# Patient Record
Sex: FEMALE | Race: WHITE | NOT HISPANIC OR LATINO | Employment: OTHER | URBAN - METROPOLITAN AREA
[De-identification: names, ages, dates, MRNs, and addresses within clinical notes are randomized per-mention and may not be internally consistent; named-entity substitution may affect disease eponyms.]

---

## 2017-11-22 ENCOUNTER — TELEPHONE (OUTPATIENT)
Dept: NEUROLOGY | Facility: CLINIC | Age: 77
End: 2017-11-22

## 2017-11-22 NOTE — TELEPHONE ENCOUNTER
----- Message from Jodee Allred sent at 11/22/2017 10:26 AM CST -----  Contact: Berenice with [facility] Nurse Navigator with Dr. Zachary Ramsey sent an electronic referral & also a faxed document marked urgent as well to Dr. Louise for patient to be seen as soon as possible.  Please call patient to schedule the appointment.  Call Back#for patient: [phone] or    (Dr. Ramsey's phone number )  Thanks

## 2017-11-22 NOTE — TELEPHONE ENCOUNTER
Returned call and spoke with patient. Patient stated she had a stroke and went to [facility]. Patient scheduled with Renetta Izaguirre. Patient is to bring records for [facility] to visit. Patient verbalized understanding.

## 2017-12-01 ENCOUNTER — TELEPHONE (OUTPATIENT)
Dept: NEUROLOGY | Facility: CLINIC | Age: 77
End: 2017-12-01

## 2017-12-01 NOTE — TELEPHONE ENCOUNTER
Spoke with the patient's husbamd, she was trying to get an appointment in [place].  He has a hard time getting his wife to places.  He was instructed to get her medical records from [facility] along with the disc of any imaging.  Can you see this patient.  He has an appointment scheduled.

## 2017-12-18 ENCOUNTER — TELEPHONE (OUTPATIENT)
Dept: NEUROLOGY | Facility: CLINIC | Age: 77
End: 2017-12-18

## 2018-05-22 ENCOUNTER — OFFICE VISIT (OUTPATIENT)
Dept: PULMONOLOGY | Facility: CLINIC | Age: 78
End: 2018-05-22
Payer: MEDICARE

## 2018-05-22 VITALS
OXYGEN SATURATION: 98 % | SYSTOLIC BLOOD PRESSURE: 121 MMHG | HEART RATE: 91 BPM | BODY MASS INDEX: 21.8 KG/M2 | HEIGHT: 67 IN | WEIGHT: 138.88 LBS | DIASTOLIC BLOOD PRESSURE: 72 MMHG

## 2018-05-22 DIAGNOSIS — J44.9 COPD MIXED TYPE: Primary | ICD-10-CM

## 2018-05-22 DIAGNOSIS — R09.89 CHRONIC SINUS COMPLAINTS: ICD-10-CM

## 2018-05-22 PROCEDURE — 3078F DIAST BP <80 MM HG: CPT | Mod: CPTII,S$GLB,, | Performed by: INTERNAL MEDICINE

## 2018-05-22 PROCEDURE — 99999 PR PBB SHADOW E&M-EST. PATIENT-LVL III: CPT | Mod: PBBFAC,,, | Performed by: INTERNAL MEDICINE

## 2018-05-22 PROCEDURE — 99204 OFFICE O/P NEW MOD 45 MIN: CPT | Mod: S$GLB,,, | Performed by: INTERNAL MEDICINE

## 2018-05-22 PROCEDURE — 3074F SYST BP LT 130 MM HG: CPT | Mod: CPTII,S$GLB,, | Performed by: INTERNAL MEDICINE

## 2018-05-22 RX ORDER — FLUOXETINE HYDROCHLORIDE 20 MG/1
CAPSULE ORAL
Refills: 0 | COMMUNITY
Start: 2018-04-02 | End: 2018-05-22 | Stop reason: SDUPTHER

## 2018-05-22 RX ORDER — FLUTICASONE PROPIONATE 50 MCG
2 SPRAY, SUSPENSION (ML) NASAL DAILY
Qty: 1 BOTTLE | Refills: 11 | Status: SHIPPED | OUTPATIENT
Start: 2018-05-22 | End: 2019-06-04 | Stop reason: SDUPTHER

## 2018-05-22 RX ORDER — FAMOTIDINE 20 MG/1
20 TABLET, FILM COATED ORAL NIGHTLY
Refills: 1 | COMMUNITY
Start: 2018-04-02

## 2018-05-22 RX ORDER — AZITHROMYCIN 500 MG/1
TABLET, FILM COATED ORAL
Qty: 3 TABLET | Refills: 3 | Status: ON HOLD | OUTPATIENT
Start: 2018-05-22 | End: 2019-10-18 | Stop reason: ALTCHOICE

## 2018-05-22 RX ORDER — PREDNISONE 20 MG/1
TABLET ORAL
Qty: 12 TABLET | Refills: 0 | Status: SHIPPED | OUTPATIENT
Start: 2018-05-22 | End: 2018-09-19 | Stop reason: SDUPTHER

## 2018-05-22 RX ORDER — CALCITRIOL 0.25 UG/1
0.25 CAPSULE ORAL DAILY
Refills: 3 | COMMUNITY
Start: 2018-03-22 | End: 2021-02-25

## 2018-05-22 RX ORDER — LEVOTHYROXINE SODIUM 100 UG/1
100 TABLET ORAL
COMMUNITY
End: 2024-03-09 | Stop reason: DRUGHIGH

## 2018-05-22 RX ORDER — IPRATROPIUM BROMIDE AND ALBUTEROL SULFATE 2.5; .5 MG/3ML; MG/3ML
3 SOLUTION RESPIRATORY (INHALATION) EVERY 6 HOURS PRN
Qty: 120 VIAL | Refills: 11 | Status: SHIPPED | OUTPATIENT
Start: 2018-05-22 | End: 2019-05-22

## 2018-05-22 RX ORDER — GABAPENTIN 100 MG/1
CAPSULE ORAL
Refills: 0 | COMMUNITY
Start: 2018-04-02 | End: 2018-05-22 | Stop reason: SDUPTHER

## 2018-05-22 RX ORDER — GABAPENTIN 300 MG/1
CAPSULE ORAL
COMMUNITY
End: 2020-02-17

## 2018-05-22 NOTE — LETTER
May 22, 2018      Zachary Ramsey MD  [address]  [address]  [facility]  [place] [address]           [facility] [facility]  [address]  [place] [address]  Phone: [phone]  Fax: [phone]          Patient: Althea Gaona   MR Number: [id number]   YOB: 1940   Date of Visit: 5/22/2018       Dear Dr. Zachary Ramsey:    Thank you for referring Althea Gaona to me for evaluation. Attached you will find relevant portions of my assessment and plan of care.    If you have questions, please do not hesitate to call me. I look forward to following Althea Gaona along with you.    Sincerely,    Matt Harvey MD    Enclosure  CC:  No Recipients    If you would like to receive this communication electronically, please contact [email or web address] or [phone] to request more information on [email or web address] Link access.    For providers and/or their staff who would like to refer a patient to [facility], please contact us through our one-stop-shop provider referral line, [facility], at [phone].    If you feel you have received this communication in error or would no longer like to receive these types of communications, please e-mail [email or web address]

## 2018-05-22 NOTE — PROGRESS NOTES
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  Fearful of dying.  Chr nausea and dizzy.  Was in hosp recently - hospitalized.  Had bipolar - usually ok but stressed with renal dz/illness.  Uses advair/combivent. Uses nebulizer.  Uses steroids occ.  trelegy helped well - better than advair.    No mucous, occ chest pains/rib pains.    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    Was seeing Dr Mckoy at [place], not recently , poor sleep on prozac 20/d - med list may be incomplete.    The chief compliant  problem is varies with instablilty at time   PFSH:  Past Medical History:   Diagnosis Date    Alcoholism     no drink since     Anxiety     Bipolar disorder     Chronic kidney disease     COPD (chronic obstructive pulmonary disease)     GERD (gastroesophageal reflux disease)     Hypertension     Pancreatitis     Thyroid disease          Past Surgical History:   Procedure Laterality Date    APPENDECTOMY      CATARACT EXTRACTION Right     EYE SURGERY      HYSTERECTOMY      THYROIDECTOMY      THYROIDECTOMY       Social History   Substance Use Topics    Smoking status: Former Smoker     Packs/day: 1.00     Types: Cigarettes     Quit date: 6/10/2015    Smokeless tobacco: Not on file    Alcohol use No     Family History   Problem Relation Age of Onset    No Known Problems Mother     Diabetes Father     Heart disease Father         blood clot in lung went to heart    Cancer Sister         breast    Stroke Paternal Aunt     Cancer Sister         breast    Cancer Cousin         colon    Diabetes Cousin     Cancer Cousin         colon    COPD Sister          of COPD     Review of patient's allergies indicates:   Allergen Reactions    Metoprolol Other (See  "Comments)     Grand-daughter/care giver reported Pt has over reaction to this drug, Bp bottoms out along with heart rate    Abilify [aripiprazole]      dizziness    Codeine      Other reaction(s): Unknown       Performance Status:The patient's activity level is mobility with asit devices.      Review of Systems:  a review of eleven systems covering constitutional, Eye, HEENT, Psych, Respiratory, Cardiac, GI, , Musculoskeletal, Endocrine, Dermatologic was negative except for pertinent findings as listed ABOVE and below:  Loss wt recently with renal failure, sinus stuffy, nerves getting by , still makes urine, chr back pains and back and rib pains.      Exam:Comprehensive exam done. /72 (BP Location: Right arm, Patient Position: Sitting)   Pulse 91   Ht 5' 7" (1.702 m)   Wt 63 kg (138 lb 14.2 oz)   SpO2 98%   BMI 21.75 kg/m²   Exam included Vitals as listed, and patient's appearance and affect and alertness and mood, oral exam for yeast and hygiene and pharynx lesions and Mallapatti (M) score, neck with inspection for jvd and masses and thyroid abnormalities and lymph nodes (supraclavicular and infraclavicular nodes and axillary also examined and noted if abn), chest exam included symmetry and effort and fremitus and percussion and auscultation, cardiac exam included rhythm and gallops and murmur and rubs and jvd and edema, abdominal exam for mass and hepatosplenomegaly and tenderness and hernias and bowel sounds, Musculoskeletal exam with muscle tone and posture and mobility/gait and  strength, and skin for rashes and cyanosis and pallor and turgor, extremity for clubbing.  Findings were normal except for pertinent findings listed below:   M2, chest is symmetric, no distress, normal percussion, normal fremitus and good normal breath sounds  Anxious.     Radiographs (ct chest and cxr) reviewed: view by direct vision  cxr nov 2016 good    Labs reviewed no recent         PFT was done and results to be " reviewed       Plan:  Clinical impression is resonably certain and repeated evaluation prn +/- follow up will be needed as below.     Althea was seen today for copd and shortness of breath.    Diagnoses and all orders for this visit:    COPD mixed type  -     PULSE OXIMETRY OVERNIGHT  -     fluticasone-umeclidin-vilanter (TRELEGY ELLIPTA) 100-62.5-25 mcg DsDv; Inhale 1 puff into the lungs once daily.  -     azithromycin (ZITHROMAX) 500 MG tablet; One daily for yellow mucous, repeat if needed  -     predniSONE (DELTASONE) 20 MG tablet; One daily for 3 days and repeat for flare of lung symptoms as intructed  -     albuterol-ipratropium (DUO-NEB) 2.5 mg-0.5 mg/3 mL nebulizer solution; Take 3 mLs by nebulization every 6 (six) hours as needed.    Chronic sinus complaints  -     fluticasone (FLONASE) 50 mcg/actuation nasal spray; 2 sprays (100 mcg total) by Each Nare route once daily.        Follow-up in about 4 months (around 9/22/2018).    Discussed with patient above for education the following:      Patient Instructions   Need to review old breathing test.    Check 02 level with sleep and arrange oxygen if needed    Use trelegy once daily.    Use combivent or nebulizer as needed.    If bad cough/wheezes - use prednisone daily for 3 days, repeat as needed.    If yellow mucous - take azithromycin.    flonase should help sinus drainage.

## 2018-05-22 NOTE — PATIENT INSTRUCTIONS
Need to review old breathing test.    Check 02 level with sleep and arrange oxygen if needed    Use trelegy once daily.    Use combivent or nebulizer as needed.    If bad cough/wheezes - use prednisone daily for 3 days, repeat as needed.    If yellow mucous - take azithromycin.    flonase should help sinus drainage.

## 2018-09-19 ENCOUNTER — OFFICE VISIT (OUTPATIENT)
Dept: PULMONOLOGY | Facility: CLINIC | Age: 78
End: 2018-09-19
Payer: MEDICARE

## 2018-09-19 VITALS
OXYGEN SATURATION: 96 % | SYSTOLIC BLOOD PRESSURE: 108 MMHG | HEART RATE: 86 BPM | BODY MASS INDEX: 21.8 KG/M2 | HEIGHT: 67 IN | DIASTOLIC BLOOD PRESSURE: 70 MMHG | WEIGHT: 138.88 LBS

## 2018-09-19 DIAGNOSIS — J44.9 COPD MIXED TYPE: ICD-10-CM

## 2018-09-19 DIAGNOSIS — J01.10 ACUTE NON-RECURRENT FRONTAL SINUSITIS: ICD-10-CM

## 2018-09-19 DIAGNOSIS — R06.02 SOB (SHORTNESS OF BREATH): Primary | ICD-10-CM

## 2018-09-19 PROCEDURE — 99213 OFFICE O/P EST LOW 20 MIN: CPT | Mod: PBBFAC,PO | Performed by: NURSE PRACTITIONER

## 2018-09-19 PROCEDURE — 1101F PT FALLS ASSESS-DOCD LE1/YR: CPT | Mod: CPTII,,, | Performed by: NURSE PRACTITIONER

## 2018-09-19 PROCEDURE — 3078F DIAST BP <80 MM HG: CPT | Mod: CPTII,,, | Performed by: NURSE PRACTITIONER

## 2018-09-19 PROCEDURE — 99214 OFFICE O/P EST MOD 30 MIN: CPT | Mod: S$PBB,,, | Performed by: NURSE PRACTITIONER

## 2018-09-19 PROCEDURE — 96372 THER/PROPH/DIAG INJ SC/IM: CPT | Mod: PBBFAC,PO

## 2018-09-19 PROCEDURE — 3074F SYST BP LT 130 MM HG: CPT | Mod: CPTII,,, | Performed by: NURSE PRACTITIONER

## 2018-09-19 PROCEDURE — 99999 PR PBB SHADOW E&M-EST. PATIENT-LVL III: CPT | Mod: PBBFAC,,, | Performed by: NURSE PRACTITIONER

## 2018-09-19 RX ORDER — BETAMETHASONE SODIUM PHOSPHATE AND BETAMETHASONE ACETATE 3; 3 MG/ML; MG/ML
6 INJECTION, SUSPENSION INTRA-ARTICULAR; INTRALESIONAL; INTRAMUSCULAR; SOFT TISSUE ONCE
Status: COMPLETED | OUTPATIENT
Start: 2018-09-19 | End: 2018-09-19

## 2018-09-19 RX ORDER — AZITHROMYCIN 500 MG/1
500 TABLET, FILM COATED ORAL DAILY
Qty: 5 TABLET | Refills: 0 | Status: SHIPPED | OUTPATIENT
Start: 2018-09-19 | End: 2018-09-24

## 2018-09-19 RX ORDER — PREDNISONE 20 MG/1
TABLET ORAL
Qty: 12 TABLET | Refills: 0 | Status: SHIPPED | OUTPATIENT
Start: 2018-09-19 | End: 2019-01-18 | Stop reason: SDUPTHER

## 2018-09-19 RX ORDER — BUSPIRONE HYDROCHLORIDE 10 MG/1
10 TABLET ORAL 3 TIMES DAILY
COMMUNITY
End: 2019-11-25 | Stop reason: SDUPTHER

## 2018-09-19 RX ADMIN — BETAMETHASONE ACETATE AND BETAMETHASONE SODIUM PHOSPHATE 6 MG: 3; 3 INJECTION, SUSPENSION INTRA-ARTICULAR; INTRALESIONAL; INTRAMUSCULAR; SOFT TISSUE at 02:09

## 2018-09-19 NOTE — PROGRESS NOTES
"9/19/2018    Althea Gaona  Office Note  Established Patient of Dr. Harvey. Patient is new to me.    Chief Complaint   Patient presents with    Shortness of Breath     "cant breath at night, weights on chest"    Cough    Dizziness       HPI: States dizziness through out day constant,  Associated with nausea takes zofran, SOB constant through day and night, states mild cough not productive, facial pain, sore throat,ear pain constant    Previous HPI Dr. 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  Fearful of dying.  Chr nausea and dizzy.  Was in hosp recently - hospitalized.  Had bipolar - usually ok but stressed with renal dz/illness.  Uses advair/combivent. Uses nebulizer.  Uses steroids occ.  trelegy helped well - better than advair.     No mucous, occ chest pains/rib pains.     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     Was seeing Dr Mckoy at [place], not recently , poor sleep on prozac 20/d - med list may be incomplete.    The chief compliant  problem is new to me   PFSH:  Past Medical History:   Diagnosis Date    Alcoholism     no drink since 1984    Anxiety     Bipolar disorder     Chronic kidney disease     COPD (chronic obstructive pulmonary disease)     GERD (gastroesophageal reflux disease)     Hypertension     Pancreatitis     Thyroid disease          Past Surgical History:   Procedure Laterality Date    APPENDECTOMY      CATARACT EXTRACTION Right     EYE SURGERY      HYSTERECTOMY      THYROIDECTOMY      THYROIDECTOMY       Social History     Tobacco Use    Smoking status: Former Smoker     Packs/day: 1.00     Types: Cigarettes     Last attempt to quit: 6/10/2015     Years since quitting: 3.2   Substance Use Topics    Alcohol use: No    Drug use: Not on file     Family History   Problem Relation Age of Onset    No Known Problems Mother     Diabetes Father     Heart disease Father         blood clot in " lung went to heart    Cancer Sister         breast    Stroke Paternal Aunt     Cancer Sister         breast    Cancer Cousin         colon    Diabetes Cousin     Cancer Cousin         colon    COPD Sister          of COPD     Review of patient's allergies indicates:   Allergen Reactions    Metoprolol Other (See Comments)     Grand-daughter/care giver reported Pt has over reaction to this drug, Bp bottoms out along with heart rate    Abilify [aripiprazole]      dizziness    Codeine      Other reaction(s): Unknown     I have reviewed past medical, family, and social history. I have reviewed previous nurse notes.    Performance Status:The patient's activity level is housebound activities.      Review of Systems   Constitutional: Negative for activity change, appetite change, diaphoresis, fatigue, fever and unexpected weight change. Positive for chills  HENT: Negative for dental problem, postnasal drip, rhinorrhea,sinus pain, sneezing, trouble swallowing and voice change.  Positive for sinus pressure, sore throat  Respiratory: Negative for apnea, cough, chest tightness, shortness of breath, wheezing and stridor.    Cardiovascular: Negative for palpitations and leg swelling. Positive for chest pain,  Gastrointestinal: Negative for abdominal distention, abdominal pain, constipation and nausea.   Musculoskeletal:  Positive for gait problems, uses cane, back pain, neck pain  Skin: Negative for color change and pallor.   Allergic/Immunologic: Negative for environmental allergies and food allergies.   Neurological: Negative for speech difficulty, weakness, , numbness Positive for dizziness, light-headedness, headaches  Hematological: Negative for adenopathy. Does not bruise/bleed easily.   Psychiatric/Behavioral: Negative for dysphoric mood and sleep disturbance. The patient is nervous/anxious.           Exam:Comprehensive exam done. /70 (BP Location: Right arm, Patient Position: Sitting)   Pulse 86   Ht  "5' 7" (1.702 m)   Wt 63 kg (138 lb 14.2 oz)   SpO2 96% Comment: on room air  BMI 21.75 kg/m²   Exam included Vitals as listed  Constitutional: He is oriented to person, place, and time. He appears well-developed. No distress.   Nose: Nose normal.   Mouth/Throat: Uvula is midline, oropharynx is clear and moist and mucous membranes are normal. No dental caries. No oropharyngeal exudate, posterior oropharyngeal edema, posterior oropharyngeal erythema or tonsillar abscesses.  Mallapatti (M) score II  Eyes: Pupils are equal, round, and reactive to light.   Neck: No JVD present. No thyromegaly present.   Cardiovascular: Normal rate, regular rhythm and normal heart sounds. Exam reveals no gallop and no friction rub.   No murmur heard.  Pulmonary/Chest: Effort normal and breath sounds normal. No accessory muscle usage or stridor. No apnea and no tachypnea. No respiratory distress. He has no decreased breath sounds. He has no wheezes. He has no rhonchi. He has no rales. He exhibits no tenderness.   Abdominal: Soft. He exhibits no mass. There is no tenderness. No hepatosplenomegaly, hernias and normoactive bowel sounds  Musculoskeletal: Normal range of motion. He exhibits no edema.   Lymphadenopathy:     Has small left submandibular adenopathy, smooth, tender, movable    He has no axillary adenopathy.   Neurological: He is alert and oriented to person, place, and time. He is not disoriented.   Skin: Skin is warm and dry. Capillary refill takes less 2 sec. No cyanosis or erythema. No pallor. Nails show no clubbing.   Psychiatric: He has a normal mood and affect. His behavior is normal. Judgment and thought content normal.       Radiographs (ct chest and cxr) reviewed: was not done recently      Labs reviewed   Lab Results   Component Value Date    WBC 9.90 11/18/2016    RBC 3.51 (L) 11/18/2016    HGB 11.1 (L) 11/18/2016    HCT 33.4 (L) 11/18/2016    MCV 95 11/18/2016    MCH 31.5 (H) 11/18/2016    MCHC 33.0 11/18/2016    RDW " 15.0 (H) 11/18/2016     11/18/2016    MPV 9.6 11/18/2016    GRAN 7.5 11/18/2016    GRAN 75.9 (H) 11/18/2016    LYMPH 1.2 11/18/2016    LYMPH 12.3 (L) 11/18/2016    MONO 1.1 (H) 11/18/2016    MONO 11.4 11/18/2016    EOS 0.0 11/18/2016    BASO 0.00 11/18/2016    EOSINOPHIL 0.1 11/18/2016    BASOPHIL 0.3 11/18/2016       PFT was not available      Plan:  Clinical impression is apparently straight forward and impression with management as below.    Althea was seen today for shortness of breath, cough and dizziness.    Diagnoses and all orders for this visit:    SOB (shortness of breath)    Acute non-recurrent frontal sinusitis    COPD mixed type    Spent over half of visit educating patient on effect of SOB and sinusitis on Headache and dizziness.    Follow-up in about 2 months (around 11/19/2018), or if symptoms worsen or fail to improve.    Discussed with patient above for education the following:      Patient Instructions   Sinusitis infection  Celestone shot today  Azithromycin 500 mg once a day for five days  Prednisone 20 mg once a day for three days, repeat if needed  Flonase 2 sprays in each nose once a day  Dizziness and headache should resolve with sinusitis resolves      COPD   Continue current medications Trelegy

## 2018-09-19 NOTE — PATIENT INSTRUCTIONS
Sinusitis infection  Celestone shot today  Azithromycin 500 mg once a day for five days  Prednisone 20 mg once a day for three days, repeat if needed  Flonase 2 sprays in each nose once a day  Dizziness and headache should resolve with sinusitis resolves      COPD   Continue current medications Trelegy

## 2018-09-21 ENCOUNTER — OFFICE VISIT (OUTPATIENT)
Dept: PULMONOLOGY | Facility: CLINIC | Age: 78
End: 2018-09-21
Payer: MEDICARE

## 2018-09-21 VITALS
BODY MASS INDEX: 21.97 KG/M2 | WEIGHT: 140 LBS | DIASTOLIC BLOOD PRESSURE: 67 MMHG | SYSTOLIC BLOOD PRESSURE: 110 MMHG | HEIGHT: 67 IN | OXYGEN SATURATION: 97 % | HEART RATE: 81 BPM

## 2018-09-21 DIAGNOSIS — R06.02 SOB (SHORTNESS OF BREATH): Primary | ICD-10-CM

## 2018-09-21 DIAGNOSIS — R42 DIZZINESS OF UNKNOWN CAUSE: ICD-10-CM

## 2018-09-21 DIAGNOSIS — J32.9 CHRONIC SINUSITIS, UNSPECIFIED LOCATION: ICD-10-CM

## 2018-09-21 PROCEDURE — 3078F DIAST BP <80 MM HG: CPT | Mod: CPTII,,, | Performed by: NURSE PRACTITIONER

## 2018-09-21 PROCEDURE — 99213 OFFICE O/P EST LOW 20 MIN: CPT | Mod: PBBFAC,PO | Performed by: NURSE PRACTITIONER

## 2018-09-21 PROCEDURE — 99999 PR PBB SHADOW E&M-EST. PATIENT-LVL III: CPT | Mod: PBBFAC,,, | Performed by: NURSE PRACTITIONER

## 2018-09-21 PROCEDURE — 1101F PT FALLS ASSESS-DOCD LE1/YR: CPT | Mod: CPTII,,, | Performed by: NURSE PRACTITIONER

## 2018-09-21 PROCEDURE — 3074F SYST BP LT 130 MM HG: CPT | Mod: CPTII,,, | Performed by: NURSE PRACTITIONER

## 2018-09-21 PROCEDURE — 99213 OFFICE O/P EST LOW 20 MIN: CPT | Mod: S$PBB,,, | Performed by: NURSE PRACTITIONER

## 2018-09-21 RX ORDER — CIPROFLOXACIN 250 MG/1
250 TABLET, FILM COATED ORAL 2 TIMES DAILY
COMMUNITY
End: 2019-10-15

## 2018-09-21 NOTE — PROGRESS NOTES
9/21/2018    Althea Gaona  Office Note    Chief Complaint   Patient presents with    Shortness of Breath    Cough    Chest Pain    Dizziness       HPI:pt arrives today alone, states she is feeling better with her breathing, no cough, finished her antibiotic and three days of prednisone 20mg . Sinus pressure has decreased.     States dizziness has not resolved but gotten worse. States she uses a cane to walk and fells like she is going to fall sometimes. Dizziness worse during day, says she can not turn over in bed. Worsens with standing. States nausea but no vomiting, takes zofran every morning.      9/19/18   States dizziness through out day constant,  Associated with nausea takes zofran, SOB constant through day and night, states mild cough not productive, facial pain, sore throat,ear pain constant     Previous HPI Dr. 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  Fearful of dying.  Chr nausea and dizzy.  Was in hosp recently - hospitalized.  Had bipolar - usually ok but stressed with renal dz/illness.  Uses advair/combivent. Uses nebulizer.  Uses steroids occ.  trelegy helped well - better than advair.     No mucous, occ chest pains/rib pains.     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     Was seeing Dr Mckoy at [place], not recently ,    The chief compliant  problem is worsening  PFSH:  Past Medical History:   Diagnosis Date    Alcoholism     no drink since 1984    Anxiety     Bipolar disorder     Chronic kidney disease     COPD (chronic obstructive pulmonary disease)     GERD (gastroesophageal reflux disease)     Hypertension     Pancreatitis     Thyroid disease          Past Surgical History:   Procedure Laterality Date    APPENDECTOMY      CATARACT EXTRACTION Right     EYE SURGERY      HYSTERECTOMY      THYROIDECTOMY      THYROIDECTOMY       Social History     Tobacco Use    Smoking status: Former Smoker      Packs/day: 1.00     Types: Cigarettes     Last attempt to quit: 6/10/2015     Years since quitting: 3.2   Substance Use Topics    Alcohol use: No    Drug use: Not on file     Family History   Problem Relation Age of Onset    No Known Problems Mother     Diabetes Father     Heart disease Father         blood clot in lung went to heart    Cancer Sister         breast    Stroke Paternal Aunt     Cancer Sister         breast    Cancer Cousin         colon    Diabetes Cousin     Cancer Cousin         colon    COPD Sister          of COPD     Review of patient's allergies indicates:   Allergen Reactions    Metoprolol Other (See Comments)     Grand-daughter/care giver reported Pt has over reaction to this drug, Bp bottoms out along with heart rate    Abilify [aripiprazole]      dizziness    Codeine      Other reaction(s): Unknown     I have reviewed past medical, family, and social history. I have reviewed previous nurse notes.    Performance Status:The patient's activity level is housebound activities.        Review of Systems   Constitutional: Negative for activity change, appetite change, chills, diaphoresis, fatigue, fever and unexpected weight change.   HENT: Negative for dental problem, , trouble swallowing and voice change.  Positive for postnasal drip, rhinorrhea, sinus pressure, sinus pain, sneezing, sore throat  Respiratory: Negative for apnea, cough, chest tightness, shortness of breath, wheezing and stridor.    Cardiovascular: Negative for chest pain, palpitations and leg swelling.   Gastrointestinal: Negative for abdominal distention, abdominal pain, constipation and nausea.   Musculoskeletal: Negative for myalgias and neck pain. Positive for unstable Gait  Skin: Negative for color change and pallor.   Allergic/Immunologic: Negative for environmental allergies and food allergies.   Neurological: Negative for  speech difficulty, weakness, numbness and headaches. Positive for dizziness,  "light-headedness  Hematological: Negative for adenopathy. Does not bruise/bleed easily.   Psychiatric/Behavioral: Negative for dysphoric mood and sleep disturbance. The patient is not nervous/anxious.           Exam:Comprehensive exam done. /67 (BP Location: Right arm, Patient Position: Sitting)   Pulse 81   Ht 5' 7" (1.702 m)   Wt 63.5 kg (139 lb 15.9 oz)   SpO2 97% Comment: on room air  BMI 21.93 kg/m²   Exam included Vitals as listed  Constitutional: He is oriented to person, place, and time. He appears well-developed. No distress.   Nose: Nose normal.   Mouth/Throat: Uvula is midline, oropharynx is clear and moist and mucous membranes are normal. No dental caries. No oropharyngeal exudate, posterior oropharyngeal edema, posterior oropharyngeal erythema or tonsillar abscesses.  Mallapatti (M) score II  Eyes: Pupils are equal, round, and reactive to light.   Neck: No JVD present. No thyromegaly present.   Cardiovascular: Normal rate, regular rhythm and normal heart sounds. Exam reveals no gallop and no friction rub.   No murmur heard.  Pulmonary/Chest: Effort normal and breath sounds normal. No accessory muscle usage or stridor. No apnea and no tachypnea. No respiratory distress. He has no decreased breath sounds. He has no wheezes. He has no rhonchi. He has no rales. He exhibits no tenderness.   Abdominal: Soft. He exhibits no mass. There is no tenderness. No hepatosplenomegaly, hernias and normoactive bowel sounds  Musculoskeletal: Normal range of motion. He exhibits no edema.   Lymphadenopathy:     no cervical adenopathy except Left submandibular adenopathy    no axillary adenopathy.   Neurological: He is alert and oriented to person, place, and time.  not disoriented.   Skin: Skin is warm and dry. Capillary refill takes less 2 sec. No cyanosis or erythema. No pallor. Nails show no clubbing.   Psychiatric: normal mood and affect. behavior is normal. Judgment and thought content normal.     CT brain " reviewed by direct vision from 8/27/15. Unclear the patency of sinus cavity.    Labs reviewed    Lab Results   Component Value Date    WBC 9.90 11/18/2016    RBC 3.51 (L) 11/18/2016    HGB 11.1 (L) 11/18/2016    HCT 33.4 (L) 11/18/2016    MCV 95 11/18/2016    MCH 31.5 (H) 11/18/2016    MCHC 33.0 11/18/2016    RDW 15.0 (H) 11/18/2016     11/18/2016    MPV 9.6 11/18/2016    GRAN 7.5 11/18/2016    GRAN 75.9 (H) 11/18/2016    LYMPH 1.2 11/18/2016    LYMPH 12.3 (L) 11/18/2016    MONO 1.1 (H) 11/18/2016    MONO 11.4 11/18/2016    EOS 0.0 11/18/2016    BASO 0.00 11/18/2016    EOSINOPHIL 0.1 11/18/2016    BASOPHIL 0.3 11/18/2016         Plan:  Clinical impression is resonably certain and repeated evaluation prn +/- follow up will be needed as below.    Althea was seen today for shortness of breath, cough, chest pain and dizziness.    Diagnoses and all orders for this visit:    SOB (shortness of breath)   -Continue current medications    Dizziness of unknown cause  -     CT Sinuses without Contrast; Future  - Possible Neurology referral, pt wants provider in [place] will wait till sinusitis resolved    Chronic sinusitis, unspecified location  -     CT Sinuses without Contrast; Future  - Possible ENT referral, will wait till CT sinus completed        Follow-up if symptoms worsen or fail to improve.    Discussed with patient above for education the following:      Patient Instructions   Dizziness:  CT sinuses    Possible referral to ENT and Neurology, local providers only due to difficulty with transportation    Continue current medications  Use Trelegy daily  When SOB use Nebulizer medications Combivent  When SOB persists after using Nebulizer medications  Then Use prednisone 20 mg once a day for three days Only when SOB not resolved with inhalers, Prednisone has side effects

## 2018-09-21 NOTE — PATIENT INSTRUCTIONS
Dizziness:  CT sinuses    Possible referral to ENT and Neurology, local providers only due to difficulty with transportation    Continue current medications  Use Trelegy daily  When SOB use Nebulizer medications Combivent  When SOB persists after using Nebulizer medications  Then Use prednisone 20 mg once a day for three days Only when SOB not resolved with inhalers, Prednisone has side effects

## 2018-09-24 ENCOUNTER — HOSPITAL ENCOUNTER (OUTPATIENT)
Dept: RADIOLOGY | Facility: HOSPITAL | Age: 78
Discharge: HOME | End: 2018-09-24
Attending: NURSE PRACTITIONER
Payer: MEDICARE

## 2018-09-24 ENCOUNTER — HOSPITAL ENCOUNTER (OUTPATIENT)
Dept: RESPIRATORY THERAPY | Facility: HOSPITAL | Age: 78
Discharge: HOME | End: 2018-09-24
Attending: NURSE PRACTITIONER
Payer: MEDICARE

## 2018-09-24 DIAGNOSIS — J01.10 ACUTE NON-RECURRENT FRONTAL SINUSITIS: ICD-10-CM

## 2018-09-24 DIAGNOSIS — J44.9 COPD MIXED TYPE: ICD-10-CM

## 2018-09-24 DIAGNOSIS — J32.9 CHRONIC SINUSITIS, UNSPECIFIED LOCATION: ICD-10-CM

## 2018-09-24 DIAGNOSIS — R06.02 SOB (SHORTNESS OF BREATH): ICD-10-CM

## 2018-09-24 DIAGNOSIS — R42 DIZZINESS OF UNKNOWN CAUSE: ICD-10-CM

## 2018-09-24 LAB — BR6MWT: NORMAL

## 2018-09-24 PROCEDURE — 70486 CT MAXILLOFACIAL W/O DYE: CPT | Mod: TC

## 2018-09-24 PROCEDURE — 70486 CT MAXILLOFACIAL W/O DYE: CPT | Mod: 26,,, | Performed by: RADIOLOGY

## 2018-09-24 PROCEDURE — 94618 PULMONARY STRESS TESTING: CPT

## 2018-09-25 ENCOUNTER — TELEPHONE (OUTPATIENT)
Dept: PULMONOLOGY | Facility: CLINIC | Age: 78
End: 2018-09-25

## 2018-09-25 DIAGNOSIS — J44.9 CHRONIC OBSTRUCTIVE PULMONARY DISEASE, UNSPECIFIED COPD TYPE: ICD-10-CM

## 2018-09-25 DIAGNOSIS — R06.02 SOB (SHORTNESS OF BREATH): Primary | ICD-10-CM

## 2018-09-25 NOTE — TELEPHONE ENCOUNTER
Contacted pt with the following results per NP. No further action needed.     ----- Message from Edith Narayanan NP sent at 9/25/2018  9:00 AM CDT -----  Contact patient to Let her know her CT sinus came back good. Sinus infection has cleared.

## 2018-09-25 NOTE — TELEPHONE ENCOUNTER
Faxed LMN to [facility] to set up POC. No further action needed. ----- Message from Edith Narayanan NP sent at 9/25/2018  8:58 AM CDT -----  Arrange oxygen 2 L per min. Concentrator POC

## 2018-10-01 ENCOUNTER — TELEPHONE (OUTPATIENT)
Dept: PULMONOLOGY | Facility: CLINIC | Age: 78
End: 2018-10-01

## 2018-10-01 NOTE — TELEPHONE ENCOUNTER
Contacted pt and notified that orders were sent to [facility] and to call [facility] for an update. Pt verbalized understanding. No further action needed.     ----- Message from Kasie Royal sent at 10/1/2018 12:35 PM CDT -----  Please call [phone]  Asking for update / has not heard from the oxygen company

## 2018-11-19 ENCOUNTER — OFFICE VISIT (OUTPATIENT)
Dept: PULMONOLOGY | Facility: CLINIC | Age: 78
End: 2018-11-19
Payer: MEDICARE

## 2018-11-19 VITALS
WEIGHT: 149.94 LBS | OXYGEN SATURATION: 94 % | HEART RATE: 90 BPM | DIASTOLIC BLOOD PRESSURE: 58 MMHG | BODY MASS INDEX: 23.53 KG/M2 | SYSTOLIC BLOOD PRESSURE: 108 MMHG | HEIGHT: 67 IN

## 2018-11-19 DIAGNOSIS — R06.02 SOB (SHORTNESS OF BREATH): Primary | ICD-10-CM

## 2018-11-19 DIAGNOSIS — H04.122 DRY EYE OF LEFT SIDE: ICD-10-CM

## 2018-11-19 DIAGNOSIS — J44.9 CHRONIC OBSTRUCTIVE PULMONARY DISEASE, UNSPECIFIED COPD TYPE: ICD-10-CM

## 2018-11-19 PROCEDURE — 99999 PR PBB SHADOW E&M-EST. PATIENT-LVL IV: CPT | Mod: PBBFAC,,, | Performed by: NURSE PRACTITIONER

## 2018-11-19 PROCEDURE — 1101F PT FALLS ASSESS-DOCD LE1/YR: CPT | Mod: CPTII,S$GLB,, | Performed by: INTERNAL MEDICINE

## 2018-11-19 PROCEDURE — 99213 OFFICE O/P EST LOW 20 MIN: CPT | Mod: S$GLB,,, | Performed by: INTERNAL MEDICINE

## 2018-11-19 PROCEDURE — 3074F SYST BP LT 130 MM HG: CPT | Mod: CPTII,S$GLB,, | Performed by: INTERNAL MEDICINE

## 2018-11-19 PROCEDURE — 3078F DIAST BP <80 MM HG: CPT | Mod: CPTII,S$GLB,, | Performed by: INTERNAL MEDICINE

## 2018-11-19 RX ORDER — POLYVINYL ALCOHOL 14 MG/ML
1 SOLUTION/ DROPS OPHTHALMIC
Qty: 15 ML | Refills: 0 | Status: SHIPPED | OUTPATIENT
Start: 2018-11-19 | End: 2021-02-25

## 2018-11-19 NOTE — PROGRESS NOTES
11/19/2018    Althea Gaona  Patient Seen Years Ago And Here For Evaluation    Chief Complaint   Patient presents with    Follow-up     2 month     Nov 19, 2018- left pink eye with hurting burning itches.  Has chr sinusitis.  Son  With pt - relates 10 cats,  And 4 dogs.  Breathing good.      Saw NP Oracio in  Sept - got couple steroid shots- HPI:pt arrives today alone, states she is feeling better with her breathing, no cough, finished her antibiotic and three days of prednisone 20mg . Sinus pressure has decreased.      States dizziness has not resolved but gotten worse. States she uses a cane to walk and fells like she is going to fall sometimes. Dizziness worse during day, says she can not turn over in bed. Worsens with standing. States nausea but no vomiting, takes zofran every morning.       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  Fearful of dying.  Chr nausea and dizzy.  Was in hosp recently - hospitalized.  Had bipolar - usually ok but stressed with renal dz/illness.  Uses advair/combivent. Uses nebulizer.  Uses steroids occ.  trelegy helped well - better than advair.    No mucous, occ chest pains/rib pains.    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    Was seeing Dr Mckoy at [place], not recently , poor sleep on prozac 20/d - med list may be incomplete.    The chief compliant  problem is varies with instablilty at time   PFSH:  Past Medical History:   Diagnosis Date    Alcoholism     no drink since 1984    Anxiety     Bipolar disorder     Chronic kidney disease     COPD (chronic obstructive pulmonary disease)     GERD (gastroesophageal reflux disease)     Hypertension     Pancreatitis     Thyroid disease   "        Past Surgical History:   Procedure Laterality Date    APPENDECTOMY      CATARACT EXTRACTION Right     EYE SURGERY      HYSTERECTOMY      THYROIDECTOMY      THYROIDECTOMY       Social History     Tobacco Use    Smoking status: Former Smoker     Packs/day: 1.00     Types: Cigarettes     Last attempt to quit: 6/10/2015     Years since quitting: 3.4   Substance Use Topics    Alcohol use: No    Drug use: Not on file     Family History   Problem Relation Age of Onset    No Known Problems Mother     Diabetes Father     Heart disease Father         blood clot in lung went to heart    Cancer Sister         breast    Stroke Paternal Aunt     Cancer Sister         breast    Cancer Cousin         colon    Diabetes Cousin     Cancer Cousin         colon    COPD Sister          of COPD     Review of patient's allergies indicates:   Allergen Reactions    Metoprolol Other (See Comments)     Grand-daughter/care giver reported Pt has over reaction to this drug, Bp bottoms out along with heart rate    Abilify [aripiprazole]      dizziness    Codeine      Other reaction(s): Unknown       Performance Status:The patient's activity level is mobility with asit devices.      Review of Systems:  a review of eleven systems covering constitutional, Eye, HEENT, Psych, Respiratory, Cardiac, GI, , Musculoskeletal, Endocrine, Dermatologic was negative except for pertinent findings as listed ABOVE and below:  Loss wt recently with renal failure, sinus stuffy, nerves getting by , still makes urine, chr back pains and back and rib pains.      Exam:Comprehensive exam done. BP (!) 108/58 (BP Location: Right arm, Patient Position: Sitting)   Pulse 90   Ht 5' 7" (1.702 m)   Wt 68 kg (149 lb 14.6 oz)   SpO2 (!) 94% Comment: on room air  BMI 23.48 kg/m²   Exam included Vitals as listed, and patient's appearance and affect and alertness and mood, oral exam for yeast and hygiene and pharynx lesions and Mallapatti (M) " score, neck with inspection for jvd and masses and thyroid abnormalities and lymph nodes (supraclavicular and infraclavicular nodes and axillary also examined and noted if abn), chest exam included symmetry and effort and fremitus and percussion and auscultation, cardiac exam included rhythm and gallops and murmur and rubs and jvd and edema, abdominal exam for mass and hepatosplenomegaly and tenderness and hernias and bowel sounds, Musculoskeletal exam with muscle tone and posture and mobility/gait and  strength, and skin for rashes and cyanosis and pallor and turgor, extremity for clubbing.  Findings were normal except for pertinent findings listed below:   M2, chest is symmetric, no distress, normal percussion, normal fremitus and good normal breath sounds  Anxious.     Radiographs (ct chest and cxr) reviewed: view by direct vision  cxr nov 2016 good,   cxr aug 2018 nad.  Labs reviewed no recent         PFT was done and results to be reviewed       Plan:  Clinical impression is resonably certain and repeated evaluation prn +/- follow up will be needed as below.     Althea was seen today for follow-up.    Diagnoses and all orders for this visit:    SOB (shortness of breath)    Dry eye of left side  -     polyvinyl alcohol, artificial tears, (LIQUIFILM TEARS) 1.4 % ophthalmic solution; Place 1 drop into the left eye as needed.    Chronic obstructive pulmonary disease, unspecified COPD type        Follow-up in about 2 months (around 1/19/2019), or if symptoms worsen or fail to improve.    Discussed with patient above for education the following:      Patient Instructions   Take prednisone once get home, should be same as shot.       Chest xray was in august

## 2018-12-03 DIAGNOSIS — R92.8 ABNORMAL FINDINGS ON DIAGNOSTIC IMAGING OF BREAST: Primary | ICD-10-CM

## 2019-01-17 NOTE — PROGRESS NOTES
1/18/2019    Althea Gaona  Office Note    Chief Complaint   Patient presents with    Follow-up     2 month, would like a steroid shot    COPD     1/18/2019- Complaint of right side hip and leg pain onset 3 weeks ago,relieved with heating pad.  Breathing- SOB with exertion, chest tightness,   Sinus pain and pressure- onset recurrent, non drainage, no cough  Trouble swallowing, chocking when eating and drinking,   No prednisone use since last appointment.  Nausea- states daily, through out day, zofran not helpful,     Nov 19, 2018- left pink eye with hurting burning itches.  Has chr sinusitis.  Son  With pt - relates 10 cats,  And 4 dogs.  Breathing good.      Saw NP Oracio in  Sept - got couple steroid shots- HPI:pt arrives today alone, states she is feeling better with her breathing, no cough, finished her antibiotic and three days of prednisone 20mg . Sinus pressure has decreased.      States dizziness has not resolved but gotten worse. States she uses a cane to walk and fells like she is going to fall sometimes. Dizziness worse during day, says she can not turn over in bed. Worsens with standing. States nausea but no vomiting, takes zofran every morning.       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  Fearful of dying.  Chr nausea and dizzy.  Was in hosp recently - hospitalized.  Had bipolar - usually ok but stressed with renal dz/illness.  Uses advair/combivent. Uses nebulizer.  Uses steroids occ.  trelegy helped well - better than advair.    No mucous, occ chest pains/rib pains.    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    Was seeing Dr Mckoy at [place], not recently , poor sleep on prozac 20/d - med list may be  incomplete.    The chief compliant  problem is varies with instablilty at time   PFSH:  Past Medical History:   Diagnosis Date    Alcoholism     no drink since     Anxiety     Bipolar disorder     Chronic kidney disease     COPD (chronic obstructive pulmonary disease)     GERD (gastroesophageal reflux disease)     Hypertension     Pancreatitis     Thyroid disease          Past Surgical History:   Procedure Laterality Date    APPENDECTOMY      CATARACT EXTRACTION Right     EYE SURGERY      HYSTERECTOMY      THYROIDECTOMY      THYROIDECTOMY       Social History     Tobacco Use    Smoking status: Current Some Day Smoker     Packs/day: 1.00     Types: Cigarettes   Substance Use Topics    Alcohol use: No    Drug use: Not on file     Family History   Problem Relation Age of Onset    No Known Problems Mother     Diabetes Father     Heart disease Father         blood clot in lung went to heart    Cancer Sister         breast    Stroke Paternal Aunt     Cancer Sister         breast    Cancer Cousin         colon    Diabetes Cousin     Cancer Cousin         colon    COPD Sister          of COPD     Review of patient's allergies indicates:   Allergen Reactions    Metoprolol Other (See Comments)     Grand-daughter/care giver reported Pt has over reaction to this drug, Bp bottoms out along with heart rate    Abilify [aripiprazole]      dizziness    Codeine      Other reaction(s): Unknown       Performance Status:The patient's activity level is mobility with asit devices.      Review of Systems:  a review of eleven systems covering constitutional, Eye, HEENT, Psych, Respiratory, Cardiac, GI, , Musculoskeletal, Endocrine, Dermatologic was negative except for pertinent findings as listed ABOVE and below:  Loss wt recently with renal failure, sinus stuffy, nerves getting by , still makes urine, chr back pains and back and rib pains.  Trouble swallowing.    Exam:Comprehensive exam done. BP  "127/76 (BP Location: Right arm, Patient Position: Sitting)   Pulse 85   Ht 5' 7" (1.702 m)   Wt 67.2 kg (148 lb 2.4 oz)   SpO2 97% Comment: on room air  BMI 23.20 kg/m²   Exam included Vitals as listed, and patient's appearance and affect and alertness and mood, oral exam for yeast and hygiene and pharynx lesions and Mallapatti (M) score, neck with inspection for jvd and masses and thyroid abnormalities and lymph nodes (supraclavicular and infraclavicular nodes and axillary also examined and noted if abn), chest exam included symmetry and effort and fremitus and percussion and auscultation, cardiac exam included rhythm and gallops and murmur and rubs and jvd and edema, abdominal exam for mass and hepatosplenomegaly and tenderness and hernias and bowel sounds, Musculoskeletal exam with muscle tone and posture and mobility/gait and  strength, and skin for rashes and cyanosis and pallor and turgor, extremity for clubbing.  Findings were normal except for pertinent findings listed below:   M2, chest is symmetric, no distress, normal percussion, normal fremitus and good normal breath sounds, slight bilateral wheeze.  Anxious.     Radiographs (ct chest and cxr) reviewed: view by direct vision  cxr nov 2016 good,   cxr aug 2018 nad.      Labs reviewed no recent  Since 2016, Blood work followed by dialysis        PFT was not done  6 min walk study was accomplished non 04/20/2018.  Patient's room air saturation was 93% at rest.  After walking 2 min O2 sat was 88% with a heart rate of 114. Patient required 2 L of nasal oxygen to keep sat in the mid upper 90s.  Patient was able to   walk 108 m. heart rate craito from 87 up to 114 during activity.       Plan:  Clinical impression is resonably certain and repeated evaluation prn +/- follow up will be needed as below.     Althea was seen today for follow-up and copd.    Diagnoses and all orders for this visit:    Chronic obstructive pulmonary disease, unspecified COPD " type  -     betamethasone acetate-betamethasone sodium phosphate injection 6 mg  -     predniSONE (DELTASONE) 20 MG tablet; One daily for 3 days and repeat for flare of lung symptoms as intructed    COPD mixed type  -     predniSONE (DELTASONE) 20 MG tablet; One daily for 3 days and repeat for flare of lung symptoms as intructed    SOB (shortness of breath)  -     predniSONE (DELTASONE) 20 MG tablet; One daily for 3 days and repeat for flare of lung symptoms as intructed    Acute non-recurrent frontal sinusitis  -     predniSONE (DELTASONE) 20 MG tablet; One daily for 3 days and repeat for flare of lung symptoms as intructed    Nausea in adult    Right hip pain        Follow-up in about 2 months (around 3/18/2019), or if symptoms worsen or fail to improve.    Discussed with patient above for education the following:    Total face-to-face time with the patient was 40 minutes and greater than 50% was spent in counseling and coordination of care. The above assessment and plan have been discussed at length. Labs and procedure reviewed. Problem List updated.    Patient Instructions   Mention nausea and right hip pain to nephrologist at dialysis, you are currently on zofran. Continue using.   Not able to prescribe pain medication for hip pain    COPD: Continue current treatment    Contact Dr. Hand Gastroenterologist about swallowing difficulty    Celestone injection in clinic for SOB, sinus pressure

## 2019-01-18 ENCOUNTER — OFFICE VISIT (OUTPATIENT)
Dept: PULMONOLOGY | Facility: CLINIC | Age: 79
End: 2019-01-18
Payer: MEDICARE

## 2019-01-18 VITALS
DIASTOLIC BLOOD PRESSURE: 76 MMHG | SYSTOLIC BLOOD PRESSURE: 127 MMHG | HEIGHT: 67 IN | HEART RATE: 85 BPM | BODY MASS INDEX: 23.25 KG/M2 | OXYGEN SATURATION: 97 % | WEIGHT: 148.13 LBS

## 2019-01-18 DIAGNOSIS — J44.9 COPD MIXED TYPE: ICD-10-CM

## 2019-01-18 DIAGNOSIS — J01.10 ACUTE NON-RECURRENT FRONTAL SINUSITIS: ICD-10-CM

## 2019-01-18 DIAGNOSIS — R06.02 SOB (SHORTNESS OF BREATH): ICD-10-CM

## 2019-01-18 DIAGNOSIS — R11.0 NAUSEA IN ADULT: ICD-10-CM

## 2019-01-18 DIAGNOSIS — J44.9 CHRONIC OBSTRUCTIVE PULMONARY DISEASE, UNSPECIFIED COPD TYPE: Primary | ICD-10-CM

## 2019-01-18 DIAGNOSIS — M25.551 RIGHT HIP PAIN: ICD-10-CM

## 2019-01-18 PROCEDURE — 96372 PR INJECTION,THERAP/PROPH/DIAG2ST, IM OR SUBCUT: ICD-10-PCS | Mod: S$GLB,,, | Performed by: NURSE PRACTITIONER

## 2019-01-18 PROCEDURE — 3074F PR MOST RECENT SYSTOLIC BLOOD PRESSURE < 130 MM HG: ICD-10-PCS | Mod: CPTII,S$GLB,, | Performed by: NURSE PRACTITIONER

## 2019-01-18 PROCEDURE — 3074F SYST BP LT 130 MM HG: CPT | Mod: CPTII,S$GLB,, | Performed by: NURSE PRACTITIONER

## 2019-01-18 PROCEDURE — 96372 THER/PROPH/DIAG INJ SC/IM: CPT | Mod: S$GLB,,, | Performed by: NURSE PRACTITIONER

## 2019-01-18 PROCEDURE — 99215 PR OFFICE/OUTPT VISIT, EST, LEVL V, 40-54 MIN: ICD-10-PCS | Mod: 25,S$GLB,, | Performed by: NURSE PRACTITIONER

## 2019-01-18 PROCEDURE — 99215 OFFICE O/P EST HI 40 MIN: CPT | Mod: 25,S$GLB,, | Performed by: NURSE PRACTITIONER

## 2019-01-18 PROCEDURE — 1101F PT FALLS ASSESS-DOCD LE1/YR: CPT | Mod: CPTII,S$GLB,, | Performed by: NURSE PRACTITIONER

## 2019-01-18 PROCEDURE — 1101F PR PT FALLS ASSESS DOC 0-1 FALLS W/OUT INJ PAST YR: ICD-10-PCS | Mod: CPTII,S$GLB,, | Performed by: NURSE PRACTITIONER

## 2019-01-18 PROCEDURE — 99999 PR PBB SHADOW E&M-EST. PATIENT-LVL III: ICD-10-PCS | Mod: PBBFAC,,, | Performed by: NURSE PRACTITIONER

## 2019-01-18 PROCEDURE — 99999 PR PBB SHADOW E&M-EST. PATIENT-LVL III: CPT | Mod: PBBFAC,,, | Performed by: NURSE PRACTITIONER

## 2019-01-18 PROCEDURE — 3078F PR MOST RECENT DIASTOLIC BLOOD PRESSURE < 80 MM HG: ICD-10-PCS | Mod: CPTII,S$GLB,, | Performed by: NURSE PRACTITIONER

## 2019-01-18 PROCEDURE — 3078F DIAST BP <80 MM HG: CPT | Mod: CPTII,S$GLB,, | Performed by: NURSE PRACTITIONER

## 2019-01-18 RX ORDER — PREDNISONE 20 MG/1
TABLET ORAL
Qty: 12 TABLET | Refills: 0 | Status: SHIPPED | OUTPATIENT
Start: 2019-01-18 | End: 2019-03-18 | Stop reason: SDUPTHER

## 2019-01-18 RX ORDER — BETAMETHASONE SODIUM PHOSPHATE AND BETAMETHASONE ACETATE 3; 3 MG/ML; MG/ML
6 INJECTION, SUSPENSION INTRA-ARTICULAR; INTRALESIONAL; INTRAMUSCULAR; SOFT TISSUE
Status: COMPLETED | OUTPATIENT
Start: 2019-01-18 | End: 2019-01-18

## 2019-01-18 RX ADMIN — BETAMETHASONE SODIUM PHOSPHATE AND BETAMETHASONE ACETATE 6 MG: 3; 3 INJECTION, SUSPENSION INTRA-ARTICULAR; INTRALESIONAL; INTRAMUSCULAR; SOFT TISSUE at 12:01

## 2019-01-18 NOTE — PATIENT INSTRUCTIONS
Mention nausea and right hip pain to nephrologist at dialysis, you are currently on zofran. Continue using.   Not able to prescribe pain medication for hip pain    COPD: Continue current treatment    Contact Dr. Hand Gastroenterologist about swallowing difficulty    Celestone injection in clinic for SOB, sinus pressure

## 2019-03-18 ENCOUNTER — OFFICE VISIT (OUTPATIENT)
Dept: PULMONOLOGY | Facility: CLINIC | Age: 79
End: 2019-03-18
Payer: MEDICARE

## 2019-03-18 VITALS
BODY MASS INDEX: 24.43 KG/M2 | WEIGHT: 155.63 LBS | HEIGHT: 67 IN | DIASTOLIC BLOOD PRESSURE: 66 MMHG | SYSTOLIC BLOOD PRESSURE: 110 MMHG | OXYGEN SATURATION: 95 % | HEART RATE: 79 BPM

## 2019-03-18 DIAGNOSIS — J44.1 CHRONIC OBSTRUCTIVE PULMONARY DISEASE WITH ACUTE EXACERBATION: Primary | ICD-10-CM

## 2019-03-18 DIAGNOSIS — R06.02 SOB (SHORTNESS OF BREATH): ICD-10-CM

## 2019-03-18 DIAGNOSIS — J44.9 COPD MIXED TYPE: ICD-10-CM

## 2019-03-18 PROCEDURE — 3078F DIAST BP <80 MM HG: CPT | Mod: CPTII,S$GLB,, | Performed by: NURSE PRACTITIONER

## 2019-03-18 PROCEDURE — 1101F PT FALLS ASSESS-DOCD LE1/YR: CPT | Mod: CPTII,S$GLB,, | Performed by: NURSE PRACTITIONER

## 2019-03-18 PROCEDURE — 96372 THER/PROPH/DIAG INJ SC/IM: CPT | Mod: S$GLB,,, | Performed by: NURSE PRACTITIONER

## 2019-03-18 PROCEDURE — 1101F PR PT FALLS ASSESS DOC 0-1 FALLS W/OUT INJ PAST YR: ICD-10-PCS | Mod: CPTII,S$GLB,, | Performed by: NURSE PRACTITIONER

## 2019-03-18 PROCEDURE — 96372 PR INJECTION,THERAP/PROPH/DIAG2ST, IM OR SUBCUT: ICD-10-PCS | Mod: S$GLB,,, | Performed by: NURSE PRACTITIONER

## 2019-03-18 PROCEDURE — 99999 PR PBB SHADOW E&M-EST. PATIENT-LVL III: ICD-10-PCS | Mod: PBBFAC,,, | Performed by: NURSE PRACTITIONER

## 2019-03-18 PROCEDURE — 99213 PR OFFICE/OUTPT VISIT, EST, LEVL III, 20-29 MIN: ICD-10-PCS | Mod: 25,S$GLB,, | Performed by: NURSE PRACTITIONER

## 2019-03-18 PROCEDURE — 99213 OFFICE O/P EST LOW 20 MIN: CPT | Mod: 25,S$GLB,, | Performed by: NURSE PRACTITIONER

## 2019-03-18 PROCEDURE — 99999 PR PBB SHADOW E&M-EST. PATIENT-LVL III: CPT | Mod: PBBFAC,,, | Performed by: NURSE PRACTITIONER

## 2019-03-18 PROCEDURE — 3074F SYST BP LT 130 MM HG: CPT | Mod: CPTII,S$GLB,, | Performed by: NURSE PRACTITIONER

## 2019-03-18 PROCEDURE — 3078F PR MOST RECENT DIASTOLIC BLOOD PRESSURE < 80 MM HG: ICD-10-PCS | Mod: CPTII,S$GLB,, | Performed by: NURSE PRACTITIONER

## 2019-03-18 PROCEDURE — 3074F PR MOST RECENT SYSTOLIC BLOOD PRESSURE < 130 MM HG: ICD-10-PCS | Mod: CPTII,S$GLB,, | Performed by: NURSE PRACTITIONER

## 2019-03-18 RX ORDER — BETAMETHASONE SODIUM PHOSPHATE AND BETAMETHASONE ACETATE 3; 3 MG/ML; MG/ML
6 INJECTION, SUSPENSION INTRA-ARTICULAR; INTRALESIONAL; INTRAMUSCULAR; SOFT TISSUE
Status: COMPLETED | OUTPATIENT
Start: 2019-03-18 | End: 2019-03-18

## 2019-03-18 RX ORDER — PREDNISONE 20 MG/1
TABLET ORAL
Qty: 12 TABLET | Refills: 0 | Status: SHIPPED | OUTPATIENT
Start: 2019-03-18 | End: 2019-05-07 | Stop reason: SDUPTHER

## 2019-03-18 RX ADMIN — BETAMETHASONE SODIUM PHOSPHATE AND BETAMETHASONE ACETATE 6 MG: 3; 3 INJECTION, SUSPENSION INTRA-ARTICULAR; INTRALESIONAL; INTRAMUSCULAR; SOFT TISSUE at 02:03

## 2019-03-18 NOTE — PROGRESS NOTES
3/18/2019    Althea Gaona  Office Note    Chief Complaint   Patient presents with    Follow-up     2 months, would like a steroid shot    Wheezing    Shortness of Breath     HPI:  3/18/2019- Breathing worsened onset 2 weeks, chest tightness, no refill for oral prednisone. Requesting steroid injection, Wearing oxygen at night. Appt with Gastroenterologist next week for difficulty swallowing.   Using nebulizer 1-2x daily.    1/18/2019- Complaint of right side hip and leg pain onset 3 weeks ago,relieved with heating pad.  Breathing- SOB with exertion, chest tightness,   Sinus pain and pressure- onset recurrent, non drainage, no cough  Trouble swallowing, chocking when eating and drinking,   No prednisone use since last appointment.  Nausea- states daily, through out day, zofran not helpful,     Nov 19, 2018- left pink eye with hurting burning itches.  Has chr sinusitis.  Son  With pt - relates 10 cats,  And 4 dogs.  Breathing good.      Saw NP Oracio in  Sept - got couple steroid shots- HPI:pt arrives today alone, states she is feeling better with her breathing, no cough, finished her antibiotic and three days of prednisone 20mg . Sinus pressure has decreased.      States dizziness has not resolved but gotten worse. States she uses a cane to walk and fells like she is going to fall sometimes. Dizziness worse during day, says she can not turn over in bed. Worsens with standing. States nausea but no vomiting, takes zofran every morning.       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  Fearful of dying.  Chr nausea and dizzy.  Was in hosp recently - hospitalized.  Had bipolar - usually ok but stressed with renal dz/illness.   Uses advair/combivent. Uses nebulizer.  Uses steroids occ.  trelegy helped well - better than advair.    No mucous, occ chest pains/rib pains.    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    Was seeing Dr Mckoy at [place], not recently , poor sleep on prozac 20/d - med list may be incomplete.    The chief compliant  problem is varies with instablilty at time   PFSH:  Past Medical History:   Diagnosis Date    Alcoholism     no drink since     Anxiety     Bipolar disorder     Chronic kidney disease     COPD (chronic obstructive pulmonary disease)     GERD (gastroesophageal reflux disease)     Hypertension     Pancreatitis     Thyroid disease          Past Surgical History:   Procedure Laterality Date    APPENDECTOMY      CATARACT EXTRACTION Right     EYE SURGERY      HYSTERECTOMY      THYROIDECTOMY      THYROIDECTOMY       Social History     Tobacco Use    Smoking status: Current Some Day Smoker     Packs/day: 1.00     Types: Cigarettes   Substance Use Topics    Alcohol use: No    Drug use: Not on file     Family History   Problem Relation Age of Onset    No Known Problems Mother     Diabetes Father     Heart disease Father         blood clot in lung went to heart    Cancer Sister         breast    Stroke Paternal Aunt     Cancer Sister         breast    Cancer Cousin         colon    Diabetes Cousin     Cancer Cousin         colon    COPD Sister          of COPD     Review of patient's allergies indicates:   Allergen Reactions    Metoprolol Other (See Comments)     Grand-daughter/care giver reported Pt has over reaction to this drug, Bp bottoms out along with heart rate    Abilify [aripiprazole]      dizziness    Codeine      Other reaction(s): Unknown       Performance Status:The patient's activity level is mobility with asit devices.      Review of Systems:  a review of eleven systems covering constitutional, Eye, HEENT, Psych, Respiratory, Cardiac, GI, , Musculoskeletal,  "Endocrine, Dermatologic was negative except for pertinent findings as listed ABOVE and below: recent weight gain 10 lbs. sinus stuffy, nerves getting by , still makes urine, chr back pains and back and rib pains.  Trouble swallowing.    Exam:Comprehensive exam done. /66 (BP Location: Right arm, Patient Position: Sitting)   Pulse 79   Ht 5' 7" (1.702 m)   Wt 70.6 kg (155 lb 10.3 oz)   SpO2 95% Comment: on room air  BMI 24.38 kg/m²   Exam included Vitals as listed, and patient's appearance and affect and alertness and mood, oral exam for yeast and hygiene and pharynx lesions and Mallapatti (M) score, neck with inspection for jvd and masses and thyroid abnormalities and lymph nodes (supraclavicular and infraclavicular nodes and axillary also examined and noted if abn), chest exam included symmetry and effort and fremitus and percussion and auscultation, cardiac exam included rhythm and gallops and murmur and rubs and jvd and edema, abdominal exam for mass and hepatosplenomegaly and tenderness and hernias and bowel sounds, Musculoskeletal exam with muscle tone and posture and mobility/gait and  strength, and skin for rashes and cyanosis and pallor and turgor, extremity for clubbing.  Findings were normal except for pertinent findings listed below:   M2, chest is symmetric, no distress, normal percussion, normal fremitus and good normal breath sounds, slight bilateral wheeze.  Anxious.     Radiographs (ct chest and cxr) reviewed: view by direct vision  cxr nov 2016 good,   cxr aug 2018 nad.      Labs reviewed no recent  Since 2016, Blood work followed by dialysis        PFT was not done  6 min walk study was accomplished non 04/20/2018.  Patient's room air saturation was 93% at rest.  After walking 2 min O2 sat was 88% with a heart rate of 114. Patient required 2 L of nasal oxygen to keep sat in the mid upper 90s.  Patient was able to   walk 108 m. heart rate carito from 87 up to 114 during activity. "       Plan:  Clinical impression is resonably certain and repeated evaluation prn +/- follow up will be needed as below.     Althea was seen today for follow-up, wheezing and shortness of breath.    Diagnoses and all orders for this visit:    Chronic obstructive pulmonary disease with acute exacerbation  -     predniSONE (DELTASONE) 20 MG tablet; One daily for 3 days and repeat for flare of lung symptoms as intructed  -     betamethasone acetate-betamethasone sodium phosphate injection 6 mg    COPD mixed type  -     predniSONE (DELTASONE) 20 MG tablet; One daily for 3 days and repeat for flare of lung symptoms as intructed    SOB (shortness of breath)  -     predniSONE (DELTASONE) 20 MG tablet; One daily for 3 days and repeat for flare of lung symptoms as intructed  -     betamethasone acetate-betamethasone sodium phosphate injection 6 mg        Follow-up in about 3 months (around 6/18/2019).    Discussed with patient above for education the following:        Patient Instructions   COPD exacerbation  Celestone injection today in clinic    Action plan    Azithromycin 500 mg 1 pill for three days for yellow or green mucous    Prednisone 20 mg pills, Take one pill a day for three days, repeat for shortness of breath or wheeze    Combivent inhaler 2 puffs every 6 hours, for cough or shortness of breath

## 2019-03-18 NOTE — PATIENT INSTRUCTIONS
COPD exacerbation  Celestone injection today in clinic    Action plan    Azithromycin 500 mg 1 pill for three days for yellow or green mucous    Prednisone 20 mg pills, Take one pill a day for three days, repeat for shortness of breath or wheeze    Combivent inhaler 2 puffs every 6 hours, for cough or shortness of breath

## 2019-03-25 DIAGNOSIS — R13.10 DYSPHAGIA: Primary | ICD-10-CM

## 2019-04-22 ENCOUNTER — HOSPITAL ENCOUNTER (OUTPATIENT)
Dept: RADIOLOGY | Facility: HOSPITAL | Age: 79
Discharge: HOME | End: 2019-04-22
Attending: INTERNAL MEDICINE
Payer: MEDICARE

## 2019-04-22 DIAGNOSIS — R13.14 PHARYNGOESOPHAGEAL DYSPHAGIA: ICD-10-CM

## 2019-04-22 PROBLEM — J01.10 ACUTE NON-RECURRENT FRONTAL SINUSITIS: Status: RESOLVED | Noted: 2019-01-18 | Resolved: 2019-04-22

## 2019-04-22 PROCEDURE — 92611 MOTION FLUOROSCOPY/SWALLOW: CPT

## 2019-04-22 PROCEDURE — G8998 SWALLOW D/C STATUS: HCPCS | Mod: CI

## 2019-04-22 PROCEDURE — G8997 SWALLOW GOAL STATUS: HCPCS | Mod: CI

## 2019-04-22 PROCEDURE — 74230 X-RAY XM SWLNG FUNCJ C+: CPT | Mod: TC

## 2019-04-22 PROCEDURE — G8996 SWALLOW CURRENT STATUS: HCPCS | Mod: CI

## 2019-04-22 PROCEDURE — 74230 FL MODIFIED BARIUM SWALLOW SPEECH STUDY: ICD-10-PCS | Mod: 26,,, | Performed by: RADIOLOGY

## 2019-04-22 PROCEDURE — 74230 X-RAY XM SWLNG FUNCJ C+: CPT | Mod: 26,,, | Performed by: RADIOLOGY

## 2019-04-22 NOTE — PROGRESS NOTES
"Modified Barium Swallowing Study    Past Medical History:   Diagnosis Date    Alcoholism     no drink since 1984    Anxiety     Bipolar disorder     Chronic kidney disease     COPD (chronic obstructive pulmonary disease)     GERD (gastroesophageal reflux disease)     Hypertension     Pancreatitis     Thyroid disease      Past Surgical History:   Procedure Laterality Date    APPENDECTOMY      CATARACT EXTRACTION Right     EYE SURGERY      HYSTERECTOMY      THYROIDECTOMY      THYROIDECTOMY       Pt was seen due to c/o globus sensation, "choking", & hoarse voice.  Pt has hx long term psych meds (oral tardive dyskinesia noted), COPD, GERD (on Pepcid daily), & CVA (per pt, affected legs only).  Denied Parkinson's, or recent pneumonia, or neck/throat surgery.  Reported xerostomia, O2 use at night, & upcoming EGD with Dr. Hand in May.  Stated she coughs on saliva, liquids, & has pill dysphagia.     Today, pt was found to have WNL oral stages, and WFL pharyngeal stage (very mild residue cleared with subsequent swallow). A barium pill was presented in applesauce & passed from lips to stomach without problem.  An esophageal sweep revealed nothing which affected the pharyngeal swallow.  Due to technical difficulties the study was not recorded.   Recommend continue regular textures & liquids, take pills in puree followed by a swallow of liquid.  Remain up for at least 1 hour after eating.  Follow up with Dr. Hand.  No tx.  G Codes Swallowing Current CI     Goal CI     D/C CI     04/22/19 1000   Speech Time Calculation   Speech Start Time 1000   Speech Stop Time 1021   Speech Total (min) 21 min   General Information   SLP Treatment Date 04/22/19   Current Respiratory Status room air   General Precautions fall   Current Diet   Current Diet Textures Regular   Current Liquid Consistencies Thin   Subjective   Patient states I get choked.  I wake up coughing at night.  I get heartburn sometimes.  My voice is hoarse.  " Dr Hand has me scheduled for that test May 3   Oral Musculature Evaluation   Oral Musculature WFL  (s/s oral tardive dyskinesia )   Dentition upper and lower dentures;uses dentures to eat   Secretion Management adequate   Mucosal Quality adequate  (asked for & constantly sipped water during interview)   Mandibular Strength and Mobility WNL   Oral Labial Strength and Mobility WNL   Lingual Strength and Mobility WNL   Velar Elevation WNL   Buccal Strength and Mobility WNL   Voice Prior to PO Intake clear, slightly hoarse        MBS Eval: Thin Liquid Trial   Mode of Presentation, Thin Liquid spoon;fed by clinician;cup;straw;self fed   Volume of Thin Liquid Presented tsp, cup sip, straw sip, refused serial straw swallows (I get choked)   Oral Prep/Phase, Thin Liquid WNL   Pharyngeal Phase, Thin Liquid cervical protrusion (comment)  (CP Bar, very mild BOT & ph wall residue)   Rosenbeck's 8-Point Penetration-Aspiration Scale, Thin Liquids (1) Material does not enter airway.   Esophageal Phase, Thin aerophagia  (CP bar did not affect passage of any bolus)   Additional Comments WFL oropharyngeal stages        MBS Eval: Pureed Trial   Mode of Presentation, Puree spoon   Volume of Puree Presented tsp   Oral Prep/Phase, Puree WNL   Pharyngeal Phase, Puree cervical protrusion (comment)  (very mild residue)   Rosenbeck's 8-Point Penetration-Aspiration Scale, Pureed (1) Material does not enter airway.   Esophageal Phase, Pureed   (WFL)   Diagnostic Statement WFL oropharyngeal stages   Additional Comments barium pill in puree passed lips to stomach without problem        MBS Eval: Soft Solid Trial   Mode of Presentation, Semisolid spoon   Volume of Semisolid Food Presented tsp peaches in thin barium   Oral Prep/Phase, Semisolid WNL   Pharyngeal Phase, Semisolid cervical protrusion (comment)  (very mild residue)   Rosenbeck's 8-Point Penetration-Aspiration Scale, Semisolid (1) Material does not enter airway.   Diagnostic Statement  WFL oropharyngeal stages        MBS Eval: Solid Food Texture Trial   Mode of Presentation, Solid spoon   Volume of Solid Food Presented 1/2 cookie with avbrium pudding   Oral Prep/Phase, Solid WNL   Pharyngeal Phase, Solid cervical protrusion (comment)  (very mild residue)   Rosenbeck's 8-Point Penetration-Aspiration Scale, Solid (1) Material does not enter airway.   Diagnostic Statement WFl oropharyngeal stages   Recommendations   Solid Diet Level Regular   Liquid Diet Level Thin   Additional Recommendations take pills in puree   SLP Follow-up   SLP Follow-up? No   Treatment/Billable Minutes   Motion Fluoro Swallow, Cine/Vid 21   Total Time 21

## 2019-05-07 DIAGNOSIS — R06.02 SOB (SHORTNESS OF BREATH): ICD-10-CM

## 2019-05-07 DIAGNOSIS — J44.9 COPD MIXED TYPE: ICD-10-CM

## 2019-05-07 DIAGNOSIS — J44.1 CHRONIC OBSTRUCTIVE PULMONARY DISEASE WITH ACUTE EXACERBATION: ICD-10-CM

## 2019-05-07 RX ORDER — PREDNISONE 20 MG/1
TABLET ORAL
Qty: 12 TABLET | Refills: 0 | Status: ON HOLD | OUTPATIENT
Start: 2019-05-07 | End: 2019-11-22 | Stop reason: CLARIF

## 2019-05-14 ENCOUNTER — TELEPHONE (OUTPATIENT)
Dept: PULMONOLOGY | Facility: CLINIC | Age: 79
End: 2019-05-14

## 2019-05-14 NOTE — TELEPHONE ENCOUNTER
Pt called stated she is anxious and having a hard time breathing. Pt stated she is in dialysis right now. Advised to notify dialysis staff of current situation and to have staff call 911 to have her brought to ER. Pt stated she wanted to go to [facility]. Pt spoke to the nurse O2 levels at 97%. Stayed on line until I heard the nurse speaking with pt in regards to going to hospital. Pt speaking in full sentences with no pausing in between.

## 2019-06-04 DIAGNOSIS — R09.89 CHRONIC SINUS COMPLAINTS: ICD-10-CM

## 2019-06-04 RX ORDER — FLUTICASONE PROPIONATE 50 MCG
2 SPRAY, SUSPENSION (ML) NASAL DAILY
Qty: 1 BOTTLE | Refills: 11 | Status: SHIPPED | OUTPATIENT
Start: 2019-06-04 | End: 2020-02-17

## 2019-06-12 ENCOUNTER — OUTSIDE PLACE OF SERVICE (OUTPATIENT)
Dept: ORTHOPEDICS | Facility: CLINIC | Age: 79
End: 2019-06-12
Payer: MEDICARE

## 2019-06-12 PROCEDURE — 99223 1ST HOSP IP/OBS HIGH 75: CPT | Mod: 57,,, | Performed by: ORTHOPAEDIC SURGERY

## 2019-06-12 PROCEDURE — 99223 PR INITIAL HOSPITAL CARE,LEVL III: ICD-10-PCS | Mod: 57,,, | Performed by: ORTHOPAEDIC SURGERY

## 2019-06-13 PROCEDURE — 25607 PR OPEN RX DISTAL RADIUS FX, EXTRA-ARTICULAR: ICD-10-PCS | Mod: LT,,, | Performed by: ORTHOPAEDIC SURGERY

## 2019-06-13 PROCEDURE — 25607 OPTX DST RD XARTC FX/EPI SEP: CPT | Mod: LT,,, | Performed by: ORTHOPAEDIC SURGERY

## 2019-06-27 ENCOUNTER — HOSPITAL ENCOUNTER (OUTPATIENT)
Dept: RADIOLOGY | Facility: HOSPITAL | Age: 79
Discharge: HOME | End: 2019-06-27
Attending: ORTHOPAEDIC SURGERY
Payer: MEDICARE

## 2019-06-27 ENCOUNTER — OFFICE VISIT (OUTPATIENT)
Dept: ORTHOPEDICS | Facility: CLINIC | Age: 79
End: 2019-06-27
Payer: MEDICARE

## 2019-06-27 VITALS — BODY MASS INDEX: 24.43 KG/M2 | HEIGHT: 67 IN | WEIGHT: 155.63 LBS

## 2019-06-27 DIAGNOSIS — M25.532 LEFT WRIST PAIN: ICD-10-CM

## 2019-06-27 DIAGNOSIS — S52.502D: Primary | ICD-10-CM

## 2019-06-27 DIAGNOSIS — S52.602D: Primary | ICD-10-CM

## 2019-06-27 DIAGNOSIS — M25.532 LEFT WRIST PAIN: Primary | ICD-10-CM

## 2019-06-27 PROCEDURE — 99024 PR POST-OP FOLLOW-UP VISIT: ICD-10-PCS | Mod: S$GLB,,, | Performed by: ORTHOPAEDIC SURGERY

## 2019-06-27 PROCEDURE — 99024 POSTOP FOLLOW-UP VISIT: CPT | Mod: S$GLB,,, | Performed by: ORTHOPAEDIC SURGERY

## 2019-06-27 PROCEDURE — 99999 PR PBB SHADOW E&M-EST. PATIENT-LVL II: ICD-10-PCS | Mod: PBBFAC,,, | Performed by: ORTHOPAEDIC SURGERY

## 2019-06-27 PROCEDURE — 73110 XR WRIST COMPLETE 3 VIEWS LEFT: ICD-10-PCS | Mod: 26,LT,, | Performed by: RADIOLOGY

## 2019-06-27 PROCEDURE — 99999 PR PBB SHADOW E&M-EST. PATIENT-LVL II: CPT | Mod: PBBFAC,,, | Performed by: ORTHOPAEDIC SURGERY

## 2019-06-27 PROCEDURE — 73110 X-RAY EXAM OF WRIST: CPT | Mod: 26,LT,, | Performed by: RADIOLOGY

## 2019-06-27 PROCEDURE — 73110 X-RAY EXAM OF WRIST: CPT | Mod: TC,PN,LT

## 2019-06-27 RX ORDER — HYDROCODONE BITARTRATE AND ACETAMINOPHEN 7.5; 325 MG/1; MG/1
1 TABLET ORAL EVERY 6 HOURS PRN
Qty: 28 TABLET | Refills: 0 | Status: SHIPPED | OUTPATIENT
Start: 2019-06-27 | End: 2019-07-15 | Stop reason: SDUPTHER

## 2019-06-27 RX ORDER — ONDANSETRON 4 MG/1
4 TABLET, ORALLY DISINTEGRATING ORAL
Status: DISCONTINUED | OUTPATIENT
Start: 2019-06-27 | End: 2019-08-30 | Stop reason: HOSPADM

## 2019-06-27 NOTE — PROGRESS NOTES
CC:  This is a 79-year-old female who is about 3 weeks postop from an ORIF of a left distal radius fracture with Dr. Mendez, who is currently on vacation.  She walked in the clinic today asking to be seen and evaluated for her left wrist.    LUE:  Surgical incision is clean, dry, intact.  No signs of infection.  Grossly intact motor and sensory function in the left upper extremity with +2 radial and ulnar pulses.    X-rays obtained 06/27/2019 were reviewed by me personally.  They show a left distal radius fracture with plate and screw fixation. It is in excellent alignment and hardware appears well fixed with no evidence of loosening.    Diagnosis:  79-year-old female 3 weeks postop ORIF left distal radius, stable    Plan:  Sutures removed and Steri-Strips applied.  We applied a splint to the distal radius.  We told her to work on moving for her fingers so she does not become stiff.  She can follow up with her surgeon in 2 weeks.

## 2019-07-12 DIAGNOSIS — M25.532 LEFT WRIST PAIN: Primary | ICD-10-CM

## 2019-07-15 ENCOUNTER — HOSPITAL ENCOUNTER (OUTPATIENT)
Dept: RADIOLOGY | Facility: HOSPITAL | Age: 79
Discharge: HOME | End: 2019-07-15
Attending: ORTHOPAEDIC SURGERY
Payer: MEDICARE

## 2019-07-15 ENCOUNTER — OFFICE VISIT (OUTPATIENT)
Dept: ORTHOPEDICS | Facility: CLINIC | Age: 79
End: 2019-07-15
Payer: MEDICARE

## 2019-07-15 VITALS
WEIGHT: 155 LBS | DIASTOLIC BLOOD PRESSURE: 79 MMHG | HEART RATE: 79 BPM | SYSTOLIC BLOOD PRESSURE: 151 MMHG | BODY MASS INDEX: 24.33 KG/M2 | HEIGHT: 67 IN

## 2019-07-15 DIAGNOSIS — M25.532 LEFT WRIST PAIN: ICD-10-CM

## 2019-07-15 DIAGNOSIS — S52.602D: ICD-10-CM

## 2019-07-15 DIAGNOSIS — S52.502D: ICD-10-CM

## 2019-07-15 DIAGNOSIS — S52.552D OTHER CLOSED EXTRA-ARTICULAR FRACTURE OF DISTAL END OF LEFT RADIUS WITH ROUTINE HEALING, SUBSEQUENT ENCOUNTER: Primary | ICD-10-CM

## 2019-07-15 PROCEDURE — 99024 PR POST-OP FOLLOW-UP VISIT: ICD-10-PCS | Mod: S$GLB,,, | Performed by: ORTHOPAEDIC SURGERY

## 2019-07-15 PROCEDURE — 73110 X-RAY EXAM OF WRIST: CPT | Mod: TC,PN,LT

## 2019-07-15 PROCEDURE — 99999 PR PBB SHADOW E&M-EST. PATIENT-LVL III: CPT | Mod: PBBFAC,,, | Performed by: ORTHOPAEDIC SURGERY

## 2019-07-15 PROCEDURE — 99999 PR PBB SHADOW E&M-EST. PATIENT-LVL III: ICD-10-PCS | Mod: PBBFAC,,, | Performed by: ORTHOPAEDIC SURGERY

## 2019-07-15 PROCEDURE — 99024 POSTOP FOLLOW-UP VISIT: CPT | Mod: S$GLB,,, | Performed by: ORTHOPAEDIC SURGERY

## 2019-07-15 PROCEDURE — 73110 XR WRIST COMPLETE 3 VIEWS LEFT: ICD-10-PCS | Mod: 26,LT,, | Performed by: RADIOLOGY

## 2019-07-15 PROCEDURE — 73110 X-RAY EXAM OF WRIST: CPT | Mod: 26,LT,, | Performed by: RADIOLOGY

## 2019-07-15 RX ORDER — HYDROCODONE BITARTRATE AND ACETAMINOPHEN 7.5; 325 MG/1; MG/1
1 TABLET ORAL EVERY 6 HOURS PRN
Qty: 28 TABLET | Refills: 0 | Status: SHIPPED | OUTPATIENT
Start: 2019-07-15 | End: 2019-08-05

## 2019-07-15 NOTE — PROGRESS NOTES
Past Medical History:   Diagnosis Date    Alcoholism     no drink since 1984    Anxiety     Bipolar disorder     Chronic kidney disease     COPD (chronic obstructive pulmonary disease)     GERD (gastroesophageal reflux disease)     Hypertension     Pancreatitis     Thyroid disease        Past Surgical History:   Procedure Laterality Date    APPENDECTOMY      CATARACT EXTRACTION Right     EYE SURGERY      HYSTERECTOMY      THYROIDECTOMY      THYROIDECTOMY         Current Outpatient Medications   Medication Sig    albuterol-ipratropium  mcg (COMBIVENT)  mcg/actuation inhaler Inhale 2 puffs into the lungs every 6 (six) hours as needed for Wheezing.    atorvastatin (LIPITOR) 40 MG tablet Take 40 mg by mouth once daily.    azithromycin (ZITHROMAX) 500 MG tablet One daily for yellow mucous, repeat if needed    busPIRone (BUSPAR) 10 MG tablet Take 10 mg by mouth 3 (three) times daily.    calcitRIOL (ROCALTROL) 0.25 MCG Cap     ciprofloxacin HCl (CIPRO) 250 MG tablet Take 250 mg by mouth 2 (two) times daily.    famotidine (PEPCID) 20 MG tablet TK 1 T PO QHS    fluoxetine (PROZAC) 20 MG tablet Take 20 mg by mouth once daily.    fluticasone propionate (FLONASE) 50 mcg/actuation nasal spray 2 sprays (100 mcg total) by Each Nare route once daily.    fluticasone-umeclidin-vilanter (TRELEGY ELLIPTA) 100-62.5-25 mcg DsDv Inhale 1 puff into the lungs once daily.    gabapentin (NEURONTIN) 100 MG capsule gabapentin 100 mg capsule   Take 3 capsules every day by oral route.    HYDROcodone-acetaminophen (NORCO) 7.5-325 mg per tablet Take 1 tablet by mouth every 6 (six) hours as needed for Pain.    levothyroxine 100 mcg Cap levothyroxine 100 mcg capsule   Take 1 capsule every day by oral route.    ondansetron (ZOFRAN) 8 MG tablet Take 1 tablet (8 mg total) by mouth every 12 (twelve) hours as needed for Nausea.    polyvinyl alcohol, artificial tears, (LIQUIFILM TEARS) 1.4 % ophthalmic solution  Place 1 drop into the left eye as needed.    predniSONE (DELTASONE) 20 MG tablet One daily for 3 days and repeat for flare of lung symptoms as intructed     Current Facility-Administered Medications   Medication    ondansetron disintegrating tablet 4 mg       Review of patient's allergies indicates:   Allergen Reactions    Metoprolol Other (See Comments)     Grand-daughter/care giver reported Pt has over reaction to this drug, Bp bottoms out along with heart rate    Abilify [aripiprazole]      dizziness    Codeine      Other reaction(s): Unknown       Family History   Problem Relation Age of Onset    No Known Problems Mother     Diabetes Father     Heart disease Father         blood clot in lung went to heart    Cancer Sister         breast    Stroke Paternal Aunt     Cancer Sister         breast    Cancer Cousin         colon    Diabetes Cousin     Cancer Cousin         colon    COPD Sister          of COPD       Social History     Socioeconomic History    Marital status:      Spouse name: Not on file    Number of children: Not on file    Years of education: Not on file    Highest education level: Not on file   Occupational History    Not on file   Social Needs    Financial resource strain: Not on file    Food insecurity:     Worry: Not on file     Inability: Not on file    Transportation needs:     Medical: Not on file     Non-medical: Not on file   Tobacco Use    Smoking status: Current Some Day Smoker     Packs/day: 1.00     Types: Cigarettes   Substance and Sexual Activity    Alcohol use: No    Drug use: Not on file    Sexual activity: Not on file   Lifestyle    Physical activity:     Days per week: Not on file     Minutes per session: Not on file    Stress: Not on file   Relationships    Social connections:     Talks on phone: Not on file     Gets together: Not on file     Attends Zoroastrianism service: Not on file     Active member of club or organization: Not on file      Attends meetings of clubs or organizations: Not on file     Relationship status: Not on file   Other Topics Concern    Not on file   Social History Narrative    Not on file       Chief Complaint:   Chief Complaint   Patient presents with    Left Wrist - Pain       Date of surgery:  June 14th 2019    History of present illness:  79-year-old female who is about a month out from open reduction internal fixation of left distal radius fracture. Patient is complaining of a lot of pain.  She is in a removable wrist brace already and is already doing home PT and OT.  Pain particularly at night.  Fairly stiff in the fingers.      Review of Systems:    Musculoskeletal:  See HPI        Physical Examination:    Vital Signs:    Vitals:    07/15/19 1504   BP: (!) 151/79   Pulse: 79       Body mass index is 24.28 kg/m².    This a well-developed, well nourished patient in no acute distress.  They are alert and oriented and cooperative to examination.  Pt.  Comes in in a wheelchair.    Examination of the left wrist shows well-healing surgical incision. Has some fullness and swelling.  Very stiff particularly in the fingers and decreased range of motion in the wrist.    X-rays:  X-rays of the left wrist are ordered and reviewed which show plate and screws in good position.  Distal ulnar shaft fracture has good abundant callus noted.     Assessment::  Healing left distal radius fracture    Plan:  I reviewed the findings with her today.  Refilled her pain medicine.  Placed her in a different wrist brace.  She can continue with gentle exercise and range of motion.  Follow-up in a month with another x-ray of the left wrist.    This note was created using [employer] voice recognition software that occasionally misinterpreted phrases or words.

## 2019-07-24 DIAGNOSIS — J44.9 COPD MIXED TYPE: ICD-10-CM

## 2019-08-01 ENCOUNTER — TELEPHONE (OUTPATIENT)
Dept: ORTHOPEDICS | Facility: CLINIC | Age: 79
End: 2019-08-01

## 2019-08-02 DIAGNOSIS — S52.552D OTHER CLOSED EXTRA-ARTICULAR FRACTURE OF DISTAL END OF LEFT RADIUS WITH ROUTINE HEALING, SUBSEQUENT ENCOUNTER: Primary | ICD-10-CM

## 2019-08-05 ENCOUNTER — HOSPITAL ENCOUNTER (OUTPATIENT)
Dept: RADIOLOGY | Facility: HOSPITAL | Age: 79
Discharge: HOME | End: 2019-08-05
Attending: ORTHOPAEDIC SURGERY
Payer: MEDICARE

## 2019-08-05 ENCOUNTER — OFFICE VISIT (OUTPATIENT)
Dept: ORTHOPEDICS | Facility: CLINIC | Age: 79
End: 2019-08-05
Payer: MEDICARE

## 2019-08-05 VITALS
DIASTOLIC BLOOD PRESSURE: 63 MMHG | HEART RATE: 68 BPM | SYSTOLIC BLOOD PRESSURE: 139 MMHG | HEIGHT: 67 IN | BODY MASS INDEX: 24.33 KG/M2 | WEIGHT: 155 LBS

## 2019-08-05 DIAGNOSIS — S52.552D OTHER CLOSED EXTRA-ARTICULAR FRACTURE OF DISTAL END OF LEFT RADIUS WITH ROUTINE HEALING, SUBSEQUENT ENCOUNTER: Primary | ICD-10-CM

## 2019-08-05 DIAGNOSIS — S52.552D OTHER CLOSED EXTRA-ARTICULAR FRACTURE OF DISTAL END OF LEFT RADIUS WITH ROUTINE HEALING, SUBSEQUENT ENCOUNTER: ICD-10-CM

## 2019-08-05 PROCEDURE — 99024 POSTOP FOLLOW-UP VISIT: CPT | Mod: S$GLB,,, | Performed by: ORTHOPAEDIC SURGERY

## 2019-08-05 PROCEDURE — 99999 PR PBB SHADOW E&M-EST. PATIENT-LVL III: CPT | Mod: PBBFAC,,, | Performed by: ORTHOPAEDIC SURGERY

## 2019-08-05 PROCEDURE — 73110 XR WRIST COMPLETE 3 VIEWS LEFT: ICD-10-PCS | Mod: 26,LT,, | Performed by: RADIOLOGY

## 2019-08-05 PROCEDURE — 99024 PR POST-OP FOLLOW-UP VISIT: ICD-10-PCS | Mod: S$GLB,,, | Performed by: ORTHOPAEDIC SURGERY

## 2019-08-05 PROCEDURE — 73110 X-RAY EXAM OF WRIST: CPT | Mod: TC,PN,LT

## 2019-08-05 PROCEDURE — 99999 PR PBB SHADOW E&M-EST. PATIENT-LVL III: ICD-10-PCS | Mod: PBBFAC,,, | Performed by: ORTHOPAEDIC SURGERY

## 2019-08-05 PROCEDURE — 73110 X-RAY EXAM OF WRIST: CPT | Mod: 26,LT,, | Performed by: RADIOLOGY

## 2019-08-05 RX ORDER — HYDROCODONE BITARTRATE AND ACETAMINOPHEN 5; 325 MG/1; MG/1
1 TABLET ORAL NIGHTLY PRN
Qty: 15 TABLET | Refills: 0 | Status: SHIPPED | OUTPATIENT
Start: 2019-08-05 | End: 2019-11-25 | Stop reason: SDUPTHER

## 2019-08-05 NOTE — PROGRESS NOTES
Past Medical History:   Diagnosis Date    Alcoholism     no drink since 1984    Anxiety     Bipolar disorder     Chronic kidney disease     COPD (chronic obstructive pulmonary disease)     GERD (gastroesophageal reflux disease)     Hypertension     Pancreatitis     Thyroid disease        Past Surgical History:   Procedure Laterality Date    APPENDECTOMY      CATARACT EXTRACTION Right     EYE SURGERY      HYSTERECTOMY      THYROIDECTOMY      THYROIDECTOMY         Current Outpatient Medications   Medication Sig    albuterol-ipratropium  mcg (COMBIVENT)  mcg/actuation inhaler Inhale 2 puffs into the lungs every 6 (six) hours as needed for Wheezing.    atorvastatin (LIPITOR) 40 MG tablet Take 40 mg by mouth once daily.    azithromycin (ZITHROMAX) 500 MG tablet One daily for yellow mucous, repeat if needed    busPIRone (BUSPAR) 10 MG tablet Take 10 mg by mouth 3 (three) times daily.    calcitRIOL (ROCALTROL) 0.25 MCG Cap     ciprofloxacin HCl (CIPRO) 250 MG tablet Take 250 mg by mouth 2 (two) times daily.    famotidine (PEPCID) 20 MG tablet TK 1 T PO QHS    fluoxetine (PROZAC) 20 MG tablet Take 20 mg by mouth once daily.    fluticasone propionate (FLONASE) 50 mcg/actuation nasal spray 2 sprays (100 mcg total) by Each Nare route once daily.    fluticasone-umeclidin-vilanter (TRELEGY ELLIPTA) 100-62.5-25 mcg DsDv Inhale 1 puff into the lungs once daily.    gabapentin (NEURONTIN) 100 MG capsule gabapentin 100 mg capsule   Take 3 capsules every day by oral route.    HYDROcodone-acetaminophen (NORCO) 7.5-325 mg per tablet Take 1 tablet by mouth every 6 (six) hours as needed for Pain.    levothyroxine 100 mcg Cap levothyroxine 100 mcg capsule   Take 1 capsule every day by oral route.    ondansetron (ZOFRAN) 8 MG tablet Take 1 tablet (8 mg total) by mouth every 12 (twelve) hours as needed for Nausea.    polyvinyl alcohol, artificial tears, (LIQUIFILM TEARS) 1.4 % ophthalmic solution  Place 1 drop into the left eye as needed.    predniSONE (DELTASONE) 20 MG tablet One daily for 3 days and repeat for flare of lung symptoms as intructed     Current Facility-Administered Medications   Medication    ondansetron disintegrating tablet 4 mg       Review of patient's allergies indicates:   Allergen Reactions    Metoprolol Other (See Comments)     Grand-daughter/care giver reported Pt has over reaction to this drug, Bp bottoms out along with heart rate    Abilify [aripiprazole]      dizziness    Codeine      Other reaction(s): Unknown       Family History   Problem Relation Age of Onset    No Known Problems Mother     Diabetes Father     Heart disease Father         blood clot in lung went to heart    Cancer Sister         breast    Stroke Paternal Aunt     Cancer Sister         breast    Cancer Cousin         colon    Diabetes Cousin     Cancer Cousin         colon    COPD Sister          of COPD       Social History     Socioeconomic History    Marital status:      Spouse name: Not on file    Number of children: Not on file    Years of education: Not on file    Highest education level: Not on file   Occupational History    Not on file   Social Needs    Financial resource strain: Not on file    Food insecurity:     Worry: Not on file     Inability: Not on file    Transportation needs:     Medical: Not on file     Non-medical: Not on file   Tobacco Use    Smoking status: Current Some Day Smoker     Packs/day: 1.00     Types: Cigarettes   Substance and Sexual Activity    Alcohol use: No    Drug use: Not on file    Sexual activity: Not on file   Lifestyle    Physical activity:     Days per week: Not on file     Minutes per session: Not on file    Stress: Not on file   Relationships    Social connections:     Talks on phone: Not on file     Gets together: Not on file     Attends Jain service: Not on file     Active member of club or organization: Not on file      Attends meetings of clubs or organizations: Not on file     Relationship status: Not on file   Other Topics Concern    Not on file   Social History Narrative    Not on file       Chief Complaint:   Chief Complaint   Patient presents with    Left Wrist - Pain    Left Forearm - Pain       Date of surgery:  June 14th 2019    History of present illness:  79-year-old female who is about a month out from open reduction internal fixation of left distal radius fracture.  Patient had a fall and came in early.  Pain is an 8/10.  A little more swelling particularly over the ulnar aspect of her wrist.  Wound is healing well.      Review of Systems:    Musculoskeletal:  See HPI        Physical Examination:    Vital Signs:    There were no vitals filed for this visit.    Body mass index is 24.28 kg/m².    This a well-developed, well nourished patient in no acute distress.  They are alert and oriented and cooperative to examination.  Pt.  Walks using a walker.    Examination of the left wrist shows healed surgical incision. Has some fullness and swelling. I am able to make a full fist with her fingers.  A little stiffness in the wrist itself.    X-rays:  X-rays of the left wrist are ordered and reviewed which show plate and screws in good position.  Distal ulnar shaft fracture has good abundant callus noted.     Assessment::  Healing left distal radius fracture    Plan:  I reviewed the findings with her today.  Refilled her pain medicine.  Placed her in a different wrist brace.  She can continue with gentle exercise and range of motion.  Follow-up in a month with another x-ray of the left wrist.    This note was created using [employer] voice recognition software that occasionally misinterpreted phrases or words.

## 2019-08-09 ENCOUNTER — TELEPHONE (OUTPATIENT)
Dept: ORTHOPEDICS | Facility: CLINIC | Age: 79
End: 2019-08-09

## 2019-08-09 NOTE — TELEPHONE ENCOUNTER
----- Message from Bird Myles sent at 8/9/2019  2:34 PM CDT -----  Contact: Crys SINGH with   Patient Quinn to be discharged next week and OT needs to give report, please call Crys

## 2019-08-29 ENCOUNTER — HOSPITAL ENCOUNTER (INPATIENT)
Facility: HOSPITAL | Age: 79
LOS: 4 days | Discharge: NURSING, REHAB OR LONG-TERM CARE | DRG: 551 | End: 2019-09-09
Attending: EMERGENCY MEDICINE | Admitting: INTERNAL MEDICINE
Payer: MEDICARE

## 2019-08-29 DIAGNOSIS — N18.6 END STAGE RENAL DISEASE: ICD-10-CM

## 2019-08-29 DIAGNOSIS — R53.81 PHYSICAL DECONDITIONING: Chronic | ICD-10-CM

## 2019-08-29 DIAGNOSIS — E87.5 HYPERKALEMIA: ICD-10-CM

## 2019-08-29 DIAGNOSIS — N18.6 ESRD (END STAGE RENAL DISEASE): Chronic | ICD-10-CM

## 2019-08-29 DIAGNOSIS — R53.81 PHYSICAL DECONDITIONING: Primary | ICD-10-CM

## 2019-08-29 PROBLEM — G93.40 ENCEPHALOPATHY: Status: ACTIVE | Noted: 2019-08-29

## 2019-08-29 PROBLEM — J44.9 CHRONIC OBSTRUCTIVE PULMONARY DISEASE: Chronic | Status: ACTIVE | Noted: 2018-11-19

## 2019-08-29 LAB
ALBUMIN SERPL BCP-MCNC: 3.8 G/DL (ref 3.5–5.2)
ALP SERPL-CCNC: 108 U/L (ref 55–135)
ALT SERPL W/O P-5'-P-CCNC: 15 U/L (ref 10–44)
ANION GAP SERPL CALC-SCNC: 5 MMOL/L (ref 8–16)
AST SERPL-CCNC: 22 U/L (ref 10–40)
BASOPHILS # BLD AUTO: 0.03 K/UL (ref 0–0.2)
BASOPHILS NFR BLD: 0.4 % (ref 0–1.9)
BILIRUB SERPL-MCNC: 0.7 MG/DL (ref 0.1–1)
BNP SERPL-MCNC: 209 PG/ML (ref 0–99)
BUN SERPL-MCNC: 27 MG/DL (ref 8–23)
CALCIUM SERPL-MCNC: 9 MG/DL (ref 8.7–10.5)
CHLORIDE SERPL-SCNC: 109 MMOL/L (ref 95–110)
CO2 SERPL-SCNC: 26 MMOL/L (ref 23–29)
CREAT SERPL-MCNC: 4 MG/DL (ref 0.5–1.4)
DIFFERENTIAL METHOD: ABNORMAL
EOSINOPHIL # BLD AUTO: 0.1 K/UL (ref 0–0.5)
EOSINOPHIL NFR BLD: 0.7 % (ref 0–8)
ERYTHROCYTE [DISTWIDTH] IN BLOOD BY AUTOMATED COUNT: 13.9 % (ref 11.5–14.5)
EST. GFR  (AFRICAN AMERICAN): 11.7 ML/MIN/1.73 M^2
EST. GFR  (NON AFRICAN AMERICAN): 10.2 ML/MIN/1.73 M^2
GLUCOSE SERPL-MCNC: 79 MG/DL (ref 70–110)
HCT VFR BLD AUTO: 38.7 % (ref 37–48.5)
HGB BLD-MCNC: 11.4 G/DL (ref 12–16)
IMM GRANULOCYTES # BLD AUTO: 0.02 K/UL (ref 0–0.04)
IMM GRANULOCYTES NFR BLD AUTO: 0.3 % (ref 0–0.5)
LYMPHOCYTES # BLD AUTO: 0.9 K/UL (ref 1–4.8)
LYMPHOCYTES NFR BLD: 12.7 % (ref 18–48)
MCH RBC QN AUTO: 33.5 PG (ref 27–31)
MCHC RBC AUTO-ENTMCNC: 29.5 G/DL (ref 32–36)
MCV RBC AUTO: 114 FL (ref 82–98)
MONOCYTES # BLD AUTO: 0.5 K/UL (ref 0.3–1)
MONOCYTES NFR BLD: 6.2 % (ref 4–15)
NEUTROPHILS # BLD AUTO: 5.8 K/UL (ref 1.8–7.7)
NEUTROPHILS NFR BLD: 79.7 % (ref 38–73)
NRBC BLD-RTO: 0 /100 WBC
PLATELET # BLD AUTO: 346 K/UL (ref 150–350)
PMV BLD AUTO: 9.9 FL (ref 9.2–12.9)
POTASSIUM SERPL-SCNC: 5.8 MMOL/L (ref 3.5–5.1)
PROT SERPL-MCNC: 7.3 G/DL (ref 6–8.4)
RBC # BLD AUTO: 3.4 M/UL (ref 4–5.4)
SODIUM SERPL-SCNC: 140 MMOL/L (ref 136–145)
TROPONIN I SERPL DL<=0.01 NG/ML-MCNC: <0.03 NG/ML (ref 0.02–0.04)
WBC # BLD AUTO: 7.3 K/UL (ref 3.9–12.7)

## 2019-08-29 PROCEDURE — G0378 HOSPITAL OBSERVATION PER HR: HCPCS

## 2019-08-29 PROCEDURE — 85025 COMPLETE CBC W/AUTO DIFF WBC: CPT

## 2019-08-29 PROCEDURE — 99291 CRITICAL CARE FIRST HOUR: CPT

## 2019-08-29 PROCEDURE — 90935 HEMODIALYSIS ONE EVALUATION: CPT

## 2019-08-29 PROCEDURE — 96372 THER/PROPH/DIAG INJ SC/IM: CPT | Mod: 59

## 2019-08-29 PROCEDURE — 84484 ASSAY OF TROPONIN QUANT: CPT

## 2019-08-29 PROCEDURE — 93005 ELECTROCARDIOGRAM TRACING: CPT

## 2019-08-29 PROCEDURE — 63600175 PHARM REV CODE 636 W HCPCS: Performed by: INTERNAL MEDICINE

## 2019-08-29 PROCEDURE — 80053 COMPREHEN METABOLIC PANEL: CPT

## 2019-08-29 PROCEDURE — 83880 ASSAY OF NATRIURETIC PEPTIDE: CPT

## 2019-08-29 PROCEDURE — 25000003 PHARM REV CODE 250: Performed by: INTERNAL MEDICINE

## 2019-08-29 RX ORDER — MUPIROCIN 20 MG/G
OINTMENT TOPICAL 2 TIMES DAILY
Status: DISPENSED | OUTPATIENT
Start: 2019-08-29 | End: 2019-09-03

## 2019-08-29 RX ORDER — POTASSIUM CHLORIDE 20 MEQ/15ML
60 SOLUTION ORAL
Status: DISCONTINUED | OUTPATIENT
Start: 2019-08-29 | End: 2019-09-09 | Stop reason: HOSPADM

## 2019-08-29 RX ORDER — ONDANSETRON 4 MG/1
4 TABLET, ORALLY DISINTEGRATING ORAL EVERY 6 HOURS PRN
Status: DISCONTINUED | OUTPATIENT
Start: 2019-08-29 | End: 2019-09-09 | Stop reason: HOSPADM

## 2019-08-29 RX ORDER — LANOLIN ALCOHOL/MO/W.PET/CERES
800 CREAM (GRAM) TOPICAL
Status: DISCONTINUED | OUTPATIENT
Start: 2019-08-29 | End: 2019-09-09 | Stop reason: HOSPADM

## 2019-08-29 RX ORDER — SODIUM CHLORIDE 0.9 % (FLUSH) 0.9 %
10 SYRINGE (ML) INJECTION
Status: DISCONTINUED | OUTPATIENT
Start: 2019-08-29 | End: 2019-09-09 | Stop reason: HOSPADM

## 2019-08-29 RX ORDER — CALCITRIOL 0.25 UG/1
0.25 CAPSULE ORAL DAILY
Status: DISCONTINUED | OUTPATIENT
Start: 2019-08-30 | End: 2019-09-09 | Stop reason: HOSPADM

## 2019-08-29 RX ORDER — SODIUM,POTASSIUM PHOSPHATES 280-250MG
2 POWDER IN PACKET (EA) ORAL
Status: DISCONTINUED | OUTPATIENT
Start: 2019-08-29 | End: 2019-09-09 | Stop reason: HOSPADM

## 2019-08-29 RX ORDER — MIDODRINE HYDROCHLORIDE 2.5 MG/1
2.5 TABLET ORAL 2 TIMES DAILY WITH MEALS
Status: DISCONTINUED | OUTPATIENT
Start: 2019-08-29 | End: 2019-08-31

## 2019-08-29 RX ORDER — ALBUTEROL SULFATE 0.83 MG/ML
10 SOLUTION RESPIRATORY (INHALATION)
Status: DISCONTINUED | OUTPATIENT
Start: 2019-08-29 | End: 2019-08-29

## 2019-08-29 RX ORDER — ROSUVASTATIN CALCIUM 20 MG/1
20 TABLET, COATED ORAL DAILY
COMMUNITY
End: 2019-12-18 | Stop reason: SDUPTHER

## 2019-08-29 RX ORDER — FAMOTIDINE 20 MG/1
20 TABLET, FILM COATED ORAL DAILY
Status: DISCONTINUED | OUTPATIENT
Start: 2019-08-30 | End: 2019-09-09 | Stop reason: HOSPADM

## 2019-08-29 RX ORDER — GLUCAGON 1 MG
1 KIT INJECTION
Status: DISCONTINUED | OUTPATIENT
Start: 2019-08-29 | End: 2019-09-09 | Stop reason: HOSPADM

## 2019-08-29 RX ORDER — CALCIUM CHLORIDE INJECTION 100 MG/ML
0.5 INJECTION, SOLUTION INTRAVENOUS
Status: DISCONTINUED | OUTPATIENT
Start: 2019-08-29 | End: 2019-08-29

## 2019-08-29 RX ORDER — ARIPIPRAZOLE 2 MG/1
5 TABLET ORAL NIGHTLY
COMMUNITY
End: 2020-02-17 | Stop reason: SDUPTHER

## 2019-08-29 RX ORDER — ROSUVASTATIN CALCIUM 20 MG/1
20 TABLET, COATED ORAL DAILY
Status: DISCONTINUED | OUTPATIENT
Start: 2019-08-30 | End: 2019-09-09 | Stop reason: HOSPADM

## 2019-08-29 RX ORDER — ONDANSETRON 4 MG/1
8 TABLET, ORALLY DISINTEGRATING ORAL EVERY 8 HOURS PRN
Status: DISCONTINUED | OUTPATIENT
Start: 2019-08-29 | End: 2019-09-09 | Stop reason: HOSPADM

## 2019-08-29 RX ORDER — ONDANSETRON 4 MG/1
4 TABLET, ORALLY DISINTEGRATING ORAL EVERY 6 HOURS PRN
Status: ON HOLD | COMMUNITY
End: 2019-08-30 | Stop reason: HOSPADM

## 2019-08-29 RX ORDER — FLUOXETINE HYDROCHLORIDE 20 MG/1
20 CAPSULE ORAL DAILY
Status: DISCONTINUED | OUTPATIENT
Start: 2019-08-30 | End: 2019-09-09 | Stop reason: HOSPADM

## 2019-08-29 RX ORDER — POTASSIUM CHLORIDE 20 MEQ/15ML
40 SOLUTION ORAL
Status: DISCONTINUED | OUTPATIENT
Start: 2019-08-29 | End: 2019-09-09 | Stop reason: HOSPADM

## 2019-08-29 RX ORDER — ACETAMINOPHEN 325 MG/1
650 TABLET ORAL EVERY 4 HOURS PRN
Status: DISCONTINUED | OUTPATIENT
Start: 2019-08-29 | End: 2019-09-09 | Stop reason: HOSPADM

## 2019-08-29 RX ORDER — LEVOTHYROXINE SODIUM 100 UG/1
100 TABLET ORAL
Status: DISCONTINUED | OUTPATIENT
Start: 2019-08-30 | End: 2019-09-09 | Stop reason: HOSPADM

## 2019-08-29 RX ORDER — IBUPROFEN 200 MG
24 TABLET ORAL
Status: DISCONTINUED | OUTPATIENT
Start: 2019-08-29 | End: 2019-09-09 | Stop reason: HOSPADM

## 2019-08-29 RX ORDER — ONDANSETRON 2 MG/ML
4 INJECTION INTRAMUSCULAR; INTRAVENOUS EVERY 6 HOURS PRN
Status: DISCONTINUED | OUTPATIENT
Start: 2019-08-29 | End: 2019-09-09 | Stop reason: HOSPADM

## 2019-08-29 RX ORDER — INDOMETHACIN 25 MG/1
50 CAPSULE ORAL
Status: DISCONTINUED | OUTPATIENT
Start: 2019-08-29 | End: 2019-08-29

## 2019-08-29 RX ORDER — ARIPIPRAZOLE 5 MG/1
5 TABLET ORAL DAILY
Status: DISCONTINUED | OUTPATIENT
Start: 2019-08-30 | End: 2019-09-09 | Stop reason: HOSPADM

## 2019-08-29 RX ORDER — FLUTICASONE PROPIONATE 50 MCG
2 SPRAY, SUSPENSION (ML) NASAL DAILY
Status: DISCONTINUED | OUTPATIENT
Start: 2019-08-30 | End: 2019-09-09 | Stop reason: HOSPADM

## 2019-08-29 RX ORDER — MIDODRINE HYDROCHLORIDE 5 MG/1
2.5 TABLET ORAL 2 TIMES DAILY WITH MEALS
Status: ON HOLD | COMMUNITY
End: 2019-09-09 | Stop reason: HOSPADM

## 2019-08-29 RX ORDER — SODIUM CHLORIDE 9 MG/ML
INJECTION, SOLUTION INTRAVENOUS ONCE
Status: DISCONTINUED | OUTPATIENT
Start: 2019-08-29 | End: 2019-09-09

## 2019-08-29 RX ORDER — IBUPROFEN 200 MG
16 TABLET ORAL
Status: DISCONTINUED | OUTPATIENT
Start: 2019-08-29 | End: 2019-09-09 | Stop reason: HOSPADM

## 2019-08-29 RX ORDER — ACETAMINOPHEN 325 MG/1
650 TABLET ORAL EVERY 8 HOURS PRN
Status: DISCONTINUED | OUTPATIENT
Start: 2019-08-29 | End: 2019-09-09 | Stop reason: HOSPADM

## 2019-08-29 RX ORDER — HYDROCODONE BITARTRATE AND ACETAMINOPHEN 7.5; 325 MG/1; MG/1
1 TABLET ORAL EVERY 6 HOURS PRN
Status: ON HOLD | COMMUNITY
End: 2019-08-30 | Stop reason: HOSPADM

## 2019-08-29 RX ORDER — GABAPENTIN 300 MG/1
300 CAPSULE ORAL DAILY
Status: DISCONTINUED | OUTPATIENT
Start: 2019-08-30 | End: 2019-09-09 | Stop reason: HOSPADM

## 2019-08-29 RX ORDER — HEPARIN SODIUM 5000 [USP'U]/ML
5000 INJECTION, SOLUTION INTRAVENOUS; SUBCUTANEOUS EVERY 8 HOURS
Status: DISCONTINUED | OUTPATIENT
Start: 2019-08-29 | End: 2019-09-09 | Stop reason: HOSPADM

## 2019-08-29 RX ADMIN — HEPARIN SODIUM 5000 UNITS: 5000 INJECTION INTRAVENOUS; SUBCUTANEOUS at 11:08

## 2019-08-29 RX ADMIN — MIDODRINE HYDROCHLORIDE 2.5 MG: 2.5 TABLET ORAL at 11:08

## 2019-08-29 NOTE — ED NOTES
Pt received from the dialysis unit, staff concerned about her confusion . Access to L upper arm , no swelling or bruising .

## 2019-08-29 NOTE — CONSULTS
Consult Note  Nephrology    Consult Requested By: Otf Mclaughlin MD    Reason for Consult: ESRD, dependent on dialysis    SUBJECTIVE:     History of Present Illness:  78 y/o female patient known to our practice, has out patient hemodialysis 3x week on TTS schedule.  She missed her treatment on Tuesday.  When she came for dialysis today, out patient unit staff noted several changes.  She usually walks in of her own accord and is oriented at baseline.  Today, family was unable to get her out of the car d/t profound weakness, and she was not oriented, talking out of her head.  She was sent to ER via EMS.  Renal is consulted for dialysis orders.    8/29  Pt seen and evaluated in ER.  Mental status a bit better.  States she missed dialysis on Tuesday d/t being very ill with fever and felt terrible.  Noted, she is unsure what day it is right now, thinks it's Saturday.  It was reported to unit staff that pt is taking lyrica, which is on her allergy list, recently prescribed.    Assessment/plan:  1.  ESRD -- dialysis today, will reassess for needs tomorrow.  Seen on dialysis  2.  Anemia of chronic dz--awaiting labs.  Will give epo w/HD or transfuse as indicated  3.  SHPT--continue calcitriol  4.  Hypotension--continue midodrine bid    Past Medical History:   Diagnosis Date    Alcoholism     no drink since 1984    Anxiety     Bipolar disorder     Chronic kidney disease     COPD (chronic obstructive pulmonary disease)     GERD (gastroesophageal reflux disease)     Hypertension     Pancreatitis     Thyroid disease      Past Surgical History:   Procedure Laterality Date    APPENDECTOMY      CATARACT EXTRACTION Right     EYE SURGERY      HYSTERECTOMY      THYROIDECTOMY      THYROIDECTOMY       Family History   Problem Relation Age of Onset    No Known Problems Mother     Diabetes Father     Heart disease Father         blood clot in lung went to heart    Cancer Sister         breast    Stroke Paternal Aunt   "   Cancer Sister         breast    Cancer Cousin         colon    Diabetes Cousin     Cancer Cousin         colon    COPD Sister          of COPD     Social History     Tobacco Use    Smoking status: Current Some Day Smoker     Packs/day: 1.00     Types: Cigarettes   Substance Use Topics    Alcohol use: No    Drug use: Not on file       Review of patient's allergies indicates:   Allergen Reactions    Metoprolol Other (See Comments)     Grand-daughter/care giver reported Pt has over reaction to this drug, Bp bottoms out along with heart rate    Abilify [aripiprazole]      dizziness    Codeine      Other reaction(s): Unknown        Review of Systems:  General ROS: positive for fever, weakness  Psychological ROS: negative for - behavioral disorder or depression  ENT ROS: c/o headache  Hematological and Lymphatic ROS: negative for - bleeding problems or bruising  Endocrine ROS: negative for - temperature intolerance or unexpected weight changes  Respiratory ROS: no cough, shortness of breath, or wheezing  Cardiovascular ROS: + chest pain, no SOB  Gastrointestinal ROS: no abdominal pain, no n/v/c/d  Genito-Urinary ROS: no dysuria or trouble voiding  Musculoskeletal ROS: positive for weakness  Neurological ROS: "dizzy", "HA"  Dermatological ROS: no c/o skin lesion    OBJECTIVE:     Vital Signs Range (Last 24H):  Temp:  [98.1 °F (36.7 °C)]   Pulse:  [70]   Resp:  [20]   BP: (113)/(62)   SpO2:  [95 %]     Physical Exam:  General- NAD noted  HEENT- WNL  Neck- supple  CV- Regular rate and rhythm  Resp- Lungs CTA Bilaterally, No increased WOB  GI- Non tender/non-distended, BS normoactive x4 quads  Extrem- No cyanosis, clubbing, edema.  Derm- skin w/d  Neuro-  Mostly confused, not at baseline     Body mass index is 24.12 kg/m².    Laboratory:  CBC: No results for input(s): WBC, RBC, HGB, HCT, PLT, MCV, MCH, MCHC in the last 168 hours.  CMP: No results for input(s): GLU, CALCIUM, ALBUMIN, PROT, NA, K, CO2, CL, " BUN, CREATININE, ALKPHOS, ALT, AST, BILITOT in the last 168 hours.    Diagnostic Results:  labs pending      ASSESSMENT/PLAN:     There are no hospital problems to display for this patient.        Thank you for allowing us to participate in the care of your patient. We will follow the patient and provide recommendations as needed.      Time spent seeing patient( greater than 1/2 spent in direct contact) :

## 2019-08-29 NOTE — ED PROVIDER NOTES
Encounter Date: 8/29/2019       History     Chief Complaint   Patient presents with    Weakness     Family states pt has been feeling weak and confused since being placed on Abilify     79-year-old female with history of end-stage renal disease dialyzed on Tuesday Thursday Saturday followed by Dr. Finch, COPD, hypertension, bipolar disorder, gastroesophageal reflux, hypothyroid, patient presents to the emergency department with complaint of generalized weakness over the last 2-3 days.  Patient states that she has missed dialysis both on Saturday and Tuesday.  The reason that she states she denied goes because she felt generalized weakness. The patient also states that she has been taking Lyrica for her bipolar and depression which is a new medication however has not been renally dosed.  Patient denies chest pain, shortness of breath, no abdominal pain, no nausea, vomiting, fever.  Patient does admit to generalized weakness and dizziness.  However denies headache, no blurred vision,        Review of patient's allergies indicates:   Allergen Reactions    Metoprolol Other (See Comments)     Grand-daughter/care giver reported Pt has over reaction to this drug, Bp bottoms out along with heart rate    Abilify [aripiprazole]      dizziness    Codeine      Other reaction(s): Unknown     Past Medical History:   Diagnosis Date    Alcoholism     no drink since 1984    Anxiety     Bipolar disorder     Chronic kidney disease     COPD (chronic obstructive pulmonary disease)     GERD (gastroesophageal reflux disease)     Hypertension     Pancreatitis     Thyroid disease      Past Surgical History:   Procedure Laterality Date    APPENDECTOMY      CATARACT EXTRACTION Right     EYE SURGERY      HYSTERECTOMY      THYROIDECTOMY      THYROIDECTOMY       Family History   Problem Relation Age of Onset    No Known Problems Mother     Diabetes Father     Heart disease Father         blood clot in lung went to heart     Cancer Sister         breast    Stroke Paternal Aunt     Cancer Sister         breast    Cancer Cousin         colon    Diabetes Cousin     Cancer Cousin         colon    COPD Sister          of COPD     Social History     Tobacco Use    Smoking status: Current Some Day Smoker     Packs/day: 1.00     Types: Cigarettes   Substance Use Topics    Alcohol use: No    Drug use: Not on file     Review of Systems   Constitutional: Positive for fatigue. Negative for chills and fever.   HENT: Negative for congestion, postnasal drip, rhinorrhea, sinus pain and trouble swallowing.    Eyes: Negative for photophobia and visual disturbance.   Respiratory: Negative for chest tightness, shortness of breath and wheezing.    Cardiovascular: Negative for chest pain, palpitations and leg swelling.   Gastrointestinal: Negative for abdominal pain, blood in stool, nausea and vomiting.   Endocrine: Negative for polydipsia, polyphagia and polyuria.   Genitourinary: Negative for dysuria, flank pain, urgency, vaginal bleeding and vaginal discharge.   Musculoskeletal: Negative for back pain and gait problem.   Skin: Negative for rash.   Neurological: Positive for dizziness and light-headedness. Negative for tremors, seizures, weakness and numbness.        Generalized weakness   Hematological: Does not bruise/bleed easily.   Psychiatric/Behavioral: Negative for confusion.   All other systems reviewed and are negative.      Physical Exam     Initial Vitals [19 1207]   BP Pulse Resp Temp SpO2   113/62 70 20 98.1 °F (36.7 °C) 95 %      MAP       --         Physical Exam    Nursing note and vitals reviewed.  Constitutional: She appears well-developed and well-nourished.   HENT:   Head: Normocephalic and atraumatic.   Nose: Nose normal.   Mouth/Throat: Oropharynx is clear and moist.   Eyes: Conjunctivae and EOM are normal. Pupils are equal, round, and reactive to light.   Neck: Normal range of motion. Neck supple. No thyromegaly  present. No tracheal deviation present.   Cardiovascular: Normal rate, regular rhythm, normal heart sounds and intact distal pulses. Exam reveals no gallop and no friction rub.    No murmur heard.  Pulmonary/Chest: Breath sounds normal. No stridor. No respiratory distress.   Abdominal: Soft. Bowel sounds are normal. She exhibits no mass. There is no rebound and no guarding.   Musculoskeletal: Normal range of motion. She exhibits no edema.   Lymphadenopathy:     She has no cervical adenopathy.   Neurological: She is alert and oriented to person, place, and time. She has normal strength and normal reflexes. No cranial nerve deficit. GCS score is 15. GCS eye subscore is 4. GCS verbal subscore is 5. GCS motor subscore is 6.   Skin: Skin is warm and dry. Capillary refill takes less than 2 seconds.   Psychiatric: She has a normal mood and affect.         ED Course   Procedures  Labs Reviewed   CBC W/ AUTO DIFFERENTIAL   COMPREHENSIVE METABOLIC PANEL   TROPONIN I   B-TYPE NATRIURETIC PEPTIDE   URINALYSIS, REFLEX TO URINE CULTURE     EKG Readings: (Independently Interpreted)   Rhythm: Normal Sinus Rhythm. Ectopy: No Ectopy.   Normal sinus rhythm, 82, left anterior fascicular block.  T-wave inversions in leads 3, flattening in AVF, inversion in V1, V2, V3, flattening in V4, V5, V6       Imaging Results          X-Ray Chest AP Portable (Final result)  Result time 08/29/19 13:58:06    Final result by Dave Baird MD (08/29/19 13:58:06)                 Impression:      Stable cardiomegaly.  Mild right basilar atelectasis.      Electronically signed by: Dave Baird MD  Date:    08/29/2019  Time:    13:58             Narrative:    EXAMINATION:  XR CHEST AP PORTABLE    CLINICAL HISTORY:  Chest Pain;    COMPARISON:  06/12/2019    FINDINGS:  Cardiac silhouette size is enlarged and stable from prior.  Linear opacities at the right lung base compatible with atelectasis.  No large pleural effusion.  No pneumothorax.  No acute  osseous abnormality.                                 Medical Decision Making:   Initial Assessment:   79-year-old female with history of end-stage renal disease, COPD, hypertension, bipolar disorder patient with complaint of generalized weakness and failure to be dialyzed over the last week.  Differential Diagnosis:   Electrolyte abnormality, metabolic encephalopathy,  Clinical Tests:   Lab Tests: Ordered and Reviewed  Radiological Study: Ordered and Reviewed  Medical Tests: Ordered and Reviewed              Attending Attestation:         Attending Critical Care:   Critical Care Times:   Direct Patient Care (initial evaluation, reassessments, and time considering the case)................................................................40 minutes.   Additional History from reviewing old medical records or taking additional history from the family, EMS, PCP, etc.......................10 minutes.   Ordering, Reviewing, and Interpreting Diagnostic Studies...............................................................................................................10 minutes.   Documentation..................................................................................................................................................................................10 minutes.   Consultation with other Physicians. .................................................................................................................................................10 minutes.   Consultation with the patient's family directly relating to the patient's condition, care, and DNR status (when patient unable)......10 minutes.   ==============================================================  · Total Critical Care Time - exclusive of procedural time: 90 minutes.  ==============================================================  Critical care was necessary to treat or prevent imminent or life-threatening deterioration of the following  conditions: metabolic crisis.   Critical care was time spent personally by me on the following activities: obtaining history from patient or relative, examination of patient, review of x-rays / CT sent with the patient, review of old charts, ordering lab, x-rays, and/or EKG, development of treatment plan with patient or relative and discussion with consultants.   Critical Care Condition: potentially life-threatening                  Clinical Impression:       ICD-10-CM ICD-9-CM   1. Hyperkalemia E87.5 276.7   2. End stage renal disease N18.6 585.6                                Otf Mclaughlin MD  08/29/19 2052       Otf Mclaughlin MD  08/29/19 2056

## 2019-08-30 PROBLEM — G89.29 CHRONIC PAIN: Chronic | Status: ACTIVE | Noted: 2019-08-30

## 2019-08-30 PROBLEM — I95.9 HYPOTENSION: Chronic | Status: ACTIVE | Noted: 2019-08-30

## 2019-08-30 PROBLEM — R53.81 PHYSICAL DECONDITIONING: Status: ACTIVE | Noted: 2019-08-30

## 2019-08-30 PROBLEM — J96.10 CHRONIC RESPIRATORY FAILURE: Chronic | Status: ACTIVE | Noted: 2019-08-30

## 2019-08-30 PROBLEM — E03.9 HYPOTHYROIDISM: Chronic | Status: ACTIVE | Noted: 2019-08-30

## 2019-08-30 PROBLEM — S62.102A FRACTURE OF LEFT WRIST: Chronic | Status: ACTIVE | Noted: 2019-08-30

## 2019-08-30 PROBLEM — G93.40 ENCEPHALOPATHY: Status: RESOLVED | Noted: 2019-08-29 | Resolved: 2019-08-30

## 2019-08-30 PROBLEM — F31.9 BIPOLAR DISORDER: Chronic | Status: ACTIVE | Noted: 2019-08-30

## 2019-08-30 PROBLEM — R53.81 PHYSICAL DECONDITIONING: Chronic | Status: ACTIVE | Noted: 2019-08-30

## 2019-08-30 PROBLEM — G89.29 WRIST PAIN, CHRONIC, LEFT: Chronic | Status: ACTIVE | Noted: 2019-08-30

## 2019-08-30 PROBLEM — M25.532 WRIST PAIN, CHRONIC, LEFT: Chronic | Status: ACTIVE | Noted: 2019-08-30

## 2019-08-30 LAB
ANION GAP SERPL CALC-SCNC: 15 MMOL/L (ref 8–16)
BASOPHILS # BLD AUTO: 0.04 K/UL (ref 0–0.2)
BASOPHILS NFR BLD: 0.5 % (ref 0–1.9)
BUN SERPL-MCNC: 13 MG/DL (ref 8–23)
CALCIUM SERPL-MCNC: 9.6 MG/DL (ref 8.7–10.5)
CHLORIDE SERPL-SCNC: 98 MMOL/L (ref 95–110)
CO2 SERPL-SCNC: 24 MMOL/L (ref 23–29)
CREAT SERPL-MCNC: 2.7 MG/DL (ref 0.5–1.4)
DIFFERENTIAL METHOD: ABNORMAL
EOSINOPHIL # BLD AUTO: 0.1 K/UL (ref 0–0.5)
EOSINOPHIL NFR BLD: 1.1 % (ref 0–8)
ERYTHROCYTE [DISTWIDTH] IN BLOOD BY AUTOMATED COUNT: 13.8 % (ref 11.5–14.5)
EST. GFR  (AFRICAN AMERICAN): 18.9 ML/MIN/1.73 M^2
EST. GFR  (NON AFRICAN AMERICAN): 16.4 ML/MIN/1.73 M^2
GLUCOSE SERPL-MCNC: 103 MG/DL (ref 70–110)
GLUCOSE SERPL-MCNC: 129 MG/DL (ref 70–110)
GLUCOSE SERPL-MCNC: 148 MG/DL (ref 70–110)
GLUCOSE SERPL-MCNC: 83 MG/DL (ref 70–110)
GLUCOSE SERPL-MCNC: 84 MG/DL (ref 70–110)
HCT VFR BLD AUTO: 40.2 % (ref 37–48.5)
HGB BLD-MCNC: 12.5 G/DL (ref 12–16)
IMM GRANULOCYTES # BLD AUTO: 0.03 K/UL (ref 0–0.04)
IMM GRANULOCYTES NFR BLD AUTO: 0.4 % (ref 0–0.5)
LYMPHOCYTES # BLD AUTO: 1.1 K/UL (ref 1–4.8)
LYMPHOCYTES NFR BLD: 14.5 % (ref 18–48)
MAGNESIUM SERPL-MCNC: 2.2 MG/DL (ref 1.6–2.6)
MCH RBC QN AUTO: 33.8 PG (ref 27–31)
MCHC RBC AUTO-ENTMCNC: 31.1 G/DL (ref 32–36)
MCV RBC AUTO: 109 FL (ref 82–98)
MONOCYTES # BLD AUTO: 0.6 K/UL (ref 0.3–1)
MONOCYTES NFR BLD: 8.8 % (ref 4–15)
NEUTROPHILS # BLD AUTO: 5.5 K/UL (ref 1.8–7.7)
NEUTROPHILS NFR BLD: 74.7 % (ref 38–73)
NRBC BLD-RTO: 0 /100 WBC
PHOSPHATE SERPL-MCNC: 3.8 MG/DL (ref 2.7–4.5)
PLATELET # BLD AUTO: 326 K/UL (ref 150–350)
PMV BLD AUTO: 9.8 FL (ref 9.2–12.9)
POTASSIUM SERPL-SCNC: 4.6 MMOL/L (ref 3.5–5.1)
PROCALCITONIN SERPL IA-MCNC: 0.37 NG/ML (ref 0–0.5)
RBC # BLD AUTO: 3.7 M/UL (ref 4–5.4)
SODIUM SERPL-SCNC: 137 MMOL/L (ref 136–145)
WBC # BLD AUTO: 7.3 K/UL (ref 3.9–12.7)

## 2019-08-30 PROCEDURE — 84145 PROCALCITONIN (PCT): CPT

## 2019-08-30 PROCEDURE — 97162 PT EVAL MOD COMPLEX 30 MIN: CPT

## 2019-08-30 PROCEDURE — 25000242 PHARM REV CODE 250 ALT 637 W/ HCPCS: Performed by: INTERNAL MEDICINE

## 2019-08-30 PROCEDURE — 25000003 PHARM REV CODE 250: Performed by: INTERNAL MEDICINE

## 2019-08-30 PROCEDURE — 63600175 PHARM REV CODE 636 W HCPCS: Performed by: INTERNAL MEDICINE

## 2019-08-30 PROCEDURE — 97530 THERAPEUTIC ACTIVITIES: CPT

## 2019-08-30 PROCEDURE — 30200315 PPD INTRADERMAL TEST REV CODE 302: Performed by: INTERNAL MEDICINE

## 2019-08-30 PROCEDURE — 80048 BASIC METABOLIC PNL TOTAL CA: CPT

## 2019-08-30 PROCEDURE — 96372 THER/PROPH/DIAG INJ SC/IM: CPT | Mod: 59

## 2019-08-30 PROCEDURE — 97535 SELF CARE MNGMENT TRAINING: CPT

## 2019-08-30 PROCEDURE — 25000003 PHARM REV CODE 250: Performed by: HOSPITALIST

## 2019-08-30 PROCEDURE — 83735 ASSAY OF MAGNESIUM: CPT

## 2019-08-30 PROCEDURE — 86580 TB INTRADERMAL TEST: CPT | Performed by: INTERNAL MEDICINE

## 2019-08-30 PROCEDURE — G0378 HOSPITAL OBSERVATION PER HR: HCPCS

## 2019-08-30 PROCEDURE — 84100 ASSAY OF PHOSPHORUS: CPT

## 2019-08-30 PROCEDURE — 97165 OT EVAL LOW COMPLEX 30 MIN: CPT

## 2019-08-30 PROCEDURE — 36415 COLL VENOUS BLD VENIPUNCTURE: CPT

## 2019-08-30 PROCEDURE — 85025 COMPLETE CBC W/AUTO DIFF WBC: CPT

## 2019-08-30 RX ORDER — POLYETHYLENE GLYCOL 3350 17 G/17G
17 POWDER, FOR SOLUTION ORAL DAILY PRN
Status: DISCONTINUED | OUTPATIENT
Start: 2019-08-30 | End: 2019-09-09 | Stop reason: HOSPADM

## 2019-08-30 RX ORDER — HYDROCODONE BITARTRATE AND ACETAMINOPHEN 5; 325 MG/1; MG/1
1 TABLET ORAL ONCE
Status: COMPLETED | OUTPATIENT
Start: 2019-08-30 | End: 2019-08-30

## 2019-08-30 RX ORDER — HYDROCODONE BITARTRATE AND ACETAMINOPHEN 5; 325 MG/1; MG/1
1 TABLET ORAL EVERY 6 HOURS PRN
Status: DISCONTINUED | OUTPATIENT
Start: 2019-08-30 | End: 2019-09-07

## 2019-08-30 RX ADMIN — FLUOXETINE 20 MG: 20 CAPSULE ORAL at 10:08

## 2019-08-30 RX ADMIN — LEVOTHYROXINE SODIUM 100 MCG: 100 TABLET ORAL at 05:08

## 2019-08-30 RX ADMIN — HYDROCODONE BITARTRATE AND ACETAMINOPHEN 1 TABLET: 5; 325 TABLET ORAL at 07:08

## 2019-08-30 RX ADMIN — HEPARIN SODIUM 5000 UNITS: 5000 INJECTION INTRAVENOUS; SUBCUTANEOUS at 10:08

## 2019-08-30 RX ADMIN — GABAPENTIN 300 MG: 300 CAPSULE ORAL at 10:08

## 2019-08-30 RX ADMIN — MUPIROCIN: 20 OINTMENT TOPICAL at 09:08

## 2019-08-30 RX ADMIN — ONDANSETRON 4 MG: 4 TABLET, ORALLY DISINTEGRATING ORAL at 05:08

## 2019-08-30 RX ADMIN — HEPARIN SODIUM 5000 UNITS: 5000 INJECTION INTRAVENOUS; SUBCUTANEOUS at 04:08

## 2019-08-30 RX ADMIN — FLUTICASONE PROPIONATE 100 MCG: 50 SPRAY, METERED NASAL at 10:08

## 2019-08-30 RX ADMIN — CALCITRIOL CAPSULES 0.25 MCG 0.25 MCG: 0.25 CAPSULE ORAL at 10:08

## 2019-08-30 RX ADMIN — FAMOTIDINE 20 MG: 20 TABLET ORAL at 10:08

## 2019-08-30 RX ADMIN — TUBERCULIN PURIFIED PROTEIN DERIVATIVE 5 UNITS: 5 INJECTION, SOLUTION INTRADERMAL at 01:08

## 2019-08-30 RX ADMIN — ACETAMINOPHEN 650 MG: 325 TABLET ORAL at 05:08

## 2019-08-30 RX ADMIN — MIDODRINE HYDROCHLORIDE 2.5 MG: 2.5 TABLET ORAL at 10:08

## 2019-08-30 RX ADMIN — ONDANSETRON 4 MG: 4 TABLET, ORALLY DISINTEGRATING ORAL at 01:08

## 2019-08-30 RX ADMIN — MIDODRINE HYDROCHLORIDE 2.5 MG: 2.5 TABLET ORAL at 05:08

## 2019-08-30 RX ADMIN — HEPARIN SODIUM 5000 UNITS: 5000 INJECTION INTRAVENOUS; SUBCUTANEOUS at 05:08

## 2019-08-30 RX ADMIN — ROSUVASTATIN CALCIUM 20 MG: 20 TABLET, FILM COATED ORAL at 08:08

## 2019-08-30 RX ADMIN — MUPIROCIN: 20 OINTMENT TOPICAL at 10:08

## 2019-08-30 RX ADMIN — ARIPIPRAZOLE 5 MG: 5 TABLET ORAL at 10:08

## 2019-08-30 RX ADMIN — HYDROCODONE BITARTRATE AND ACETAMINOPHEN 1 TABLET: 5; 325 TABLET ORAL at 01:08

## 2019-08-30 NOTE — PLAN OF CARE
08/30/19 1713   Post-Acute Status   Post-Acute Authorization Placement   Post-Acute Placement Status Referrals Sent   Spoke with patient about Freedom of Choice Form, explained to patient and family that they have the right to choose any agency, and a list of agencies was provided to patient and family to review, they verbalized an understanding, pt signed form, and form scanned into CM notes. [facility]. [facility] accepted the patient.

## 2019-08-30 NOTE — PT/OT/SLP EVAL
"Occupational Therapy   Evaluation    Name: Althea Gaona  MRN: [id number]  Admitting Diagnosis:  Physical deconditioning      Recommendations:     Discharge Recommendations: nursing facility, skilled  Discharge Equipment Recommendations:  (tbd)  Barriers to discharge:  None    Assessment:     Althea Gaona is a 79 y.o. female with a medical diagnosis of Physical deconditioning.  Patient agreeable to OT session. Performance deficits affecting function: weakness, impaired endurance, impaired self care skills, impaired balance, impaired functional mobilty, decreased safety awareness. Pt reports feeling weak today during session.    Rehab Prognosis: Fair; patient would benefit from acute skilled OT services to address these deficits and reach maximum level of function.       Plan:     Patient to be seen 5 x/week to address the above listed problems via self-care/home management, therapeutic activities, therapeutic exercises  · Plan of Care Expires: 09/30/19  · Plan of Care Reviewed with: patient    Subjective     Chief Complaint: "It is so hot in this room"  Patient/Family Comments/goals: to return home    Occupational Profile:  Living Environment: Pt lives with her  and mother-in-law in a 1 story house with 3 steps to enter and a railing present. Pt has a tub/shower combo with a shower chair and a standard height toilet.   Previous level of function: Pt independent in dressing. Mother-in-law does the cooking, driving, and goes to the grocery store. Pt has help from  with bathing. Pt is currently receiving  OT/PT.   Equipment Used at Home:  wheelchair, cane, straight, shower chair, walker, rolling  Assistance upon Discharge: yes, patient lives with her  and mother-in-law.     Pain/Comfort:  · Pain Rating 1: 8/10  · Location - Side 1: Left  · Location 1: wrist  · Pain Addressed 1: Distraction, Other (see comments)(nursing aware)    Patients cultural, spiritual, Nondenominational conflicts given the " current situation: no    Objective:     Communicated with: nursing prior to session.  Patient found HOB elevated with peripheral IV, oxygen upon OT entry to room.    General Precautions: Standard, fall   Orthopedic Precautions:N/A   Braces: UE brace     Occupational Performance:    Bed Mobility:    · Patient completed Rolling/Turning to Left with  stand by assistance  · Patient completed Scooting/Bridging with stand by assistance  · Patient completed Supine to Sit with contact guard assistance    Activities of Daily Living:  · Lower Body Dressing: contact guard assistance to don socks EOB    Cognitive/Visual Perceptual:  Cognitive/Psychosocial Skills:     -       Oriented to: Person, Place, Time and Situation   -       Follows Commands/attention:Follows one-step commands  -       Safety awareness/insight to disability: impaired   -       Mood/Affect/Coping skills/emotional control: Appropriate to situation, Cooperative and Pleasant    Physical Exam:  Balance:    -       sitting balance fair  Postural examination/scapula alignment:    -       Rounded shoulders  -       Forward head  Upper Extremity Range of Motion:     -       Right Upper Extremity: Deficits: ~95 degrees shoulder flexion, WNL distally  -       Left Upper Extremity: Deficits: ~95 degrees shoulder flexion; limited ROM in wrist due to recent surgery  Upper Extremity Strength:    -       Right Upper Extremity: 3-/5 shoulder flexion, 3/5 distally   -       Left Upper Extremity: 3-/5 shoulder flexion, 3/5 distally    Strength:    -       Right Upper Extremity: Deficits  -       Left Upper Extremity: Deficits     AMPAC 6 Click ADL:  AMPAC Total Score: 19    Treatment & Education:  Pt educated on role of OT, POC, safety techniques, bed mobility techniques, and benefits of SNF following d/c.   Education:    Patient left seated EOB with all lines intact and PT  present.    GOALS:   Multidisciplinary Problems     Occupational Therapy Goals        Problem:  Occupational Therapy Goal    Goal Priority Disciplines Outcome Interventions   Occupational Therapy Goal     OT, PT/OT     Description:  Goals to be met by: discharge     Patient will increase functional independence with ADLs by performing:    LE Dressing with Supervision.  Grooming while standing at sink with Supervision.  Toileting from toilet with Supervision for hygiene and clothing management.   Toilet transfer to toilet with Supervision.                       History:     Past Medical History:   Diagnosis Date    Alcoholism     no drink since 1984    Anxiety     Asthma     Bipolar disorder     Chronic kidney disease     COPD (chronic obstructive pulmonary disease)     GERD (gastroesophageal reflux disease)     Hypertension     Pancreatitis     Thyroid disease        Past Surgical History:   Procedure Laterality Date    APPENDECTOMY      CATARACT EXTRACTION Right     EYE SURGERY      HYSTERECTOMY      THYROIDECTOMY      THYROIDECTOMY         Time Tracking:     OT Date of Treatment: 08/30/19  OT Start Time: 1135  OT Stop Time: 1150  OT Total Time (min): 15 min    Billable Minutes:Evaluation 5  Self Care/Home Management 10    Audrey Hunt OT  8/30/2019

## 2019-08-30 NOTE — ASSESSMENT & PLAN NOTE
History of chronic neck and back pain. Likely secondary to MSK/degenerative joint disease. Avoid Lyrica.

## 2019-08-30 NOTE — ASSESSMENT & PLAN NOTE
Chronic medical condition.  Continuing home medication.  Monitoring.  No current signs of exacerbation.

## 2019-08-30 NOTE — CONSULTS
Consult Note  Nephrology    Consult Requested By: Gabi Mckinney MD    Reason for Consult: ESRD, dependent on dialysis    SUBJECTIVE:     History of Present Illness:  80 y/o female patient known to our practice, has out patient hemodialysis 3x week on TTS schedule.  She missed her treatment on Tuesday.  When she came for dialysis today, out patient unit staff noted several changes.  She usually walks in of her own accord and is oriented at baseline.  Today, family was unable to get her out of the car d/t profound weakness, and she was not oriented, talking out of her head.  She was sent to ER via EMS.  Renal is consulted for dialysis orders.    8/29  Pt seen and evaluated in ER.  Mental status a bit better.  States she missed dialysis on Tuesday d/t being very ill with fever and felt terrible.  Noted, she is unsure what day it is right now, thinks it's Saturday.  It was reported to unit staff that pt is taking lyrica, which is on her allergy list, recently prescribed.  8/30  sleeping    Assessment/plan:  1.  ESRD -- t,th,sat hd  2.  Anemia of chronic dz--erythropoiesis stimulating agent with renal replacement therapy    3.  SHPT--continue calcitriol  4.  Hypotension--continue midodrine bid    Past Medical History:   Diagnosis Date    Alcoholism     no drink since 1984    Anxiety     Asthma     Bipolar disorder     Chronic kidney disease     COPD (chronic obstructive pulmonary disease)     GERD (gastroesophageal reflux disease)     Hypertension     Pancreatitis     Thyroid disease      Past Surgical History:   Procedure Laterality Date    APPENDECTOMY      CATARACT EXTRACTION Right     EYE SURGERY      HYSTERECTOMY      THYROIDECTOMY      THYROIDECTOMY       Family History   Problem Relation Age of Onset    No Known Problems Mother     Diabetes Father     Heart disease Father         blood clot in lung went to heart    Cancer Sister         breast    Stroke Paternal Aunt     Cancer Sister   "       breast    Cancer Cousin         colon    Diabetes Cousin     Cancer Cousin         colon    COPD Sister          of COPD     Social History     Tobacco Use    Smoking status: Current Some Day Smoker     Packs/day: 1.00     Types: Cigarettes    Smokeless tobacco: Never Used   Substance Use Topics    Alcohol use: No    Drug use: Never       Review of patient's allergies indicates:   Allergen Reactions    Metoprolol Other (See Comments)     Grand-daughter/care giver reported Pt has over reaction to this drug, Bp bottoms out along with heart rate    Abilify [aripiprazole]      dizziness    Codeine      Other reaction(s): Unknown        Review of Systems:  General ROS: positive for fever, weakness  Psychological ROS: negative for - behavioral disorder or depression  ENT ROS: c/o headache  Hematological and Lymphatic ROS: negative for - bleeding problems or bruising  Endocrine ROS: negative for - temperature intolerance or unexpected weight changes  Respiratory ROS: no cough, shortness of breath, or wheezing  Cardiovascular ROS: + chest pain, no SOB  Gastrointestinal ROS: no abdominal pain, no n/v/c/d  Genito-Urinary ROS: no dysuria or trouble voiding  Musculoskeletal ROS: positive for weakness  Neurological ROS: "dizzy", "HA"  Dermatological ROS: no c/o skin lesion    OBJECTIVE:     Vital Signs Range (Last 24H):  Temp:  [96.4 °F (35.8 °C)-98.8 °F (37.1 °C)]   Pulse:  [64-98]   Resp:  [16-26]   BP: ()/(49-79)   SpO2:  [98 %-99 %]     Physical Exam:  General- NAD noted  HEENT- WNL  Neck- supple  CV- Regular rate and rhythm  Resp- Lungs CTA Bilaterally, No increased WOB  GI- Non tender/non-distended, BS normoactive x4 quads  Extrem- No cyanosis, clubbing, edema.  Derm- skin w/d  Neuro-  Mostly confused, not at baseline     Body mass index is 22.96 kg/m².    Laboratory:  CBC:   Recent Labs   Lab 19  0509   WBC 7.30   RBC 3.70*   HGB 12.5   HCT 40.2      *   MCH 33.8*   MCHC " 31.1*     CMP:   Recent Labs   Lab 08/29/19  1417 08/30/19  0509   GLU 79 83   CALCIUM 9.0 9.6   ALBUMIN 3.8  --    PROT 7.3  --     137   K 5.8* 4.6   CO2 26 24    98   BUN 27* 13   CREATININE 4.0* 2.7*   ALKPHOS 108  --    ALT 15  --    AST 22  --    BILITOT 0.7  --        Diagnostic Results:  labs pending      ASSESSMENT/PLAN:     Active Hospital Problems    Diagnosis  POA    *Physical deconditioning with generalized weakness and history of falls [R53.81]  Yes    Hypotension [I95.9]  Yes     Chronic    Chronic pain [G89.29]  Yes     Chronic    Chronic respiratory failure [J96.10]  Yes     Chronic    Fracture of left wrist [S62.102A]  Yes     Chronic    Bipolar disorder [F31.9]  Yes     Chronic    Hypothyroidism [E03.9]  Yes     Chronic    ESRD (end stage renal disease) [N18.6]  Yes     Chronic    Chronic obstructive pulmonary disease [J44.9]  Yes     Chronic    Hyperlipidemia [E78.5]  Yes     Chronic    GERD (gastroesophageal reflux disease) [K21.9]  Yes     Chronic      Resolved Hospital Problems    Diagnosis Date Resolved POA    Encephalopathy [G93.40] 08/30/2019 Yes    Hyperkalemia [E87.5] 08/30/2019 Yes         Thank you for allowing us to participate in the care of your patient. We will follow the patient and provide recommendations as needed.      Time spent seeing patient( greater than 1/2 spent in direct contact) :

## 2019-08-30 NOTE — DISCHARGE SUMMARY
[facility]  Discharge Summary      Patient Name: Althea Gaona  MRN: [id number]  Admission Date: 8/29/2019  Hospital Length of Stay: 0 days  Discharge Date and Time:  08/30/2019 9:06 AM  Attending Physician: Gabi Mckinney MD   Discharging Provider: Gabi Mckinney MD  Primary Care Provider: Primary Doctor No      HPI:   Mrs. Gaona is a 79 years old female sent from hemodialysis due to encephalopathy and weakness.  As per HPI patient was assessed after urgent dialysis and was reportedly back to her baseline. As per collateral she is ESRD on HD TTS however missed HD session on Tuesday due to generalized weakness. On Thursday when she presented to HD session she was noted to be unlike herself, unable to ambulate, weak and confused and therefore referred to the ED. Her admissions labs were significant for hyperkalemia, otherwise at baseline, no signs of infection, non focal neurological examination. Denied any fevers, shortness of breath, abdominal pain, loose stools.      * No surgery found *      Hospital Course:   In the ED nephrology was consulted and patient underwent urgent hemodialysis session.  Following assessment after dialysis patient mental status back to baseline, alert and oriented, answering all questions appropriately.  There was a concern she was recently started on lyrica however unable to confirm and this is listed as medication allergy. She did complain of headache and chronic neck and back pain for which she is on chronic opioid therapy at home. She also has injury to the left wrist and following orthopedics for same.  She continued to endorse generalized weakness with debility, states has been getting PT/OT sessions at home for the last few weeks, reports noted improvement but still concerned.  Discussed at this time if feels unsafe to return home would recommend skilled nursing placement however patient adamantly refusing same, stating she will not go to  any facility.  Discussed generalized weakness will need time to improve with ongoing therapy sessions.  Medically stable for discharge. Discharge plan including medications, follow-up as well as return precautions explained to the patient at bedside. Resume home health on discharge.    Discharge examination  Chronically ill-appearing female lying in bed, comfortable, calm, cooperative  On chronic home O2 via nasal cannula, good air entry bilaterally  Regular heart rhythm  Alert and oriented x3, answering questions appropriately, moving all 4 extremities, no focal neurological deficit but generalized weakness     Consults:   Consults (From admission, onward)        Status Ordering Provider     Inpatient consult to Nephrology  Once     Provider:  Eran Solis MD    Acknowledged QUINTON SMITH     IP consult to case management  Once     Provider:  (Not yet assigned)    Acknowledged QUINTON SMITH          No new Assessment & Plan notes have been filed under this hospital service since the last note was generated.  Service: Hospital Medicine    Final Active Diagnoses:    Diagnosis Date Noted POA    Hypotension [I95.9] 08/30/2019 Yes     Chronic    Physical deconditioning [R53.81] 08/30/2019 Yes     Chronic    Chronic pain [G89.29] 08/30/2019 Yes     Chronic    Chronic respiratory failure [J96.10] 08/30/2019 Yes     Chronic    Wrist pain, chronic, left [M25.532, G89.29] 08/30/2019 Yes     Chronic    ESRD (end stage renal disease) [N18.6] 08/29/2019 Yes     Chronic    Chronic obstructive pulmonary disease [J44.9] 11/19/2018 Yes     Chronic    Hyperlipidemia [E78.5] 08/03/2015 Yes     Chronic    GERD (gastroesophageal reflux disease) [K21.9] 08/03/2015 Yes     Chronic      Problems Resolved During this Admission:    Diagnosis Date Noted Date Resolved POA    PRINCIPAL PROBLEM:  Encephalopathy [G93.40] 08/29/2019 08/30/2019 Yes    Hyperkalemia [E87.5] 08/03/2015 08/30/2019 Yes       Discharged Condition:  fair    Disposition: Home-Health Care [facility] [place]    Follow Up:  Follow-up Information     Eran Solis MD. Schedule an appointment as soon as possible for a visit in 2 weeks.    Specialty:  Nephrology  Contact information:  [address]  [facility]  [place] [id number]  [phone]             Primary Doctor No. Schedule an appointment as soon as possible for a visit in 1 week.               Patient Instructions:      Referral to Home health   Referral Priority: Routine Referral Type: Home Health Care   Referral Reason: Specialty Services Required   Requested Specialty: Home Health Services   Number of Visits Requested: 1     Diet renal     Activity as tolerated       Significant Diagnostic Studies: Labs:   CMP   Recent Labs   Lab 08/29/19  1417 08/30/19  0509    137   K 5.8* 4.6    98   CO2 26 24   GLU 79 83   BUN 27* 13   CREATININE 4.0* 2.7*   CALCIUM 9.0 9.6   PROT 7.3  --    ALBUMIN 3.8  --    BILITOT 0.7  --    ALKPHOS 108  --    AST 22  --    ALT 15  --    ANIONGAP 5* 15   ESTGFRAFRICA 11.7* 18.9*   EGFRNONAA 10.2* 16.4*    and CBC   Recent Labs   Lab 08/29/19  1417 08/30/19  0509   WBC 7.30 7.30   HGB 11.4* 12.5   HCT 38.7 40.2    326     X-ray Wrist Complete Left    Result Date: 8/5/2019  EXAMINATION: XR WRIST COMPLETE 3 VIEWS LEFT CLINICAL HISTORY: Other extraarticular fracture of lower end of left radius, subsequent encounter for closed fracture with routine healing TECHNIQUE: PA, lateral, and oblique views of the left wrist were performed. COMPARISON: 07/15/2019, 06/27/2019, 06/11/2019 FINDINGS: Postoperative changes of ORIF of a distal left radius fracture with volar plate and screw device again demonstrated.  Alignment and positioning appears unchanged.  Comminuted fracture of the distal ulnar diametaphysis demonstrates interval progression of healing.  No new acute fracture or dislocation.     Interval progression of healing of the comminuted distal radial and ulnar  fractures status post distal radial ORIF.  No change in alignment. Electronically signed by: Tico Calvert Date:    08/05/2019 Time:    16:24    X-ray Chest Ap Portable    Result Date: 8/29/2019  EXAMINATION: XR CHEST AP PORTABLE CLINICAL HISTORY: Chest Pain; COMPARISON: 06/12/2019 FINDINGS: Cardiac silhouette size is enlarged and stable from prior.  Linear opacities at the right lung base compatible with atelectasis.  No large pleural effusion.  No pneumothorax.  No acute osseous abnormality.     Stable cardiomegaly.  Mild right basilar atelectasis. Electronically signed by: Dave Baird MD Date:    08/29/2019 Time:    13:58    Pending Diagnostic Studies:     None         Medications:  Reconciled Home Medications:      Medication List      CHANGE how you take these medications    HYDROcodone-acetaminophen 5-325 mg per tablet  Commonly known as:  NORCO  Take 1 tablet by mouth nightly as needed for Pain.  What changed:  Another medication with the same name was removed. Continue taking this medication, and follow the directions you see here.        CONTINUE taking these medications    ARIPiprazole 2 MG Tab  Commonly known as:  ABILIFY  Take 5 mg by mouth once daily.     atorvastatin 40 MG tablet  Commonly known as:  LIPITOR  Take 40 mg by mouth once daily.     azithromycin 500 MG tablet  Commonly known as:  ZITHROMAX  One daily for yellow mucous, repeat if needed     busPIRone 10 MG tablet  Commonly known as:  BUSPAR  Take 10 mg by mouth 3 (three) times daily.     calcitRIOL 0.25 MCG Cap  Commonly known as:  ROCALTROL  0.25 mcg once daily.     ciprofloxacin HCl 250 MG tablet  Commonly known as:  CIPRO  Take 250 mg by mouth 2 (two) times daily.     COMBIVENT  mcg/actuation inhaler  Generic drug:  albuterol-ipratropium  mcg  Inhale 2 puffs into the lungs every 6 (six) hours as needed for Wheezing.     famotidine 20 MG tablet  Commonly known as:  PEPCID  TK 1 T PO QHS     FLUoxetine 20 MG tablet  Take 20 mg  by mouth once daily.     fluticasone propionate 50 mcg/actuation nasal spray  Commonly known as:  FLONASE  2 sprays (100 mcg total) by Each Nare route once daily.     fluticasone-umeclidin-vilanter 100-62.5-25 mcg Dsdv  Commonly known as:  TRELEGY ELLIPTA  Inhale 1 puff into the lungs once daily.     gabapentin 300 MG capsule  Commonly known as:  NEURONTIN  hs     levothyroxine 100 MCG tablet  Commonly known as:  SYNTHROID  Take 1 tab every day by oral route.     midodrine 5 MG Tab  Commonly known as:  PROAMATINE  Take 2.5 mg by mouth 2 (two) times daily with meals. Hold is bp is greater than 140     ondansetron 8 MG tablet  Commonly known as:  ZOFRAN  Take 1 tablet (8 mg total) by mouth every 12 (twelve) hours as needed for Nausea.     polyvinyl alcohol (artificial tears) 1.4 % ophthalmic solution  Commonly known as:  LIQUIFILM TEARS  Place 1 drop into the left eye as needed.     predniSONE 20 MG tablet  Commonly known as:  DELTASONE  One daily for 3 days and repeat for flare of lung symptoms as intructed     rosuvastatin 20 MG tablet  Commonly known as:  CRESTOR  Take 20 mg by mouth once daily.        STOP taking these medications    ondansetron 4 MG Tbdl  Commonly known as:  ZOFRAN-ODT            Indwelling Lines/Drains at time of discharge:   Lines/Drains/Airways     Drain                 Hemodialysis AV Fistula Left upper arm -- days                Time spent on the discharge of patient: 28 minutes  Patient was seen and examined on the date of discharge and determined to be suitable for discharge.         Gabi Mckinney MD  Department of Hospital Medicine  [facility]

## 2019-08-30 NOTE — NURSING
"Pt arrived in the unit without receiving report from ED nurse due to patient getting transferred to to dialysis 2 hours before night shift.    Pt was received in the room by charge nurse Rosalia Lane RN & task nurse Andrew Tejeda RN at 2100.    Admitting doctor notified of pt being in the unit. MD Arthur states that she will come see the patient.    Presently pt appears with NAD. Admits to feeling tired. VSS.    Addendum: Noted pt has L FA splint. States, "I broke my wrist when I fell." Unable to recall when it happened.  "

## 2019-08-30 NOTE — ASSESSMENT & PLAN NOTE
Known ESRD on HD TTS.  Missed 2 sessions due to generalized weakness.  Status post urgent dialysis 8/29.  Nephrology on board, no need for repeat session today.  - HD as per Nephrology, resuming TTS  -renally dose all medications and avoid nephrotoxin drugs  -appreciate Nephrology input

## 2019-08-30 NOTE — ASSESSMENT & PLAN NOTE
History of fall with sustained left distal radial and ulna fracture status post or if.  Left wrist currently in a splint.  Due to follow-up orthopedics,  09/05.  -resume home Norco 5 q.6h p.r.n. with bowel regimen  -keep orthopedic follow-up

## 2019-08-30 NOTE — SUBJECTIVE & OBJECTIVE
Past Medical History:   Diagnosis Date    Alcoholism     no drink since 1984    Anxiety     Asthma     Bipolar disorder     Chronic kidney disease     COPD (chronic obstructive pulmonary disease)     GERD (gastroesophageal reflux disease)     Hypertension     Pancreatitis     Thyroid disease        Past Surgical History:   Procedure Laterality Date    APPENDECTOMY      CATARACT EXTRACTION Right     EYE SURGERY      HYSTERECTOMY      THYROIDECTOMY      THYROIDECTOMY         Review of patient's allergies indicates:   Allergen Reactions    Metoprolol Other (See Comments)     Grand-daughter/care giver reported Pt has over reaction to this drug, Bp bottoms out along with heart rate    Abilify [aripiprazole]      dizziness    Codeine      Other reaction(s): Unknown       No current facility-administered medications on file prior to encounter.      Current Outpatient Medications on File Prior to Encounter   Medication Sig    ARIPiprazole (ABILIFY) 2 MG Tab Take 5 mg by mouth once daily.    atorvastatin (LIPITOR) 40 MG tablet Take 40 mg by mouth once daily.    busPIRone (BUSPAR) 10 MG tablet Take 10 mg by mouth 3 (three) times daily.    calcitRIOL (ROCALTROL) 0.25 MCG Cap 0.25 mcg once daily.     famotidine (PEPCID) 20 MG tablet TK 1 T PO QHS    fluoxetine (PROZAC) 20 MG tablet Take 20 mg by mouth once daily.    fluticasone propionate (FLONASE) 50 mcg/actuation nasal spray 2 sprays (100 mcg total) by Each Nare route once daily.    fluticasone-umeclidin-vilanter (TRELEGY ELLIPTA) 100-62.5-25 mcg DsDv Inhale 1 puff into the lungs once daily.    gabapentin (NEURONTIN) 300 MG capsule hs    HYDROcodone-acetaminophen (NORCO) 7.5-325 mg per tablet Take 1 tablet by mouth every 6 (six) hours as needed for Pain.    levothyroxine (SYNTHROID) 100 MCG tablet Take 1 tab every day by oral route.    midodrine (PROAMATINE) 5 MG Tab Take 2.5 mg by mouth 2 (two) times daily with meals. Hold is bp is greater than  140    ondansetron (ZOFRAN-ODT) 4 MG TbDL Take 4 mg by mouth every 6 (six) hours as needed.    rosuvastatin (CRESTOR) 20 MG tablet Take 20 mg by mouth once daily.    albuterol-ipratropium  mcg (COMBIVENT)  mcg/actuation inhaler Inhale 2 puffs into the lungs every 6 (six) hours as needed for Wheezing.    azithromycin (ZITHROMAX) 500 MG tablet One daily for yellow mucous, repeat if needed    ciprofloxacin HCl (CIPRO) 250 MG tablet Take 250 mg by mouth 2 (two) times daily.    HYDROcodone-acetaminophen (NORCO) 5-325 mg per tablet Take 1 tablet by mouth nightly as needed for Pain.    ondansetron (ZOFRAN) 8 MG tablet Take 1 tablet (8 mg total) by mouth every 12 (twelve) hours as needed for Nausea.    polyvinyl alcohol, artificial tears, (LIQUIFILM TEARS) 1.4 % ophthalmic solution Place 1 drop into the left eye as needed.    predniSONE (DELTASONE) 20 MG tablet One daily for 3 days and repeat for flare of lung symptoms as intructed     Family History     Problem Relation (Age of Onset)    COPD Sister    Cancer Sister, Sister, Cousin, Cousin    Diabetes Father, Cousin    Heart disease Father    No Known Problems Mother    Stroke Paternal Aunt        Tobacco Use    Smoking status: Current Some Day Smoker     Packs/day: 1.00     Types: Cigarettes    Smokeless tobacco: Never Used   Substance and Sexual Activity    Alcohol use: No    Drug use: Never    Sexual activity: Not Currently     Review of Systems   Constitutional: Negative.    HENT: Negative.    Eyes: Negative.    Respiratory: Negative.    Cardiovascular: Negative.    Gastrointestinal: Negative.    Endocrine: Negative.    Genitourinary: Negative.    Musculoskeletal: Negative.    Skin: Negative.    Allergic/Immunologic: Negative.    Neurological: Positive for weakness.   Hematological: Negative.    Psychiatric/Behavioral: Positive for confusion.     Objective:     Vital Signs (Most Recent):  Temp: 98.4 °F (36.9 °C) (08/29/19 2152)  Pulse: 77  (08/29/19 2152)  Resp: 16 (08/29/19 2152)  BP: 117/78 (08/29/19 2152)  SpO2: 99 % (08/29/19 1600) Vital Signs (24h Range):  Temp:  [96.4 °F (35.8 °C)-98.4 °F (36.9 °C)] 98.4 °F (36.9 °C)  Pulse:  [64-98] 77  Resp:  [16-33] 16  SpO2:  [95 %-100 %] 99 %  BP: ()/(49-79) 117/78     Weight: 67.5 kg (148 lb 13 oz)  Body mass index is 22.96 kg/m².    Physical Exam   Constitutional: She is oriented to person, place, and time. She appears well-developed. No distress.   HENT:   Head: Normocephalic and atraumatic.   Mouth/Throat: Oropharynx is clear and moist.   Eyes: Pupils are equal, round, and reactive to light. EOM are normal.   Neck: Normal range of motion.   Cardiovascular: Regular rhythm.   No murmur heard.  Pulmonary/Chest: Effort normal and breath sounds normal. She has no wheezes.   Abdominal: Soft. She exhibits no distension. There is no tenderness. There is no rebound and no guarding.   Musculoskeletal: Normal range of motion.   Neurological: She is alert and oriented to person, place, and time. No cranial nerve deficit or sensory deficit.   Face symmetric.  Speech non slurred.  She answered all of my questions appropraitely.   Skin: No rash noted.   Psychiatric: She has a normal mood and affect.   Nursing note and vitals reviewed.        CRANIAL NERVES     CN III, IV, VI   Pupils are equal, round, and reactive to light.  Extraocular motions are normal.        Significant Labs:   CBC:   Recent Labs   Lab 08/29/19  1417   WBC 7.30   HGB 11.4*   HCT 38.7        CMP:   Recent Labs   Lab 08/29/19  1417      K 5.8*      CO2 26   GLU 79   BUN 27*   CREATININE 4.0*   CALCIUM 9.0   PROT 7.3   ALBUMIN 3.8   BILITOT 0.7   ALKPHOS 108   AST 22   ALT 15   ANIONGAP 5*   EGFRNONAA 10.2*       Significant Imaging: CXR: I have reviewed all pertinent results/findings within the past 24 hours and my personal findings are:  No consolidation

## 2019-08-30 NOTE — SUBJECTIVE & OBJECTIVE
Interval History:  Patient is alert and oriented x3, answering all questions appropriately.  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  Reports diffuse throbbing headache as well as bilateral upper shoulder and neck pain, chronic as per patient.  She does endorse generalized weakness with history of prior falls, has been receiving PT/OT at home, has noted some improvement.  She states her  is not able to manage her at home and requesting that she stay in the hospital until weakness is better.  Discussed with patient at this time she is medically stable for discharge and can consider skilled nursing placement for ongoing therapy sessions, initially she was reluctant but later agreeable.  Discussed with nephrologist, no need for additional hemodialysis session today, will resume HD TTS.      Review of Systems   Constitutional: Positive for fatigue. Negative for chills and fever.   HENT: Negative for congestion and trouble swallowing.    Eyes: Negative for photophobia.   Respiratory: Negative for cough, shortness of breath and wheezing.    Cardiovascular: Negative for chest pain, palpitations and leg swelling.   Gastrointestinal: Negative for abdominal pain, constipation, nausea and vomiting.   Genitourinary: Negative for dysuria.   Musculoskeletal: Positive for back pain and neck pain.        Left wrist injury in brace   Neurological: Positive for weakness (generalized).   Psychiatric/Behavioral: Negative for agitation and suicidal ideas.        Low mood     Objective:     Vital Signs (Most Recent):  Temp: 98.8 °F (37.1 °C) (08/30/19 0730)  Pulse: 72 (08/30/19 0730)  Resp: 18 (08/30/19 0730)  BP: 102/61 (08/30/19 0730)  SpO2: 99 % (08/29/19 1600) Vital Signs (24h Range):  Temp:  [96.4 °F (35.8 °C)-98.8 °F (37.1 °C)] 98.8 °F (37.1 °C)  Pulse:  [64-98] 72  Resp:  [16-33] 18  SpO2:  [95 %-100 %] 99 %  BP: ()/(49-79) 102/61     Weight: 67.5 kg (148 lb 13 oz)  Body mass index is 22.96  kg/m².    Intake/Output Summary (Last 24 hours) at 8/30/2019 1050  Last data filed at 8/29/2019 1950  Gross per 24 hour   Intake 500 ml   Output 2500 ml   Net -2000 ml      Physical Exam   Constitutional: She is oriented to person, place, and time. No distress.   Elderly female, frail, appears comfortable, calm, cooperative   HENT:   Head: Normocephalic and atraumatic.   Eyes: Pupils are equal, round, and reactive to light. EOM are normal. Right eye exhibits no discharge. Left eye exhibits no discharge.   Neck: Normal range of motion. Neck supple.   Cardiovascular: Normal rate and regular rhythm.   Pulmonary/Chest: Effort normal and breath sounds normal. She has no wheezes.   on supplemental O2   Abdominal: Soft. Bowel sounds are normal. She exhibits no distension. There is no rebound and no guarding.   Musculoskeletal:   L wrist in splint   Neurological: She is alert and oriented to person, place, and time.   Moving all four extremities   Skin: She is not diaphoretic.   Psychiatric:   Flat affect   Nursing note and vitals reviewed.      Significant Labs:   BMP:   Recent Labs   Lab 08/30/19  0509   GLU 83      K 4.6   CL 98   CO2 24   BUN 13   CREATININE 2.7*   CALCIUM 9.6   MG 2.2     CBC:   Recent Labs   Lab 08/29/19  1417 08/30/19  0509   WBC 7.30 7.30   HGB 11.4* 12.5   HCT 38.7 40.2    326     Magnesium:   Recent Labs   Lab 08/30/19  0509   MG 2.2     TSH: No results for input(s): TSH in the last 4320 hours.    Significant Imaging: CXR: I have reviewed all pertinent results/findings within the past 24 hours and my personal findings are:  no focal infiltrates  EKG: I have reviewed all pertinent results/findings within the past 24 hours and my personal findings are: sinus   X-ray Wrist Complete Left    Result Date: 8/5/2019  EXAMINATION: XR WRIST COMPLETE 3 VIEWS LEFT CLINICAL HISTORY: Other extraarticular fracture of lower end of left radius, subsequent encounter for closed fracture with routine  healing TECHNIQUE: PA, lateral, and oblique views of the left wrist were performed. COMPARISON: 07/15/2019, 06/27/2019, 06/11/2019 FINDINGS: Postoperative changes of ORIF of a distal left radius fracture with volar plate and screw device again demonstrated.  Alignment and positioning appears unchanged.  Comminuted fracture of the distal ulnar diametaphysis demonstrates interval progression of healing.  No new acute fracture or dislocation.     Interval progression of healing of the comminuted distal radial and ulnar fractures status post distal radial ORIF.  No change in alignment. Electronically signed by: Tico Calvert Date:    08/05/2019 Time:    16:24    X-ray Chest Ap Portable    Result Date: 8/29/2019  EXAMINATION: XR CHEST AP PORTABLE CLINICAL HISTORY: Chest Pain; COMPARISON: 06/12/2019 FINDINGS: Cardiac silhouette size is enlarged and stable from prior.  Linear opacities at the right lung base compatible with atelectasis.  No large pleural effusion.  No pneumothorax.  No acute osseous abnormality.     Stable cardiomegaly.  Mild right basilar atelectasis. Electronically signed by: Dave Baird MD Date:    08/29/2019 Time:    13:58  ECG Results          EKG 12-lead (In process)  Result time 08/29/19 16:50:02    In process by Interface, Lab In [facility] (08/29/19 16:50:02)                 Narrative:    Test Reason : R07.9,    Vent. Rate : 065 BPM     Atrial Rate : 065 BPM     P-R Int : 176 ms          QRS Dur : 086 ms      QT Int : 420 ms       P-R-T Axes : 049 -30 050 degrees     QTc Int : 436 ms    Normal sinus rhythm  Left axis deviation  Anterior infarct (cited on or before 18-NOV-2016)  Abnormal ECG  When compared with ECG of 18-NOV-2016 14:20,  Questionable change in initial forces of Septal leads    Referred By: [email or web address]   SELF           Confirmed By:                   In process by Interface, Lab In [facility] (08/29/19 16:22:33)                 Narrative:    Test Reason : R07.9,    Vent. Rate : 065 BPM      Atrial Rate : 065 BPM     P-R Int : 176 ms          QRS Dur : 086 ms      QT Int : 420 ms       P-R-T Axes : 049 -30 050 degrees     QTc Int : 436 ms    Normal sinus rhythm  Left axis deviation  Anterior infarct (cited on or before 18-NOV-2016)  Abnormal ECG  When compared with ECG of 18-NOV-2016 14:20,  Questionable change in initial forces of Septal leads    Referred By: [email or web address]   SELF           Confirmed By:                   In process by Interface, Lab In [facility] (08/29/19 14:57:16)                 Narrative:    Test Reason : R07.9,    Vent. Rate : 065 BPM     Atrial Rate : 065 BPM     P-R Int : 176 ms          QRS Dur : 086 ms      QT Int : 420 ms       P-R-T Axes : 049 -30 050 degrees     QTc Int : 436 ms    Normal sinus rhythm  Left axis deviation  Anterior infarct (cited on or before 18-NOV-2016)  Abnormal ECG  When compared with ECG of 18-NOV-2016 14:20,  Questionable change in initial forces of Septal leads    Referred By: [email or web address]   SELF           Confirmed By:                   In process by Interface, Lab In [facility] (08/29/19 14:36:27)                 Narrative:    Test Reason : R07.9,    Vent. Rate : 065 BPM     Atrial Rate : 065 BPM     P-R Int : 176 ms          QRS Dur : 086 ms      QT Int : 420 ms       P-R-T Axes : 049 -30 050 degrees     QTc Int : 436 ms    Normal sinus rhythm  Left axis deviation  Anterior infarct (cited on or before 18-NOV-2016)  Abnormal ECG  When compared with ECG of 18-NOV-2016 14:20,  Questionable change in initial forces of Septal leads    Referred By: [email or web address]   SELF           Confirmed By:

## 2019-08-30 NOTE — NURSING
Pt c/o pain to shoulder and back, uncontrolled by previously given Tylenol 650mg. Pt explained she either takes Tramadol or Norco. Verified that Norco is listed under pt medication    MD Soto notified and received order to administere Norco 5-325 PO x 1 dose.

## 2019-08-30 NOTE — HPI
Mrs. Gaona is a 79 years old female sent from hemodialysis due to encephalopathy and weakness.  As per HPI patient was assessed after urgent dialysis and was reportedly back to her baseline. As per collateral she is ESRD on HD TTS however missed HD session on Tuesday due to generalized weakness. On Thursday when she presented to HD session she was noted to be unlike herself, unable to ambulate, weak and confused and therefore referred to the ED. Her admissions labs were significant for hyperkalemia, otherwise at baseline, no signs of infection, non focal neurological examination. Denied any fevers, shortness of breath, abdominal pain, loose stools.

## 2019-08-30 NOTE — ASSESSMENT & PLAN NOTE
HD done emergently given missed HD and hyperkalemia. Renal following and will decide further HD needs based on AM labs.

## 2019-08-30 NOTE — H&P
[facility]  History & Physical    Patient Name: Althea Gaona  MRN: [id number]  Admission Date: 8/29/2019  Attending Physician: Arnie Arthur MD  Primary Care Provider: Primary Doctor No         Patient information was obtained from patient and ER records.     Subjective:     Principal Problem:<principal problem not specified>    Chief Complaint:   Chief Complaint   Patient presents with    Weakness     Family states pt has been feeling weak and confused since being placed on Abilify        HPI: Patient sent from HD with weakness and encephalopathy.  I did not see her until after HD and she was feeling better at that time. History originally obtained from ED MD.  Patient had been feeling poorly for the last few days and missed the last two sessions of HD.  When she did arrive for HD today, she was weak and confused.  Patient denies fevers at home. Denies shortness of breath.  Denies abdominal pain and loose stool. Denies urinary symptoms.     Past Medical History:   Diagnosis Date    Alcoholism     no drink since 1984    Anxiety     Asthma     Bipolar disorder     Chronic kidney disease     COPD (chronic obstructive pulmonary disease)     GERD (gastroesophageal reflux disease)     Hypertension     Pancreatitis     Thyroid disease        Past Surgical History:   Procedure Laterality Date    APPENDECTOMY      CATARACT EXTRACTION Right     EYE SURGERY      HYSTERECTOMY      THYROIDECTOMY      THYROIDECTOMY         Review of patient's allergies indicates:   Allergen Reactions    Metoprolol Other (See Comments)     Grand-daughter/care giver reported Pt has over reaction to this drug, Bp bottoms out along with heart rate    Abilify [aripiprazole]      dizziness    Codeine      Other reaction(s): Unknown       No current facility-administered medications on file prior to encounter.      Current Outpatient Medications on File Prior to Encounter   Medication Sig     ARIPiprazole (ABILIFY) 2 MG Tab Take 5 mg by mouth once daily.    atorvastatin (LIPITOR) 40 MG tablet Take 40 mg by mouth once daily.    busPIRone (BUSPAR) 10 MG tablet Take 10 mg by mouth 3 (three) times daily.    calcitRIOL (ROCALTROL) 0.25 MCG Cap 0.25 mcg once daily.     famotidine (PEPCID) 20 MG tablet TK 1 T PO QHS    fluoxetine (PROZAC) 20 MG tablet Take 20 mg by mouth once daily.    fluticasone propionate (FLONASE) 50 mcg/actuation nasal spray 2 sprays (100 mcg total) by Each Nare route once daily.    fluticasone-umeclidin-vilanter (TRELEGY ELLIPTA) 100-62.5-25 mcg DsDv Inhale 1 puff into the lungs once daily.    gabapentin (NEURONTIN) 300 MG capsule hs    HYDROcodone-acetaminophen (NORCO) 7.5-325 mg per tablet Take 1 tablet by mouth every 6 (six) hours as needed for Pain.    levothyroxine (SYNTHROID) 100 MCG tablet Take 1 tab every day by oral route.    midodrine (PROAMATINE) 5 MG Tab Take 2.5 mg by mouth 2 (two) times daily with meals. Hold is bp is greater than 140    ondansetron (ZOFRAN-ODT) 4 MG TbDL Take 4 mg by mouth every 6 (six) hours as needed.    rosuvastatin (CRESTOR) 20 MG tablet Take 20 mg by mouth once daily.    albuterol-ipratropium  mcg (COMBIVENT)  mcg/actuation inhaler Inhale 2 puffs into the lungs every 6 (six) hours as needed for Wheezing.    azithromycin (ZITHROMAX) 500 MG tablet One daily for yellow mucous, repeat if needed    ciprofloxacin HCl (CIPRO) 250 MG tablet Take 250 mg by mouth 2 (two) times daily.    HYDROcodone-acetaminophen (NORCO) 5-325 mg per tablet Take 1 tablet by mouth nightly as needed for Pain.    ondansetron (ZOFRAN) 8 MG tablet Take 1 tablet (8 mg total) by mouth every 12 (twelve) hours as needed for Nausea.    polyvinyl alcohol, artificial tears, (LIQUIFILM TEARS) 1.4 % ophthalmic solution Place 1 drop into the left eye as needed.    predniSONE (DELTASONE) 20 MG tablet One daily for 3 days and repeat for flare of lung symptoms as  intructed     Family History     Problem Relation (Age of Onset)    COPD Sister    Cancer Sister, Sister, Cousin, Cousin    Diabetes Father, Cousin    Heart disease Father    No Known Problems Mother    Stroke Paternal Aunt        Tobacco Use    Smoking status: Current Some Day Smoker     Packs/day: 1.00     Types: Cigarettes    Smokeless tobacco: Never Used   Substance and Sexual Activity    Alcohol use: No    Drug use: Never    Sexual activity: Not Currently     Review of Systems   Constitutional: Negative.    HENT: Negative.    Eyes: Negative.    Respiratory: Negative.    Cardiovascular: Negative.    Gastrointestinal: Negative.    Endocrine: Negative.    Genitourinary: Negative.    Musculoskeletal: Negative.    Skin: Negative.    Allergic/Immunologic: Negative.    Neurological: Positive for weakness.   Hematological: Negative.    Psychiatric/Behavioral: Positive for confusion.     Objective:     Vital Signs (Most Recent):  Temp: 98.4 °F (36.9 °C) (08/29/19 2152)  Pulse: 77 (08/29/19 2152)  Resp: 16 (08/29/19 2152)  BP: 117/78 (08/29/19 2152)  SpO2: 99 % (08/29/19 1600) Vital Signs (24h Range):  Temp:  [96.4 °F (35.8 °C)-98.4 °F (36.9 °C)] 98.4 °F (36.9 °C)  Pulse:  [64-98] 77  Resp:  [16-33] 16  SpO2:  [95 %-100 %] 99 %  BP: ()/(49-79) 117/78     Weight: 67.5 kg (148 lb 13 oz)  Body mass index is 22.96 kg/m².    Physical Exam   Constitutional: She is oriented to person, place, and time. She appears well-developed. No distress.   HENT:   Head: Normocephalic and atraumatic.   Mouth/Throat: Oropharynx is clear and moist.   Eyes: Pupils are equal, round, and reactive to light. EOM are normal.   Neck: Normal range of motion.   Cardiovascular: Regular rhythm.   No murmur heard.  Pulmonary/Chest: Effort normal and breath sounds normal. She has no wheezes.   Abdominal: Soft. She exhibits no distension. There is no tenderness. There is no rebound and no guarding.   Musculoskeletal: Normal range of motion.    Neurological: She is alert and oriented to person, place, and time. No cranial nerve deficit or sensory deficit.   Face symmetric.  Speech non slurred.  She answered all of my questions appropraitely.   Skin: No rash noted.   Psychiatric: She has a normal mood and affect.   Nursing note and vitals reviewed.        CRANIAL NERVES     CN III, IV, VI   Pupils are equal, round, and reactive to light.  Extraocular motions are normal.        Significant Labs:   CBC:   Recent Labs   Lab 08/29/19  1417   WBC 7.30   HGB 11.4*   HCT 38.7        CMP:   Recent Labs   Lab 08/29/19  1417      K 5.8*      CO2 26   GLU 79   BUN 27*   CREATININE 4.0*   CALCIUM 9.0   PROT 7.3   ALBUMIN 3.8   BILITOT 0.7   ALKPHOS 108   AST 22   ALT 15   ANIONGAP 5*   EGFRNONAA 10.2*       Significant Imaging: CXR: I have reviewed all pertinent results/findings within the past 24 hours and my personal findings are:  No consolidation    Assessment/Plan:     Encephalopathy  Patient initially presented with confusion at HD.  She reports feeling improved after HD.  Does not appear confused to me at this time.  Neuro exam with no focal deficits.  Answering questions appropriately. Due to missed HD?  No fever. No Leukocytosis. Exam not revealing of infectious source.  Had hyperkalemia but no other significant electrolyte abnormality.  Will monitor overnight and re evaluate tomorrow.If showing further signs of encephalopahty, will need to pursue broader workup.      ESRD (end stage renal disease)  HD done emergently given missed HD and hyperkalemia. Renal following and will decide further HD needs based on AM labs.      Chronic obstructive pulmonary disease    Chronic medical condition.  Continuing home medication.  Monitoring.  No current signs of exacerbation.     Hyperkalemia  Emergent HD done.  Repeating labs.      GERD (gastroesophageal reflux disease)  Chronic medical condition.  Continuing home medication.   Monitoring.        Hypertension, essential  Chronic medical condition.  Continuing home medication.  Monitoring.        Hyperlipidemia  Chronic medical condition.  Continuing home medication.  Monitoring.          VTE Risk Mitigation (From admission, onward)        Ordered     heparin (porcine) injection 5,000 Units  Every 8 hours      08/29/19 2050     IP VTE HIGH RISK PATIENT  Once      08/29/19 2050             Arnie Arthur MD  Department of Hospital Medicine   [facility]

## 2019-08-30 NOTE — ASSESSMENT & PLAN NOTE
Patient initially presented with confusion at HD.  She reports feeling improved after HD.  Does not appear confused to me at this time.  Neuro exam with no focal deficits.  Answering questions appropriately. Due to missed HD?  No fever. No Leukocytosis. Exam not revealing of infectious source.  Had hyperkalemia but no other significant electrolyte abnormality.  Will monitor overnight and re evaluate tomorrow.If showing further signs of encephalopahty, will need to pursue broader workup.

## 2019-08-30 NOTE — PROGRESS NOTES
HD: tx complete, pt stable. Lines re-infused, needles pulled. Hemostasis achieved. Pt tolerated tx well.     Net uf 2000 ml

## 2019-08-30 NOTE — HOSPITAL COURSE
In the ED nephrology was consulted and patient underwent urgent hemodialysis session.  Following assessment after dialysis patient mental status back to baseline, alert and oriented, answering all questions appropriately.  There was a concern she was recently started on lyrica however unable to confirm and this is listed as medication allergy. She did complain of headache and chronic neck and back pain for which she is on chronic opioid therapy at home. She also has injury to the left wrist and following orthopedics for same.  She continued to endorse generalized weakness with debility, states has been getting PT/OT sessions at home for the last few weeks, reports noted improvement but still concerned.  Initially she was reluctant to consider skilled nursing placement however after discussion with family, patient more agreeable.  Blood pressure better controlled on increase dose of midodrine.  Nephrology following for regular home dialysis schedule TTS.  Medically stable for discharge to skilled nursing facility, awaiting placement.  9/4/19, pt fell in her room with c/o back pain. Xray lumbar shows Mild compression fracture at l2. Ortho Dr Mcarthur consulted. Recommended TLSO brace and F/U with pain mx out pt for eval for kyphoplasty  Pt evaluated by PT/OT, information faxed over to [facility] as well as [facility] [place] on 09/08/2019.  Awaiting to hear back  DO NOT PRESCRIBE LYRICA ON DISCHARGE

## 2019-08-30 NOTE — PLAN OF CARE
Problem: Fall Injury Risk  Goal: Absence of Fall and Fall-Related Injury    Intervention: Promote Injury-Free Environment     08/30/19 0456   Optimize [place] and Functional Mobility   Environmental Safety Modification assistive device/personal items within reach   Optimize Balance and Safe Activity   Safety Promotion/Fall Prevention bed alarm set;side rails raised x 2;nonskid shoes/socks when out of bed   Pt educated to call if needing to get to the bathroom. Bed alarm set. Call light placed next to patient hand and instructed on usage. Demonstrated back understanding.

## 2019-08-30 NOTE — PT/OT/SLP EVAL
Physical Therapy Evaluation    Patient Name:  Althea Gaona   MRN:  [id number]    Recommendations:     Discharge Recommendations:  nursing facility, skilled   Discharge Equipment Recommendations: none   Barriers to discharge: None    Assessment:     Althea Gaona is a 79 y.o. female admitted with a medical diagnosis of Physical deconditioning.  She presents with the following impairments/functional limitations:  weakness, gait instability, impaired endurance, impaired balance, decreased safety awareness, impaired self care skills, impaired functional mobilty. OT present upon entry to the room. Pt agreeable to transfer to chair for lunch. She is very anxious about D/C and feels she is too weak to leave the hospital. She was educated on the benefit of SNF upon D/C to allow increased strength before returning home.    Rehab Prognosis: Fair; patient would benefit from acute skilled PT services to address these deficits and reach maximum level of function.    Recent Surgery: * No surgery found *      Plan:     During this hospitalization, patient to be seen 6 x/week to address the identified rehab impairments via gait training, therapeutic activities, therapeutic exercises and progress toward the following goals:    · Plan of Care Expires:  09/30/19    Subjective     Chief Complaint: weakness  Patient/Family Comments/goals: home  Pain/Comfort:  · Pain Rating 1: 0/10    Patients cultural, spiritual, Judaism conflicts given the current situation:      Living Environment:  Pt lives at home with her . She has 3 stairs to enter the home. She notes difficulty with ascent/descent in the past. Educated that SNF placement can allow improved stair navigation  Prior to admission, patients level of function was modified independent. She receives  PT/OT 2x/week.  Equipment used at home: walker, rolling, wheelchair, cane, straight.  DME owned (not currently used): none.  Upon discharge, patient will have assistance  from .    Objective:     Communicated with RN Heather prior to session.  Patient found EOB with peripheral IV, oxygen  And OT present upon PT entry to room.    General Precautions: Standard, fall   Orthopedic Precautions:    Braces: UE brace     Exams:  · RLE Strength: WFL  · LLE Strength: WFL    Functional Mobility:  · Transfers:     · Sit to Stand:  minimum assistance with hand-held assist  · Bed to Chair: minimum assistance with  hand-held assist  using  Step Transfer  · Gait: 3' to chair with [place]  · Balance: Fair      Therapeutic Activities and Exercises:   Pt educated on benefits of SNF upon D/C to increase strength to allow safe ambulation and decrease fall risk before return home     AM-PAC 6 CLICK MOBILITY  Total Score:13     Patient left up in chair for lunch with call button in reach and RN notified.    GOALS:   Multidisciplinary Problems     Physical Therapy Goals        Problem: Physical Therapy Goal    Goal Priority Disciplines Outcome Goal Variances Interventions   Physical Therapy Goal     PT, PT/OT      Description:  Goals to be met by: D/C     Patient will increase functional independence with mobility by performin. Supine to sit with Stand-by Assistance  2. Sit to stand transfer with Stand-by Assistance  3. Gait  x 25 feet with Supervision using Rolling Walker or cane.                       History:     Past Medical History:   Diagnosis Date    Alcoholism     no drink since     Anxiety     Asthma     Bipolar disorder     Chronic kidney disease     COPD (chronic obstructive pulmonary disease)     GERD (gastroesophageal reflux disease)     Hypertension     Pancreatitis     Thyroid disease        Past Surgical History:   Procedure Laterality Date    APPENDECTOMY      CATARACT EXTRACTION Right     EYE SURGERY      HYSTERECTOMY      THYROIDECTOMY      THYROIDECTOMY         Time Tracking:     PT Received On: 19  PT Start Time: 1150     PT Stop Time: 1209  PT Total  Time (min): 19 min     Billable Minutes: Evaluation 9 and Therapeutic Activity 10      Fabiana Brown, PT  08/30/2019

## 2019-08-30 NOTE — PROGRESS NOTES
[facility]  Progress Note    Patient Name: Althea Gaona  MRN: [id number]  Patient Class: OP- Observation   Admission Date: 8/29/2019  Length of Stay: 0 days  Attending Physician: Gabi Mckinney MD  Primary Care Provider: Primary Doctor No        Subjective:     Principal Problem:Physical deconditioning        HPI:  Mrs. Gaona is a 79 years old female sent from hemodialysis due to encephalopathy and weakness.  As per HPI patient was assessed after urgent dialysis and was reportedly back to her baseline. As per collateral she is ESRD on HD TTS however missed HD session on Tuesday due to generalized weakness. On Thursday when she presented to HD session she was noted to be unlike herself, unable to ambulate, weak and confused and therefore referred to the ED. Her admissions labs were significant for hyperkalemia, otherwise at baseline, no signs of infection, non focal neurological examination. Denied any fevers, shortness of breath, abdominal pain, loose stools.      Overview/Hospital Course:  In the ED nephrology was consulted and patient underwent urgent hemodialysis session.  Following assessment after dialysis patient mental status back to baseline, alert and oriented, answering all questions appropriately.  There was a concern she was recently started on lyrica however unable to confirm and this is listed as medication allergy. She did complain of headache and chronic neck and back pain for which she is on chronic opioid therapy at home. She also has injury to the left wrist and following orthopedics for same.  She continued to endorse generalized weakness with debility, states has been getting PT/OT sessions at home for the last few weeks, reports noted improvement but still concerned.  Initially she was reluctant to consider skilled nursing placement however after discussion with family, patient more agreeable.  Medically stable for discharge to skilled nursing facility,  awaiting placement.      Interval History:  Patient is alert and oriented x3, answering all questions appropriately.  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  Reports diffuse throbbing headache as well as bilateral upper shoulder and neck pain, chronic as per patient.  She does endorse generalized weakness with history of prior falls, has been receiving PT/OT at home, has noted some improvement.  She states her  is not able to manage her at home and requesting that she stay in the hospital until weakness is better.  Discussed with patient at this time she is medically stable for discharge and can consider skilled nursing placement for ongoing therapy sessions, initially she was reluctant but later agreeable.  Discussed with nephrologist, no need for additional hemodialysis session today, will resume HD TTS.      Review of Systems   Constitutional: Positive for fatigue. Negative for chills and fever.   HENT: Negative for congestion and trouble swallowing.    Eyes: Negative for photophobia.   Respiratory: Negative for cough, shortness of breath and wheezing.    Cardiovascular: Negative for chest pain, palpitations and leg swelling.   Gastrointestinal: Negative for abdominal pain, constipation, nausea and vomiting.   Genitourinary: Negative for dysuria.   Musculoskeletal: Positive for back pain and neck pain.        Left wrist injury in brace   Neurological: Positive for weakness (generalized).   Psychiatric/Behavioral: Negative for agitation and suicidal ideas.        Low mood     Objective:     Vital Signs (Most Recent):  Temp: 98.8 °F (37.1 °C) (08/30/19 0730)  Pulse: 72 (08/30/19 0730)  Resp: 18 (08/30/19 0730)  BP: 102/61 (08/30/19 0730)  SpO2: 99 % (08/29/19 1600) Vital Signs (24h Range):  Temp:  [96.4 °F (35.8 °C)-98.8 °F (37.1 °C)] 98.8 °F (37.1 °C)  Pulse:  [64-98] 72  Resp:  [16-33] 18  SpO2:  [95 %-100 %] 99 %  BP: ()/(49-79) 102/61     Weight: 67.5 kg (148 lb 13  oz)  Body mass index is 22.96 kg/m².    Intake/Output Summary (Last 24 hours) at 8/30/2019 1050  Last data filed at 8/29/2019 1950  Gross per 24 hour   Intake 500 ml   Output 2500 ml   Net -2000 ml      Physical Exam   Constitutional: She is oriented to person, place, and time. No distress.   Elderly female, frail, appears comfortable, calm, cooperative   HENT:   Head: Normocephalic and atraumatic.   Eyes: Pupils are equal, round, and reactive to light. EOM are normal. Right eye exhibits no discharge. Left eye exhibits no discharge.   Neck: Normal range of motion. Neck supple.   Cardiovascular: Normal rate and regular rhythm.   Pulmonary/Chest: Effort normal and breath sounds normal. She has no wheezes.   on supplemental O2   Abdominal: Soft. Bowel sounds are normal. She exhibits no distension. There is no rebound and no guarding.   Musculoskeletal:   L wrist in splint   Neurological: She is alert and oriented to person, place, and time.   Moving all four extremities   Skin: She is not diaphoretic.   Psychiatric:   Flat affect   Nursing note and vitals reviewed.      Significant Labs:   BMP:   Recent Labs   Lab 08/30/19  0509   GLU 83      K 4.6   CL 98   CO2 24   BUN 13   CREATININE 2.7*   CALCIUM 9.6   MG 2.2     CBC:   Recent Labs   Lab 08/29/19  1417 08/30/19  0509   WBC 7.30 7.30   HGB 11.4* 12.5   HCT 38.7 40.2    326     Magnesium:   Recent Labs   Lab 08/30/19  0509   MG 2.2     TSH: No results for input(s): TSH in the last 4320 hours.    Significant Imaging: CXR: I have reviewed all pertinent results/findings within the past 24 hours and my personal findings are:  no focal infiltrates  EKG: I have reviewed all pertinent results/findings within the past 24 hours and my personal findings are: sinus   X-ray Wrist Complete Left    Result Date: 8/5/2019  EXAMINATION: XR WRIST COMPLETE 3 VIEWS LEFT CLINICAL HISTORY: Other extraarticular fracture of lower end of left radius, subsequent encounter for  closed fracture with routine healing TECHNIQUE: PA, lateral, and oblique views of the left wrist were performed. COMPARISON: 07/15/2019, 06/27/2019, 06/11/2019 FINDINGS: Postoperative changes of ORIF of a distal left radius fracture with volar plate and screw device again demonstrated.  Alignment and positioning appears unchanged.  Comminuted fracture of the distal ulnar diametaphysis demonstrates interval progression of healing.  No new acute fracture or dislocation.     Interval progression of healing of the comminuted distal radial and ulnar fractures status post distal radial ORIF.  No change in alignment. Electronically signed by: Tico Calvert Date:    08/05/2019 Time:    16:24    X-ray Chest Ap Portable    Result Date: 8/29/2019  EXAMINATION: XR CHEST AP PORTABLE CLINICAL HISTORY: Chest Pain; COMPARISON: 06/12/2019 FINDINGS: Cardiac silhouette size is enlarged and stable from prior.  Linear opacities at the right lung base compatible with atelectasis.  No large pleural effusion.  No pneumothorax.  No acute osseous abnormality.     Stable cardiomegaly.  Mild right basilar atelectasis. Electronically signed by: Dave Baird MD Date:    08/29/2019 Time:    13:58  ECG Results          EKG 12-lead (In process)  Result time 08/29/19 16:50:02    In process by Interface, Lab In [facility] (08/29/19 16:50:02)                 Narrative:    Test Reason : R07.9,    Vent. Rate : 065 BPM     Atrial Rate : 065 BPM     P-R Int : 176 ms          QRS Dur : 086 ms      QT Int : 420 ms       P-R-T Axes : 049 -30 050 degrees     QTc Int : 436 ms    Normal sinus rhythm  Left axis deviation  Anterior infarct (cited on or before 18-NOV-2016)  Abnormal ECG  When compared with ECG of 18-NOV-2016 14:20,  Questionable change in initial forces of Septal leads    Referred By: [email or web address]   SELF           Confirmed By:                   In process by Interface, Lab In [facility] (08/29/19 16:22:33)                 Narrative:    Test Reason :  R07.9,    Vent. Rate : 065 BPM     Atrial Rate : 065 BPM     P-R Int : 176 ms          QRS Dur : 086 ms      QT Int : 420 ms       P-R-T Axes : 049 -30 050 degrees     QTc Int : 436 ms    Normal sinus rhythm  Left axis deviation  Anterior infarct (cited on or before 18-NOV-2016)  Abnormal ECG  When compared with ECG of 18-NOV-2016 14:20,  Questionable change in initial forces of Septal leads    Referred By: [email or web address]   SELF           Confirmed By:                   In process by Interface, Lab In [facility] (08/29/19 14:57:16)                 Narrative:    Test Reason : R07.9,    Vent. Rate : 065 BPM     Atrial Rate : 065 BPM     P-R Int : 176 ms          QRS Dur : 086 ms      QT Int : 420 ms       P-R-T Axes : 049 -30 050 degrees     QTc Int : 436 ms    Normal sinus rhythm  Left axis deviation  Anterior infarct (cited on or before 18-NOV-2016)  Abnormal ECG  When compared with ECG of 18-NOV-2016 14:20,  Questionable change in initial forces of Septal leads    Referred By: [email or web address]   SELF           Confirmed By:                   In process by Interface, Lab In [facility] (08/29/19 14:36:27)                 Narrative:    Test Reason : R07.9,    Vent. Rate : 065 BPM     Atrial Rate : 065 BPM     P-R Int : 176 ms          QRS Dur : 086 ms      QT Int : 420 ms       P-R-T Axes : 049 -30 050 degrees     QTc Int : 436 ms    Normal sinus rhythm  Left axis deviation  Anterior infarct (cited on or before 18-NOV-2016)  Abnormal ECG  When compared with ECG of 18-NOV-2016 14:20,  Questionable change in initial forces of Septal leads    Referred By: [email or web address]   SELF           Confirmed By:                               Assessment/Plan:      * Physical deconditioning with generalized weakness and history of falls  Generalized weakness and physical deconditioning.  As per collateral 2 falls recently.  Has sustained left distal radial and ulna fractures status post surgery.  Was receiving home PT/OT.  As per family not able to manage.   Patient now agreeable to skilled nursing placement.  -PT/OT consult  -case management consult for skilled nursing placement  -medical ready for discharge awaiting placement  -place PPD      ESRD (end stage renal disease)  Known ESRD on HD TTS.  Missed 2 sessions due to generalized weakness.  Status post urgent dialysis 8/29.  Nephrology on board, no need for repeat session today.  - HD as per Nephrology, resuming TTS  -renally dose all medications and avoid nephrotoxin drugs  -appreciate Nephrology input      Chronic pain  History of chronic neck and back pain. Likely secondary to MSK/degenerative joint disease. Avoid Lyrica.      Fracture of left wrist  History of fall with sustained left distal radial and ulna fracture status post or if.  Left wrist currently in a splint.  Due to follow-up orthopedics,  09/05.  -resume home [place] 5 q.6h p.r.n. with bowel regimen  -keep orthopedic follow-up      Chronic respiratory failure  On chronic home nocturnal oxygen.      Hypotension  Uses p.r.n. midodrine.      Chronic obstructive pulmonary disease  Chronic medical condition.  Continuing home medication.  Monitoring.  No current signs of exacerbation.     GERD (gastroesophageal reflux disease)  Chronic medical condition.  Continuing home medication.  Monitoring.        Hyperlipidemia  Chronic medical condition.  Continuing home medication.  Monitoring.          VTE Risk Mitigation (From admission, onward)        Ordered     heparin (porcine) injection 5,000 Units  Every 8 hours      08/29/19 2050     IP VTE HIGH RISK PATIENT  Once      08/29/19 2050                Gabi Mckinney MD  Department of Hospital Medicine   [facility]

## 2019-08-30 NOTE — ASSESSMENT & PLAN NOTE
Generalized weakness and physical deconditioning.  As per collateral 2 falls recently.  Has sustained left distal radial and ulna fractures status post surgery.  Was receiving home PT/OT.  As per family not able to manage.  Patient now agreeable to skilled nursing placement.  -PT/OT consult  -case management consult for skilled nursing placement  -medical ready for discharge awaiting placement  -place PPD

## 2019-08-30 NOTE — PLAN OF CARE
08/30/19 1027   STONER Message   Medicare Outpatient and Observation Notification regarding financial responsibility Given to patient/caregiver;Explained to patient/caregiver;Signed/date by patient/caregiver   Date STONER was signed 08/30/19   Time STONER was signed 1020

## 2019-08-31 PROBLEM — I95.9 HYPOTENSION: Status: ACTIVE | Noted: 2019-08-30

## 2019-08-31 PROBLEM — E83.39 HYPERPHOSPHATEMIA: Status: ACTIVE | Noted: 2019-08-31

## 2019-08-31 LAB
ANION GAP SERPL CALC-SCNC: 13 MMOL/L (ref 8–16)
BUN SERPL-MCNC: 38 MG/DL (ref 8–23)
CALCIUM SERPL-MCNC: 9 MG/DL (ref 8.7–10.5)
CHLORIDE SERPL-SCNC: 96 MMOL/L (ref 95–110)
CO2 SERPL-SCNC: 24 MMOL/L (ref 23–29)
CREAT SERPL-MCNC: 5.1 MG/DL (ref 0.5–1.4)
ERYTHROCYTE [DISTWIDTH] IN BLOOD BY AUTOMATED COUNT: 13.6 % (ref 11.5–14.5)
EST. GFR  (AFRICAN AMERICAN): 8.8 ML/MIN/1.73 M^2
EST. GFR  (NON AFRICAN AMERICAN): 7.6 ML/MIN/1.73 M^2
GLUCOSE SERPL-MCNC: 101 MG/DL (ref 70–110)
GLUCOSE SERPL-MCNC: 104 MG/DL (ref 70–110)
GLUCOSE SERPL-MCNC: 122 MG/DL (ref 70–110)
GLUCOSE SERPL-MCNC: 136 MG/DL (ref 70–110)
HCT VFR BLD AUTO: 38.9 % (ref 37–48.5)
HGB BLD-MCNC: 12.3 G/DL (ref 12–16)
MAGNESIUM SERPL-MCNC: 2.5 MG/DL (ref 1.6–2.6)
MCH RBC QN AUTO: 33.8 PG (ref 27–31)
MCHC RBC AUTO-ENTMCNC: 31.6 G/DL (ref 32–36)
MCV RBC AUTO: 107 FL (ref 82–98)
PHOSPHATE SERPL-MCNC: 6.4 MG/DL (ref 2.7–4.5)
PLATELET # BLD AUTO: 288 K/UL (ref 150–350)
PMV BLD AUTO: 9.7 FL (ref 9.2–12.9)
POTASSIUM SERPL-SCNC: 4.6 MMOL/L (ref 3.5–5.1)
RBC # BLD AUTO: 3.64 M/UL (ref 4–5.4)
SODIUM SERPL-SCNC: 133 MMOL/L (ref 136–145)
WBC # BLD AUTO: 6.59 K/UL (ref 3.9–12.7)

## 2019-08-31 PROCEDURE — 94761 N-INVAS EAR/PLS OXIMETRY MLT: CPT

## 2019-08-31 PROCEDURE — 97116 GAIT TRAINING THERAPY: CPT

## 2019-08-31 PROCEDURE — 97530 THERAPEUTIC ACTIVITIES: CPT

## 2019-08-31 PROCEDURE — 84100 ASSAY OF PHOSPHORUS: CPT

## 2019-08-31 PROCEDURE — 90935 HEMODIALYSIS ONE EVALUATION: CPT

## 2019-08-31 PROCEDURE — 96372 THER/PROPH/DIAG INJ SC/IM: CPT | Mod: 59

## 2019-08-31 PROCEDURE — 80048 BASIC METABOLIC PNL TOTAL CA: CPT

## 2019-08-31 PROCEDURE — 36415 COLL VENOUS BLD VENIPUNCTURE: CPT

## 2019-08-31 PROCEDURE — 63600175 PHARM REV CODE 636 W HCPCS: Performed by: INTERNAL MEDICINE

## 2019-08-31 PROCEDURE — G0378 HOSPITAL OBSERVATION PER HR: HCPCS

## 2019-08-31 PROCEDURE — 96374 THER/PROPH/DIAG INJ IV PUSH: CPT

## 2019-08-31 PROCEDURE — 25000003 PHARM REV CODE 250: Performed by: INTERNAL MEDICINE

## 2019-08-31 PROCEDURE — 27000221 HC OXYGEN, UP TO 24 HOURS

## 2019-08-31 PROCEDURE — 85027 COMPLETE CBC AUTOMATED: CPT

## 2019-08-31 PROCEDURE — 83735 ASSAY OF MAGNESIUM: CPT

## 2019-08-31 RX ORDER — MIDODRINE HYDROCHLORIDE 2.5 MG/1
5 TABLET ORAL 2 TIMES DAILY WITH MEALS
Status: DISCONTINUED | OUTPATIENT
Start: 2019-08-31 | End: 2019-09-09 | Stop reason: HOSPADM

## 2019-08-31 RX ORDER — BUSPIRONE HYDROCHLORIDE 5 MG/1
10 TABLET ORAL 3 TIMES DAILY
Status: DISCONTINUED | OUTPATIENT
Start: 2019-08-31 | End: 2019-08-31

## 2019-08-31 RX ADMIN — ONDANSETRON 8 MG: 4 TABLET, ORALLY DISINTEGRATING ORAL at 03:08

## 2019-08-31 RX ADMIN — ROSUVASTATIN CALCIUM 20 MG: 20 TABLET, FILM COATED ORAL at 09:08

## 2019-08-31 RX ADMIN — ACETAMINOPHEN 650 MG: 325 TABLET ORAL at 09:08

## 2019-08-31 RX ADMIN — MUPIROCIN: 20 OINTMENT TOPICAL at 11:08

## 2019-08-31 RX ADMIN — EPOETIN ALFA-EPBX 10000 UNITS: 10000 INJECTION, SOLUTION INTRAVENOUS; SUBCUTANEOUS at 02:08

## 2019-08-31 RX ADMIN — HEPARIN SODIUM 5000 UNITS: 5000 INJECTION INTRAVENOUS; SUBCUTANEOUS at 09:08

## 2019-08-31 RX ADMIN — FLUTICASONE PROPIONATE 100 MCG: 50 SPRAY, METERED NASAL at 09:08

## 2019-08-31 RX ADMIN — LEVOTHYROXINE SODIUM 100 MCG: 100 TABLET ORAL at 05:08

## 2019-08-31 RX ADMIN — FAMOTIDINE 20 MG: 20 TABLET ORAL at 09:08

## 2019-08-31 RX ADMIN — HEPARIN SODIUM 5000 UNITS: 5000 INJECTION INTRAVENOUS; SUBCUTANEOUS at 03:08

## 2019-08-31 RX ADMIN — MUPIROCIN: 20 OINTMENT TOPICAL at 09:08

## 2019-08-31 RX ADMIN — HEPARIN SODIUM 5000 UNITS: 5000 INJECTION INTRAVENOUS; SUBCUTANEOUS at 05:08

## 2019-08-31 RX ADMIN — MIDODRINE HYDROCHLORIDE 5 MG: 2.5 TABLET ORAL at 05:08

## 2019-08-31 RX ADMIN — HYDROCODONE BITARTRATE AND ACETAMINOPHEN 1 TABLET: 5; 325 TABLET ORAL at 03:08

## 2019-08-31 RX ADMIN — MIDODRINE HYDROCHLORIDE 2.5 MG: 2.5 TABLET ORAL at 09:08

## 2019-08-31 RX ADMIN — FLUOXETINE 20 MG: 20 CAPSULE ORAL at 09:08

## 2019-08-31 RX ADMIN — GABAPENTIN 300 MG: 300 CAPSULE ORAL at 09:08

## 2019-08-31 RX ADMIN — HYDROCODONE BITARTRATE AND ACETAMINOPHEN 1 TABLET: 5; 325 TABLET ORAL at 09:08

## 2019-08-31 RX ADMIN — ONDANSETRON 4 MG: 2 INJECTION INTRAMUSCULAR; INTRAVENOUS at 09:08

## 2019-08-31 RX ADMIN — CALCITRIOL CAPSULES 0.25 MCG 0.25 MCG: 0.25 CAPSULE ORAL at 09:08

## 2019-08-31 RX ADMIN — MIDODRINE HYDROCHLORIDE 5 MG: 2.5 TABLET ORAL at 11:08

## 2019-08-31 NOTE — ASSESSMENT & PLAN NOTE
History of fall with sustained left distal radial and ulna fracture status post or if.  Left wrist currently in a splint.  Due to follow-up orthopedics,  09/05.  -home [place] 5 q.6h p.r.n. with bowel regimen  -keep orthopedic follow-up

## 2019-08-31 NOTE — PT/OT/SLP PROGRESS
Physical Therapy Treatment    Patient Name:  Althea Gaona   MRN:  [id number]    Recommendations:     Discharge Recommendations:  nursing facility, skilled   Discharge Equipment Recommendations: none   Barriers to discharge: None    Assessment:     Althea Gaona is a 79 y.o. female admitted with a medical diagnosis of Physical deconditioning.  She presents with the following impairments/functional limitations:  weakness, impaired endurance, impaired self care skills, impaired balance, decreased safety awareness, impaired functional mobilty. Patient not used to using RW and lacked safety awareness during gt training. She required VC's and TC's for safety and did not have rote learning today. She will require further training.    Rehab Prognosis: Good and Fair; patient would benefit from acute skilled PT services to address these deficits and reach maximum level of function.    Recent Surgery: * No surgery found *      Plan:     During this hospitalization, patient to be seen 6 x/week to address the identified rehab impairments via gait training, therapeutic activities, therapeutic exercises and progress toward the following goals:    · Plan of Care Expires:  09/30/19    Subjective     Chief Complaint: is confused and in a fog and a little dizzy  Patient/Family Comments/goals: To get stronger and not be confused or dizzy  Pain/Comfort:  · Pain Rating 1: 0/10  · Pain Addressed 1: Pre-medicate for activity  · Pain Rating Post-Intervention 1: 0/10      Objective:     Communicated with GRUPO Blackman prior to session.  Patient found HOB elevated with peripheral IV, oxygen upon PT entry to room.     General Precautions: Standard, fall   Orthopedic Precautions:    Braces: (wrist brace Left)     Functional Mobility:  · Bed Mobility:     · Rolling Left:  stand by assistance  · Scooting: minimum assistance  · Supine to Sit: minimum assistance  · Transfers:     · Sit to Stand:  contact guard assistance with rolling  walker  · Bed to Chair: contact guard assistance with  rolling walker  using  Stand Pivot  · Gait: 200' with CGA, VC's and TC's to stay in middle of RW and for turns as pt could not turn walker or make turns during gt without TC's with VC's.      AM-PAC 6 CLICK MOBILITY          Therapeutic Activities and Exercises:  Sitting exercises for lower extremity strengthening to increase standing tolerance and decrease fall risk: Ankle pumps, LAQ, Heel slides, Marching, Partial sit to stand x 10.  Patient sat on EOB for spinal orientation and to increase proprioception in sitting.    Patient left up in chair with all lines intact, call button in reach and RN Hiral notified..    GOALS:   Multidisciplinary Problems     Physical Therapy Goals        Problem: Physical Therapy Goal    Goal Priority Disciplines Outcome Goal Variances Interventions   Physical Therapy Goal     PT, PT/OT Ongoing (interventions implemented as appropriate)     Description:  Goals to be met by: D/C     Patient will increase functional independence with mobility by performin. Supine to sit with Stand-by Assistance  2. Sit to stand transfer with Stand-by Assistance  3. Gait  x 50 feet with Supervision using Rolling Walker or cane.   4. Ascend and descend 3 stairs to allow safe return home                        Time Tracking:     PT Received On: 19  PT Start Time: 1124     PT Stop Time: 1147  PT Total Time (min): 23 min     Billable Minutes: Gait Training 15 and Therapeutic Activity 8    Treatment Type: Treatment  PT/PTA: PTA     PTA Visit Number: 1     Delaney Baird, PTA  2019

## 2019-08-31 NOTE — PROGRESS NOTES
[facility]  Progress Note    Patient Name: Althea Gaona  MRN: [id number]  Patient Class: OP- Observation   Admission Date: 8/29/2019  Length of Stay: 0 days  Attending Physician: Gabi Mckinney MD  Primary Care Provider: Primary Doctor No        Subjective:     Principal Problem:Physical deconditioning        HPI:  Mrs. Gaona is a 79 years old female sent from hemodialysis due to encephalopathy and weakness.  As per HPI patient was assessed after urgent dialysis and was reportedly back to her baseline. As per collateral she is ESRD on HD TTS however missed HD session on Tuesday due to generalized weakness. On Thursday when she presented to HD session she was noted to be unlike herself, unable to ambulate, weak and confused and therefore referred to the ED. Her admissions labs were significant for hyperkalemia, otherwise at baseline, no signs of infection, non focal neurological examination. Denied any fevers, shortness of breath, abdominal pain, loose stools.      Overview/Hospital Course:  In the ED nephrology was consulted and patient underwent urgent hemodialysis session.  Following assessment after dialysis patient mental status back to baseline, alert and oriented, answering all questions appropriately.  There was a concern she was recently started on lyrica however unable to confirm and this is listed as medication allergy. She did complain of headache and chronic neck and back pain for which she is on chronic opioid therapy at home. She also has injury to the left wrist and following orthopedics for same.  She continued to endorse generalized weakness with debility, states has been getting PT/OT sessions at home for the last few weeks, reports noted improvement but still concerned.  Initially she was reluctant to consider skilled nursing placement however after discussion with family, patient more agreeable.  Medically stable for discharge to skilled nursing facility,  awaiting placement.      Interval History:  Reports improvement in bilateral shoulder pain.  States she worked with physical therapy today.  Borderline hypotension, on chronic midodrine therapy.  Labs with elevated phosphorus.  Plans for hemodialysis, regular schedule, today.  Awaiting skilled nursing placement.  Ate about 75% of lunch.    Review of Systems   Constitutional: Negative for chills and fever.   Respiratory: Negative for shortness of breath.    Cardiovascular: Negative for chest pain, palpitations and leg swelling.   Gastrointestinal: Negative for abdominal pain, constipation, diarrhea, nausea and vomiting.   Musculoskeletal:        Improved neck pain   Skin: Negative for wound.   Neurological: Positive for weakness (Generalized).   Psychiatric/Behavioral: Negative for hallucinations.     Objective:     Vital Signs (Most Recent):  Temp: 97.5 °F (36.4 °C) (08/31/19 0423)  Pulse: 61 (08/31/19 0756)  Resp: 18 (08/31/19 0756)  BP: (!) 92/55 (08/31/19 0423)  SpO2: (!) 93 % (08/31/19 0756) Vital Signs (24h Range):  Temp:  [97.5 °F (36.4 °C)-98.5 °F (36.9 °C)] 97.5 °F (36.4 °C)  Pulse:  [55-74] 61  Resp:  [18-20] 18  SpO2:  [90 %-95 %] 93 %  BP: (91-98)/(53-64) 92/55     Weight: 67.5 kg (148 lb 13 oz)  Body mass index is 22.96 kg/m².  No intake or output data in the 24 hours ending 08/31/19 1242   Physical Exam   Constitutional: She is oriented to person, place, and time. No distress.   Elderly female, comfortable, calm, cooperative    HENT:   Head: Normocephalic and atraumatic.   Eyes: Pupils are equal, round, and reactive to light. EOM are normal. Right eye exhibits no discharge. Left eye exhibits no discharge.   Neck: Normal range of motion. Neck supple.   Cardiovascular: Normal rate and regular rhythm.   Pulmonary/Chest: Effort normal and breath sounds normal. She has no wheezes.   on supplemental O2   Abdominal: Soft. Bowel sounds are normal. She exhibits no distension. There is no rebound and no guarding.    Musculoskeletal:   L wrist in splint   Neurological: She is alert and oriented to person, place, and time.   Moving all four extremities   Skin: She is not diaphoretic.   Left upper extremity AVF   Psychiatric: She has a normal mood and affect.   Nursing note and vitals reviewed.      Significant Labs:   BMP:   Recent Labs   Lab 08/31/19  0521      *   K 4.6   CL 96   CO2 24   BUN 38*   CREATININE 5.1*   CALCIUM 9.0   MG 2.5     CBC:   Recent Labs   Lab 08/29/19  1417 08/30/19  0509 08/31/19  0521   WBC 7.30 7.30 6.59   HGB 11.4* 12.5 12.3   HCT 38.7 40.2 38.9    326 288     Magnesium:   Recent Labs   Lab 08/30/19  0509 08/31/19  0521   MG 2.2 2.5       Significant Imaging: I have reviewed and interpreted all pertinent imaging results/findings within the past 24 hours.      Assessment/Plan:      * Physical deconditioning with generalized weakness and history of falls  Generalized weakness and physical deconditioning.  As per collateral 2 falls recently.  Has sustained left distal radial and ulna fractures status post surgery.  Was receiving home PT/OT.  As per family not able to manage.  Patient now agreeable to skilled nursing placement.  -PT/OT following  -case management consulted for skilled nursing placement  -medical ready for discharge awaiting SNF placement        Hyperphosphatemia  In ESRD patient.   - dialysis and low phosphorus diet  - trend labs      Borderline hypotension  Borderline hypertension, acute on chronic.  On home midodrine.  -increase midodrine to 5 mg b.i.d.  -continue to monitor blood pressure clinically    Chronic pain  History of chronic neck and back pain. Likely secondary to MSK/degenerative joint disease. Avoid Lyrica.      ESRD (end stage renal disease)  Known ESRD on HD TTS.  Missed 2 sessions due to generalized weakness.  Status post urgent dialysis 8/29.    - HD as per Nephrology, resuming TTS  - renally dose all medications and avoid nephrotoxin drugs  -  appreciate Nephrology input      Fracture of left wrist  History of fall with sustained left distal radial and ulna fracture status post or if.  Left wrist currently in a splint.  Due to follow-up orthopedics,  09/05.  -home [place] 5 q.6h p.r.n. with bowel regimen  -keep orthopedic follow-up      Chronic respiratory failure  On chronic home nocturnal oxygen.      Chronic obstructive pulmonary disease  Chronic medical condition.  Continuing home medication.  Monitoring.  No current signs of exacerbation.     GERD (gastroesophageal reflux disease)  Chronic medical condition.  Continuing home medication.  Monitoring.        Hyperlipidemia  Chronic medical condition.  Continuing home medication.  Monitoring.          VTE Risk Mitigation (From admission, onward)        Ordered     heparin (porcine) injection 5,000 Units  Every 8 hours      08/29/19 2050     IP VTE HIGH RISK PATIENT  Once      08/29/19 2050                Gabi Mckinney MD  Department of Hospital Medicine   [facility]

## 2019-08-31 NOTE — PROGRESS NOTES
Progress Note  Nephrology    Consult Requested By: Gabi Mckinney MD    Reason for Consult: ESRD, dependent on dialysis    SUBJECTIVE:     History of Present Illness:  80 y/o female patient known to our practice, has out patient hemodialysis 3x week on TTS schedule.  She missed her treatment on Tuesday.  When she came for dialysis today, out patient unit staff noted several changes.  She usually walks in of her own accord and is oriented at baseline.  Today, family was unable to get her out of the car d/t profound weakness, and she was not oriented, talking out of her head.  She was sent to ER via EMS.  Renal is consulted for dialysis orders.    8/29  Pt seen and evaluated in ER.  Mental status a bit better.  States she missed dialysis on Tuesday d/t being very ill with fever and felt terrible.  Noted, she is unsure what day it is right now, thinks it's Saturday.  It was reported to unit staff that pt is taking lyrica, which is on her allergy list, recently prescribed.  8/30  Sleeping  8/31  Sleeping.  K+ 4.6, Na+ 133.  Hypotensive.  HD due today.    Assessment/plan:  1.  ESRD -- t,th,sat hd  2.  Anemia of chronic dz--erythropoiesis stimulating agent with renal replacement therapy    3.  SHPT--continue calcitriol  4.  Hypotension--continue midodrine bid    Past Medical History:   Diagnosis Date    Alcoholism     no drink since 1984    Anxiety     Asthma     Bipolar disorder     Chronic kidney disease     COPD (chronic obstructive pulmonary disease)     GERD (gastroesophageal reflux disease)     Hypertension     Pancreatitis     Thyroid disease      Past Surgical History:   Procedure Laterality Date    APPENDECTOMY      CATARACT EXTRACTION Right     EYE SURGERY      HYSTERECTOMY      THYROIDECTOMY      THYROIDECTOMY       Family History   Problem Relation Age of Onset    No Known Problems Mother     Diabetes Father     Heart disease Father         blood clot in lung went to heart    Cancer  "Sister         breast    Stroke Paternal Aunt     Cancer Sister         breast    Cancer Cousin         colon    Diabetes Cousin     Cancer Cousin         colon    COPD Sister          of COPD     Social History     Tobacco Use    Smoking status: Current Some Day Smoker     Packs/day: 1.00     Types: Cigarettes    Smokeless tobacco: Never Used   Substance Use Topics    Alcohol use: No    Drug use: Never       Review of patient's allergies indicates:   Allergen Reactions    Metoprolol Other (See Comments)     Grand-daughter/care giver reported Pt has over reaction to this drug, Bp bottoms out along with heart rate    Abilify [aripiprazole]      dizziness    Codeine      Other reaction(s): Unknown        Review of Systems:  General ROS: positive for fever, weakness  Psychological ROS: negative for - behavioral disorder or depression  ENT ROS: c/o headache  Hematological and Lymphatic ROS: negative for - bleeding problems or bruising  Endocrine ROS: negative for - temperature intolerance or unexpected weight changes  Respiratory ROS: no cough, shortness of breath, or wheezing  Cardiovascular ROS: + chest pain, no SOB  Gastrointestinal ROS: no abdominal pain, no n/v/c/d  Genito-Urinary ROS: no dysuria or trouble voiding  Musculoskeletal ROS: positive for weakness  Neurological ROS: "dizzy", "HA"  Dermatological ROS: no c/o skin lesion    OBJECTIVE:     Vital Signs Range (Last 24H):  Temp:  [97.5 °F (36.4 °C)-98.5 °F (36.9 °C)]   Pulse:  [55-74]   Resp:  [18-20]   BP: (91-98)/(53-66)   SpO2:  [90 %-98 %]     Physical Exam:  General- NAD noted  HEENT- WNL  Neck- supple  CV- Regular rate and rhythm  Resp- Lungs CTA Bilaterally, No increased WOB  GI- Non tender/non-distended, BS normoactive x4 quads  Extrem- No cyanosis, clubbing, edema.  Derm- skin w/d  Neuro-  Mostly confused, not at baseline     Body mass index is 22.96 kg/m².    Laboratory:  CBC:   Recent Labs   Lab 19  0521   WBC 6.59   RBC 3.64* "   HGB 12.3   HCT 38.9      *   MCH 33.8*   MCHC 31.6*     CMP:   Recent Labs   Lab 08/29/19  1417  08/31/19  0521   GLU 79   < > 101   CALCIUM 9.0   < > 9.0   ALBUMIN 3.8  --   --    PROT 7.3  --   --       < > 133*   K 5.8*   < > 4.6   CO2 26   < > 24      < > 96   BUN 27*   < > 38*   CREATININE 4.0*   < > 5.1*   ALKPHOS 108  --   --    ALT 15  --   --    AST 22  --   --    BILITOT 0.7  --   --     < > = values in this interval not displayed.       Diagnostic Results:  labs pending      ASSESSMENT/PLAN:     Active Hospital Problems    Diagnosis  POA    *Physical deconditioning with generalized weakness and history of falls [R53.81]  Yes    Hypotension [I95.9]  Yes     Chronic    Chronic pain [G89.29]  Yes     Chronic    Chronic respiratory failure [J96.10]  Yes     Chronic    Fracture of left wrist [S62.102A]  Yes     Chronic    Bipolar disorder [F31.9]  Yes     Chronic    Hypothyroidism [E03.9]  Yes     Chronic    ESRD (end stage renal disease) [N18.6]  Yes     Chronic    Chronic obstructive pulmonary disease [J44.9]  Yes     Chronic    Hyperlipidemia [E78.5]  Yes     Chronic    GERD (gastroesophageal reflux disease) [K21.9]  Yes     Chronic      Resolved Hospital Problems    Diagnosis Date Resolved POA    Encephalopathy [G93.40] 08/30/2019 Yes    Hyperkalemia [E87.5] 08/30/2019 Yes         Thank you for allowing us to participate in the care of your patient. We will follow the patient and provide recommendations as needed.      Time spent seeing patient( greater than 1/2 spent in direct contact) :

## 2019-08-31 NOTE — ASSESSMENT & PLAN NOTE
Generalized weakness and physical deconditioning.  As per collateral 2 falls recently.  Has sustained left distal radial and ulna fractures status post surgery.  Was receiving home PT/OT.  As per family not able to manage.  Patient now agreeable to skilled nursing placement.  -PT/OT following  -case management consulted for skilled nursing placement  -medical ready for discharge awaiting SNF placement

## 2019-08-31 NOTE — ASSESSMENT & PLAN NOTE
Known ESRD on HD TTS.  Missed 2 sessions due to generalized weakness.  Status post urgent dialysis 8/29.    - HD as per Nephrology, resuming TTS  - renally dose all medications and avoid nephrotoxin drugs  - appreciate Nephrology input

## 2019-08-31 NOTE — SUBJECTIVE & OBJECTIVE
Interval History:  Reports improvement in bilateral shoulder pain.  States she worked with physical therapy today.  Borderline hypotension, on chronic midodrine therapy.  Labs with elevated phosphorus.  Plans for hemodialysis, regular schedule, today.  Awaiting skilled nursing placement.  Ate about 75% of lunch.    Review of Systems   Constitutional: Negative for chills and fever.   Respiratory: Negative for shortness of breath.    Cardiovascular: Negative for chest pain, palpitations and leg swelling.   Gastrointestinal: Negative for abdominal pain, constipation, diarrhea, nausea and vomiting.   Musculoskeletal:        Improved neck pain   Skin: Negative for wound.   Neurological: Positive for weakness (Generalized).   Psychiatric/Behavioral: Negative for hallucinations.     Objective:     Vital Signs (Most Recent):  Temp: 97.5 °F (36.4 °C) (08/31/19 0423)  Pulse: 61 (08/31/19 0756)  Resp: 18 (08/31/19 0756)  BP: (!) 92/55 (08/31/19 0423)  SpO2: (!) 93 % (08/31/19 0756) Vital Signs (24h Range):  Temp:  [97.5 °F (36.4 °C)-98.5 °F (36.9 °C)] 97.5 °F (36.4 °C)  Pulse:  [55-74] 61  Resp:  [18-20] 18  SpO2:  [90 %-95 %] 93 %  BP: (91-98)/(53-64) 92/55     Weight: 67.5 kg (148 lb 13 oz)  Body mass index is 22.96 kg/m².  No intake or output data in the 24 hours ending 08/31/19 1242   Physical Exam   Constitutional: She is oriented to person, place, and time. No distress.   Elderly female, comfortable, calm, cooperative    HENT:   Head: Normocephalic and atraumatic.   Eyes: Pupils are equal, round, and reactive to light. EOM are normal. Right eye exhibits no discharge. Left eye exhibits no discharge.   Neck: Normal range of motion. Neck supple.   Cardiovascular: Normal rate and regular rhythm.   Pulmonary/Chest: Effort normal and breath sounds normal. She has no wheezes.   on supplemental O2   Abdominal: Soft. Bowel sounds are normal. She exhibits no distension. There is no rebound and no guarding.   Musculoskeletal:   L  wrist in splint   Neurological: She is alert and oriented to person, place, and time.   Moving all four extremities   Skin: She is not diaphoretic.   Left upper extremity AVF   Psychiatric: She has a normal mood and affect.   Nursing note and vitals reviewed.      Significant Labs:   BMP:   Recent Labs   Lab 08/31/19  0521      *   K 4.6   CL 96   CO2 24   BUN 38*   CREATININE 5.1*   CALCIUM 9.0   MG 2.5     CBC:   Recent Labs   Lab 08/29/19  1417 08/30/19  0509 08/31/19  0521   WBC 7.30 7.30 6.59   HGB 11.4* 12.5 12.3   HCT 38.7 40.2 38.9    326 288     Magnesium:   Recent Labs   Lab 08/30/19  0509 08/31/19  0521   MG 2.2 2.5       Significant Imaging: I have reviewed and interpreted all pertinent imaging results/findings within the past 24 hours.

## 2019-08-31 NOTE — ASSESSMENT & PLAN NOTE
Borderline hypertension, acute on chronic.  On home midodrine.  -increase midodrine to 5 mg b.i.d.  -continue to monitor blood pressure clinically

## 2019-09-01 PROBLEM — R10.13 DYSPEPSIA: Chronic | Status: ACTIVE | Noted: 2019-09-01

## 2019-09-01 LAB
GLUCOSE SERPL-MCNC: 109 MG/DL (ref 70–110)
GLUCOSE SERPL-MCNC: 116 MG/DL (ref 70–110)
GLUCOSE SERPL-MCNC: 120 MG/DL (ref 70–110)
GLUCOSE SERPL-MCNC: 124 MG/DL (ref 70–110)

## 2019-09-01 PROCEDURE — 99900035 HC TECH TIME PER 15 MIN (STAT)

## 2019-09-01 PROCEDURE — 96372 THER/PROPH/DIAG INJ SC/IM: CPT | Mod: 59

## 2019-09-01 PROCEDURE — 94761 N-INVAS EAR/PLS OXIMETRY MLT: CPT

## 2019-09-01 PROCEDURE — 63600175 PHARM REV CODE 636 W HCPCS: Performed by: INTERNAL MEDICINE

## 2019-09-01 PROCEDURE — 25000003 PHARM REV CODE 250: Performed by: INTERNAL MEDICINE

## 2019-09-01 PROCEDURE — 96376 TX/PRO/DX INJ SAME DRUG ADON: CPT

## 2019-09-01 PROCEDURE — 27000221 HC OXYGEN, UP TO 24 HOURS

## 2019-09-01 PROCEDURE — 82962 GLUCOSE BLOOD TEST: CPT

## 2019-09-01 PROCEDURE — G0378 HOSPITAL OBSERVATION PER HR: HCPCS

## 2019-09-01 RX ORDER — BUSPIRONE HYDROCHLORIDE 5 MG/1
10 TABLET ORAL 3 TIMES DAILY
Status: DISCONTINUED | OUTPATIENT
Start: 2019-09-01 | End: 2019-09-09 | Stop reason: HOSPADM

## 2019-09-01 RX ORDER — POLYETHYLENE GLYCOL 3350 17 G/17G
17 POWDER, FOR SOLUTION ORAL DAILY
Status: DISCONTINUED | OUTPATIENT
Start: 2019-09-01 | End: 2019-09-09 | Stop reason: HOSPADM

## 2019-09-01 RX ADMIN — MUPIROCIN: 20 OINTMENT TOPICAL at 09:09

## 2019-09-01 RX ADMIN — HEPARIN SODIUM 5000 UNITS: 5000 INJECTION INTRAVENOUS; SUBCUTANEOUS at 03:09

## 2019-09-01 RX ADMIN — FLUOXETINE 20 MG: 20 CAPSULE ORAL at 10:09

## 2019-09-01 RX ADMIN — MIDODRINE HYDROCHLORIDE 5 MG: 2.5 TABLET ORAL at 07:09

## 2019-09-01 RX ADMIN — HEPARIN SODIUM 5000 UNITS: 5000 INJECTION INTRAVENOUS; SUBCUTANEOUS at 06:09

## 2019-09-01 RX ADMIN — HYDROCODONE BITARTRATE AND ACETAMINOPHEN 1 TABLET: 5; 325 TABLET ORAL at 02:09

## 2019-09-01 RX ADMIN — ROSUVASTATIN CALCIUM 20 MG: 20 TABLET, FILM COATED ORAL at 09:09

## 2019-09-01 RX ADMIN — POLYETHYLENE GLYCOL 3350 17 G: 17 POWDER, FOR SOLUTION ORAL at 03:09

## 2019-09-01 RX ADMIN — FAMOTIDINE 20 MG: 20 TABLET ORAL at 10:09

## 2019-09-01 RX ADMIN — HYDROCODONE BITARTRATE AND ACETAMINOPHEN 1 TABLET: 5; 325 TABLET ORAL at 07:09

## 2019-09-01 RX ADMIN — FLUTICASONE PROPIONATE 100 MCG: 50 SPRAY, METERED NASAL at 10:09

## 2019-09-01 RX ADMIN — MIDODRINE HYDROCHLORIDE 5 MG: 2.5 TABLET ORAL at 05:09

## 2019-09-01 RX ADMIN — HYDROCODONE BITARTRATE AND ACETAMINOPHEN 1 TABLET: 5; 325 TABLET ORAL at 01:09

## 2019-09-01 RX ADMIN — HEPARIN SODIUM 5000 UNITS: 5000 INJECTION INTRAVENOUS; SUBCUTANEOUS at 09:09

## 2019-09-01 RX ADMIN — GABAPENTIN 300 MG: 300 CAPSULE ORAL at 10:09

## 2019-09-01 RX ADMIN — ONDANSETRON 8 MG: 4 TABLET, ORALLY DISINTEGRATING ORAL at 07:09

## 2019-09-01 RX ADMIN — POLYETHYLENE GLYCOL 3350 17 G: 17 POWDER, FOR SOLUTION ORAL at 10:09

## 2019-09-01 RX ADMIN — BUSPIRONE HYDROCHLORIDE 10 MG: 5 TABLET ORAL at 09:09

## 2019-09-01 RX ADMIN — ONDANSETRON 4 MG: 2 INJECTION INTRAMUSCULAR; INTRAVENOUS at 07:09

## 2019-09-01 RX ADMIN — LEVOTHYROXINE SODIUM 100 MCG: 100 TABLET ORAL at 06:09

## 2019-09-01 RX ADMIN — ONDANSETRON 4 MG: 2 INJECTION INTRAMUSCULAR; INTRAVENOUS at 11:09

## 2019-09-01 RX ADMIN — ALUMINUM HYDROXIDE, MAGNESIUM HYDROXIDE, AND SIMETHICONE 50 ML: 200; 200; 20 SUSPENSION ORAL at 07:09

## 2019-09-01 RX ADMIN — CALCITRIOL CAPSULES 0.25 MCG 0.25 MCG: 0.25 CAPSULE ORAL at 10:09

## 2019-09-01 NOTE — SUBJECTIVE & OBJECTIVE
Interval History:  Patient reports she does not feel well today, reports has indigestion, states this is lifelong and she takes Pepto-Bismol and Zofran at home.  Only small bowel movement yesterday and states she still feels constipated, took some Dulcolax this morning and is hopeful for a bowel movement soon.  Had dialysis yesterday with 2 L removed.  Blood pressure is improved on increased dose of midodrine.    Review of Systems   Constitutional: Negative for chills and fever.   Respiratory: Negative for shortness of breath.    Cardiovascular: Negative for chest pain, palpitations and leg swelling.   Gastrointestinal: Positive for constipation. Negative for abdominal pain, diarrhea, nausea and vomiting.        Dyspepsia   Skin: Negative for wound.   Neurological: Positive for weakness (Generalized).   Psychiatric/Behavioral: Negative for hallucinations.     Objective:     Vital Signs (Most Recent):  Temp: 99 °F (37.2 °C) (09/01/19 0730)  Pulse: 68 (09/01/19 0814)  Resp: 18 (09/01/19 0814)  BP: 105/69 (09/01/19 0730)  SpO2: (!) 94 % (09/01/19 0814) Vital Signs (24h Range):  Temp:  [97 °F (36.1 °C)-99.9 °F (37.7 °C)] 99 °F (37.2 °C)  Pulse:  [60-82] 68  Resp:  [16-19] 18  SpO2:  [90 %-97 %] 94 %  BP: ()/(43-71) 105/69     Weight: 67.5 kg (148 lb 13 oz)  Body mass index is 22.96 kg/m².    Intake/Output Summary (Last 24 hours) at 9/1/2019 1126  Last data filed at 8/31/2019 2100  Gross per 24 hour   Intake 920 ml   Output 2074 ml   Net -1154 ml      Physical Exam   Constitutional: She is oriented to person, place, and time. No distress.   Elderly female, comfortable, calm, cooperative    HENT:   Head: Normocephalic and atraumatic.   Eyes: Pupils are equal, round, and reactive to light. EOM are normal. Right eye exhibits no discharge. Left eye exhibits no discharge.   Neck: Normal range of motion. Neck supple.   Cardiovascular: Normal rate and regular rhythm.   Pulmonary/Chest: Effort normal and breath sounds  normal. She has no wheezes.   on supplemental O2   Abdominal: Soft. Bowel sounds are normal. She exhibits no distension. There is no rebound and no guarding.   Musculoskeletal:   L wrist in splint   Neurological: She is alert and oriented to person, place, and time.   Moving all four extremities   Skin: She is not diaphoretic.   Left upper extremity AVF   Psychiatric: She has a normal mood and affect.   Nursing note and vitals reviewed.      Significant Labs: All pertinent labs within the past 24 hours have been reviewed.    Significant Imaging: I have reviewed and interpreted all pertinent imaging results/findings within the past 24 hours.

## 2019-09-01 NOTE — CARE UPDATE
09/01/19 0814   Patient Assessment/Suction   Level of Consciousness (AVPU) alert   Respiratory Effort Normal;Unlabored   All Lung Fields Breath Sounds clear;diminished   PRE-TX-O2   O2 Device (Oxygen Therapy) nasal cannula   $ Is the patient on Low Flow Oxygen? Yes   Flow (L/min) 2   SpO2 (!) 94 %   $ Pulse Oximetry - Multiple Charge Pulse Oximetry - Multiple   Pulse 68   Resp 18   Aerosol Therapy   $ Aerosol Therapy Charges PRN treatment not required

## 2019-09-01 NOTE — ASSESSMENT & PLAN NOTE
Borderline hypertension, acute on chronic.  Improved on increased dose of midodrine.  -continue increased midodrine to 5 mg b.i.d.  -continue to monitor blood pressure clinically

## 2019-09-01 NOTE — PROGRESS NOTES
Progress Note  Nephrology    Consult Requested By: Gabi Mckinney MD    Reason for Consult: ESRD, dependent on dialysis    SUBJECTIVE:     History of Present Illness:  78 y/o female patient known to our practice, has out patient hemodialysis 3x week on TTS schedule.  She missed her treatment on Tuesday.  When she came for dialysis today, out patient unit staff noted several changes.  She usually walks in of her own accord and is oriented at baseline.  Today, family was unable to get her out of the car d/t profound weakness, and she was not oriented, talking out of her head.  She was sent to ER via EMS.  Renal is consulted for dialysis orders.    8/29  Pt seen and evaluated in ER.  Mental status a bit better.  States she missed dialysis on Tuesday d/t being very ill with fever and felt terrible.  Noted, she is unsure what day it is right now, thinks it's Saturday.  It was reported to unit staff that pt is taking lyrica, which is on her allergy list, recently prescribed.  8/30  Sleeping  8/31  Sleeping.  K+ 4.6, Na+ 133.  Hypotensive.  HD due today.  9/1  No new labs today, BMP in am.  Pt reports feeling better, still weak.  Plans to go to SNF at discharge.  HD yesterday, 2L UF.  SBP low 100's today.    Assessment/plan:  1.  ESRD -- t,th,sat hd  2.  Anemia of chronic dz--erythropoiesis stimulating agent with renal replacement therapy    3.  SHPT--continue calcitriol  4.  Hypotension--continue midodrine bid    Past Medical History:   Diagnosis Date    Alcoholism     no drink since 1984    Anxiety     Asthma     Bipolar disorder     Chronic kidney disease     COPD (chronic obstructive pulmonary disease)     GERD (gastroesophageal reflux disease)     Hypertension     Pancreatitis     Thyroid disease      Past Surgical History:   Procedure Laterality Date    APPENDECTOMY      CATARACT EXTRACTION Right     EYE SURGERY      HYSTERECTOMY      THYROIDECTOMY      THYROIDECTOMY       Family History   Problem  "Relation Age of Onset    No Known Problems Mother     Diabetes Father     Heart disease Father         blood clot in lung went to heart    Cancer Sister         breast    Stroke Paternal Aunt     Cancer Sister         breast    Cancer Cousin         colon    Diabetes Cousin     Cancer Cousin         colon    COPD Sister          of COPD     Social History     Tobacco Use    Smoking status: Current Some Day Smoker     Packs/day: 1.00     Types: Cigarettes    Smokeless tobacco: Never Used   Substance Use Topics    Alcohol use: No    Drug use: Never       Review of patient's allergies indicates:   Allergen Reactions    Metoprolol Other (See Comments)     Grand-daughter/care giver reported Pt has over reaction to this drug, Bp bottoms out along with heart rate    Abilify [aripiprazole]      dizziness    Codeine      Other reaction(s): Unknown        Review of Systems:  General ROS: positive for fever, weakness  Psychological ROS: negative for - behavioral disorder or depression  ENT ROS: c/o headache  Hematological and Lymphatic ROS: negative for - bleeding problems or bruising  Endocrine ROS: negative for - temperature intolerance or unexpected weight changes  Respiratory ROS: no cough, shortness of breath, or wheezing  Cardiovascular ROS: + chest pain, no SOB  Gastrointestinal ROS: no abdominal pain, no n/v/c/d  Genito-Urinary ROS: no dysuria or trouble voiding  Musculoskeletal ROS: positive for weakness  Neurological ROS: "dizzy", "HA"  Dermatological ROS: no c/o skin lesion    OBJECTIVE:     Vital Signs Range (Last 24H):  Temp:  [97 °F (36.1 °C)-99.9 °F (37.7 °C)]   Pulse:  [60-82]   Resp:  [16-19]   BP: ()/(43-71)   SpO2:  [90 %-97 %]     Physical Exam:  General- NAD noted  HEENT- WNL  Neck- supple  CV- Regular rate and rhythm  Resp- Lungs CTA Bilaterally, No increased WOB  GI- Non tender/non-distended, BS normoactive x4 quads  Extrem- No cyanosis, clubbing, edema.  Derm- skin w/d  Neuro- "  Mostly confused, not at baseline     Body mass index is 22.96 kg/m².    Laboratory:  CBC:   Recent Labs   Lab 08/31/19  0521   WBC 6.59   RBC 3.64*   HGB 12.3   HCT 38.9      *   MCH 33.8*   MCHC 31.6*     CMP:   Recent Labs   Lab 08/29/19  1417  08/31/19  0521   GLU 79   < > 101   CALCIUM 9.0   < > 9.0   ALBUMIN 3.8  --   --    PROT 7.3  --   --       < > 133*   K 5.8*   < > 4.6   CO2 26   < > 24      < > 96   BUN 27*   < > 38*   CREATININE 4.0*   < > 5.1*   ALKPHOS 108  --   --    ALT 15  --   --    AST 22  --   --    BILITOT 0.7  --   --     < > = values in this interval not displayed.       Diagnostic Results:  labs pending      ASSESSMENT/PLAN:     Active Hospital Problems    Diagnosis  POA    *Physical deconditioning with generalized weakness and history of falls [R53.81]  Yes    Hyperphosphatemia [E83.39]  Yes    Borderline hypotension [I95.9]  Yes    Chronic pain [G89.29]  Yes     Chronic    Chronic respiratory failure [J96.10]  Yes     Chronic    Fracture of left wrist [S62.102A]  Yes     Chronic    Bipolar disorder [F31.9]  Yes     Chronic    Hypothyroidism [E03.9]  Yes     Chronic    ESRD (end stage renal disease) [N18.6]  Yes     Chronic    Chronic obstructive pulmonary disease [J44.9]  Yes     Chronic    Hyperlipidemia [E78.5]  Yes     Chronic    GERD (gastroesophageal reflux disease) [K21.9]  Yes     Chronic      Resolved Hospital Problems    Diagnosis Date Resolved POA    Encephalopathy [G93.40] 08/30/2019 Yes    Hyperkalemia [E87.5] 08/30/2019 Yes         Thank you for allowing us to participate in the care of your patient. We will follow the patient and provide recommendations as needed.      Time spent seeing patient( greater than 1/2 spent in direct contact) :

## 2019-09-01 NOTE — PROGRESS NOTES
[facility]  Progress Note    Patient Name: Althea Gaona  MRN: [id number]  Patient Class: OP- Observation   Admission Date: 8/29/2019  Length of Stay: 0 days  Attending Physician: Gabi Mckinney MD  Primary Care Provider: Primary Doctor No        Subjective:     Principal Problem:Physical deconditioning        HPI:  Mrs. Gaona is a 79 years old female sent from hemodialysis due to encephalopathy and weakness.  As per HPI patient was assessed after urgent dialysis and was reportedly back to her baseline. As per collateral she is ESRD on HD TTS however missed HD session on Tuesday due to generalized weakness. On Thursday when she presented to HD session she was noted to be unlike herself, unable to ambulate, weak and confused and therefore referred to the ED. Her admissions labs were significant for hyperkalemia, otherwise at baseline, no signs of infection, non focal neurological examination. Denied any fevers, shortness of breath, abdominal pain, loose stools.      Overview/Hospital Course:  In the ED nephrology was consulted and patient underwent urgent hemodialysis session.  Following assessment after dialysis patient mental status back to baseline, alert and oriented, answering all questions appropriately.  There was a concern she was recently started on lyrica however unable to confirm and this is listed as medication allergy. She did complain of headache and chronic neck and back pain for which she is on chronic opioid therapy at home. She also has injury to the left wrist and following orthopedics for same.  She continued to endorse generalized weakness with debility, states has been getting PT/OT sessions at home for the last few weeks, reports noted improvement but still concerned.  Initially she was reluctant to consider skilled nursing placement however after discussion with family, patient more agreeable.  Blood pressure better controlled on increase dose of  midodrine.  Nephrology following for regular home dialysis schedule TTS.  Medically stable for discharge to skilled nursing facility, awaiting placement.      Interval History:  Patient reports she does not feel well today, reports has indigestion, states this is lifelong and she takes Pepto-Bismol and Zofran at home.  Only small bowel movement yesterday and states she still feels constipated, took some Dulcolax this morning and is hopeful for a bowel movement soon.  Had dialysis yesterday with 2 L removed.  Blood pressure is improved on increased dose of midodrine.    Review of Systems   Constitutional: Negative for chills and fever.   Respiratory: Negative for shortness of breath.    Cardiovascular: Negative for chest pain, palpitations and leg swelling.   Gastrointestinal: Positive for constipation. Negative for abdominal pain, diarrhea, nausea and vomiting.        Dyspepsia   Skin: Negative for wound.   Neurological: Positive for weakness (Generalized).   Psychiatric/Behavioral: Negative for hallucinations.     Objective:     Vital Signs (Most Recent):  Temp: 99 °F (37.2 °C) (09/01/19 0730)  Pulse: 68 (09/01/19 0814)  Resp: 18 (09/01/19 0814)  BP: 105/69 (09/01/19 0730)  SpO2: (!) 94 % (09/01/19 0814) Vital Signs (24h Range):  Temp:  [97 °F (36.1 °C)-99.9 °F (37.7 °C)] 99 °F (37.2 °C)  Pulse:  [60-82] 68  Resp:  [16-19] 18  SpO2:  [90 %-97 %] 94 %  BP: ()/(43-71) 105/69     Weight: 67.5 kg (148 lb 13 oz)  Body mass index is 22.96 kg/m².    Intake/Output Summary (Last 24 hours) at 9/1/2019 1126  Last data filed at 8/31/2019 2100  Gross per 24 hour   Intake 920 ml   Output 2074 ml   Net -1154 ml      Physical Exam   Constitutional: She is oriented to person, place, and time. No distress.   Elderly female, comfortable, calm, cooperative    HENT:   Head: Normocephalic and atraumatic.   Eyes: Pupils are equal, round, and reactive to light. EOM are normal. Right eye exhibits no discharge. Left eye exhibits no  discharge.   Neck: Normal range of motion. Neck supple.   Cardiovascular: Normal rate and regular rhythm.   Pulmonary/Chest: Effort normal and breath sounds normal. She has no wheezes.   on supplemental O2   Abdominal: Soft. Bowel sounds are normal. She exhibits no distension. There is no rebound and no guarding.   Musculoskeletal:   L wrist in splint   Neurological: She is alert and oriented to person, place, and time.   Moving all four extremities   Skin: She is not diaphoretic.   Left upper extremity AVF   Psychiatric: She has a normal mood and affect.   Nursing note and vitals reviewed.      Significant Labs: All pertinent labs within the past 24 hours have been reviewed.    Significant Imaging: I have reviewed and interpreted all pertinent imaging results/findings within the past 24 hours.      Assessment/Plan:      * Physical deconditioning with generalized weakness and history of falls  Generalized weakness and physical deconditioning.  As per collateral 2 falls recently.  Has sustained left distal radial and ulna fractures status post surgery.  Was receiving home PT/OT.  As per family not able to manage.  Patient now agreeable to skilled nursing placement.  -PT/OT following  -case management consulted for skilled nursing placement  -medical ready for discharge awaiting SNF placement        Hyperphosphatemia  In ESRD patient.   - dialysis and low phosphorus diet  - trend labs      Borderline hypotension  Borderline hypertension, acute on chronic.  Improved on increased dose of midodrine.  -continue increased midodrine to 5 mg b.i.d.  -continue to monitor blood pressure clinically    Dyspepsia  -chronic, continue famotidine and clinically monitor      Chronic pain  History of chronic neck and back pain. Likely secondary to MSK/degenerative joint disease. Avoid Lyrica.      ESRD (end stage renal disease)  Known ESRD on HD TTS.  Missed 2 sessions due to generalized weakness.  Status post urgent dialysis 8/29.     - HD as per Nephrology, resuming TTS  - renally dose all medications and avoid nephrotoxin drugs  - appreciate Nephrology input      Fracture of left wrist  History of fall with sustained left distal radial and ulna fracture status post or if.  Left wrist currently in a splint.  Due to follow-up orthopedics,  09/05.  -home [place] 5 q.6h p.r.n. with bowel regimen  -keep orthopedic follow-up      Chronic respiratory failure  On chronic home nocturnal oxygen.      Chronic obstructive pulmonary disease  Chronic medical condition.  Continuing home medication.  Monitoring.  No current signs of exacerbation.     Constipation  -possibly worsened by narcotic use  -if no improvement with docusate, will add MiraLax      GERD (gastroesophageal reflux disease)  Chronic medical condition.  Continuing home medication.  Monitoring.        Hyperlipidemia  Chronic medical condition.  Continuing home medication.  Monitoring.          VTE Risk Mitigation (From admission, onward)        Ordered     heparin (porcine) injection 5,000 Units  Every 8 hours      08/29/19 2050     IP VTE HIGH RISK PATIENT  Once      08/29/19 2050                Gabi Mckinney MD  Department of Hospital Medicine   [facility]

## 2019-09-01 NOTE — PROGRESS NOTES
HD complete, vss  Net UF 1474 mL  Report to [facility] Daily     08/31/19 8875   Vital Signs   Temp 97 °F (36.1 °C)   Temp src Tympanic   Pulse 65   Heart Rate Source Monitor   Resp 18   Flow (L/min) 2   O2 Device (Oxygen Therapy) nasal cannula   /66   BP Location Right arm   BP Method Automatic   Patient Position Lying   During Hemodialysis Assessment   UF Removed (mL) 2074 mL   Intra-Hemodialysis Comments tx complete, vss   Post-Hemodialysis Assessment   Rinseback Volume (mL) 250 mL   Blood Volume Processed (Liters) 58.2 L   Dialyzer Clearance Lightly streaked   Duration of Treatment (minutes) 180 minutes   Hemodialysis Intake (mL) 600 mL   Total UF (mL) 2074 mL   Net Fluid Removal 1474   Patient Response to Treatment pt c/o not feeling well. BP dropped twice during tx and UF was turned off, pt c/o stomach ache post-tx   Post-Treatment Weight 66.7 kg (147 lb 0.8 oz)   Treatment Weight Change -1.5   Arterial bleeding stop time (min) 5 min   Venous bleeding stop time (min) 5 min   Post-Hemodialysis Comments Tx completed, pt c/o not feeling well, Floor nurse informed

## 2019-09-02 PROBLEM — D64.9 ANEMIA: Chronic | Status: ACTIVE | Noted: 2019-09-02

## 2019-09-02 LAB
ANION GAP SERPL CALC-SCNC: 14 MMOL/L (ref 8–16)
BUN SERPL-MCNC: 34 MG/DL (ref 8–23)
CALCIUM SERPL-MCNC: 9.1 MG/DL (ref 8.7–10.5)
CHLORIDE SERPL-SCNC: 96 MMOL/L (ref 95–110)
CO2 SERPL-SCNC: 24 MMOL/L (ref 23–29)
CREAT SERPL-MCNC: 5.6 MG/DL (ref 0.5–1.4)
ERYTHROCYTE [DISTWIDTH] IN BLOOD BY AUTOMATED COUNT: 13.5 % (ref 11.5–14.5)
EST. GFR  (AFRICAN AMERICAN): 7.8 ML/MIN/1.73 M^2
EST. GFR  (NON AFRICAN AMERICAN): 6.8 ML/MIN/1.73 M^2
GLUCOSE SERPL-MCNC: 101 MG/DL (ref 70–110)
GLUCOSE SERPL-MCNC: 101 MG/DL (ref 70–110)
GLUCOSE SERPL-MCNC: 110 MG/DL (ref 70–110)
GLUCOSE SERPL-MCNC: 112 MG/DL (ref 70–110)
GLUCOSE SERPL-MCNC: 95 MG/DL (ref 70–110)
HCT VFR BLD AUTO: 40.1 % (ref 37–48.5)
HGB BLD-MCNC: 12.7 G/DL (ref 12–16)
MAGNESIUM SERPL-MCNC: 2.4 MG/DL (ref 1.6–2.6)
MCH RBC QN AUTO: 33.7 PG (ref 27–31)
MCHC RBC AUTO-ENTMCNC: 31.7 G/DL (ref 32–36)
MCV RBC AUTO: 106 FL (ref 82–98)
PHOSPHATE SERPL-MCNC: 5 MG/DL (ref 2.7–4.5)
PLATELET # BLD AUTO: 308 K/UL (ref 150–350)
PMV BLD AUTO: 10.8 FL (ref 9.2–12.9)
POTASSIUM SERPL-SCNC: 4.5 MMOL/L (ref 3.5–5.1)
RBC # BLD AUTO: 3.77 M/UL (ref 4–5.4)
SODIUM SERPL-SCNC: 134 MMOL/L (ref 136–145)
WBC # BLD AUTO: 9.45 K/UL (ref 3.9–12.7)

## 2019-09-02 PROCEDURE — 85027 COMPLETE CBC AUTOMATED: CPT

## 2019-09-02 PROCEDURE — 84100 ASSAY OF PHOSPHORUS: CPT

## 2019-09-02 PROCEDURE — 83735 ASSAY OF MAGNESIUM: CPT

## 2019-09-02 PROCEDURE — G0378 HOSPITAL OBSERVATION PER HR: HCPCS

## 2019-09-02 PROCEDURE — 36415 COLL VENOUS BLD VENIPUNCTURE: CPT

## 2019-09-02 PROCEDURE — 94761 N-INVAS EAR/PLS OXIMETRY MLT: CPT

## 2019-09-02 PROCEDURE — 82962 GLUCOSE BLOOD TEST: CPT

## 2019-09-02 PROCEDURE — 25000003 PHARM REV CODE 250: Performed by: INTERNAL MEDICINE

## 2019-09-02 PROCEDURE — 80048 BASIC METABOLIC PNL TOTAL CA: CPT

## 2019-09-02 PROCEDURE — 63600175 PHARM REV CODE 636 W HCPCS: Performed by: INTERNAL MEDICINE

## 2019-09-02 PROCEDURE — 27000221 HC OXYGEN, UP TO 24 HOURS

## 2019-09-02 PROCEDURE — 96372 THER/PROPH/DIAG INJ SC/IM: CPT | Mod: 59

## 2019-09-02 PROCEDURE — 94640 AIRWAY INHALATION TREATMENT: CPT

## 2019-09-02 RX ADMIN — HYDROCODONE BITARTRATE AND ACETAMINOPHEN 1 TABLET: 5; 325 TABLET ORAL at 03:09

## 2019-09-02 RX ADMIN — HEPARIN SODIUM 5000 UNITS: 5000 INJECTION INTRAVENOUS; SUBCUTANEOUS at 06:09

## 2019-09-02 RX ADMIN — MUPIROCIN: 20 OINTMENT TOPICAL at 09:09

## 2019-09-02 RX ADMIN — MIDODRINE HYDROCHLORIDE 5 MG: 2.5 TABLET ORAL at 07:09

## 2019-09-02 RX ADMIN — HEPARIN SODIUM 5000 UNITS: 5000 INJECTION INTRAVENOUS; SUBCUTANEOUS at 03:09

## 2019-09-02 RX ADMIN — FAMOTIDINE 20 MG: 20 TABLET ORAL at 09:09

## 2019-09-02 RX ADMIN — HYDROCODONE BITARTRATE AND ACETAMINOPHEN 1 TABLET: 5; 325 TABLET ORAL at 02:09

## 2019-09-02 RX ADMIN — BUSPIRONE HYDROCHLORIDE 10 MG: 5 TABLET ORAL at 03:09

## 2019-09-02 RX ADMIN — POLYETHYLENE GLYCOL 3350 17 G: 17 POWDER, FOR SOLUTION ORAL at 07:09

## 2019-09-02 RX ADMIN — BUSPIRONE HYDROCHLORIDE 10 MG: 5 TABLET ORAL at 08:09

## 2019-09-02 RX ADMIN — GABAPENTIN 300 MG: 300 CAPSULE ORAL at 09:09

## 2019-09-02 RX ADMIN — HEPARIN SODIUM 5000 UNITS: 5000 INJECTION INTRAVENOUS; SUBCUTANEOUS at 09:09

## 2019-09-02 RX ADMIN — BUSPIRONE HYDROCHLORIDE 10 MG: 5 TABLET ORAL at 09:09

## 2019-09-02 RX ADMIN — CALCITRIOL CAPSULES 0.25 MCG 0.25 MCG: 0.25 CAPSULE ORAL at 09:09

## 2019-09-02 RX ADMIN — ARIPIPRAZOLE 5 MG: 5 TABLET ORAL at 09:09

## 2019-09-02 RX ADMIN — FLUOXETINE 20 MG: 20 CAPSULE ORAL at 09:09

## 2019-09-02 RX ADMIN — FLUTICASONE PROPIONATE 100 MCG: 50 SPRAY, METERED NASAL at 09:09

## 2019-09-02 RX ADMIN — LEVOTHYROXINE SODIUM 100 MCG: 100 TABLET ORAL at 06:09

## 2019-09-02 RX ADMIN — MUPIROCIN: 20 OINTMENT TOPICAL at 08:09

## 2019-09-02 RX ADMIN — ALUMINUM HYDROXIDE, MAGNESIUM HYDROXIDE, AND SIMETHICONE 50 ML: 200; 200; 20 SUSPENSION ORAL at 08:09

## 2019-09-02 RX ADMIN — HYDROCODONE BITARTRATE AND ACETAMINOPHEN 1 TABLET: 5; 325 TABLET ORAL at 09:09

## 2019-09-02 RX ADMIN — ONDANSETRON 8 MG: 4 TABLET, ORALLY DISINTEGRATING ORAL at 03:09

## 2019-09-02 RX ADMIN — MIDODRINE HYDROCHLORIDE 5 MG: 2.5 TABLET ORAL at 06:09

## 2019-09-02 RX ADMIN — ROSUVASTATIN CALCIUM 20 MG: 20 TABLET, FILM COATED ORAL at 08:09

## 2019-09-02 NOTE — PROGRESS NOTES
[facility]  Progress Note    Patient Name: Althea Gaona  MRN: [id number]  Patient Class: OP- Observation   Admission Date: 8/29/2019  Length of Stay: 0 days  Attending Physician: Gabi Mckinney MD  Primary Care Provider: Primary Doctor No        Subjective:     Principal Problem:Physical deconditioning        HPI:  Mrs. Gaona is a 79 years old female sent from hemodialysis due to encephalopathy and weakness.  As per HPI patient was assessed after urgent dialysis and was reportedly back to her baseline. As per collateral she is ESRD on HD TTS however missed HD session on Tuesday due to generalized weakness. On Thursday when she presented to HD session she was noted to be unlike herself, unable to ambulate, weak and confused and therefore referred to the ED. Her admissions labs were significant for hyperkalemia, otherwise at baseline, no signs of infection, non focal neurological examination. Denied any fevers, shortness of breath, abdominal pain, loose stools.      Overview/Hospital Course:  In the ED nephrology was consulted and patient underwent urgent hemodialysis session.  Following assessment after dialysis patient mental status back to baseline, alert and oriented, answering all questions appropriately.  There was a concern she was recently started on lyrica however unable to confirm and this is listed as medication allergy. She did complain of headache and chronic neck and back pain for which she is on chronic opioid therapy at home. She also has injury to the left wrist and following orthopedics for same.  She continued to endorse generalized weakness with debility, states has been getting PT/OT sessions at home for the last few weeks, reports noted improvement but still concerned.  Initially she was reluctant to consider skilled nursing placement however after discussion with family, patient more agreeable.  Blood pressure better controlled on increase dose of  midodrine.  Nephrology following for regular home dialysis schedule TTS.  Medically stable for discharge to skilled nursing facility, awaiting placement.      Interval History:  Complaining of right shoulder and neck pain, chronic as per patient, has ice pack applied to right shoulder.  Dyspepsia is much improved today.Blood pressure better today on increased dose of midodrine.  Discussed with case management, medically has been accepted by guest house however still has to get paperwork and to get insurance approval, will follow up tomorrow.    Review of Systems   Constitutional: Negative for chills and fever.   Respiratory: Negative for shortness of breath.    Cardiovascular: Negative for chest pain and leg swelling.   Musculoskeletal: Positive for neck pain.        Shoulder pain   Neurological: Positive for weakness.   Psychiatric/Behavioral: Negative for hallucinations.     Objective:     Vital Signs (Most Recent):  Temp: 98 °F (36.7 °C) (09/02/19 1149)  Pulse: 73 (09/02/19 1149)  Resp: 16 (09/02/19 1149)  BP: (!) 162/81 (09/02/19 1149)  SpO2: 97 % (09/02/19 1149) Vital Signs (24h Range):  Temp:  [97.6 °F (36.4 °C)-98.9 °F (37.2 °C)] 98 °F (36.7 °C)  Pulse:  [] 73  Resp:  [16-18] 16  SpO2:  [83 %-97 %] 97 %  BP: (109-162)/(70-81) 162/81     Weight: 67.5 kg (148 lb 13 oz)  Body mass index is 22.96 kg/m².    Intake/Output Summary (Last 24 hours) at 9/2/2019 1612  Last data filed at 9/2/2019 0500  Gross per 24 hour   Intake 150 ml   Output 200 ml   Net -50 ml      Physical Exam   Constitutional: She is oriented to person, place, and time. No distress.   Elderly female, comfortable, calm, cooperative    HENT:   Head: Normocephalic and atraumatic.   Eyes: Pupils are equal, round, and reactive to light. EOM are normal. Right eye exhibits no discharge. Left eye exhibits no discharge.   Neck: Normal range of motion. Neck supple.   Cardiovascular: Normal rate and regular rhythm.   Pulmonary/Chest: Effort normal and  breath sounds normal. She has no wheezes.   on supplemental O2   Abdominal: Soft. Bowel sounds are normal. She exhibits no distension. There is no rebound and no guarding.   Musculoskeletal:   L wrist in splint, ice pack to right shoulder   Neurological: She is alert and oriented to person, place, and time.   Moving all four extremities   Skin: She is not diaphoretic.   Left upper extremity AVF   Psychiatric: She has a normal mood and affect.   Nursing note and vitals reviewed.      Significant Labs:   BMP:   Recent Labs   Lab 09/02/19  0515      *   K 4.5   CL 96   CO2 24   BUN 34*   CREATININE 5.6*   CALCIUM 9.1   MG 2.4     CBC:   Recent Labs   Lab 09/02/19  0515   WBC 9.45   HGB 12.7   HCT 40.1        Magnesium:   Recent Labs   Lab 09/02/19  0515   MG 2.4       Significant Imaging: I have reviewed and interpreted all pertinent imaging results/findings within the past 24 hours.      Assessment/Plan:      * Physical deconditioning with generalized weakness and history of falls  Generalized weakness and physical deconditioning.  As per collateral 2 falls recently.  Has sustained left distal radial and ulna fractures status post surgery.  Was receiving home PT/OT.  As per family not able to manage.  Patient now agreeable to skilled nursing placement.  -PT/OT following  -case management consulted for skilled nursing placement  -medical ready for discharge awaiting SNF placement        Hyperphosphatemia  In ESRD patient.   - dialysis and low phosphorus diet  - trend labs      Borderline hypotension  Borderline hypertension, acute on chronic.  Improved on increased dose of midodrine.  -continue increased midodrine to 5 mg b.i.d.  -continue to monitor blood pressure clinically    Dyspepsia  -chronic, continue famotidine and clinically monitor      Chronic pain  History of chronic neck and back pain. Likely secondary to MSK/degenerative joint disease. Avoid Lyrica.      ESRD (end stage renal  disease)  Known ESRD on HD TTS.  Missed 2 sessions due to generalized weakness.  Status post urgent dialysis 8/29.    - HD as per Nephrology, resuming TTS  - renally dose all medications and avoid nephrotoxin drugs  - appreciate Nephrology input      Fracture of left wrist  History of fall with sustained left distal radial and ulna fracture status post or if.  Left wrist currently in a splint.  Due to follow-up orthopedics,  09/05.  -home [place] 5 q.6h p.r.n. with bowel regimen  -keep orthopedic follow-up      Chronic respiratory failure  On chronic home nocturnal oxygen.      Chronic anemia  Continue CLAIRE.      Chronic obstructive pulmonary disease  Chronic medical condition.  Continuing home medication.  Monitoring.  No current signs of exacerbation.     GERD (gastroesophageal reflux disease)  Chronic medical condition.  Continuing home medication.  Monitoring.        Hyperlipidemia  Chronic medical condition.  Continuing home medication.  Monitoring.          VTE Risk Mitigation (From admission, onward)        Ordered     heparin (porcine) injection 5,000 Units  Every 8 hours      08/29/19 2050     IP VTE HIGH RISK PATIENT  Once      08/29/19 2050                Gabi Mckinney MD  Department of Hospital Medicine   [facility]

## 2019-09-02 NOTE — PLAN OF CARE
09/02/19 0751   Patient Assessment/Suction   Level of Consciousness (AVPU) alert   Respiratory Effort Normal;Unlabored   All Lung Fields Breath Sounds clear;diminished   PRE-TX-O2   O2 Device (Oxygen Therapy) nasal cannula   $ Is the patient on Low Flow Oxygen? Yes   Flow (L/min) 2   SpO2 (!) 83 %   Pulse Oximetry Type Intermittent   $ Pulse Oximetry - Multiple Charge Pulse Oximetry - Multiple   Pulse 71   Resp 17   Inhaler   $ Inhaler Charges MDI (Metered Dose [place]) Treatment  (home mdi trelegy)   Breath Sounds Post-Respiratory Treatment   Throughout All Fields Post-Treatment All Fields   Post-treatment Heart Rate (beats/min) 71   Post-treatment Resp Rate (breaths/min) 17

## 2019-09-02 NOTE — PLAN OF CARE
09/01/19 1900   Patient Assessment/Suction   Level of Consciousness (AVPU) alert   All Lung Fields Breath Sounds clear   PRE-TX-O2   O2 Device (Oxygen Therapy) nasal cannula   Flow (L/min) 2   Oxygen Concentration (%) 97   Positioning   Body Position supine   Ready to Wean/Extubation Screen   FIO2<=50 (chart decimal) (!) 0.97

## 2019-09-02 NOTE — PT/OT/SLP PROGRESS
Physical Therapy      Patient Name:  Althea Gaona   MRN:  [id number]    Patient not seen today secondary to (PT unavailable). Will follow-up tomorrow (9/3/19).    Fabiana Borwn, PT

## 2019-09-02 NOTE — PROGRESS NOTES
Progress Note  Nephrology    Consult Requested By: Gabi Mckinney MD    Reason for Consult: ESRD, dependent on dialysis    SUBJECTIVE:     History of Present Illness:  80 y/o female patient known to our practice, has out patient hemodialysis 3x week on TTS schedule.  She missed her treatment on Tuesday.  When she came for dialysis today, out patient unit staff noted several changes.  She usually walks in of her own accord and is oriented at baseline.  Today, family was unable to get her out of the car d/t profound weakness, and she was not oriented, talking out of her head.  She was sent to ER via EMS.  Renal is consulted for dialysis orders.    8/29  Pt seen and evaluated in ER.  Mental status a bit better.  States she missed dialysis on Tuesday d/t being very ill with fever and felt terrible.  Noted, she is unsure what day it is right now, thinks it's Saturday.  It was reported to unit staff that pt is taking lyrica, which is on her allergy list, recently prescribed.  8/30  Sleeping  8/31  Sleeping.  K+ 4.6, Na+ 133.  Hypotensive.  HD due today.  9/1  No new labs today, BMP in am.  Pt reports feeling better, still weak.  Plans to go to SNF at discharge.  HD yesterday, 2L UF.  SBP low 100's today.  9/2  HD tomorrow, no acute need today.  Pt feeling better, VSS.  Awaiting SNF placement.    Assessment/plan:  1.  ESRD -- t,th,sat hd  2.  Anemia of chronic dz--erythropoiesis stimulating agent with renal replacement therapy    3.  SHPT--continue calcitriol  4.  Hypotension--continue midodrine bid    Past Medical History:   Diagnosis Date    Alcoholism     no drink since 1984    Anxiety     Asthma     Bipolar disorder     Chronic kidney disease     COPD (chronic obstructive pulmonary disease)     GERD (gastroesophageal reflux disease)     Hypertension     Pancreatitis     Thyroid disease      Past Surgical History:   Procedure Laterality Date    APPENDECTOMY      CATARACT EXTRACTION Right     EYE SURGERY  "     HYSTERECTOMY      THYROIDECTOMY      THYROIDECTOMY       Family History   Problem Relation Age of Onset    No Known Problems Mother     Diabetes Father     Heart disease Father         blood clot in lung went to heart    Cancer Sister         breast    Stroke Paternal Aunt     Cancer Sister         breast    Cancer Cousin         colon    Diabetes Cousin     Cancer Cousin         colon    COPD Sister          of COPD     Social History     Tobacco Use    Smoking status: Current Some Day Smoker     Packs/day: 1.00     Types: Cigarettes    Smokeless tobacco: Never Used   Substance Use Topics    Alcohol use: No    Drug use: Never       Review of patient's allergies indicates:   Allergen Reactions    Metoprolol Other (See Comments)     Grand-daughter/care giver reported Pt has over reaction to this drug, Bp bottoms out along with heart rate    Abilify [aripiprazole]      dizziness    Codeine      Other reaction(s): Unknown        Review of Systems:  General ROS: positive for fever, weakness  Psychological ROS: negative for - behavioral disorder or depression  ENT ROS: c/o headache  Hematological and Lymphatic ROS: negative for - bleeding problems or bruising  Endocrine ROS: negative for - temperature intolerance or unexpected weight changes  Respiratory ROS: no cough, shortness of breath, or wheezing  Cardiovascular ROS: + chest pain, no SOB  Gastrointestinal ROS: no abdominal pain, no n/v/c/d  Genito-Urinary ROS: no dysuria or trouble voiding  Musculoskeletal ROS: positive for weakness  Neurological ROS: "dizzy", "HA"  Dermatological ROS: no c/o skin lesion    OBJECTIVE:     Vital Signs Range (Last 24H):  Temp:  [97.6 °F (36.4 °C)-98.9 °F (37.2 °C)]   Pulse:  []   Resp:  [16-18]   BP: (109-162)/(70-81)   SpO2:  [83 %-97 %]     Physical Exam:  General- NAD noted  HEENT- WNL  Neck- supple  CV- Regular rate and rhythm  Resp- Lungs CTA Bilaterally, No increased WOB  GI- Non " tender/non-distended, BS normoactive x4 quads  Extrem- No cyanosis, clubbing, edema.  Derm- skin w/d  Neuro-  Mostly confused, not at baseline     Body mass index is 22.96 kg/m².    Laboratory:  CBC:   Recent Labs   Lab 09/02/19  0515   WBC 9.45   RBC 3.77*   HGB 12.7   HCT 40.1      *   MCH 33.7*   MCHC 31.7*     CMP:   Recent Labs   Lab 08/29/19  1417  09/02/19  0515   GLU 79   < > 101   CALCIUM 9.0   < > 9.1   ALBUMIN 3.8  --   --    PROT 7.3  --   --       < > 134*   K 5.8*   < > 4.5   CO2 26   < > 24      < > 96   BUN 27*   < > 34*   CREATININE 4.0*   < > 5.6*   ALKPHOS 108  --   --    ALT 15  --   --    AST 22  --   --    BILITOT 0.7  --   --     < > = values in this interval not displayed.       Diagnostic Results:  labs pending      ASSESSMENT/PLAN:     Active Hospital Problems    Diagnosis  POA    *Physical deconditioning with generalized weakness and history of falls [R53.81]  Yes    Dyspepsia [R10.13]  Yes     Chronic    Hyperphosphatemia [E83.39]  Yes    Borderline hypotension [I95.9]  Yes    Chronic pain [G89.29]  Yes     Chronic    Chronic respiratory failure [J96.10]  Yes     Chronic    Fracture of left wrist [S62.102A]  Yes     Chronic    Bipolar disorder [F31.9]  Yes     Chronic    Hypothyroidism [E03.9]  Yes     Chronic    ESRD (end stage renal disease) [N18.6]  Yes     Chronic    Chronic obstructive pulmonary disease [J44.9]  Yes     Chronic    Hyperlipidemia [E78.5]  Yes     Chronic    GERD (gastroesophageal reflux disease) [K21.9]  Yes     Chronic    Constipation [K59.00]  Yes      Resolved Hospital Problems    Diagnosis Date Resolved POA    Encephalopathy [G93.40] 08/30/2019 Yes    Hyperkalemia [E87.5] 08/30/2019 Yes         Thank you for allowing us to participate in the care of your patient. We will follow the patient and provide recommendations as needed.      Time spent seeing patient( greater than 1/2 spent in direct contact) :

## 2019-09-02 NOTE — SUBJECTIVE & OBJECTIVE
Interval History:  Complaining of right shoulder and neck pain, chronic as per patient, has ice pack applied to right shoulder.  Dyspepsia is much improved today.Blood pressure better today on increased dose of midodrine.  Discussed with case management, medically has been accepted by guest house however still has to get paperwork and to get insurance approval, will follow up tomorrow.    Review of Systems   Constitutional: Negative for chills and fever.   Respiratory: Negative for shortness of breath.    Cardiovascular: Negative for chest pain and leg swelling.   Musculoskeletal: Positive for neck pain.        Shoulder pain   Neurological: Positive for weakness.   Psychiatric/Behavioral: Negative for hallucinations.     Objective:     Vital Signs (Most Recent):  Temp: 98 °F (36.7 °C) (09/02/19 1149)  Pulse: 73 (09/02/19 1149)  Resp: 16 (09/02/19 1149)  BP: (!) 162/81 (09/02/19 1149)  SpO2: 97 % (09/02/19 1149) Vital Signs (24h Range):  Temp:  [97.6 °F (36.4 °C)-98.9 °F (37.2 °C)] 98 °F (36.7 °C)  Pulse:  [] 73  Resp:  [16-18] 16  SpO2:  [83 %-97 %] 97 %  BP: (109-162)/(70-81) 162/81     Weight: 67.5 kg (148 lb 13 oz)  Body mass index is 22.96 kg/m².    Intake/Output Summary (Last 24 hours) at 9/2/2019 1612  Last data filed at 9/2/2019 0500  Gross per 24 hour   Intake 150 ml   Output 200 ml   Net -50 ml      Physical Exam   Constitutional: She is oriented to person, place, and time. No distress.   Elderly female, comfortable, calm, cooperative    HENT:   Head: Normocephalic and atraumatic.   Eyes: Pupils are equal, round, and reactive to light. EOM are normal. Right eye exhibits no discharge. Left eye exhibits no discharge.   Neck: Normal range of motion. Neck supple.   Cardiovascular: Normal rate and regular rhythm.   Pulmonary/Chest: Effort normal and breath sounds normal. She has no wheezes.   on supplemental O2   Abdominal: Soft. Bowel sounds are normal. She exhibits no distension. There is no rebound and  no guarding.   Musculoskeletal:   L wrist in splint, ice pack to right shoulder   Neurological: She is alert and oriented to person, place, and time.   Moving all four extremities   Skin: She is not diaphoretic.   Left upper extremity AVF   Psychiatric: She has a normal mood and affect.   Nursing note and vitals reviewed.      Significant Labs:   BMP:   Recent Labs   Lab 09/02/19 0515      *   K 4.5   CL 96   CO2 24   BUN 34*   CREATININE 5.6*   CALCIUM 9.1   MG 2.4     CBC:   Recent Labs   Lab 09/02/19  0515   WBC 9.45   HGB 12.7   HCT 40.1        Magnesium:   Recent Labs   Lab 09/02/19  0515   MG 2.4       Significant Imaging: I have reviewed and interpreted all pertinent imaging results/findings within the past 24 hours.

## 2019-09-03 DIAGNOSIS — M25.532 LEFT WRIST PAIN: Primary | ICD-10-CM

## 2019-09-03 LAB
ANION GAP SERPL CALC-SCNC: 15 MMOL/L (ref 8–16)
BUN SERPL-MCNC: 45 MG/DL (ref 8–23)
CALCIUM SERPL-MCNC: 9.2 MG/DL (ref 8.7–10.5)
CHLORIDE SERPL-SCNC: 90 MMOL/L (ref 95–110)
CO2 SERPL-SCNC: 25 MMOL/L (ref 23–29)
CREAT SERPL-MCNC: 6.5 MG/DL (ref 0.5–1.4)
ERYTHROCYTE [DISTWIDTH] IN BLOOD BY AUTOMATED COUNT: 13.4 % (ref 11.5–14.5)
EST. GFR  (AFRICAN AMERICAN): 6.5 ML/MIN/1.73 M^2
EST. GFR  (NON AFRICAN AMERICAN): 5.7 ML/MIN/1.73 M^2
GLUCOSE SERPL-MCNC: 103 MG/DL (ref 70–110)
GLUCOSE SERPL-MCNC: 118 MG/DL (ref 70–110)
GLUCOSE SERPL-MCNC: 129 MG/DL (ref 70–110)
GLUCOSE SERPL-MCNC: 132 MG/DL (ref 70–110)
GLUCOSE SERPL-MCNC: 144 MG/DL (ref 70–110)
HCT VFR BLD AUTO: 39.9 % (ref 37–48.5)
HGB BLD-MCNC: 12.7 G/DL (ref 12–16)
MAGNESIUM SERPL-MCNC: 2.9 MG/DL (ref 1.6–2.6)
MCH RBC QN AUTO: 33.7 PG (ref 27–31)
MCHC RBC AUTO-ENTMCNC: 31.8 G/DL (ref 32–36)
MCV RBC AUTO: 106 FL (ref 82–98)
PHOSPHATE SERPL-MCNC: 4.5 MG/DL (ref 2.7–4.5)
PLATELET # BLD AUTO: 310 K/UL (ref 150–350)
PMV BLD AUTO: 10.7 FL (ref 9.2–12.9)
POTASSIUM SERPL-SCNC: 4 MMOL/L (ref 3.5–5.1)
RBC # BLD AUTO: 3.77 M/UL (ref 4–5.4)
SODIUM SERPL-SCNC: 130 MMOL/L (ref 136–145)
WBC # BLD AUTO: 6.24 K/UL (ref 3.9–12.7)

## 2019-09-03 PROCEDURE — 25000003 PHARM REV CODE 250: Performed by: INTERNAL MEDICINE

## 2019-09-03 PROCEDURE — 27000221 HC OXYGEN, UP TO 24 HOURS

## 2019-09-03 PROCEDURE — 96372 THER/PROPH/DIAG INJ SC/IM: CPT | Mod: 59

## 2019-09-03 PROCEDURE — 94761 N-INVAS EAR/PLS OXIMETRY MLT: CPT

## 2019-09-03 PROCEDURE — 36415 COLL VENOUS BLD VENIPUNCTURE: CPT

## 2019-09-03 PROCEDURE — 82962 GLUCOSE BLOOD TEST: CPT

## 2019-09-03 PROCEDURE — 90935 HEMODIALYSIS ONE EVALUATION: CPT

## 2019-09-03 PROCEDURE — 63600175 PHARM REV CODE 636 W HCPCS: Performed by: INTERNAL MEDICINE

## 2019-09-03 PROCEDURE — 84100 ASSAY OF PHOSPHORUS: CPT

## 2019-09-03 PROCEDURE — G0378 HOSPITAL OBSERVATION PER HR: HCPCS

## 2019-09-03 PROCEDURE — 99900035 HC TECH TIME PER 15 MIN (STAT)

## 2019-09-03 PROCEDURE — 83735 ASSAY OF MAGNESIUM: CPT

## 2019-09-03 PROCEDURE — 80048 BASIC METABOLIC PNL TOTAL CA: CPT

## 2019-09-03 PROCEDURE — 85027 COMPLETE CBC AUTOMATED: CPT

## 2019-09-03 RX ADMIN — BUSPIRONE HYDROCHLORIDE 10 MG: 5 TABLET ORAL at 04:09

## 2019-09-03 RX ADMIN — HEPARIN SODIUM 5000 UNITS: 5000 INJECTION INTRAVENOUS; SUBCUTANEOUS at 04:09

## 2019-09-03 RX ADMIN — HEPARIN SODIUM 5000 UNITS: 5000 INJECTION INTRAVENOUS; SUBCUTANEOUS at 09:09

## 2019-09-03 RX ADMIN — BUSPIRONE HYDROCHLORIDE 10 MG: 5 TABLET ORAL at 09:09

## 2019-09-03 RX ADMIN — ONDANSETRON 4 MG: 4 TABLET, ORALLY DISINTEGRATING ORAL at 03:09

## 2019-09-03 RX ADMIN — GABAPENTIN 300 MG: 300 CAPSULE ORAL at 04:09

## 2019-09-03 RX ADMIN — FAMOTIDINE 20 MG: 20 TABLET ORAL at 04:09

## 2019-09-03 RX ADMIN — POLYETHYLENE GLYCOL 3350 17 G: 17 POWDER, FOR SOLUTION ORAL at 04:09

## 2019-09-03 RX ADMIN — ACETAMINOPHEN 650 MG: 325 TABLET ORAL at 09:09

## 2019-09-03 RX ADMIN — CALCITRIOL CAPSULES 0.25 MCG 0.25 MCG: 0.25 CAPSULE ORAL at 04:09

## 2019-09-03 RX ADMIN — FLUOXETINE 20 MG: 20 CAPSULE ORAL at 04:09

## 2019-09-03 RX ADMIN — LEVOTHYROXINE SODIUM 100 MCG: 100 TABLET ORAL at 04:09

## 2019-09-03 RX ADMIN — MUPIROCIN: 20 OINTMENT TOPICAL at 04:09

## 2019-09-03 RX ADMIN — ROSUVASTATIN CALCIUM 20 MG: 20 TABLET, FILM COATED ORAL at 09:09

## 2019-09-03 RX ADMIN — FLUTICASONE PROPIONATE 100 MCG: 50 SPRAY, METERED NASAL at 04:09

## 2019-09-03 RX ADMIN — HYDROCODONE BITARTRATE AND ACETAMINOPHEN 1 TABLET: 5; 325 TABLET ORAL at 11:09

## 2019-09-03 RX ADMIN — HYDROCODONE BITARTRATE AND ACETAMINOPHEN 1 TABLET: 5; 325 TABLET ORAL at 06:09

## 2019-09-03 RX ADMIN — HYDROCODONE BITARTRATE AND ACETAMINOPHEN 1 TABLET: 5; 325 TABLET ORAL at 04:09

## 2019-09-03 RX ADMIN — MIDODRINE HYDROCHLORIDE 5 MG: 2.5 TABLET ORAL at 04:09

## 2019-09-03 RX ADMIN — ONDANSETRON 8 MG: 4 TABLET, ORALLY DISINTEGRATING ORAL at 04:09

## 2019-09-03 RX ADMIN — EPOETIN ALFA-EPBX 3400 UNITS: 10000 INJECTION, SOLUTION INTRAVENOUS; SUBCUTANEOUS at 02:09

## 2019-09-03 NOTE — ASSESSMENT & PLAN NOTE
Generalized weakness and physical deconditioning.  As per collateral 2 falls recently.  Has sustained left distal radial and ulna fractures status- post surgery.  Was receiving home PT/OT.  As per family not able to manage.  Patient now agreeable to skilled nursing placement.  PT/OT following  case management consulted for skilled nursing placement.  Awaiting acceptance  medical ready for discharge awaiting SNF placement

## 2019-09-03 NOTE — PROGRESS NOTES
[facility] Medicine  Progress Note    Patient Name: Althea Gaona  MRN: [id number]  Patient Class: OP- Observation   Admission Date: 8/29/2019  Length of Stay: 0 days  Attending Physician: Shaheen Turner MD  Primary Care Provider: Primary Doctor No        Subjective:     Principal Problem:Physical deconditioning        HPI:  Mrs. Gaona is a 79 years old female sent from hemodialysis due to encephalopathy and weakness.  As per HPI patient was assessed after urgent dialysis and was reportedly back to her baseline. As per collateral she is ESRD on HD TTS however missed HD session on Tuesday due to generalized weakness. On Thursday when she presented to HD session she was noted to be unlike herself, unable to ambulate, weak and confused and therefore referred to the ED. Her admissions labs were significant for hyperkalemia, otherwise at baseline, no signs of infection, non focal neurological examination. Denied any fevers, shortness of breath, abdominal pain, loose stools.      Overview/Hospital Course:  In the ED nephrology was consulted and patient underwent urgent hemodialysis session.  Following assessment after dialysis patient mental status back to baseline, alert and oriented, answering all questions appropriately.  There was a concern she was recently started on lyrica however unable to confirm and this is listed as medication allergy. She did complain of headache and chronic neck and back pain for which she is on chronic opioid therapy at home. She also has injury to the left wrist and following orthopedics for same.  She continued to endorse generalized weakness with debility, states has been getting PT/OT sessions at home for the last few weeks, reports noted improvement but still concerned.  Initially she was reluctant to consider skilled nursing placement however after discussion with family, patient more agreeable.  Blood pressure better controlled on increase dose of midodrine.   Nephrology following for regular home dialysis schedule TTS.  Medically stable for discharge to skilled nursing facility, awaiting placement.      Interval History:  Patient seen and examined this morning.  Laying in bed with an ice pack on the right shoulder.  Patient inquired before she goes to sniff if she can drop by at home to grab her makeup.  Patient reports she was informed she will be transported through a van.  Discussed she can ask a family member to bring her makeup and personal belongings.  Patient reports she is feeling much better as compared to when she came in.   Denies chest pain, shortness of breath, headache.  Does report weakness    Review of Systems   Constitutional: Negative for chills and fever.   HENT: Negative for ear discharge and postnasal drip.    Eyes: Negative for pain and redness.   Respiratory: Negative for shortness of breath.    Cardiovascular: Negative for chest pain and leg swelling.   Gastrointestinal: Negative for abdominal pain, nausea and vomiting.   Genitourinary: Negative.    Musculoskeletal: Positive for arthralgias ( right shoulder discomfort) and neck pain.        Shoulder pain   Neurological: Positive for weakness.   Psychiatric/Behavioral: Negative for hallucinations.     Objective:     Vital Signs (Most Recent):  Temp: 97.9 °F (36.6 °C) (09/03/19 0730)  Pulse: 66 (09/03/19 0730)  Resp: 18 (09/03/19 0730)  BP: 130/79 (09/03/19 0730)  SpO2: 97 % (09/03/19 0730) Vital Signs (24h Range):  Temp:  [97.9 °F (36.6 °C)-98.4 °F (36.9 °C)] 97.9 °F (36.6 °C)  Pulse:  [66-76] 66  Resp:  [16-18] 18  SpO2:  [67 %-100 %] 97 %  BP: (104-162)/(68-81) 130/79     Weight: 67.5 kg (148 lb 13 oz)  Body mass index is 22.96 kg/m².  No intake or output data in the 24 hours ending 09/03/19 0841   Physical Exam   Constitutional: She is oriented to person, place, and time. She appears well-developed and well-nourished. No distress.   Elderly female, comfortable, calm, cooperative    HENT:   Head:  Normocephalic and atraumatic.   Eyes: Pupils are equal, round, and reactive to light. EOM are normal. Right eye exhibits no discharge. Left eye exhibits no discharge.   Neck: Normal range of motion. Neck supple.   Cardiovascular: Normal rate and regular rhythm.   Murmur (2/6 systolic murmur) heard.  Good thrill over the AV fistula in the left arm   Pulmonary/Chest: Effort normal and breath sounds normal. She has no wheezes.   on supplemental O2   Abdominal: Soft. Bowel sounds are normal. She exhibits no distension. There is no rebound and no guarding.   Musculoskeletal:   L wrist in splint, ice pack to right shoulder   Neurological: She is alert and oriented to person, place, and time.   Moving all four extremities   Skin: She is not diaphoretic.   Left upper extremity AVF   Psychiatric: She has a normal mood and affect. Her behavior is normal. Judgment and thought content normal.   Nursing note and vitals reviewed.      Significant Labs:   CBC:   Recent Labs   Lab 09/02/19  0515 09/03/19  0441   WBC 9.45 6.24   HGB 12.7 12.7   HCT 40.1 39.9    310     CMP:   Recent Labs   Lab 09/02/19  0515 09/03/19  0441   * 130*   K 4.5 4.0   CL 96 90*   CO2 24 25    103   BUN 34* 45*   CREATININE 5.6* 6.5*   CALCIUM 9.1 9.2   ANIONGAP 14 15   EGFRNONAA 6.8* 5.7*     Magnesium:   Recent Labs   Lab 09/02/19  0515 09/03/19  0441   MG 2.4 2.9*       Significant Imaging: I have reviewed all pertinent imaging results/findings within the past 24 hours.      Assessment/Plan:      * Physical deconditioning with generalized weakness and history of falls  Generalized weakness and physical deconditioning.  As per collateral 2 falls recently.  Has sustained left distal radial and ulna fractures status- post surgery.  Was receiving home PT/OT.  As per family not able to manage.  Patient now agreeable to skilled nursing placement.  PT/OT following  case management consulted for skilled nursing placement.  Awaiting  acceptance  medical ready for discharge awaiting SNF placement        ESRD (end stage renal disease)  Known ESRD on HD TTS.  Missed 2 sessions due to generalized weakness and now Status post urgent dialysis 8/29.    HD as per Nephrology, resuming TTS  renally dose all medications and avoid nephrotoxin drugs  appreciate Nephrology input      Chronic anemia  Continue CLAIRE.      Dyspepsia  -chronic, continue famotidine and clinically monitor      Hyperphosphatemia  In ESRD patient.   - dialysis and low phosphorus diet  - trend labs      Fracture of left wrist  History of fall with sustained left distal radial and ulna fracture status post or if.  Left wrist currently in a splint.  Due to follow-up orthopedics,  09/05.  -home [place] 5 q.6h p.r.n. with bowel regimen  -keep orthopedic follow-up      Chronic respiratory failure  On chronic home nocturnal oxygen.      Bipolar disorder        Chronic pain  History of chronic neck and back pain. Likely secondary to MSK/degenerative joint disease.   Avoid Lyrica.      Borderline hypotension  Borderline hypertension, acute on chronic.  Improved on increased dose of midodrine.  -continue increased midodrine to 5 mg b.i.d.  -continue to monitor blood pressure clinically    Chronic obstructive pulmonary disease  Chronic medical condition.  Continuing home medication.  Monitoring.  No current signs of exacerbation.     GERD (gastroesophageal reflux disease)  Chronic medical condition.  Continuing home medication.  Monitoring.          Hyperlipidemia  Chronic medical condition.  Continuing home medication.  Monitoring.          Patient Active Problem List   Diagnosis    Colitis    SOB (shortness of breath)    Pyrexia    Hyperlipidemia    GERD (gastroesophageal reflux disease)    Renal insufficiency    Dehydration    Chest pain    Dry eye of left side    Chronic obstructive pulmonary disease    Nausea in adult    Right hip pain    COPD mixed type    Closed  fracture of lower end of left radius with routine healing    ESRD (end stage renal disease)    Borderline hypotension    Physical deconditioning with generalized weakness and history of falls    Chronic pain    Bipolar disorder    Chronic respiratory failure    Hypothyroidism    Fracture of left wrist    Hyperphosphatemia    Dyspepsia    Chronic anemia         VTE Risk Mitigation (From admission, onward)        Ordered     heparin (porcine) injection 5,000 Units  Every 8 hours      08/29/19 2050     IP VTE HIGH RISK PATIENT  Once      08/29/19 2050                Shaheen Turner MD  Department of Hospital Medicine   [facility]

## 2019-09-03 NOTE — PT/OT/SLP PROGRESS
Physical Therapy      Patient Name:  Althea Gaona   MRN:  [id number]    Patient not seen today secondary to Dialysis. Will follow-up tomorrow (9/4/19).    Fabiana Brown, PT

## 2019-09-03 NOTE — ASSESSMENT & PLAN NOTE
Known ESRD on HD TTS.  Missed 2 sessions due to generalized weakness and now Status post urgent dialysis 8/29.    HD as per Nephrology, resuming TTS  renally dose all medications and avoid nephrotoxin drugs  appreciate Nephrology input

## 2019-09-03 NOTE — PLAN OF CARE
Problem: Pain Acute  Goal: Optimal Pain Control  Outcome: Ongoing (interventions implemented as appropriate)  Plan for pain management discussed with patient. PRN analgesia orders for dosage and frequency explained. Will administer as needed per patient request.   Plan of care discussed with patient. Questions pertaining to plan this shift answered. Pt verbally acknowledged understanding of plan, denies any further question at this time.

## 2019-09-03 NOTE — PLAN OF CARE
09/03/19 0827   Post-Acute Status   Post-Acute Authorization Placement   Post-Acute Placement Status Referrals Sent   Wilda from [facility] called and said patient is denied because can not take any more dialysis patients.

## 2019-09-03 NOTE — CARE UPDATE
09/03/19 0900   Patient Assessment/Suction   Level of Consciousness (AVPU) alert   Respiratory Effort Normal;Unlabored   Expansion/Accessory Muscles/Retractions no use of accessory muscles   All Lung Fields Breath Sounds clear;diminished   PRE-TX-O2   O2 Device (Oxygen Therapy) nasal cannula   $ Is the patient on Low Flow Oxygen? Yes   Flow (L/min) 2   SpO2 97 %   $ Pulse Oximetry - Multiple Charge Pulse Oximetry - Multiple   Pulse 68   Inhaler   $ Inhaler Charges Independent  (HOME MED)

## 2019-09-03 NOTE — SUBJECTIVE & OBJECTIVE
Interval History:  Patient seen and examined this morning.  Laying in bed with an ice pack on the right shoulder.  Patient inquired before she goes to sniff if she can drop by at home to grab her makeup.  Patient reports she was informed she will be transported through a van.  Discussed she can ask a family member to bring her makeup and personal belongings.  Patient reports she is feeling much better as compared to when she came in.   Denies chest pain, shortness of breath, headache.  Does report weakness    Review of Systems   Constitutional: Negative for chills and fever.   HENT: Negative for ear discharge and postnasal drip.    Eyes: Negative for pain and redness.   Respiratory: Negative for shortness of breath.    Cardiovascular: Negative for chest pain and leg swelling.   Gastrointestinal: Negative for abdominal pain, nausea and vomiting.   Genitourinary: Negative.    Musculoskeletal: Positive for arthralgias ( right shoulder discomfort) and neck pain.        Shoulder pain   Neurological: Positive for weakness.   Psychiatric/Behavioral: Negative for hallucinations.     Objective:     Vital Signs (Most Recent):  Temp: 97.9 °F (36.6 °C) (09/03/19 0730)  Pulse: 66 (09/03/19 0730)  Resp: 18 (09/03/19 0730)  BP: 130/79 (09/03/19 0730)  SpO2: 97 % (09/03/19 0730) Vital Signs (24h Range):  Temp:  [97.9 °F (36.6 °C)-98.4 °F (36.9 °C)] 97.9 °F (36.6 °C)  Pulse:  [66-76] 66  Resp:  [16-18] 18  SpO2:  [67 %-100 %] 97 %  BP: (104-162)/(68-81) 130/79     Weight: 67.5 kg (148 lb 13 oz)  Body mass index is 22.96 kg/m².  No intake or output data in the 24 hours ending 09/03/19 0841   Physical Exam   Constitutional: She is oriented to person, place, and time. She appears well-developed and well-nourished. No distress.   Elderly female, comfortable, calm, cooperative    HENT:   Head: Normocephalic and atraumatic.   Eyes: Pupils are equal, round, and reactive to light. EOM are normal. Right eye exhibits no discharge. Left eye  exhibits no discharge.   Neck: Normal range of motion. Neck supple.   Cardiovascular: Normal rate and regular rhythm.   Murmur (2/6 systolic murmur) heard.  Good thrill over the AV fistula in the left arm   Pulmonary/Chest: Effort normal and breath sounds normal. She has no wheezes.   on supplemental O2   Abdominal: Soft. Bowel sounds are normal. She exhibits no distension. There is no rebound and no guarding.   Musculoskeletal:   L wrist in splint, ice pack to right shoulder   Neurological: She is alert and oriented to person, place, and time.   Moving all four extremities   Skin: She is not diaphoretic.   Left upper extremity AVF   Psychiatric: She has a normal mood and affect. Her behavior is normal. Judgment and thought content normal.   Nursing note and vitals reviewed.      Significant Labs:   CBC:   Recent Labs   Lab 09/02/19  0515 09/03/19 0441   WBC 9.45 6.24   HGB 12.7 12.7   HCT 40.1 39.9    310     CMP:   Recent Labs   Lab 09/02/19  0515 09/03/19  0441   * 130*   K 4.5 4.0   CL 96 90*   CO2 24 25    103   BUN 34* 45*   CREATININE 5.6* 6.5*   CALCIUM 9.1 9.2   ANIONGAP 14 15   EGFRNONAA 6.8* 5.7*     Magnesium:   Recent Labs   Lab 09/02/19  0515 09/03/19  0441   MG 2.4 2.9*       Significant Imaging: I have reviewed all pertinent imaging results/findings within the past 24 hours.

## 2019-09-03 NOTE — PT/OT/SLP PROGRESS
Occupational Therapy      Patient Name:  Althea Gaona   MRN:  [id number]    Patient not seen today secondary to Dialysis, Other (Comment)(1st attempt patient refused; 2nd attempt pt at dialysis). Will follow-up next service date.    Audrey Hunt OT  9/3/2019

## 2019-09-03 NOTE — PROGRESS NOTES
Progress Note  Nephrology    Consult Requested By: Shaheen Turner MD    Reason for Consult: ESRD, dependent on dialysis    SUBJECTIVE:     History of Present Illness:  78 y/o female patient known to our practice, has out patient hemodialysis 3x week on TTS schedule.  She missed her treatment on Tuesday.  When she came for dialysis today, out patient unit staff noted several changes.  She usually walks in of her own accord and is oriented at baseline.  Today, family was unable to get her out of the car d/t profound weakness, and she was not oriented, talking out of her head.  She was sent to ER via EMS.  Renal is consulted for dialysis orders.    8/29  Pt seen and evaluated in ER.  Mental status a bit better.  States she missed dialysis on Tuesday d/t being very ill with fever and felt terrible.  Noted, she is unsure what day it is right now, thinks it's Saturday.  It was reported to unit staff that pt is taking lyrica, which is on her allergy list, recently prescribed.  8/30  Sleeping  8/31  Sleeping.  K+ 4.6, Na+ 133.  Hypotensive.  HD due today.  9/1  No new labs today, BMP in am.  Pt reports feeling better, still weak.  Plans to go to SNF at discharge.  HD yesterday, 2L UF.  SBP low 100's today.  9/2  HD tomorrow, no acute need today.  Pt feeling better, VSS.  Awaiting SNF placement.  9/3  Seen nad examined on HD today.  Pt feeling better, VSS.  Awaiting SNF placement.    Assessment/plan:  1.  ESRD -- On TTS schedule, dose meds for HD. Renal diet.  2.  Anemia of chronic dz--CLIARE with renal replacement therapy PRN  3.  SHPT--continue calcitriol, monitor Phos and Ca  4.  Hypotension--continue midodrine bid, UF as tolerated.    Past Medical History:   Diagnosis Date    Alcoholism     no drink since 1984    Anxiety     Asthma     Bipolar disorder     Chronic kidney disease     COPD (chronic obstructive pulmonary disease)     GERD (gastroesophageal reflux disease)     Hypertension     Pancreatitis     Thyroid  "disease      Past Surgical History:   Procedure Laterality Date    APPENDECTOMY      CATARACT EXTRACTION Right     EYE SURGERY      HYSTERECTOMY      THYROIDECTOMY      THYROIDECTOMY       Family History   Problem Relation Age of Onset    No Known Problems Mother     Diabetes Father     Heart disease Father         blood clot in lung went to heart    Cancer Sister         breast    Stroke Paternal Aunt     Cancer Sister         breast    Cancer Cousin         colon    Diabetes Cousin     Cancer Cousin         colon    COPD Sister          of COPD     Social History     Tobacco Use    Smoking status: Current Some Day Smoker     Packs/day: 1.00     Types: Cigarettes    Smokeless tobacco: Never Used   Substance Use Topics    Alcohol use: No    Drug use: Never       Review of patient's allergies indicates:   Allergen Reactions    Metoprolol Other (See Comments)     Grand-daughter/care giver reported Pt has over reaction to this drug, Bp bottoms out along with heart rate    Abilify [aripiprazole]      dizziness    Codeine      Other reaction(s): Unknown        Review of Systems:  General ROS: positive for fever, weakness  Psychological ROS: negative for - behavioral disorder or depression  ENT ROS: c/o headache  Hematological and Lymphatic ROS: negative for - bleeding problems or bruising  Endocrine ROS: negative for - temperature intolerance or unexpected weight changes  Respiratory ROS: no cough, shortness of breath, or wheezing  Cardiovascular ROS: + chest pain, no SOB  Gastrointestinal ROS: no abdominal pain, no n/v/c/d  Genito-Urinary ROS: no dysuria or trouble voiding  Musculoskeletal ROS: positive for weakness  Neurological ROS: "dizzy", "HA"  Dermatological ROS: no c/o skin lesion    OBJECTIVE:     Vital Signs Range (Last 24H):  Temp:  [97.9 °F (36.6 °C)-98.4 °F (36.9 °C)]   Pulse:  [66-76]   Resp:  [16-18]   BP: (104-162)/(68-81)   SpO2:  [67 %-100 %]     Physical Exam:  General- NAD " noted  HEENT- WNL  Neck- supple  CV- Regular rate and rhythm  Resp- Lungs CTA Bilaterally, No increased WOB  GI- Non tender/non-distended, BS normoactive x4 quads  Extrem- No cyanosis, clubbing, edema.  Derm- skin w/d  Neuro-  Mostly confused, not at baseline     Body mass index is 22.96 kg/m².    Laboratory:  CBC:   Recent Labs   Lab 09/03/19  0441   WBC 6.24   RBC 3.77*   HGB 12.7   HCT 39.9      *   MCH 33.7*   MCHC 31.8*     CMP:   Recent Labs   Lab 08/29/19  1417  09/03/19  0441   GLU 79   < > 103   CALCIUM 9.0   < > 9.2   ALBUMIN 3.8  --   --    PROT 7.3  --   --       < > 130*   K 5.8*   < > 4.0   CO2 26   < > 25      < > 90*   BUN 27*   < > 45*   CREATININE 4.0*   < > 6.5*   ALKPHOS 108  --   --    ALT 15  --   --    AST 22  --   --    BILITOT 0.7  --   --     < > = values in this interval not displayed.       Diagnostic Results:  labs pending      ASSESSMENT/PLAN:     Active Hospital Problems    Diagnosis  POA    *Physical deconditioning with generalized weakness and history of falls [R53.81]  Yes    Chronic anemia [D64.9]  Yes     Chronic    Dyspepsia [R10.13]  Yes     Chronic    Hyperphosphatemia [E83.39]  Yes    Borderline hypotension [I95.9]  Yes    Chronic pain [G89.29]  Yes     Chronic    Chronic respiratory failure [J96.10]  Yes     Chronic    Fracture of left wrist [S62.102A]  Yes     Chronic    Bipolar disorder [F31.9]  Yes     Chronic    Hypothyroidism [E03.9]  Yes     Chronic    ESRD (end stage renal disease) [N18.6]  Yes     Chronic    Chronic obstructive pulmonary disease [J44.9]  Yes     Chronic    Hyperlipidemia [E78.5]  Yes     Chronic    GERD (gastroesophageal reflux disease) [K21.9]  Yes     Chronic      Resolved Hospital Problems    Diagnosis Date Resolved POA    Encephalopathy [G93.40] 08/30/2019 Yes    Hyperkalemia [E87.5] 08/30/2019 Yes    Constipation [K59.00] 09/02/2019 Yes         Thank you for allowing us to participate in the care of your  patient. We will follow the patient and provide recommendations as needed.      Time spent seeing patient( greater than 1/2 spent in direct contact) :

## 2019-09-03 NOTE — PROGRESS NOTES
HD: tx complete, pt stable. Lines re-infused, needles removed. Hemostasis achieved. Pt tolerated tx well.     Net Uf: 1000 ml

## 2019-09-04 PROBLEM — S32.020A COMPRESSION FRACTURE OF L2 LUMBAR VERTEBRA: Status: ACTIVE | Noted: 2019-09-04

## 2019-09-04 LAB
ANION GAP SERPL CALC-SCNC: 12 MMOL/L (ref 8–16)
BUN SERPL-MCNC: 23 MG/DL (ref 8–23)
CALCIUM SERPL-MCNC: 10.2 MG/DL (ref 8.7–10.5)
CHLORIDE SERPL-SCNC: 99 MMOL/L (ref 95–110)
CO2 SERPL-SCNC: 24 MMOL/L (ref 23–29)
CREAT SERPL-MCNC: 4.3 MG/DL (ref 0.5–1.4)
ERYTHROCYTE [DISTWIDTH] IN BLOOD BY AUTOMATED COUNT: 13.6 % (ref 11.5–14.5)
EST. GFR  (AFRICAN AMERICAN): 10.8 ML/MIN/1.73 M^2
EST. GFR  (NON AFRICAN AMERICAN): 9.3 ML/MIN/1.73 M^2
GLUCOSE SERPL-MCNC: 103 MG/DL (ref 70–110)
GLUCOSE SERPL-MCNC: 106 MG/DL (ref 70–110)
GLUCOSE SERPL-MCNC: 107 MG/DL (ref 70–110)
GLUCOSE SERPL-MCNC: 138 MG/DL (ref 70–110)
HCT VFR BLD AUTO: 40.6 % (ref 37–48.5)
HGB BLD-MCNC: 12.9 G/DL (ref 12–16)
MAGNESIUM SERPL-MCNC: 2.6 MG/DL (ref 1.6–2.6)
MCH RBC QN AUTO: 33.8 PG (ref 27–31)
MCHC RBC AUTO-ENTMCNC: 31.8 G/DL (ref 32–36)
MCV RBC AUTO: 106 FL (ref 82–98)
PLATELET # BLD AUTO: 314 K/UL (ref 150–350)
PMV BLD AUTO: 10.5 FL (ref 9.2–12.9)
POTASSIUM SERPL-SCNC: 4.7 MMOL/L (ref 3.5–5.1)
RBC # BLD AUTO: 3.82 M/UL (ref 4–5.4)
SODIUM SERPL-SCNC: 135 MMOL/L (ref 136–145)
WBC # BLD AUTO: 8.1 K/UL (ref 3.9–12.7)

## 2019-09-04 PROCEDURE — 96376 TX/PRO/DX INJ SAME DRUG ADON: CPT

## 2019-09-04 PROCEDURE — 99900035 HC TECH TIME PER 15 MIN (STAT)

## 2019-09-04 PROCEDURE — G0378 HOSPITAL OBSERVATION PER HR: HCPCS

## 2019-09-04 PROCEDURE — 94640 AIRWAY INHALATION TREATMENT: CPT

## 2019-09-04 PROCEDURE — 27000221 HC OXYGEN, UP TO 24 HOURS

## 2019-09-04 PROCEDURE — 36415 COLL VENOUS BLD VENIPUNCTURE: CPT

## 2019-09-04 PROCEDURE — 96372 THER/PROPH/DIAG INJ SC/IM: CPT | Mod: 59

## 2019-09-04 PROCEDURE — 80048 BASIC METABOLIC PNL TOTAL CA: CPT

## 2019-09-04 PROCEDURE — 83735 ASSAY OF MAGNESIUM: CPT

## 2019-09-04 PROCEDURE — 63600175 PHARM REV CODE 636 W HCPCS: Performed by: INTERNAL MEDICINE

## 2019-09-04 PROCEDURE — 25000003 PHARM REV CODE 250: Performed by: INTERNAL MEDICINE

## 2019-09-04 PROCEDURE — 82962 GLUCOSE BLOOD TEST: CPT

## 2019-09-04 PROCEDURE — 85027 COMPLETE CBC AUTOMATED: CPT

## 2019-09-04 PROCEDURE — 94761 N-INVAS EAR/PLS OXIMETRY MLT: CPT

## 2019-09-04 RX ORDER — MORPHINE SULFATE 2 MG/ML
1 INJECTION, SOLUTION INTRAMUSCULAR; INTRAVENOUS EVERY 4 HOURS PRN
Status: DISCONTINUED | OUTPATIENT
Start: 2019-09-04 | End: 2019-09-07

## 2019-09-04 RX ADMIN — MIDODRINE HYDROCHLORIDE 5 MG: 2.5 TABLET ORAL at 08:09

## 2019-09-04 RX ADMIN — ROSUVASTATIN CALCIUM 20 MG: 20 TABLET, FILM COATED ORAL at 09:09

## 2019-09-04 RX ADMIN — POLYETHYLENE GLYCOL 3350 17 G: 17 POWDER, FOR SOLUTION ORAL at 08:09

## 2019-09-04 RX ADMIN — ARIPIPRAZOLE 5 MG: 5 TABLET ORAL at 08:09

## 2019-09-04 RX ADMIN — FLUOXETINE 20 MG: 20 CAPSULE ORAL at 08:09

## 2019-09-04 RX ADMIN — HEPARIN SODIUM 5000 UNITS: 5000 INJECTION INTRAVENOUS; SUBCUTANEOUS at 03:09

## 2019-09-04 RX ADMIN — ONDANSETRON 4 MG: 2 INJECTION INTRAMUSCULAR; INTRAVENOUS at 12:09

## 2019-09-04 RX ADMIN — ONDANSETRON 4 MG: 4 TABLET, ORALLY DISINTEGRATING ORAL at 02:09

## 2019-09-04 RX ADMIN — HYDROCODONE BITARTRATE AND ACETAMINOPHEN 1 TABLET: 5; 325 TABLET ORAL at 12:09

## 2019-09-04 RX ADMIN — MORPHINE SULFATE 1 MG: 2 INJECTION, SOLUTION INTRAMUSCULAR; INTRAVENOUS at 05:09

## 2019-09-04 RX ADMIN — BUSPIRONE HYDROCHLORIDE 10 MG: 5 TABLET ORAL at 08:09

## 2019-09-04 RX ADMIN — CALCITRIOL CAPSULES 0.25 MCG 0.25 MCG: 0.25 CAPSULE ORAL at 08:09

## 2019-09-04 RX ADMIN — GABAPENTIN 300 MG: 300 CAPSULE ORAL at 08:09

## 2019-09-04 RX ADMIN — BUSPIRONE HYDROCHLORIDE 10 MG: 5 TABLET ORAL at 09:09

## 2019-09-04 RX ADMIN — MORPHINE SULFATE 1 MG: 2 INJECTION, SOLUTION INTRAMUSCULAR; INTRAVENOUS at 09:09

## 2019-09-04 RX ADMIN — MIDODRINE HYDROCHLORIDE 5 MG: 2.5 TABLET ORAL at 05:09

## 2019-09-04 RX ADMIN — HYDROCODONE BITARTRATE AND ACETAMINOPHEN 1 TABLET: 5; 325 TABLET ORAL at 07:09

## 2019-09-04 RX ADMIN — LEVOTHYROXINE SODIUM 100 MCG: 100 TABLET ORAL at 05:09

## 2019-09-04 RX ADMIN — HEPARIN SODIUM 5000 UNITS: 5000 INJECTION INTRAVENOUS; SUBCUTANEOUS at 05:09

## 2019-09-04 RX ADMIN — BUSPIRONE HYDROCHLORIDE 10 MG: 5 TABLET ORAL at 03:09

## 2019-09-04 RX ADMIN — FLUTICASONE PROPIONATE 100 MCG: 50 SPRAY, METERED NASAL at 08:09

## 2019-09-04 RX ADMIN — HEPARIN SODIUM 5000 UNITS: 5000 INJECTION INTRAVENOUS; SUBCUTANEOUS at 09:09

## 2019-09-04 RX ADMIN — FAMOTIDINE 20 MG: 20 TABLET ORAL at 09:09

## 2019-09-04 RX ADMIN — HYDROCODONE BITARTRATE AND ACETAMINOPHEN 1 TABLET: 5; 325 TABLET ORAL at 06:09

## 2019-09-04 NOTE — PROGRESS NOTES
Progress Note  Nephrology    Consult Requested By: Shaheen Turner MD    Reason for Consult: ESRD, dependent on dialysis    SUBJECTIVE:     History of Present Illness:  78 y/o female patient known to our practice, has out patient hemodialysis 3x week on TTS schedule.  She missed her treatment on Tuesday.  When she came for dialysis today, out patient unit staff noted several changes.  She usually walks in of her own accord and is oriented at baseline.  Today, family was unable to get her out of the car d/t profound weakness, and she was not oriented, talking out of her head.  She was sent to ER via EMS.  Renal is consulted for dialysis orders.    8/29  Pt seen and evaluated in ER.  Mental status a bit better.  States she missed dialysis on Tuesday d/t being very ill with fever and felt terrible.  Noted, she is unsure what day it is right now, thinks it's Saturday.  It was reported to unit staff that pt is taking lyrica, which is on her allergy list, recently prescribed.  8/30  Sleeping  8/31  Sleeping.  K+ 4.6, Na+ 133.  Hypotensive.  HD due today.  9/1  No new labs today, BMP in am.  Pt reports feeling better, still weak.  Plans to go to SNF at discharge.  HD yesterday, 2L UF.  SBP low 100's today.  9/2  HD tomorrow, no acute need today.  Pt feeling better, VSS.  Awaiting SNF placement.  9/3  Seen nad examined on HD today.  Pt feeling better, VSS.  Awaiting SNF placement.  9/4  HD tomorrow, no acute need today.  Pt feeling better, VSS.  Awaiting SNF placement.    Assessment/plan:  1.  ESRD -- On TTS schedule, dose meds for HD. Renal diet.  2.  Anemia of chronic dz--CLAIRE with renal replacement therapy PRN  3.  SHPT--continue calcitriol, monitor Phos and Ca  4.  Hypotension--continue midodrine bid, UF as tolerated.    Past Medical History:   Diagnosis Date    Alcoholism     no drink since 1984    Anxiety     Asthma     Bipolar disorder     Chronic kidney disease     COPD (chronic obstructive pulmonary disease)      "GERD (gastroesophageal reflux disease)     Hypertension     Pancreatitis     Thyroid disease      Past Surgical History:   Procedure Laterality Date    APPENDECTOMY      CATARACT EXTRACTION Right     EYE SURGERY      HYSTERECTOMY      THYROIDECTOMY      THYROIDECTOMY       Family History   Problem Relation Age of Onset    No Known Problems Mother     Diabetes Father     Heart disease Father         blood clot in lung went to heart    Cancer Sister         breast    Stroke Paternal Aunt     Cancer Sister         breast    Cancer Cousin         colon    Diabetes Cousin     Cancer Cousin         colon    COPD Sister          of COPD     Social History     Tobacco Use    Smoking status: Current Some Day Smoker     Packs/day: 1.00     Types: Cigarettes    Smokeless tobacco: Never Used   Substance Use Topics    Alcohol use: No    Drug use: Never       Review of patient's allergies indicates:   Allergen Reactions    Metoprolol Other (See Comments)     Grand-daughter/care giver reported Pt has over reaction to this drug, Bp bottoms out along with heart rate    Abilify [aripiprazole]      dizziness    Codeine      Other reaction(s): Unknown        Review of Systems:  General ROS: positive for fever, weakness  Psychological ROS: negative for - behavioral disorder or depression  ENT ROS: c/o headache  Hematological and Lymphatic ROS: negative for - bleeding problems or bruising  Endocrine ROS: negative for - temperature intolerance or unexpected weight changes  Respiratory ROS: no cough, shortness of breath, or wheezing  Cardiovascular ROS: + chest pain, no SOB  Gastrointestinal ROS: no abdominal pain, no n/v/c/d  Genito-Urinary ROS: no dysuria or trouble voiding  Musculoskeletal ROS: positive for weakness  Neurological ROS: "dizzy", "HA"  Dermatological ROS: no c/o skin lesion    OBJECTIVE:     Vital Signs Range (Last 24H):  Temp:  [97 °F (36.1 °C)-98.6 °F (37 °C)]   Pulse:  [64-84]   Resp:  " [17-21]   BP: (105-135)/(64-80)   SpO2:  [92 %-97 %]     Physical Exam:  General- NAD noted  HEENT- WNL  Neck- supple  CV- Regular rate and rhythm  Resp- Lungs CTA Bilaterally, No increased WOB  GI- Non tender/non-distended, BS normoactive x4 quads  Extrem- No cyanosis, clubbing, edema.  Derm- skin w/d  Neuro-  Mostly confused, not at baseline     Body mass index is 22.96 kg/m².    Laboratory:  CBC:   Recent Labs   Lab 09/04/19  0510   WBC 8.10   RBC 3.82*   HGB 12.9   HCT 40.6      *   MCH 33.8*   MCHC 31.8*     CMP:   Recent Labs   Lab 08/29/19  1417  09/04/19  0510   GLU 79   < > 106   CALCIUM 9.0   < > 10.2   ALBUMIN 3.8  --   --    PROT 7.3  --   --       < > 135*   K 5.8*   < > 4.7   CO2 26   < > 24      < > 99   BUN 27*   < > 23   CREATININE 4.0*   < > 4.3*   ALKPHOS 108  --   --    ALT 15  --   --    AST 22  --   --    BILITOT 0.7  --   --     < > = values in this interval not displayed.       Diagnostic Results:  labs pending      ASSESSMENT/PLAN:     Active Hospital Problems    Diagnosis  POA    *Physical deconditioning with generalized weakness and history of falls [R53.81]  Yes    Chronic anemia [D64.9]  Yes     Chronic    Dyspepsia [R10.13]  Yes     Chronic    Hyperphosphatemia [E83.39]  Yes    Borderline hypotension [I95.9]  Yes    Chronic pain [G89.29]  Yes     Chronic    Chronic respiratory failure [J96.10]  Yes     Chronic    Fracture of left wrist [S62.102A]  Yes     Chronic    Bipolar disorder [F31.9]  Yes     Chronic    Hypothyroidism [E03.9]  Yes     Chronic    ESRD (end stage renal disease) [N18.6]  Yes     Chronic    Chronic obstructive pulmonary disease [J44.9]  Yes     Chronic    Hyperlipidemia [E78.5]  Yes     Chronic    GERD (gastroesophageal reflux disease) [K21.9]  Yes     Chronic      Resolved Hospital Problems    Diagnosis Date Resolved POA    Encephalopathy [G93.40] 08/30/2019 Yes    Hyperkalemia [E87.5] 08/30/2019 Yes    Constipation  [K59.00] 09/02/2019 Yes         Thank you for allowing us to participate in the care of your patient. We will follow the patient and provide recommendations as needed.      Time spent seeing patient( greater than 1/2 spent in direct contact) :

## 2019-09-04 NOTE — CARE UPDATE
Patient has been accepted to [facility] for SNF. Patient's  has already signed financial paperwork. However we are still waiting on State paperwork to be returned and [facility] Authorization.

## 2019-09-04 NOTE — NURSING
Patient transported to x-ray by x-ray tech via wheelchair. Patient is AAOx's 4, PRN analgesia given to address complaints of pain to back.

## 2019-09-04 NOTE — PLAN OF CARE
09/03/19 2130   Patient Assessment/Suction   Level of Consciousness (AVPU) alert   Respiratory Effort Unlabored   Expansion/Accessory Muscles/Retractions no use of accessory muscles   PRE-TX-O2   O2 Device (Oxygen Therapy) nasal cannula   Flow (L/min) 2   SpO2 95 %   Pulse 64   Resp 18

## 2019-09-04 NOTE — NURSING
Spoke with  on call for orthopedic about consult for patient due to x ray showing  compression fracture of L2 and back pain. MD informed call Dr. Mcarthur in AM because they do not see patients for consults about back issues.

## 2019-09-04 NOTE — PT/OT/SLP PROGRESS
Occupational Therapy      Patient Name:  Althea Gaona   MRN:  [id number]    Patient not seen today secondary to Other (Comment)(need new orders secondary to change in pt medical status).     Audrey Hunt OT  9/4/2019

## 2019-09-04 NOTE — PT/OT/SLP PROGRESS
Physical Therapy      Patient Name:  Althea Gaona   MRN:  [id number]    Patient not seen today secondary to waiting for ortho consult to clear mobility secondary to fall last night. Will follow-up tomorrow (9/5/19).    Fabiana Brown, PT

## 2019-09-04 NOTE — PLAN OF CARE
09/04/19 0736   Patient Assessment/Suction   Level of Consciousness (AVPU) alert   Respiratory Effort Normal;Unlabored   Expansion/Accessory Muscles/Retractions no use of accessory muscles;no retractions   All Lung Fields Breath Sounds clear   Rhythm/Pattern, Respiratory pattern regular   Cough Frequency no cough   PRE-TX-O2   O2 Device (Oxygen Therapy) nasal cannula   $ Is the patient on Low Flow Oxygen? Yes   Flow (L/min) 2   Pulse Oximetry Type Intermittent   $ Pulse Oximetry - Multiple Charge Pulse Oximetry - Multiple   Pulse 77   Resp 17   Aerosol Therapy   $ Aerosol Therapy Charges PRN treatment not required   Inhaler   $ Inhaler Charges MDI (Metered Dose Inahler) Treatment;Mouth rinsed post treatment   Daily Review of Necessity (Inhaler) completed   Respiratory Treatment Status (Inhaler) given   Treatment Route (Inhaler) mouthpiece   Patient Position (Inhaler) semi-Singh's   Post Treatment Assessment (Inhaler) increased aeration   Signs of Intolerance (Inhaler) none   Breath Sounds Post-Respiratory Treatment   Throughout All Fields Post-Treatment All Fields   Throughout All Fields Post-Treatment clear   Post-treatment Heart Rate (beats/min) 77   Post-treatment Resp Rate (breaths/min) 18

## 2019-09-04 NOTE — NURSING
"Patient care assistant reports to primary nurse that patient was found on the floor and patient reported that she had fallen. Upon entering room, primary nurse found patient sitting on floor, back against the wall. When primary nurse inquired about the bed alarm, which was personally set by primary nurse at bedside handoff, and patient replies "it was on, but I turned it off". Patient states, "I needed to gt up and my diaper fell. I lost my balance and fell on my butt really hard". Vitals are within normal limits, patient not showing any signs of distress. Reports 5/10 pain to back and butt. On-call provide paged.   "

## 2019-09-04 NOTE — NURSING
Patient returned from x-ray by x-ray tech. Denies any new pain or discomfort. Appears to be lying comfortable in bed, no immediate distress noted.

## 2019-09-05 ENCOUNTER — TELEPHONE (OUTPATIENT)
Dept: ORTHOPEDICS | Facility: CLINIC | Age: 79
End: 2019-09-05

## 2019-09-05 PROBLEM — E87.5 HYPERKALEMIA: Status: ACTIVE | Noted: 2019-09-05

## 2019-09-05 PROBLEM — E87.5 HYPERKALEMIA: Status: RESOLVED | Noted: 2019-09-05 | Resolved: 2019-09-05

## 2019-09-05 LAB
ANION GAP SERPL CALC-SCNC: 14 MMOL/L (ref 8–16)
BUN SERPL-MCNC: 34 MG/DL (ref 8–23)
CALCIUM SERPL-MCNC: 9.3 MG/DL (ref 8.7–10.5)
CHLORIDE SERPL-SCNC: 95 MMOL/L (ref 95–110)
CO2 SERPL-SCNC: 23 MMOL/L (ref 23–29)
CREAT SERPL-MCNC: 5.1 MG/DL (ref 0.5–1.4)
EST. GFR  (AFRICAN AMERICAN): 8.8 ML/MIN/1.73 M^2
EST. GFR  (NON AFRICAN AMERICAN): 7.6 ML/MIN/1.73 M^2
GLUCOSE SERPL-MCNC: 100 MG/DL (ref 70–110)
GLUCOSE SERPL-MCNC: 116 MG/DL (ref 70–110)
GLUCOSE SERPL-MCNC: 140 MG/DL (ref 70–110)
GLUCOSE SERPL-MCNC: 145 MG/DL (ref 70–110)
GLUCOSE SERPL-MCNC: 99 MG/DL (ref 70–110)
POTASSIUM SERPL-SCNC: 4.5 MMOL/L (ref 3.5–5.1)
SODIUM SERPL-SCNC: 132 MMOL/L (ref 136–145)

## 2019-09-05 PROCEDURE — 63600175 PHARM REV CODE 636 W HCPCS: Performed by: INTERNAL MEDICINE

## 2019-09-05 PROCEDURE — 99900035 HC TECH TIME PER 15 MIN (STAT)

## 2019-09-05 PROCEDURE — 96376 TX/PRO/DX INJ SAME DRUG ADON: CPT

## 2019-09-05 PROCEDURE — 25000003 PHARM REV CODE 250: Performed by: INTERNAL MEDICINE

## 2019-09-05 PROCEDURE — 27000221 HC OXYGEN, UP TO 24 HOURS

## 2019-09-05 PROCEDURE — 94640 AIRWAY INHALATION TREATMENT: CPT

## 2019-09-05 PROCEDURE — 80048 BASIC METABOLIC PNL TOTAL CA: CPT

## 2019-09-05 PROCEDURE — 90935 HEMODIALYSIS ONE EVALUATION: CPT

## 2019-09-05 PROCEDURE — 12000002 HC ACUTE/MED SURGE SEMI-PRIVATE ROOM

## 2019-09-05 PROCEDURE — 94761 N-INVAS EAR/PLS OXIMETRY MLT: CPT

## 2019-09-05 PROCEDURE — 96372 THER/PROPH/DIAG INJ SC/IM: CPT | Mod: 59

## 2019-09-05 PROCEDURE — 36415 COLL VENOUS BLD VENIPUNCTURE: CPT

## 2019-09-05 RX ADMIN — ARIPIPRAZOLE 5 MG: 5 TABLET ORAL at 09:09

## 2019-09-05 RX ADMIN — EPOETIN ALFA-EPBX 3400 UNITS: 10000 INJECTION, SOLUTION INTRAVENOUS; SUBCUTANEOUS at 02:09

## 2019-09-05 RX ADMIN — ALUMINUM HYDROXIDE, MAGNESIUM HYDROXIDE, AND SIMETHICONE 50 ML: 200; 200; 20 SUSPENSION ORAL at 09:09

## 2019-09-05 RX ADMIN — MORPHINE SULFATE 1 MG: 2 INJECTION, SOLUTION INTRAMUSCULAR; INTRAVENOUS at 02:09

## 2019-09-05 RX ADMIN — ONDANSETRON 4 MG: 2 INJECTION INTRAMUSCULAR; INTRAVENOUS at 05:09

## 2019-09-05 RX ADMIN — HYDROCODONE BITARTRATE AND ACETAMINOPHEN 1 TABLET: 5; 325 TABLET ORAL at 05:09

## 2019-09-05 RX ADMIN — GABAPENTIN 300 MG: 300 CAPSULE ORAL at 08:09

## 2019-09-05 RX ADMIN — ROSUVASTATIN CALCIUM 20 MG: 20 TABLET, FILM COATED ORAL at 08:09

## 2019-09-05 RX ADMIN — HEPARIN SODIUM 5000 UNITS: 5000 INJECTION INTRAVENOUS; SUBCUTANEOUS at 10:09

## 2019-09-05 RX ADMIN — MIDODRINE HYDROCHLORIDE 5 MG: 2.5 TABLET ORAL at 08:09

## 2019-09-05 RX ADMIN — BUSPIRONE HYDROCHLORIDE 10 MG: 5 TABLET ORAL at 05:09

## 2019-09-05 RX ADMIN — POLYETHYLENE GLYCOL 3350 17 G: 17 POWDER, FOR SOLUTION ORAL at 08:09

## 2019-09-05 RX ADMIN — CALCITRIOL CAPSULES 0.25 MCG 0.25 MCG: 0.25 CAPSULE ORAL at 08:09

## 2019-09-05 RX ADMIN — FAMOTIDINE 20 MG: 20 TABLET ORAL at 08:09

## 2019-09-05 RX ADMIN — HEPARIN SODIUM 5000 UNITS: 5000 INJECTION INTRAVENOUS; SUBCUTANEOUS at 05:09

## 2019-09-05 RX ADMIN — MORPHINE SULFATE 1 MG: 2 INJECTION, SOLUTION INTRAMUSCULAR; INTRAVENOUS at 08:09

## 2019-09-05 RX ADMIN — FLUTICASONE PROPIONATE 100 MCG: 50 SPRAY, METERED NASAL at 08:09

## 2019-09-05 RX ADMIN — ONDANSETRON 4 MG: 4 TABLET, ORALLY DISINTEGRATING ORAL at 05:09

## 2019-09-05 RX ADMIN — BUSPIRONE HYDROCHLORIDE 10 MG: 5 TABLET ORAL at 08:09

## 2019-09-05 RX ADMIN — FLUOXETINE 20 MG: 20 CAPSULE ORAL at 08:09

## 2019-09-05 RX ADMIN — HYDROCODONE BITARTRATE AND ACETAMINOPHEN 1 TABLET: 5; 325 TABLET ORAL at 11:09

## 2019-09-05 RX ADMIN — LEVOTHYROXINE SODIUM 100 MCG: 100 TABLET ORAL at 05:09

## 2019-09-05 RX ADMIN — MIDODRINE HYDROCHLORIDE 5 MG: 2.5 TABLET ORAL at 05:09

## 2019-09-05 NOTE — ASSESSMENT & PLAN NOTE
Recommend TLSO for all OOB activity - for comfort  PT for functional mobility.  No indications for surgery.  Consider referral to discuss Kyphoplasty with appropriate pain management MD.

## 2019-09-05 NOTE — PT/OT/SLP PROGRESS
Physical Therapy      Patient Name:  Althea Gaona   MRN:  [id number]    Patient not seen today secondary to waiting for TLSO for OOB per Zachary Nair's note. RN notified of need for TLSO and communicated with CM about getting the TLSO for the pt. Will follow-up tomorrow (9/6/19) to see if TLSO has arrived.    Fabiana Brown, PT

## 2019-09-05 NOTE — PLAN OF CARE
09/05/19 0727   Patient Assessment/Suction   Level of Consciousness (AVPU) alert   Respiratory Effort Normal;Unlabored   Expansion/Accessory Muscles/Retractions no use of accessory muscles;no retractions   All Lung Fields Breath Sounds clear   Rhythm/Pattern, Respiratory unlabored;pattern regular   Cough Frequency infrequent   Cough Type good   PRE-TX-O2   O2 Device (Oxygen Therapy) nasal cannula   $ Is the patient on Low Flow Oxygen? Yes   Flow (L/min) 0.5   SpO2 95 %   Pulse Oximetry Type Intermittent   $ Pulse Oximetry - Multiple Charge Pulse Oximetry - Multiple   Pulse 76   Resp 18   Aerosol Therapy   $ Aerosol Therapy Charges PRN treatment not required   Inhaler   $ Inhaler Charges MDI (Metered Dose Inahler) Treatment;Mouth rinsed post treatment   Daily Review of Necessity (Inhaler) completed   Respiratory Treatment Status (Inhaler) given   Treatment Route (Inhaler) mouthpiece   Patient Position (Inhaler) semi-Singh's   Post Treatment Assessment (Inhaler) increased aeration   Signs of Intolerance (Inhaler) none   Breath Sounds Post-Respiratory Treatment   Throughout All Fields Post-Treatment All Fields   Throughout All Fields Post-Treatment clear   Post-treatment Heart Rate (beats/min) 76   Post-treatment Resp Rate (breaths/min) 18

## 2019-09-05 NOTE — PROGRESS NOTES
Progress Note  Nephrology    Consult Requested By: Shaheen Turner MD    Reason for Consult: ESRD, dependent on dialysis    SUBJECTIVE:     History of Present Illness:  78 y/o female patient known to our practice, has out patient hemodialysis 3x week on TTS schedule.  She missed her treatment on Tuesday.  When she came for dialysis today, out patient unit staff noted several changes.  She usually walks in of her own accord and is oriented at baseline.  Today, family was unable to get her out of the car d/t profound weakness, and she was not oriented, talking out of her head.  She was sent to ER via EMS.  Renal is consulted for dialysis orders.    8/29  Pt seen and evaluated in ER.  Mental status a bit better.  States she missed dialysis on Tuesday d/t being very ill with fever and felt terrible.  Noted, she is unsure what day it is right now, thinks it's Saturday.  It was reported to unit staff that pt is taking lyrica, which is on her allergy list, recently prescribed.  8/30  Sleeping  8/31  Sleeping.  K+ 4.6, Na+ 133.  Hypotensive.  HD due today.  9/1  No new labs today, BMP in am.  Pt reports feeling better, still weak.  Plans to go to SNF at discharge.  HD yesterday, 2L UF.  SBP low 100's today.  9/2  HD tomorrow, no acute need today.  Pt feeling better, VSS.  Awaiting SNF placement.  9/3  Seen nad examined on HD today.  Pt feeling better, VSS.  Awaiting SNF placement.  9/4  HD tomorrow, no acute need today.  Pt feeling better, VSS.  Awaiting SNF placement.  9/5  Seen and examined on HD today. Tolerating well.    Assessment/plan:  1.  ESRD -- On TTS schedule, dose meds for HD. Renal diet.  2.  Anemia of chronic dz--CLAIRE with renal replacement therapy PRN  3.  SHPT--continue calcitriol, monitor Phos and Ca  4.  Hypotension--continue midodrine bid, UF as tolerated.    Past Medical History:   Diagnosis Date    Alcoholism     no drink since 1984    Anxiety     Asthma     Bipolar disorder     Chronic kidney disease   "   COPD (chronic obstructive pulmonary disease)     GERD (gastroesophageal reflux disease)     Hypertension     Pancreatitis     Thyroid disease      Past Surgical History:   Procedure Laterality Date    APPENDECTOMY      CATARACT EXTRACTION Right     EYE SURGERY      HYSTERECTOMY      THYROIDECTOMY      THYROIDECTOMY       Family History   Problem Relation Age of Onset    No Known Problems Mother     Diabetes Father     Heart disease Father         blood clot in lung went to heart    Cancer Sister         breast    Stroke Paternal Aunt     Cancer Sister         breast    Cancer Cousin         colon    Diabetes Cousin     Cancer Cousin         colon    COPD Sister          of COPD     Social History     Tobacco Use    Smoking status: Current Some Day Smoker     Packs/day: 1.00     Types: Cigarettes    Smokeless tobacco: Never Used   Substance Use Topics    Alcohol use: No    Drug use: Never       Review of patient's allergies indicates:   Allergen Reactions    Metoprolol Other (See Comments)     Grand-daughter/care giver reported Pt has over reaction to this drug, Bp bottoms out along with heart rate    Abilify [aripiprazole]      dizziness    Codeine      Other reaction(s): Unknown        Review of Systems:  General ROS: positive for fever, weakness  Psychological ROS: negative for - behavioral disorder or depression  ENT ROS: c/o headache  Hematological and Lymphatic ROS: negative for - bleeding problems or bruising  Endocrine ROS: negative for - temperature intolerance or unexpected weight changes  Respiratory ROS: no cough, shortness of breath, or wheezing  Cardiovascular ROS: + chest pain, no SOB  Gastrointestinal ROS: no abdominal pain, no n/v/c/d  Genito-Urinary ROS: no dysuria or trouble voiding  Musculoskeletal ROS: positive for weakness  Neurological ROS: "dizzy", "HA"  Dermatological ROS: no c/o skin lesion    OBJECTIVE:     Vital Signs Range (Last 24H):  Temp:  [97.5 °F " (36.4 °C)-98.8 °F (37.1 °C)]   Pulse:  [63-80]   Resp:  [16-18]   BP: (110-129)/(70-78)   SpO2:  [92 %-96 %]     Physical Exam:  General- NAD noted  HEENT- WNL  Neck- supple  CV- Regular rate and rhythm  Resp- Lungs CTA Bilaterally, No increased WOB  GI- Non tender/non-distended, BS normoactive x4 quads  Extrem- No cyanosis, clubbing, edema.  Derm- skin w/d  Neuro-  Mostly confused, not at baseline     Body mass index is 22.96 kg/m².    Laboratory:  CBC:   Recent Labs   Lab 09/04/19  0510   WBC 8.10   RBC 3.82*   HGB 12.9   HCT 40.6      *   MCH 33.8*   MCHC 31.8*     CMP:   Recent Labs   Lab 08/29/19  1417  09/05/19  0452   GLU 79   < > 99   CALCIUM 9.0   < > 9.3   ALBUMIN 3.8  --   --    PROT 7.3  --   --       < > 132*   K 5.8*   < > 4.5   CO2 26   < > 23      < > 95   BUN 27*   < > 34*   CREATININE 4.0*   < > 5.1*   ALKPHOS 108  --   --    ALT 15  --   --    AST 22  --   --    BILITOT 0.7  --   --     < > = values in this interval not displayed.       Diagnostic Results:  labs pending      ASSESSMENT/PLAN:     Active Hospital Problems    Diagnosis  POA    *Physical deconditioning with generalized weakness and history of falls [R53.81]  Yes    Compression fracture of L2 lumbar vertebra [S32.020A]  No    Chronic anemia [D64.9]  Yes     Chronic    Dyspepsia [R10.13]  Yes     Chronic    Hyperphosphatemia [E83.39]  Yes    Borderline hypotension [I95.9]  Yes    Chronic pain [G89.29]  Yes     Chronic    Chronic respiratory failure [J96.10]  Yes     Chronic    Fracture of left wrist [S62.102A]  Yes     Chronic    Bipolar disorder [F31.9]  Yes     Chronic    Hypothyroidism [E03.9]  Yes     Chronic    ESRD (end stage renal disease) [N18.6]  Yes     Chronic    Chronic obstructive pulmonary disease [J44.9]  Yes     Chronic    Hyperlipidemia [E78.5]  Yes     Chronic    GERD (gastroesophageal reflux disease) [K21.9]  Yes     Chronic      Resolved Hospital Problems    Diagnosis Date  Resolved POA    Encephalopathy [G93.40] 08/30/2019 Yes    Hyperkalemia [E87.5] 08/30/2019 Yes    Constipation [K59.00] 09/02/2019 Yes         Thank you for allowing us to participate in the care of your patient. We will follow the patient and provide recommendations as needed.      Time spent seeing patient( greater than 1/2 spent in direct contact) :

## 2019-09-05 NOTE — CARE UPDATE
TLSO brace order was faxed to Berenice in our Materials Management Dept. At 3854. [facility] Ins. is waiting on current PT/OT notes not older than 48 hours old before they will consider giving an authorization for SNF. Dr. Turner made aware of this.    9/6/19. I confirmed with Berenice([id number]) that brace has been ordered and supplier is coming to measure patient for sizing of brace. Once patient receives brace she will be seen by PT/OT. Still awaiting placement.

## 2019-09-05 NOTE — TELEPHONE ENCOUNTER
Called and spoke with Eryn at [facility] and advised that Dr. Mendez does not treat the back and the consult will need to go to a back MD. She verbalized understanding.

## 2019-09-05 NOTE — PLAN OF CARE
Problem: Adult Inpatient Plan of Care  Goal: Plan of Care Review  Outcome: Ongoing (interventions implemented as appropriate)  Plan of care discussed with patient. Questions pertaining to plan this shift answered. Pt verbally acknowledged understanding of plan, denies any further question at this time.  Fall precautions explained to pt. Pt verbalized  understanding of precautions and safety instructions. Bed in low, locked position, call light within reach, bed alarm active and audible, instructed to call nursing staff for assistance with ambulation. Personal items within a safe distance for reach. Pt remains free from falls this shift.

## 2019-09-05 NOTE — ASSESSMENT & PLAN NOTE
Generalized weakness and physical deconditioning.  As per collateral 2 falls recently.  Has sustained left distal radial and ulna fractures, status post surgery.  Was receiving home PT/OT.  As per family not able to manage.  Patient now agreeable to skilled nursing placement.  PT/OT following  case management consulted for skilled nursing placement.  Awaiting acceptance  medical ready for discharge awaiting SNF placement

## 2019-09-05 NOTE — ASSESSMENT & PLAN NOTE
Fell in the room 9/4/19 at 12:20 am, pt reported she needed to go to the rest room and got out without assistance and tripped over her cane at the bedside  Xray lumbar shows mild compression fracture L2  Norco and IV morphone 1 mg on board  Consulted Ortho Dr Mcarthur, possibly will need a back brace  PT/OT to cont therapy while awaiting placement  Pt is high risk for falls, hx of 2 falls at home before admit as well  Confusion is a contributing factor as pt mentioned she was confused last night

## 2019-09-05 NOTE — TELEPHONE ENCOUNTER
----- Message from Kaykay Boateng MA sent at 9/5/2019  8:00 AM CDT -----  Contact: [facility]   Room 1108    MD Selena   Consult  Compression fracture, back pain   Call back

## 2019-09-05 NOTE — SUBJECTIVE & OBJECTIVE
"Interval History:  Patient seen and examined this afternoon.  Laying in bed.  Complained of pain to the back and requesting morphine over and over again.  Patient informed she fell last night and tripped over her cane on the bedside.  Inquired why she was out of the bed without assistance and patient informed she was confused and she needed to go to the restroom but instead of going to worse the restroom she walked in the opposite direction and fell.  Noted in the nurse's note that patient reported she turned the bed alarm of by herself and stood up and her diaper fell and that is when she lost her balance and fell.   Patient reports her back hurts and Norco is not helping and requesting morphine.  Patient said back in June when she broke her wrist, morphine was the only medicine that helped her, patient states" I beg you for morphine".  Patient reports she would like to have walker instead of cane as her balance has gotten worse.  Discussed with nurse and informed that patient x-ray did showed mild compression fracture at L2.  Discussed will consult Dr Guerra, possibly she will need brace for her back    Review of Systems   Constitutional: Negative for chills and fever.   HENT: Negative for ear discharge and postnasal drip.    Eyes: Negative for pain and redness.   Respiratory: Negative for shortness of breath.    Cardiovascular: Negative for chest pain and leg swelling.   Gastrointestinal: Negative for abdominal pain, nausea and vomiting.   Genitourinary: Negative.    Musculoskeletal: Positive for arthralgias ( right shoulder discomfort), back pain (since fall last night and mild compression fracture L2) and neck pain.        Shoulder pain   Neurological: Positive for weakness.   Psychiatric/Behavioral: Positive for confusion (off and on ). Negative for hallucinations.     Objective:     Vital Signs (Most Recent):  Temp: 98.2 °F (36.8 °C) (09/04/19 1916)  Pulse: 72 (09/04/19 1916)  Resp: 18 (09/04/19 1916)  BP: " 122/77 (09/04/19 1916)  SpO2: 95 % (09/04/19 1916) Vital Signs (24h Range):  Temp:  [97.5 °F (36.4 °C)-98.6 °F (37 °C)] 98.2 °F (36.8 °C)  Pulse:  [63-78] 72  Resp:  [17-20] 18  SpO2:  [92 %-95 %] 95 %  BP: (105-129)/(66-80) 122/77     Weight: 67.5 kg (148 lb 13 oz)  Body mass index is 22.96 kg/m².  No intake or output data in the 24 hours ending 09/04/19 2053   Physical Exam   Constitutional: She is oriented to person, place, and time. She appears well-developed and well-nourished. No distress.   Elderly female, comfortable, calm, cooperative    HENT:   Head: Normocephalic and atraumatic.   Eyes: Pupils are equal, round, and reactive to light. EOM are normal. Right eye exhibits no discharge. Left eye exhibits no discharge.   Neck: Normal range of motion. Neck supple.   Cardiovascular: Normal rate and regular rhythm.   Murmur (2/6 systolic murmur) heard.  Good thrill over the AV fistula in the left arm   Pulmonary/Chest: Effort normal and breath sounds normal. She has no wheezes.   on supplemental O2   Abdominal: Soft. Bowel sounds are normal. She exhibits no distension. There is no rebound and no guarding.   Musculoskeletal:   L wrist in splint, ice pack to right shoulder  Laying still as movement make her back pain worse    Neurological: She is alert and oriented to person, place, and time.   Moving all four extremities   Skin: She is not diaphoretic.   Left upper extremity AVF   Psychiatric: She has a normal mood and affect. Her behavior is normal. Judgment and thought content normal.   Nursing note and vitals reviewed.      Significant Labs:   BMP:   Recent Labs   Lab 09/04/19  0510      *   K 4.7   CL 99   CO2 24   BUN 23   CREATININE 4.3*   CALCIUM 10.2   MG 2.6     CBC:   Recent Labs   Lab 09/03/19 0441 09/04/19  0510   WBC 6.24 8.10   HGB 12.7 12.9   HCT 39.9 40.6    314     Magnesium:   Recent Labs   Lab 09/03/19 0441 09/04/19  0510   MG 2.9* 2.6       Significant Imaging: I have  reviewed all pertinent imaging results/findings within the past 24 hours.     Xray lumbar shows Acute mild compression fracture of the superior endplate of L2, without retropulsion    Xray pelvis showed mild bone demineralization but no acute abnorm at hip/ pubis symphysis and pelvis

## 2019-09-05 NOTE — PROGRESS NOTES
[facility] Medicine  Progress Note    Patient Name: Althea Gaona  MRN: [id number]  Patient Class: OP- Observation   Admission Date: 8/29/2019  Length of Stay: 0 days  Attending Physician: Shaheen Turner MD  Primary Care Provider: Primary Doctor No        Subjective:     Principal Problem:Physical deconditioning        HPI:  Mrs. Gaona is a 79 years old female sent from hemodialysis due to encephalopathy and weakness.  As per HPI patient was assessed after urgent dialysis and was reportedly back to her baseline. As per collateral she is ESRD on HD TTS however missed HD session on Tuesday due to generalized weakness. On Thursday when she presented to HD session she was noted to be unlike herself, unable to ambulate, weak and confused and therefore referred to the ED. Her admissions labs were significant for hyperkalemia, otherwise at baseline, no signs of infection, non focal neurological examination. Denied any fevers, shortness of breath, abdominal pain, loose stools.      Overview/Hospital Course:  In the ED nephrology was consulted and patient underwent urgent hemodialysis session.  Following assessment after dialysis patient mental status back to baseline, alert and oriented, answering all questions appropriately.  There was a concern she was recently started on lyrica however unable to confirm and this is listed as medication allergy. She did complain of headache and chronic neck and back pain for which she is on chronic opioid therapy at home. She also has injury to the left wrist and following orthopedics for same.  She continued to endorse generalized weakness with debility, states has been getting PT/OT sessions at home for the last few weeks, reports noted improvement but still concerned.  Initially she was reluctant to consider skilled nursing placement however after discussion with family, patient more agreeable.  Blood pressure better controlled on increase dose of midodrine.   "Nephrology following for regular home dialysis schedule TTS.  Medically stable for discharge to skilled nursing facility, awaiting placement.      Interval History:  Patient seen and examined this afternoon.  Laying in bed.  Complained of pain to the back and requesting morphine over and over again.  Patient informed she fell last night and tripped over her cane on the bedside.  Inquired why she was out of the bed without assistance and patient informed she was confused and she needed to go to the restroom but instead of going to worse the restroom she walked in the opposite direction and fell.  Noted in the nurse's note that patient reported she turned the bed alarm of by herself and stood up and her diaper fell and that is when she lost her balance and fell.   Patient reports her back hurts and Norco is not helping and requesting morphine.  Patient said back in June when she broke her wrist, morphine was the only medicine that helped her, patient states" I beg you for morphine".  Patient reports she would like to have walker instead of cane as her balance has gotten worse.  Discussed with nurse and informed that patient x-ray did showed mild compression fracture at L2.  Discussed will consult Dr Guerra, possibly she will need brace for her back    Review of Systems   Constitutional: Negative for chills and fever.   HENT: Negative for ear discharge and postnasal drip.    Eyes: Negative for pain and redness.   Respiratory: Negative for shortness of breath.    Cardiovascular: Negative for chest pain and leg swelling.   Gastrointestinal: Negative for abdominal pain, nausea and vomiting.   Genitourinary: Negative.    Musculoskeletal: Positive for arthralgias ( right shoulder discomfort), back pain (since fall last night and mild compression fracture L2) and neck pain.        Shoulder pain   Neurological: Positive for weakness.   Psychiatric/Behavioral: Positive for confusion (off and on ). Negative for hallucinations. "     Objective:     Vital Signs (Most Recent):  Temp: 98.2 °F (36.8 °C) (09/04/19 1916)  Pulse: 72 (09/04/19 1916)  Resp: 18 (09/04/19 1916)  BP: 122/77 (09/04/19 1916)  SpO2: 95 % (09/04/19 1916) Vital Signs (24h Range):  Temp:  [97.5 °F (36.4 °C)-98.6 °F (37 °C)] 98.2 °F (36.8 °C)  Pulse:  [63-78] 72  Resp:  [17-20] 18  SpO2:  [92 %-95 %] 95 %  BP: (105-129)/(66-80) 122/77     Weight: 67.5 kg (148 lb 13 oz)  Body mass index is 22.96 kg/m².  No intake or output data in the 24 hours ending 09/04/19 2053   Physical Exam   Constitutional: She is oriented to person, place, and time. She appears well-developed and well-nourished. No distress.   Elderly female, comfortable, calm, cooperative    HENT:   Head: Normocephalic and atraumatic.   Eyes: Pupils are equal, round, and reactive to light. EOM are normal. Right eye exhibits no discharge. Left eye exhibits no discharge.   Neck: Normal range of motion. Neck supple.   Cardiovascular: Normal rate and regular rhythm.   Murmur (2/6 systolic murmur) heard.  Good thrill over the AV fistula in the left arm   Pulmonary/Chest: Effort normal and breath sounds normal. She has no wheezes.   on supplemental O2   Abdominal: Soft. Bowel sounds are normal. She exhibits no distension. There is no rebound and no guarding.   Musculoskeletal:   L wrist in splint, ice pack to right shoulder  Laying still as movement make her back pain worse    Neurological: She is alert and oriented to person, place, and time.   Moving all four extremities   Skin: She is not diaphoretic.   Left upper extremity AVF   Psychiatric: She has a normal mood and affect. Her behavior is normal. Judgment and thought content normal.   Nursing note and vitals reviewed.      Significant Labs:   BMP:   Recent Labs   Lab 09/04/19  0510      *   K 4.7   CL 99   CO2 24   BUN 23   CREATININE 4.3*   CALCIUM 10.2   MG 2.6     CBC:   Recent Labs   Lab 09/03/19  0441 09/04/19  0510   WBC 6.24 8.10   HGB 12.7 12.9    HCT 39.9 40.6    314     Magnesium:   Recent Labs   Lab 09/03/19  0441 09/04/19  0510   MG 2.9* 2.6       Significant Imaging: I have reviewed all pertinent imaging results/findings within the past 24 hours.     Xray lumbar shows Acute mild compression fracture of the superior endplate of L2, without retropulsion    Xray pelvis showed mild bone demineralization but no acute abnorm at hip/ pubis symphysis and pelvis       Assessment/Plan:      * Physical deconditioning with generalized weakness and history of falls  Generalized weakness and physical deconditioning.  As per collateral 2 falls recently.  Has sustained left distal radial and ulna fractures, status post surgery.  Was receiving home PT/OT.  As per family not able to manage.  Patient now agreeable to skilled nursing placement.  PT/OT following  case management consulted for skilled nursing placement.  Awaiting acceptance  medical ready for discharge awaiting SNF placement        ESRD (end stage renal disease)  Known ESRD on HD TTS.  Missed 2 sessions due to generalized weakness before admit and now Status post urgent dialysis 8/29.    HD as per Nephrology, resuming TTS  renally dose all medications and avoid nephrotoxin drugs  appreciate Nephrology input      Compression fracture of L2 lumbar vertebra  Fell in the room 9/4/19 at 12:20 am, pt reported she needed to go to the rest room and got out without assistance and tripped over her cane at the bedside  Xray lumbar shows mild compression fracture L2  Norco and IV morphone 1 mg on board  Consulted Ortho Dr Mcarthur, possibly will need a back brace  PT/OT to cont therapy while awaiting placement  Pt is high risk for falls, hx of 2 falls at home before admit as well  Confusion is a contributing factor as pt mentioned she was confused last night      Chronic anemia  Continue CLAIRE.      Dyspepsia  -chronic, continue famotidine and clinically monitor      Hyperphosphatemia  In ESRD patient.   - dialysis  and low phosphorus diet  - trend labs      Fracture of left wrist  History of fall with sustained left distal radial and ulna fracture status post or if.  Left wrist currently in a splint.  Due to follow-up orthopedics,  09/05.  -home [place] 5 q.6h p.r.n. with bowel regimen  -keep orthopedic follow-up      Chronic respiratory failure  On chronic home nocturnal oxygen.      Bipolar disorder        Chronic pain  History of chronic neck and back pain. Likely secondary to MSK/degenerative joint disease.   Avoid Lyrica.      Borderline hypotension  Borderline hypertension, acute on chronic.  Improved on increased dose of midodrine.  -continue increased midodrine to 5 mg b.i.d.  -continue to monitor blood pressure clinically    Chronic obstructive pulmonary disease  Chronic medical condition.  Continuing home medication.  Monitoring.  No current signs of exacerbation.     GERD (gastroesophageal reflux disease)  Chronic medical condition.  Continuing home medication.  Monitoring.          Hyperlipidemia  Chronic medical condition.    Continuing home medication.  Monitoring.          VTE Risk Mitigation (From admission, onward)        Ordered     heparin (porcine) injection 5,000 Units  Every 8 hours      08/29/19 2050     IP VTE HIGH RISK PATIENT  Once      08/29/19 2050                Shaheen Turner MD  Department of Hospital Medicine   [facility]

## 2019-09-05 NOTE — PROGRESS NOTES
Pt seen and examined.  Labs, VS, radiology reports, and med list reviewed.  Denies chest pain and SOB.  C/o back pain 2/2 recent fall and compression fx.  Pt is awaiting transfer to SNF.  HD due today.  See full note/assessment written by Dr. Odonnell.    Fernando, NP

## 2019-09-05 NOTE — HPI
Admitted through ED 8/29/19 2/2 issues related to ESRD and missed dialysis appts. Had a recent fall and has been c/o back pain as well. X-rays demonstrate L1 superior endplate mild compression Fx.

## 2019-09-05 NOTE — ASSESSMENT & PLAN NOTE
Known ESRD on HD TTS.  Missed 2 sessions due to generalized weakness before admit and now Status post urgent dialysis 8/29.    HD as per Nephrology, resuming TTS  renally dose all medications and avoid nephrotoxin drugs  appreciate Nephrology input

## 2019-09-05 NOTE — SUBJECTIVE & OBJECTIVE
Past Medical History:   Diagnosis Date    Alcoholism     no drink since 1984    Anxiety     Asthma     Bipolar disorder     Chronic kidney disease     COPD (chronic obstructive pulmonary disease)     GERD (gastroesophageal reflux disease)     Hypertension     Pancreatitis     Thyroid disease        Past Surgical History:   Procedure Laterality Date    APPENDECTOMY      CATARACT EXTRACTION Right     EYE SURGERY      HYSTERECTOMY      THYROIDECTOMY      THYROIDECTOMY         Review of patient's allergies indicates:   Allergen Reactions    Metoprolol Other (See Comments)     Grand-daughter/care giver reported Pt has over reaction to this drug, Bp bottoms out along with heart rate    Abilify [aripiprazole]      dizziness    Codeine      Other reaction(s): Unknown       Current Facility-Administered Medications   Medication    0.9%  NaCl infusion    acetaminophen tablet 650 mg    acetaminophen tablet 650 mg    ARIPiprazole tablet 5 mg    busPIRone tablet 10 mg    calcitRIOL capsule 0.25 mcg    dextrose 50% injection 12.5 g    dextrose 50% injection 25 g    epoetin jacob-epbx injection 3,400 Units    famotidine tablet 20 mg    FLUoxetine capsule 20 mg    fluticasone propionate 50 mcg/actuation nasal spray 100 mcg    fluticasone-umeclidin-vilanter 100-62.5-25 mcg DsDv 1 puff    gabapentin capsule 300 mg    GI cocktail (mylanta 30 mL, lidocaine 2 % viscous 10 mL, dicyclomine 10 mL) 50 mL    glucagon (human recombinant) injection 1 mg    glucose chewable tablet 16 g    glucose chewable tablet 24 g    heparin (porcine) injection 5,000 Units    HYDROcodone-acetaminophen 5-325 mg per tablet 1 tablet    levothyroxine tablet 100 mcg    magnesium oxide tablet 800 mg    magnesium oxide tablet 800 mg    midodrine tablet 5 mg    morphine injection 1 mg    ondansetron disintegrating tablet 4 mg    ondansetron disintegrating tablet 8 mg    ondansetron injection 4 mg    polyethylene glycol  "packet 17 g    polyethylene glycol packet 17 g    potassium chloride 10% oral solution 40 mEq    potassium chloride 10% oral solution 40 mEq    potassium chloride 10% oral solution 60 mEq    potassium, sodium phosphates 280-160-250 mg packet 2 packet    potassium, sodium phosphates 280-160-250 mg packet 2 packet    potassium, sodium phosphates 280-160-250 mg packet 2 packet    rosuvastatin tablet 20 mg    sodium chloride 0.9% flush 10 mL     Family History     Problem Relation (Age of Onset)    COPD Sister    Cancer Sister, Sister, Cousin, Cousin    Diabetes Father, Cousin    Heart disease Father    No Known Problems Mother    Stroke Paternal Aunt        Tobacco Use    Smoking status: Current Some Day Smoker     Packs/day: 1.00     Types: Cigarettes    Smokeless tobacco: Never Used   Substance and Sexual Activity    Alcohol use: No    Drug use: Never    Sexual activity: Not Currently     Review of Systems   Constitution: Negative for chills, decreased appetite, diaphoresis and fever.   Eyes: Negative for blurred vision.   Cardiovascular: Negative for chest pain.   Respiratory: Negative for cough.    Gastrointestinal: Negative for bloating, abdominal pain, anorexia and change in bowel habit.   Psychiatric/Behavioral: Negative for altered mental status.        PMH bipolar     Objective:     Vital Signs (Most Recent):  Temp: 98.8 °F (37.1 °C) (09/05/19 0725)  Pulse: 76 (09/05/19 0727)  Resp: 18 (09/05/19 0727)  BP: 110/74 (09/05/19 0725)  SpO2: 95 % (09/05/19 0727) Vital Signs (24h Range):  Temp:  [97.5 °F (36.4 °C)-98.8 °F (37.1 °C)] 98.8 °F (37.1 °C)  Pulse:  [63-80] 76  Resp:  [16-18] 18  SpO2:  [92 %-96 %] 95 %  BP: (110-129)/(70-78) 110/74     Weight: 67.5 kg (148 lb 13 oz)  Height: 5' 7.5" (171.5 cm)  Body mass index is 22.96 kg/m².      Intake/Output Summary (Last 24 hours) at 9/5/2019 1033  Last data filed at 9/5/2019 0800  Gross per 24 hour   Intake --   Output 300 ml   Net -300 ml "       General    Nursing note and vitals reviewed.  Constitutional: She is oriented to person, place, and time. She appears well-developed and well-nourished.   Pulmonary/Chest: Effort normal.   Neurological: She is alert and oriented to person, place, and time.   Psychiatric: She has a normal mood and affect. Her behavior is normal.         Back (L-Spine & T-Spine) / Neck (C-Spine) Exam     Comments:  Supine in bed. Comfortable. Antalgic bed mobility. TTP over the Thoracolumbar region. BLEs DNVI        Significant Labs:   CBC:   Recent Labs   Lab 09/04/19  0510   WBC 8.10   HGB 12.9   HCT 40.6        CMP:   Recent Labs   Lab 09/04/19  0510 09/05/19  0452   * 132*   K 4.7 4.5   CL 99 95   CO2 24 23    99   BUN 23 34*   CREATININE 4.3* 5.1*   CALCIUM 10.2 9.3   ANIONGAP 12 14   EGFRNONAA 9.3* 7.6*     All pertinent labs within the past 24 hours have been reviewed.    Significant Imaging: X-Ray: I have reviewed all pertinent results/findings and my personal findings are:  Lumbar X-rays:

## 2019-09-05 NOTE — CONSULTS
[facility]  Orthopedics  Consult Note    Patient Name: Althea Gaona  MRN: [id number]  Admission Date: 8/29/2019  Hospital Length of Stay: 0 days  Attending Provider: Shaheen Turner MD  Primary Care Provider: Primary Doctor No    Patient information was obtained from patient and ER records.     Consults  Subjective:     Principal Problem:Physical deconditioning    Chief Complaint:   Chief Complaint   Patient presents with    Weakness     Family states pt has been feeling weak and confused since being placed on Abilify        HPI: Admitted through ED 8/29/19 2/2 issues related to ESRD and missed dialysis appts. Had a recent fall and has been c/o back pain as well. X-rays demonstrate L1 superior endplate mild compression Fx.    Past Medical History:   Diagnosis Date    Alcoholism     no drink since 1984    Anxiety     Asthma     Bipolar disorder     Chronic kidney disease     COPD (chronic obstructive pulmonary disease)     GERD (gastroesophageal reflux disease)     Hypertension     Pancreatitis     Thyroid disease        Past Surgical History:   Procedure Laterality Date    APPENDECTOMY      CATARACT EXTRACTION Right     EYE SURGERY      HYSTERECTOMY      THYROIDECTOMY      THYROIDECTOMY         Review of patient's allergies indicates:   Allergen Reactions    Metoprolol Other (See Comments)     Grand-daughter/care giver reported Pt has over reaction to this drug, Bp bottoms out along with heart rate    Abilify [aripiprazole]      dizziness    Codeine      Other reaction(s): Unknown       Current Facility-Administered Medications   Medication    0.9%  NaCl infusion    acetaminophen tablet 650 mg    acetaminophen tablet 650 mg    ARIPiprazole tablet 5 mg    busPIRone tablet 10 mg    calcitRIOL capsule 0.25 mcg    dextrose 50% injection 12.5 g    dextrose 50% injection 25 g    epoetin jacob-epbx injection 3,400 Units    famotidine tablet 20 mg    FLUoxetine capsule 20 mg     fluticasone propionate 50 mcg/actuation nasal spray 100 mcg    fluticasone-umeclidin-vilanter 100-62.5-25 mcg DsDv 1 puff    gabapentin capsule 300 mg    GI cocktail (mylanta 30 mL, lidocaine 2 % viscous 10 mL, dicyclomine 10 mL) 50 mL    glucagon (human recombinant) injection 1 mg    glucose chewable tablet 16 g    glucose chewable tablet 24 g    heparin (porcine) injection 5,000 Units    HYDROcodone-acetaminophen 5-325 mg per tablet 1 tablet    levothyroxine tablet 100 mcg    magnesium oxide tablet 800 mg    magnesium oxide tablet 800 mg    midodrine tablet 5 mg    morphine injection 1 mg    ondansetron disintegrating tablet 4 mg    ondansetron disintegrating tablet 8 mg    ondansetron injection 4 mg    polyethylene glycol packet 17 g    polyethylene glycol packet 17 g    potassium chloride 10% oral solution 40 mEq    potassium chloride 10% oral solution 40 mEq    potassium chloride 10% oral solution 60 mEq    potassium, sodium phosphates 280-160-250 mg packet 2 packet    potassium, sodium phosphates 280-160-250 mg packet 2 packet    potassium, sodium phosphates 280-160-250 mg packet 2 packet    rosuvastatin tablet 20 mg    sodium chloride 0.9% flush 10 mL     Family History     Problem Relation (Age of Onset)    COPD Sister    Cancer Sister, Sister, Cousin, Cousin    Diabetes Father, Cousin    Heart disease Father    No Known Problems Mother    Stroke Paternal Aunt        Tobacco Use    Smoking status: Current Some Day Smoker     Packs/day: 1.00     Types: Cigarettes    Smokeless tobacco: Never Used   Substance and Sexual Activity    Alcohol use: No    Drug use: Never    Sexual activity: Not Currently     Review of Systems   Constitution: Negative for chills, decreased appetite, diaphoresis and fever.   Eyes: Negative for blurred vision.   Cardiovascular: Negative for chest pain.   Respiratory: Negative for cough.    Gastrointestinal: Negative for bloating, abdominal pain, anorexia  "and change in bowel habit.   Psychiatric/Behavioral: Negative for altered mental status.        PMH bipolar     Objective:     Vital Signs (Most Recent):  Temp: 98.8 °F (37.1 °C) (09/05/19 0725)  Pulse: 76 (09/05/19 0727)  Resp: 18 (09/05/19 0727)  BP: 110/74 (09/05/19 0725)  SpO2: 95 % (09/05/19 0727) Vital Signs (24h Range):  Temp:  [97.5 °F (36.4 °C)-98.8 °F (37.1 °C)] 98.8 °F (37.1 °C)  Pulse:  [63-80] 76  Resp:  [16-18] 18  SpO2:  [92 %-96 %] 95 %  BP: (110-129)/(70-78) 110/74     Weight: 67.5 kg (148 lb 13 oz)  Height: 5' 7.5" (171.5 cm)  Body mass index is 22.96 kg/m².      Intake/Output Summary (Last 24 hours) at 9/5/2019 1033  Last data filed at 9/5/2019 0800  Gross per 24 hour   Intake --   Output 300 ml   Net -300 ml       General    Nursing note and vitals reviewed.  Constitutional: She is oriented to person, place, and time. She appears well-developed and well-nourished.   Pulmonary/Chest: Effort normal.   Neurological: She is alert and oriented to person, place, and time.   Psychiatric: She has a normal mood and affect. Her behavior is normal.         Back (L-Spine & T-Spine) / Neck (C-Spine) Exam     Comments:  Supine in bed. Comfortable. Antalgic bed mobility. TTP over the Thoracolumbar region. BLEs DNVI        Significant Labs:   CBC:   Recent Labs   Lab 09/04/19  0510   WBC 8.10   HGB 12.9   HCT 40.6        CMP:   Recent Labs   Lab 09/04/19  0510 09/05/19  0452   * 132*   K 4.7 4.5   CL 99 95   CO2 24 23    99   BUN 23 34*   CREATININE 4.3* 5.1*   CALCIUM 10.2 9.3   ANIONGAP 12 14   EGFRNONAA 9.3* 7.6*     All pertinent labs within the past 24 hours have been reviewed.    Significant Imaging: X-Ray: I have reviewed all pertinent results/findings and my personal findings are:  Lumbar X-rays:     Assessment/Plan:     Compression fracture of L2 lumbar vertebra  Recommend TLSO for all OOB activity - for comfort  PT for functional mobility.  No indications for surgery.  Consider " referral to discuss Kyphoplasty with appropriate pain management MD.          Thank you for your consult. I will sign off. Please contact us if you have any additional questions.    WILLIAM ACOSTA  Orthopedics  [facility]

## 2019-09-06 LAB
GLUCOSE SERPL-MCNC: 111 MG/DL (ref 70–110)
GLUCOSE SERPL-MCNC: 117 MG/DL (ref 70–110)
GLUCOSE SERPL-MCNC: 137 MG/DL (ref 70–110)

## 2019-09-06 PROCEDURE — 25000003 PHARM REV CODE 250: Performed by: INTERNAL MEDICINE

## 2019-09-06 PROCEDURE — 99900035 HC TECH TIME PER 15 MIN (STAT)

## 2019-09-06 PROCEDURE — 63600175 PHARM REV CODE 636 W HCPCS: Performed by: INTERNAL MEDICINE

## 2019-09-06 PROCEDURE — 12000002 HC ACUTE/MED SURGE SEMI-PRIVATE ROOM

## 2019-09-06 PROCEDURE — 94761 N-INVAS EAR/PLS OXIMETRY MLT: CPT

## 2019-09-06 PROCEDURE — 97116 GAIT TRAINING THERAPY: CPT

## 2019-09-06 PROCEDURE — 97530 THERAPEUTIC ACTIVITIES: CPT

## 2019-09-06 PROCEDURE — 94640 AIRWAY INHALATION TREATMENT: CPT

## 2019-09-06 PROCEDURE — 27000221 HC OXYGEN, UP TO 24 HOURS

## 2019-09-06 RX ADMIN — HEPARIN SODIUM 5000 UNITS: 5000 INJECTION INTRAVENOUS; SUBCUTANEOUS at 02:09

## 2019-09-06 RX ADMIN — HYDROCODONE BITARTRATE AND ACETAMINOPHEN 1 TABLET: 5; 325 TABLET ORAL at 12:09

## 2019-09-06 RX ADMIN — POLYETHYLENE GLYCOL 3350 17 G: 17 POWDER, FOR SOLUTION ORAL at 09:09

## 2019-09-06 RX ADMIN — FLUOXETINE 20 MG: 20 CAPSULE ORAL at 08:09

## 2019-09-06 RX ADMIN — MORPHINE SULFATE 1 MG: 2 INJECTION, SOLUTION INTRAMUSCULAR; INTRAVENOUS at 02:09

## 2019-09-06 RX ADMIN — GABAPENTIN 300 MG: 300 CAPSULE ORAL at 08:09

## 2019-09-06 RX ADMIN — MIDODRINE HYDROCHLORIDE 5 MG: 2.5 TABLET ORAL at 06:09

## 2019-09-06 RX ADMIN — CALCITRIOL CAPSULES 0.25 MCG 0.25 MCG: 0.25 CAPSULE ORAL at 08:09

## 2019-09-06 RX ADMIN — BUSPIRONE HYDROCHLORIDE 10 MG: 5 TABLET ORAL at 02:09

## 2019-09-06 RX ADMIN — BUSPIRONE HYDROCHLORIDE 10 MG: 5 TABLET ORAL at 08:09

## 2019-09-06 RX ADMIN — HEPARIN SODIUM 5000 UNITS: 5000 INJECTION INTRAVENOUS; SUBCUTANEOUS at 10:09

## 2019-09-06 RX ADMIN — MORPHINE SULFATE 1 MG: 2 INJECTION, SOLUTION INTRAMUSCULAR; INTRAVENOUS at 08:09

## 2019-09-06 RX ADMIN — HYDROCODONE BITARTRATE AND ACETAMINOPHEN 1 TABLET: 5; 325 TABLET ORAL at 07:09

## 2019-09-06 RX ADMIN — MORPHINE SULFATE 1 MG: 2 INJECTION, SOLUTION INTRAMUSCULAR; INTRAVENOUS at 10:09

## 2019-09-06 RX ADMIN — MIDODRINE HYDROCHLORIDE 5 MG: 2.5 TABLET ORAL at 08:09

## 2019-09-06 RX ADMIN — ARIPIPRAZOLE 5 MG: 5 TABLET ORAL at 08:09

## 2019-09-06 RX ADMIN — HYDROCODONE BITARTRATE AND ACETAMINOPHEN 1 TABLET: 5; 325 TABLET ORAL at 06:09

## 2019-09-06 RX ADMIN — ONDANSETRON 4 MG: 2 INJECTION INTRAMUSCULAR; INTRAVENOUS at 08:09

## 2019-09-06 RX ADMIN — ROSUVASTATIN CALCIUM 20 MG: 20 TABLET, FILM COATED ORAL at 09:09

## 2019-09-06 RX ADMIN — HEPARIN SODIUM 5000 UNITS: 5000 INJECTION INTRAVENOUS; SUBCUTANEOUS at 06:09

## 2019-09-06 RX ADMIN — ONDANSETRON 4 MG: 2 INJECTION INTRAMUSCULAR; INTRAVENOUS at 02:09

## 2019-09-06 RX ADMIN — LEVOTHYROXINE SODIUM 100 MCG: 100 TABLET ORAL at 06:09

## 2019-09-06 RX ADMIN — ALUMINUM HYDROXIDE, MAGNESIUM HYDROXIDE, AND SIMETHICONE 50 ML: 200; 200; 20 SUSPENSION ORAL at 12:09

## 2019-09-06 RX ADMIN — BUSPIRONE HYDROCHLORIDE 10 MG: 5 TABLET ORAL at 09:09

## 2019-09-06 RX ADMIN — FAMOTIDINE 20 MG: 20 TABLET ORAL at 08:09

## 2019-09-06 NOTE — PT/OT/SLP PROGRESS
Physical Therapy Treatment    Patient Name:  Althea Gaona   MRN:  [id number]    Recommendations:     Discharge Recommendations:  nursing facility, skilled   Discharge Equipment Recommendations: none   Barriers to discharge: None    Assessment:     Althea Gaona is a 79 y.o. female admitted with a medical diagnosis of Physical deconditioning.  She presents with the following impairments/functional limitations:  weakness, impaired endurance, impaired functional mobilty, gait instability, pain, impaired balance .  TLSO arrived and patient presented with it in place sitting EOB.  Patient then transferred sit to stand with min assist and ambulated x 75 feet with WR with CGA. Patient reported low back pain graded 10/10 all through treatment. Patient indicated she did not have a walker for home use so would probably benefit from obtaining one prior to discharge if going home.    Rehab Prognosis: Good; patient would benefit from acute skilled PT services to address these deficits and reach maximum level of function.    Recent Surgery: * No surgery found *      Plan:     During this hospitalization, patient to be seen 6 x/week to address the identified rehab impairments via gait training, therapeutic activities, therapeutic exercises and progress toward the following goals:    · Plan of Care Expires:  09/30/19    Subjective     Chief Complaint: pain  Patient/Family Comments/goals: get up and walk  Pain/Comfort:  · Pain Rating 1: 10/10  · Location - Side 1: Bilateral  · Location - Orientation 1: posterior  · Location 1: lumbar spine  · Pain Addressed 1: Reposition, Cessation of Activity  · Pain Rating Post-Intervention 1: 10/10      Objective:     Communicated with nurse and patient prior to session.  Patient found supine with peripheral IV upon PT entry to room.     General Precautions: Standard, fall   Orthopedic Precautions:    Braces: TLSO     Functional Mobility:  · Bed Mobility:     · Supine to Sit: minimum  assistance  · Transfers:     · Sit to Stand:  minimum assistance with rolling walker  · Gait: x 75 feet with RW with CGA      AM-PAC 6 CLICK MOBILITY  Turning over in bed (including adjusting bedclothes, sheets and blankets)?: 3  Sitting down on and standing up from a chair with arms (e.g., wheelchair, bedside commode, etc.): 3  Moving from lying on back to sitting on the side of the bed?: 3  Moving to and from a bed to a chair (including a wheelchair)?: 3  Need to walk in hospital room?: 3  Climbing 3-5 steps with a railing?: 1  Basic Mobility Total Score: 16       Therapeutic Activities and Exercises:   transfer to stand with RW with min assist  Gait training x 75 feet with RW with min assist.  TLSO put on/off with instruction to make sure patient understood how to do it.    Patient left sitting EOB with call button in reach and CNA present..    GOALS:   Multidisciplinary Problems     Physical Therapy Goals        Problem: Physical Therapy Goal    Goal Priority Disciplines Outcome Goal Variances Interventions   Physical Therapy Goal     PT, PT/OT Ongoing (interventions implemented as appropriate)     Description:  Goals to be met by: D/C     Patient will increase functional independence with mobility by performin. Supine to sit with Stand-by Assistance  2. Sit to stand transfer with Stand-by Assistance  3. Gait  x 50 feet with Supervision using Rolling Walker or cane.   4. Ascend and descend 3 stairs to allow safe return home                        Time Tracking:     PT Received On: 19  PT Start Time: 1330     PT Stop Time: 1350  PT Total Time (min): 20 min     Billable Minutes: Gait Training 20    Treatment Type: Treatment  PT/PTA: PT     PTA Visit Number: 0     Chris MeGilligan, PT  2019

## 2019-09-06 NOTE — PROGRESS NOTES
Progress Note  Nephrology    Consult Requested By: Shaheen Turner MD    Reason for Consult: ESRD, dependent on dialysis    SUBJECTIVE:     History of Present Illness:  80 y/o female patient known to our practice, has out patient hemodialysis 3x week on TTS schedule.  She missed her treatment on Tuesday.  When she came for dialysis today, out patient unit staff noted several changes.  She usually walks in of her own accord and is oriented at baseline.  Today, family was unable to get her out of the car d/t profound weakness, and she was not oriented, talking out of her head.  She was sent to ER via EMS.  Renal is consulted for dialysis orders.    8/29  Pt seen and evaluated in ER.  Mental status a bit better.  States she missed dialysis on Tuesday d/t being very ill with fever and felt terrible.  Noted, she is unsure what day it is right now, thinks it's Saturday.  It was reported to unit staff that pt is taking lyrica, which is on her allergy list, recently prescribed.  8/30  Sleeping  8/31  Sleeping.  K+ 4.6, Na+ 133.  Hypotensive.  HD due today.  9/1  No new labs today, BMP in am.  Pt reports feeling better, still weak.  Plans to go to SNF at discharge.  HD yesterday, 2L UF.  SBP low 100's today.  9/2  HD tomorrow, no acute need today.  Pt feeling better, VSS.  Awaiting SNF placement.  9/3  Seen nad examined on HD today.  Pt feeling better, VSS.  Awaiting SNF placement.  9/4  HD tomorrow, no acute need today.  Pt feeling better, VSS.  Awaiting SNF placement.  9/5  Seen and examined on HD today. Tolerating well.  9/6  HD tomorrow, no acute need today.  Pt feeling better, VSS.  Awaiting SNF placement.    Assessment/plan:  1.  ESRD -- On TTS schedule, dose meds for HD. Renal diet.  2.  Anemia of chronic dz--CLAIRE with renal replacement therapy PRN  3.  SHPT--continue calcitriol, monitor Phos and Ca  4.  Hypotension--continue midodrine bid, UF as tolerated.    Past Medical History:   Diagnosis Date    Alcoholism     no  "drink since     Anxiety     Asthma     Bipolar disorder     Chronic kidney disease     COPD (chronic obstructive pulmonary disease)     GERD (gastroesophageal reflux disease)     Hypertension     Pancreatitis     Thyroid disease      Past Surgical History:   Procedure Laterality Date    APPENDECTOMY      CATARACT EXTRACTION Right     EYE SURGERY      HYSTERECTOMY      THYROIDECTOMY      THYROIDECTOMY       Family History   Problem Relation Age of Onset    No Known Problems Mother     Diabetes Father     Heart disease Father         blood clot in lung went to heart    Cancer Sister         breast    Stroke Paternal Aunt     Cancer Sister         breast    Cancer Cousin         colon    Diabetes Cousin     Cancer Cousin         colon    COPD Sister          of COPD     Social History     Tobacco Use    Smoking status: Current Some Day Smoker     Packs/day: 1.00     Types: Cigarettes    Smokeless tobacco: Never Used   Substance Use Topics    Alcohol use: No    Drug use: Never       Review of patient's allergies indicates:   Allergen Reactions    Metoprolol Other (See Comments)     Grand-daughter/care giver reported Pt has over reaction to this drug, Bp bottoms out along with heart rate    Abilify [aripiprazole]      dizziness    Codeine      Other reaction(s): Unknown        Review of Systems:  General ROS: positive for fever, weakness  Psychological ROS: negative for - behavioral disorder or depression  ENT ROS: c/o headache  Hematological and Lymphatic ROS: negative for - bleeding problems or bruising  Endocrine ROS: negative for - temperature intolerance or unexpected weight changes  Respiratory ROS: no cough, shortness of breath, or wheezing  Cardiovascular ROS: + chest pain, no SOB  Gastrointestinal ROS: no abdominal pain, no n/v/c/d  Genito-Urinary ROS: no dysuria or trouble voiding  Musculoskeletal ROS: positive for weakness  Neurological ROS: "dizzy", "HA"  Dermatological " ROS: no c/o skin lesion    OBJECTIVE:     Vital Signs Range (Last 24H):  Temp:  [97.9 °F (36.6 °C)-98.3 °F (36.8 °C)]   Pulse:  [71-93]   Resp:  [16-18]   BP: ()/(43-83)   SpO2:  [5 %-94 %]     Physical Exam:  General- NAD noted  HEENT- WNL  Neck- supple  CV- Regular rate and rhythm  Resp- Lungs CTA Bilaterally, No increased WOB  GI- Non tender/non-distended, BS normoactive x4 quads  Extrem- No cyanosis, clubbing, edema.  Derm- skin w/d  Neuro-  Mostly confused, not at baseline     Body mass index is 22.96 kg/m².    Laboratory:  CBC:   Recent Labs   Lab 09/04/19  0510   WBC 8.10   RBC 3.82*   HGB 12.9   HCT 40.6      *   MCH 33.8*   MCHC 31.8*     CMP:   Recent Labs   Lab 09/05/19  0452   GLU 99   CALCIUM 9.3   *   K 4.5   CO2 23   CL 95   BUN 34*   CREATININE 5.1*       Diagnostic Results:  labs pending      ASSESSMENT/PLAN:     Active Hospital Problems    Diagnosis  POA    *Physical deconditioning with generalized weakness and history of falls [R53.81]  Yes    Compression fracture of L2 lumbar vertebra [S32.020A]  No    Chronic anemia [D64.9]  Yes     Chronic    Dyspepsia [R10.13]  Yes     Chronic    Hyperphosphatemia [E83.39]  Yes    Borderline hypotension [I95.9]  Yes    Chronic pain [G89.29]  Yes     Chronic    Chronic respiratory failure [J96.10]  Yes     Chronic    Fracture of left wrist [S62.102A]  Yes     Chronic    Bipolar disorder [F31.9]  Yes     Chronic    Hypothyroidism [E03.9]  Yes     Chronic    ESRD (end stage renal disease) [N18.6]  Yes     Chronic    Chronic obstructive pulmonary disease [J44.9]  Yes     Chronic    Hyperlipidemia [E78.5]  Yes     Chronic    GERD (gastroesophageal reflux disease) [K21.9]  Yes     Chronic      Resolved Hospital Problems    Diagnosis Date Resolved POA    Hyperkalemia [E87.5] 09/05/2019 Yes    Encephalopathy [G93.40] 08/30/2019 Yes    Hyperkalemia [E87.5] 08/30/2019 Yes    Constipation [K59.00] 09/02/2019 Yes         Thank  you for allowing us to participate in the care of your patient. We will follow the patient and provide recommendations as needed.      Time spent seeing patient( greater than 1/2 spent in direct contact) :

## 2019-09-06 NOTE — PROGRESS NOTES
[facility] Medicine  Progress Note    Patient Name: Althea Gaona  MRN: [id number]  Patient Class: IP- Inpatient   Admission Date: 8/29/2019  Length of Stay: 0 days  Attending Physician: Shaheen Turner MD  Primary Care Provider: Primary Doctor No        Subjective:     Principal Problem:Physical deconditioning        HPI:  Mrs. Gaona is a 79 years old female sent from hemodialysis due to encephalopathy and weakness.  As per HPI patient was assessed after urgent dialysis and was reportedly back to her baseline. As per collateral she is ESRD on HD TTS however missed HD session on Tuesday due to generalized weakness. On Thursday when she presented to HD session she was noted to be unlike herself, unable to ambulate, weak and confused and therefore referred to the ED. Her admissions labs were significant for hyperkalemia, otherwise at baseline, no signs of infection, non focal neurological examination. Denied any fevers, shortness of breath, abdominal pain, loose stools.      Overview/Hospital Course:  In the ED nephrology was consulted and patient underwent urgent hemodialysis session.  Following assessment after dialysis patient mental status back to baseline, alert and oriented, answering all questions appropriately.  There was a concern she was recently started on lyrica however unable to confirm and this is listed as medication allergy. She did complain of headache and chronic neck and back pain for which she is on chronic opioid therapy at home. She also has injury to the left wrist and following orthopedics for same.  She continued to endorse generalized weakness with debility, states has been getting PT/OT sessions at home for the last few weeks, reports noted improvement but still concerned.  Initially she was reluctant to consider skilled nursing placement however after discussion with family, patient more agreeable.  Blood pressure better controlled on increase dose of midodrine.   Nephrology following for regular home dialysis schedule TTS.  Medically stable for discharge to skilled nursing facility, awaiting placement.  9/4/19, pt fell in her room with c/o back pain. Xray lumbar shows Mild compression fracture at l2. Ortho Dr Mcarthur consulted. Will preferably order the back brace.   Still awaiting acceptance       Interval History: Pt seen and examined. Laying in bed comfortably. Reports pain is well controlled with meds. Asked if she can be transferred tomorrow to her facility, informed awaiitng her brace fitting and OT eval.  Pt is s/p her HD today. No c/o chest pain, SOB, headache    Review of Systems   Constitutional: Negative for chills and fever.   HENT: Negative for ear discharge and postnasal drip.    Eyes: Negative for pain and redness.   Respiratory: Negative for shortness of breath.    Cardiovascular: Negative for chest pain and leg swelling.   Gastrointestinal: Negative for abdominal pain, nausea and vomiting.   Genitourinary: Negative.    Musculoskeletal: Positive for arthralgias ( right shoulder discomfort), back pain (since fall 9/4/19 and mild compression fracture L2 ) and neck pain.        Shoulder pain   Neurological: Positive for weakness.   Psychiatric/Behavioral: Positive for confusion (off and on ). Negative for hallucinations.     Objective:     Vital Signs (Most Recent):  Temp: 98.1 °F (36.7 °C) (09/05/19 1929)  Pulse: 93 (09/05/19 1929)  Resp: 18 (09/05/19 1929)  BP: 114/74 (09/05/19 1929)  SpO2: (!) 93 % (09/05/19 1929) Vital Signs (24h Range):  Temp:  [98 °F (36.7 °C)-98.8 °F (37.1 °C)] 98.1 °F (36.7 °C)  Pulse:  [63-93] 93  Resp:  [16-18] 18  SpO2:  [92 %-96 %] 93 %  BP: ()/(43-80) 114/74     Weight: 67.5 kg (148 lb 13 oz)  Body mass index is 22.96 kg/m².    Intake/Output Summary (Last 24 hours) at 9/5/2019 2209  Last data filed at 9/5/2019 1520  Gross per 24 hour   Intake 500 ml   Output 2400 ml   Net -1900 ml      Physical Exam   Constitutional: She is  oriented to person, place, and time. She appears well-developed and well-nourished. No distress.   Elderly female, comfortable, calm, cooperative    HENT:   Head: Normocephalic and atraumatic.   Eyes: Pupils are equal, round, and reactive to light. EOM are normal. Right eye exhibits no discharge. Left eye exhibits no discharge.   Neck: Normal range of motion. Neck supple.   Cardiovascular: Normal rate and regular rhythm.   Murmur (2/6 systolic murmur) heard.  Good thrill over the AV fistula in the left arm   Pulmonary/Chest: Effort normal and breath sounds normal. She has no wheezes.   on supplemental O2   Abdominal: Soft. Bowel sounds are normal. She exhibits no distension. There is no rebound and no guarding.   Musculoskeletal:   L wrist in splint, ice pack to right shoulder  Laying still as movement make her back pain worse    Neurological: She is alert and oriented to person, place, and time.   Moving all four extremities   Skin: She is not diaphoretic.   Left upper extremity AVF   Psychiatric: She has a normal mood and affect. Her behavior is normal. Judgment and thought content normal.   Nursing note and vitals reviewed.      Significant Labs:   CBC:   Recent Labs   Lab 09/04/19  0510   WBC 8.10   HGB 12.9   HCT 40.6        CMP:   Recent Labs   Lab 09/04/19  0510 09/05/19  0452   * 132*   K 4.7 4.5   CL 99 95   CO2 24 23    99   BUN 23 34*   CREATININE 4.3* 5.1*   CALCIUM 10.2 9.3   ANIONGAP 12 14   EGFRNONAA 9.3* 7.6*     Magnesium:   Recent Labs   Lab 09/04/19  0510   MG 2.6       Significant Imaging: I have reviewed all pertinent imaging results/findings within the past 24 hours.      Assessment/Plan:      * Physical deconditioning with generalized weakness and history of falls  Generalized weakness and physical deconditioning.  As per collateral 2 falls recently.  Has sustained left distal radial and ulna fractures, status post surgery.  Was receiving home PT/OT.  As per family not able  to manage.  Patient now agreeable to skilled nursing placement.  PT/OT following  case management consulted for skilled nursing placement.  Awaiting acceptance  Now with mild compression fracture L2, Awaiting LTSO Brace and fitting and re eval by OT          ESRD (end stage renal disease)  Known ESRD on HD TTS.  Missed 2 sessions due to generalized weakness before admit and now Status post urgent dialysis 8/29.    HD as per Nephrology, resuming TTS  renally dose all medications and avoid nephrotoxin drugs  appreciate Nephrology input      Compression fracture of L2 lumbar vertebra  Fell in the room 9/4/19 at 12:20 am, pt reported she needed to go to the rest room and got out without assistance and tripped over her cane at the bedside  Xray lumbar shows mild compression fracture L2  Norco and IV morphone 1 mg on board  Consulted Ortho, been evaluated and recommended LTSO Brace. recommended also pt f/u with pain mx as out pt for kyphoplasty if indicated at that point  PT/OT to cont therapy while awaiting placement  Pt is high risk for falls, hx of 2 falls at home before admit as well  Confusion is a contributing factor as pt mentioned she was confused the night she fell    Chronic anemia  Continue CLAIRE.      Dyspepsia  -chronic, continue famotidine and clinically monitor      Hyperphosphatemia  In ESRD patient.   - dialysis and low phosphorus diet  - trend labs      Fracture of left wrist  History of fall with sustained left distal radial and ulna fracture status post or if.  Left wrist currently in a splint.  Due to follow-up orthopedics,  09/05.  -home [place] 5 q.6h p.r.n. with bowel regimen  -keep orthopedic follow-up      Chronic respiratory failure  On chronic home nocturnal oxygen.      Bipolar disorder        Chronic pain  History of chronic neck and back pain. Likely secondary to MSK/degenerative joint disease.   Avoid Lyrica.      Borderline hypotension  Borderline hypertension, acute on chronic.   Improved on increased dose of midodrine.  -continue increased midodrine to 5 mg b.i.d.  -continue to monitor blood pressure clinically    Chronic obstructive pulmonary disease  Chronic medical condition.  Continuing home medication.  Monitoring.  No current signs of exacerbation.     GERD (gastroesophageal reflux disease)  Chronic medical condition.  Continuing home medication.  Monitoring.          Hyperlipidemia  Chronic medical condition.    Continuing home medication.  Monitoring.          Patient Active Problem List   Diagnosis    Colitis    SOB (shortness of breath)    Pyrexia    Hyperlipidemia    GERD (gastroesophageal reflux disease)    Renal insufficiency    Dehydration    Chest pain    Dry eye of left side    Chronic obstructive pulmonary disease    Nausea in adult    Right hip pain    COPD mixed type    Closed fracture of lower end of left radius with routine healing    ESRD (end stage renal disease)    Borderline hypotension    Physical deconditioning with generalized weakness and history of falls    Chronic pain    Bipolar disorder    Chronic respiratory failure    Hypothyroidism    Fracture of left wrist    Hyperphosphatemia    Dyspepsia    Chronic anemia    Compression fracture of L2 lumbar vertebra       VTE Risk Mitigation (From admission, onward)        Ordered     heparin (porcine) injection 5,000 Units  Every 8 hours      08/29/19 2050     IP VTE HIGH RISK PATIENT  Once      08/29/19 2050                Shaheen Turner MD  Department of Hospital Medicine   [facility]

## 2019-09-06 NOTE — ASSESSMENT & PLAN NOTE
Generalized weakness and physical deconditioning.  As per collateral 2 falls recently.  Has sustained left distal radial and ulna fractures, status post surgery.  Was receiving home PT/OT.  As per family not able to manage.  Patient now agreeable to skilled nursing placement.  PT/OT following  case management consulted for skilled nursing placement.  Awaiting acceptance  Now with mild compression fracture L2, Awaiting LTSO Brace and fitting and re eval by OT

## 2019-09-06 NOTE — SUBJECTIVE & OBJECTIVE
Interval History: Pt seen and examined. Laying in bed comfortably. Reports pain is well controlled with meds. Asked if she can be transferred tomorrow to her facility, informed awaiitng her brace fitting and OT eval.  Pt is s/p her HD today. No c/o chest pain, SOB, headache    Review of Systems   Constitutional: Negative for chills and fever.   HENT: Negative for ear discharge and postnasal drip.    Eyes: Negative for pain and redness.   Respiratory: Negative for shortness of breath.    Cardiovascular: Negative for chest pain and leg swelling.   Gastrointestinal: Negative for abdominal pain, nausea and vomiting.   Genitourinary: Negative.    Musculoskeletal: Positive for arthralgias ( right shoulder discomfort), back pain (since fall 9/4/19 and mild compression fracture L2 ) and neck pain.        Shoulder pain   Neurological: Positive for weakness.   Psychiatric/Behavioral: Positive for confusion (off and on ). Negative for hallucinations.     Objective:     Vital Signs (Most Recent):  Temp: 98.1 °F (36.7 °C) (09/05/19 1929)  Pulse: 93 (09/05/19 1929)  Resp: 18 (09/05/19 1929)  BP: 114/74 (09/05/19 1929)  SpO2: (!) 93 % (09/05/19 1929) Vital Signs (24h Range):  Temp:  [98 °F (36.7 °C)-98.8 °F (37.1 °C)] 98.1 °F (36.7 °C)  Pulse:  [63-93] 93  Resp:  [16-18] 18  SpO2:  [92 %-96 %] 93 %  BP: ()/(43-80) 114/74     Weight: 67.5 kg (148 lb 13 oz)  Body mass index is 22.96 kg/m².    Intake/Output Summary (Last 24 hours) at 9/5/2019 2207  Last data filed at 9/5/2019 1520  Gross per 24 hour   Intake 500 ml   Output 2400 ml   Net -1900 ml      Physical Exam   Constitutional: She is oriented to person, place, and time. She appears well-developed and well-nourished. No distress.   Elderly female, comfortable, calm, cooperative    HENT:   Head: Normocephalic and atraumatic.   Eyes: Pupils are equal, round, and reactive to light. EOM are normal. Right eye exhibits no discharge. Left eye exhibits no discharge.   Neck: Normal  range of motion. Neck supple.   Cardiovascular: Normal rate and regular rhythm.   Murmur (2/6 systolic murmur) heard.  Good thrill over the AV fistula in the left arm   Pulmonary/Chest: Effort normal and breath sounds normal. She has no wheezes.   on supplemental O2   Abdominal: Soft. Bowel sounds are normal. She exhibits no distension. There is no rebound and no guarding.   Musculoskeletal:   L wrist in splint, ice pack to right shoulder  Laying still as movement make her back pain worse    Neurological: She is alert and oriented to person, place, and time.   Moving all four extremities   Skin: She is not diaphoretic.   Left upper extremity AVF   Psychiatric: She has a normal mood and affect. Her behavior is normal. Judgment and thought content normal.   Nursing note and vitals reviewed.      Significant Labs:   CBC:   Recent Labs   Lab 09/04/19  0510   WBC 8.10   HGB 12.9   HCT 40.6        CMP:   Recent Labs   Lab 09/04/19  0510 09/05/19  0452   * 132*   K 4.7 4.5   CL 99 95   CO2 24 23    99   BUN 23 34*   CREATININE 4.3* 5.1*   CALCIUM 10.2 9.3   ANIONGAP 12 14   EGFRNONAA 9.3* 7.6*     Magnesium:   Recent Labs   Lab 09/04/19  0510   MG 2.6       Significant Imaging: I have reviewed all pertinent imaging results/findings within the past 24 hours.

## 2019-09-07 LAB
ANION GAP SERPL CALC-SCNC: 18 MMOL/L (ref 8–16)
BUN SERPL-MCNC: 36 MG/DL (ref 8–23)
CALCIUM SERPL-MCNC: 9.9 MG/DL (ref 8.7–10.5)
CHLORIDE SERPL-SCNC: 91 MMOL/L (ref 95–110)
CO2 SERPL-SCNC: 23 MMOL/L (ref 23–29)
CREAT SERPL-MCNC: 5.5 MG/DL (ref 0.5–1.4)
EST. GFR  (AFRICAN AMERICAN): 8 ML/MIN/1.73 M^2
EST. GFR  (NON AFRICAN AMERICAN): 6.9 ML/MIN/1.73 M^2
GLUCOSE SERPL-MCNC: 136 MG/DL (ref 70–110)
GLUCOSE SERPL-MCNC: 177 MG/DL (ref 70–110)
GLUCOSE SERPL-MCNC: 281 MG/DL (ref 70–110)
GLUCOSE SERPL-MCNC: 90 MG/DL (ref 70–110)
POTASSIUM SERPL-SCNC: 5 MMOL/L (ref 3.5–5.1)
SODIUM SERPL-SCNC: 132 MMOL/L (ref 136–145)

## 2019-09-07 PROCEDURE — 25000003 PHARM REV CODE 250: Performed by: INTERNAL MEDICINE

## 2019-09-07 PROCEDURE — 80048 BASIC METABOLIC PNL TOTAL CA: CPT

## 2019-09-07 PROCEDURE — 27000221 HC OXYGEN, UP TO 24 HOURS

## 2019-09-07 PROCEDURE — 99900035 HC TECH TIME PER 15 MIN (STAT)

## 2019-09-07 PROCEDURE — 97535 SELF CARE MNGMENT TRAINING: CPT

## 2019-09-07 PROCEDURE — 12000002 HC ACUTE/MED SURGE SEMI-PRIVATE ROOM

## 2019-09-07 PROCEDURE — 90935 HEMODIALYSIS ONE EVALUATION: CPT

## 2019-09-07 PROCEDURE — 63600175 PHARM REV CODE 636 W HCPCS: Performed by: INTERNAL MEDICINE

## 2019-09-07 PROCEDURE — 94761 N-INVAS EAR/PLS OXIMETRY MLT: CPT

## 2019-09-07 PROCEDURE — 97168 OT RE-EVAL EST PLAN CARE: CPT

## 2019-09-07 PROCEDURE — 94640 AIRWAY INHALATION TREATMENT: CPT

## 2019-09-07 PROCEDURE — 36415 COLL VENOUS BLD VENIPUNCTURE: CPT

## 2019-09-07 RX ORDER — HYDROCODONE BITARTRATE AND ACETAMINOPHEN 5; 325 MG/1; MG/1
1 TABLET ORAL EVERY 4 HOURS PRN
Status: DISCONTINUED | OUTPATIENT
Start: 2019-09-07 | End: 2019-09-09 | Stop reason: HOSPADM

## 2019-09-07 RX ADMIN — BUSPIRONE HYDROCHLORIDE 10 MG: 5 TABLET ORAL at 10:09

## 2019-09-07 RX ADMIN — HYDROCODONE BITARTRATE AND ACETAMINOPHEN 1 TABLET: 5; 325 TABLET ORAL at 05:09

## 2019-09-07 RX ADMIN — LEVOTHYROXINE SODIUM 100 MCG: 100 TABLET ORAL at 06:09

## 2019-09-07 RX ADMIN — MIDODRINE HYDROCHLORIDE 5 MG: 2.5 TABLET ORAL at 05:09

## 2019-09-07 RX ADMIN — MORPHINE SULFATE 1 MG: 2 INJECTION, SOLUTION INTRAMUSCULAR; INTRAVENOUS at 07:09

## 2019-09-07 RX ADMIN — HEPARIN SODIUM 5000 UNITS: 5000 INJECTION INTRAVENOUS; SUBCUTANEOUS at 06:09

## 2019-09-07 RX ADMIN — ARIPIPRAZOLE 5 MG: 5 TABLET ORAL at 10:09

## 2019-09-07 RX ADMIN — FAMOTIDINE 20 MG: 20 TABLET ORAL at 10:09

## 2019-09-07 RX ADMIN — GABAPENTIN 300 MG: 300 CAPSULE ORAL at 10:09

## 2019-09-07 RX ADMIN — MIDODRINE HYDROCHLORIDE 5 MG: 2.5 TABLET ORAL at 10:09

## 2019-09-07 RX ADMIN — HEPARIN SODIUM 5000 UNITS: 5000 INJECTION INTRAVENOUS; SUBCUTANEOUS at 09:09

## 2019-09-07 RX ADMIN — POLYETHYLENE GLYCOL 3350 17 G: 17 POWDER, FOR SOLUTION ORAL at 10:09

## 2019-09-07 RX ADMIN — CALCITRIOL CAPSULES 0.25 MCG 0.25 MCG: 0.25 CAPSULE ORAL at 10:09

## 2019-09-07 RX ADMIN — EPOETIN ALFA-EPBX 3400 UNITS: 10000 INJECTION, SOLUTION INTRAVENOUS; SUBCUTANEOUS at 03:09

## 2019-09-07 RX ADMIN — BUSPIRONE HYDROCHLORIDE 10 MG: 5 TABLET ORAL at 09:09

## 2019-09-07 RX ADMIN — ROSUVASTATIN CALCIUM 20 MG: 20 TABLET, FILM COATED ORAL at 09:09

## 2019-09-07 RX ADMIN — HYDROCODONE BITARTRATE AND ACETAMINOPHEN 1 TABLET: 5; 325 TABLET ORAL at 12:09

## 2019-09-07 RX ADMIN — HEPARIN SODIUM 5000 UNITS: 5000 INJECTION INTRAVENOUS; SUBCUTANEOUS at 05:09

## 2019-09-07 RX ADMIN — FLUOXETINE 20 MG: 20 CAPSULE ORAL at 10:09

## 2019-09-07 NOTE — PLAN OF CARE
Problem: Pain Acute  Goal: Optimal Pain Control  Outcome: Ongoing (interventions implemented as appropriate)  Pain controlled with prn hydrocodone and IV morphine as needed.

## 2019-09-07 NOTE — ASSESSMENT & PLAN NOTE
Fell in the room 9/4/19 at 12:20 am, pt reported she needed to go to the rest room and got out without assistance and tripped over her cane at the bedside  Xray lumbar shows mild compression fracture L2  Norco 5/325 mg 1 p.o. Q 4 hourly, changed from q.6  DC IV morphine   Consulted Ortho, been evaluated and recommended LTSO Brace. recommended also pt f/u with pain mx as out pt for pain management and kyphoplasty if indicated at that point  PT/OT therapy on hold since the fall/compression fracture.   Pt is high risk for falls, hx of 2 falls at home before admit as well  Confusion is a contributing factor as pt mentioned she was confused the night she fell  Patient received TLSO brace 09/06/2019, awaiting PT OT notes for today before she can be transferred to [facility]

## 2019-09-07 NOTE — PROGRESS NOTES
Counts include 234 beds at the [facility] Medicine  Progress Note    Patient Name: Althea Gaona  MRN: [id number]  Patient Class: IP- Inpatient   Admission Date: 8/29/2019  Length of Stay: 2 days  Attending Physician: Shaheen Turner MD  Primary Care Provider: Primary Doctor No        Subjective:     Principal Problem:Physical deconditioning        HPI:  Mrs. Gaona is a 79 years old female sent from hemodialysis due to encephalopathy and weakness.  As per HPI patient was assessed after urgent dialysis and was reportedly back to her baseline. As per collateral she is ESRD on HD TTS however missed HD session on Tuesday due to generalized weakness. On Thursday when she presented to HD session she was noted to be unlike herself, unable to ambulate, weak and confused and therefore referred to the ED. Her admissions labs were significant for hyperkalemia, otherwise at baseline, no signs of infection, non focal neurological examination. Denied any fevers, shortness of breath, abdominal pain, loose stools.      Overview/Hospital Course:  In the ED nephrology was consulted and patient underwent urgent hemodialysis session.  Following assessment after dialysis patient mental status back to baseline, alert and oriented, answering all questions appropriately.  There was a concern she was recently started on lyrica however unable to confirm and this is listed as medication allergy. She did complain of headache and chronic neck and back pain for which she is on chronic opioid therapy at home. She also has injury to the left wrist and following orthopedics for same.  She continued to endorse generalized weakness with debility, states has been getting PT/OT sessions at home for the last few weeks, reports noted improvement but still concerned.  Initially she was reluctant to consider skilled nursing placement however after discussion with family, patient more agreeable.  Blood pressure better controlled on increase dose of midodrine.   Nephrology following for regular home dialysis schedule TTS.  Medically stable for discharge to skilled nursing facility, awaiting placement.  9/4/19, pt fell in her room with c/o back pain. Xray lumbar shows Mild compression fracture at l2. Ortho Dr Mcarthur consulted. Recommended TLSO brace and F/U with pain mx out pt for eval for kyphoplasty  Pt to be evaluated by PT/OT before being transfer to SNF.    Interval History:  Patient seen and examined this morning.  Laying in bed.  Complained of severe back pain.  Requesting pain medication.  Discussed with patient that it is a scheduled medicine and I will check with the nurse.  Noted patient has received her brace and reported yesterday the brace helped with the pain and support.  Again discussed with the patient the plan for follow-up with the Pain Management as outpatient for further evaluation and management per Orthopedic.  Patient again requested that she be given enough of the pain medicine before she leaves for the nursing home.    Review of Systems   Constitutional: Negative for chills and fever.   HENT: Negative for ear discharge and postnasal drip.    Eyes: Negative for pain and redness.   Respiratory: Negative for shortness of breath.    Cardiovascular: Negative for chest pain and leg swelling.   Gastrointestinal: Negative for abdominal pain, nausea and vomiting.   Genitourinary: Negative.    Musculoskeletal: Positive for arthralgias ( right shoulder discomfort), back pain (since fall 9/4/19 and mild compression fracture L2 ) and neck pain.        Shoulder pain   Neurological: Positive for weakness.   Psychiatric/Behavioral: Positive for confusion (off and on ). Negative for hallucinations.     Objective:     Vital Signs (Most Recent):  Temp: 98 °F (36.7 °C) (09/07/19 0400)  Pulse: 68 (09/07/19 0400)  Resp: 18 (09/07/19 0400)  BP: (!) 141/71 (09/07/19 0400)  SpO2: (!) 94 % (09/07/19 0400) Vital Signs (24h Range):  Temp:  [97.7 °F (36.5 °C)-98.7 °F (37.1  °C)] 98 °F (36.7 °C)  Pulse:  [63-75] 68  Resp:  [16-18] 18  SpO2:  [5 %-96 %] 94 %  BP: (117-141)/(71-83) 141/71     Weight: 67.5 kg (148 lb 13 oz)  Body mass index is 22.96 kg/m².    Intake/Output Summary (Last 24 hours) at 9/7/2019 0807  Last data filed at 9/7/2019 0600  Gross per 24 hour   Intake 150 ml   Output 650 ml   Net -500 ml      Physical Exam   Constitutional: She is oriented to person, place, and time. She appears well-developed and well-nourished. No distress.   Elderly female, comfortable, calm, cooperative    HENT:   Head: Normocephalic and atraumatic.   Eyes: Pupils are equal, round, and reactive to light. EOM are normal. Right eye exhibits no discharge. Left eye exhibits no discharge.   Neck: Normal range of motion. Neck supple.   Cardiovascular: Normal rate and regular rhythm.   Murmur (2/6 systolic murmur) heard.  Good thrill over the AV fistula in the left arm   Pulmonary/Chest: Effort normal and breath sounds normal. She has no wheezes.   on supplemental O2   Abdominal: Soft. Bowel sounds are normal. She exhibits no distension. There is no rebound and no guarding.   Musculoskeletal:   L wrist in splint  TLSO brace on bedside   Neurological: She is alert and oriented to person, place, and time.   Moving all four extremities   Skin: She is not diaphoretic.   Left upper extremity AVF   Psychiatric: She has a normal mood and affect. Her behavior is normal. Judgment and thought content normal.   Nursing note and vitals reviewed.      Significant Labs:   BMP:   Recent Labs   Lab 09/07/19  0518   GLU 90   *   K 5.0   CL 91*   CO2 23   BUN 36*   CREATININE 5.5*   CALCIUM 9.9     CBC: No results for input(s): WBC, HGB, HCT, PLT in the last 48 hours.  Magnesium: No results for input(s): MG in the last 48 hours.    Significant Imaging: I have reviewed all pertinent imaging results/findings within the past 24 hours.      Assessment/Plan:      * Physical deconditioning with generalized weakness and  history of falls  Generalized weakness and physical deconditioning.  As per collateral 2 falls recently before admission.  Has sustained left distal radial and ulna fractures, status post surgery.  Was receiving home PT/OT.  As per family not able to manage.  Patient now agreeable to skilled nursing placement.  PT/OT following  case management consulted for skilled nursing placement.  Patient accepted but awaiting recent PT OT notes  09/04/2019, fell in the room --> mild compression fracture L2, Pt evaluated by Ortho, recommended TLSO brace. Pt received her Brace 9/6/19 been fitted and  Feels much better with it.   Will be evaluated by PT/OT 9/7/19 before her placement as new PT OT notes are needed      ESRD (end stage renal disease)  Known ESRD on HD TTS.  Missed 2 sessions due to generalized weakness before admit and now Status post urgent dialysis 8/29.    HD as per Nephrology, resuming TTS  Renally dose all medications and avoid nephrotoxin drugs  appreciate Nephrology input      Compression fracture of L2 lumbar vertebra  Fell in the room 9/4/19 at 12:20 am, pt reported she needed to go to the rest room and got out without assistance and tripped over her cane at the bedside  Xray lumbar shows mild compression fracture L2  Norco 5/325 mg 1 p.o. Q 4 hourly, changed from q.6  DC IV morphine   Consulted Ortho, been evaluated and recommended LTSO Brace. recommended also pt f/u with pain mx as out pt for pain management and kyphoplasty if indicated at that point  PT/OT therapy on hold since the fall/compression fracture.   Pt is high risk for falls, hx of 2 falls at home before admit as well  Confusion is a contributing factor as pt mentioned she was confused the night she fell  Patient received TLSO brace 09/06/2019, awaiting PT OT notes for today before she can be transferred to [facility]    Chronic anemia  Continue CLAIRE.      Dyspepsia  -chronic, continue famotidine and clinically  monitor      Hyperphosphatemia  In ESRD patient.   - dialysis and low phosphorus diet  - trend labs      Fracture of left wrist  History of fall with sustained left distal radial and ulna fracture status post or if.  Left wrist currently in a splint.  Due to follow-up orthopedics,  09/05.  -home [place] 5 q.6h p.r.n. with bowel regimen  -keep orthopedic follow-up      Chronic respiratory failure  On chronic home nocturnal oxygen.      Bipolar disorder        Chronic pain  History of chronic neck and back pain. Likely secondary to MSK/degenerative joint disease.   Avoid Lyrica.      Borderline hypotension  Borderline hypertension, acute on chronic.  Improved on increased dose of midodrine.  -continue increased midodrine to 5 mg b.i.d.  -continue to monitor blood pressure clinically    Chronic obstructive pulmonary disease  Chronic medical condition.  Continuing home medication.  Monitoring.  No current signs of exacerbation.     GERD (gastroesophageal reflux disease)  Chronic medical condition.  Continuing home medication.  Monitoring.          Hyperlipidemia  Chronic medical condition.    Continuing home medication.  Monitoring.          VTE Risk Mitigation (From admission, onward)        Ordered     heparin (porcine) injection 5,000 Units  Every 8 hours      08/29/19 2050     IP VTE HIGH RISK PATIENT  Once      08/29/19 2050                Shaheen Turner MD  Department of Hospital Medicine   [facility]

## 2019-09-07 NOTE — ASSESSMENT & PLAN NOTE
Generalized weakness and physical deconditioning.  As per collateral 2 falls recently before admission.  Has sustained left distal radial and ulna fractures, status post surgery.  Was receiving home PT/OT.  As per family not able to manage.  Patient now agreeable to skilled nursing placement.  PT/OT following  case management consulted for skilled nursing placement.  Patient accepted but awaiting recent PT OT notes  09/04/2019, fell in the room --> mild compression fracture L2, Pt evaluated by Ortho, recommended TLSO brace. Pt received her Brace 9/6/19 been fitted and  Feels much better with it.   Will be evaluated by PT/OT 9/7/19 before her placement as new PT OT notes are needed

## 2019-09-07 NOTE — ASSESSMENT & PLAN NOTE
Generalized weakness and physical deconditioning.  As per collateral 2 falls recently.  Has sustained left distal radial and ulna fractures, status post surgery.  Was receiving home PT/OT.  As per family not able to manage.  Patient now agreeable to skilled nursing placement.  PT/OT following  case management consulted for skilled nursing placement.  Awaiting acceptance  Now with mild compression fracture L2, Pt evaluated by Ortho, recommended TLSO brace. Pt received her Brace today been fitted and  Feels much better with it.   Will be evaluated by PT/OT tomorrow before her transfer

## 2019-09-07 NOTE — PROGRESS NOTES
[facility]  Progress Note    Patient Name: Althea Gaona  MRN: [id number]  Patient Class: IP- Inpatient   Admission Date: 8/29/2019  Length of Stay: 1 days  Attending Physician: Shaheen Turner MD  Primary Care Provider: Primary Doctor No        Subjective:     Principal Problem:Physical deconditioning        HPI:  Mrs. Gaona is a 79 years old female sent from hemodialysis due to encephalopathy and weakness.  As per HPI patient was assessed after urgent dialysis and was reportedly back to her baseline. As per collateral she is ESRD on HD TTS however missed HD session on Tuesday due to generalized weakness. On Thursday when she presented to HD session she was noted to be unlike herself, unable to ambulate, weak and confused and therefore referred to the ED. Her admissions labs were significant for hyperkalemia, otherwise at baseline, no signs of infection, non focal neurological examination. Denied any fevers, shortness of breath, abdominal pain, loose stools.      Overview/Hospital Course:  In the ED nephrology was consulted and patient underwent urgent hemodialysis session.  Following assessment after dialysis patient mental status back to baseline, alert and oriented, answering all questions appropriately.  There was a concern she was recently started on lyrica however unable to confirm and this is listed as medication allergy. She did complain of headache and chronic neck and back pain for which she is on chronic opioid therapy at home. She also has injury to the left wrist and following orthopedics for same.  She continued to endorse generalized weakness with debility, states has been getting PT/OT sessions at home for the last few weeks, reports noted improvement but still concerned.  Initially she was reluctant to consider skilled nursing placement however after discussion with family, patient more agreeable.  Blood pressure better controlled on increase dose of midodrine.   "Nephrology following for regular home dialysis schedule TTS.  Medically stable for discharge to skilled nursing facility, awaiting placement.  9/4/19, pt fell in her room with c/o back pain. Xray lumbar shows Mild compression fracture at l2. Ortho Dr Mcarthur consulted. Will preferably order the back brace.   Still awaiting acceptance       Interval History: Pt seen and examined today, Laying in bed comfortably. Pt wearing her back brace, reports they fitted her today and it is giving a good support to her back and helping with the pain. Pt inquired when can she move to her SNF facility, informed PT/OT has to evaluate her again and then we can transfer her. Pt said" I will need bunch of my pain medicine and dont forget that for me."  Other than that pt denied chest pain, SOB     Review of Systems   Constitutional: Negative for chills and fever.   HENT: Negative for ear discharge and postnasal drip.    Eyes: Negative for pain and redness.   Respiratory: Negative for shortness of breath.    Cardiovascular: Negative for chest pain and leg swelling.   Gastrointestinal: Negative for abdominal pain, nausea and vomiting.   Genitourinary: Negative.    Musculoskeletal: Positive for arthralgias ( right shoulder discomfort), back pain (since fall 9/4/19 and mild compression fracture L2 ) and neck pain.        Shoulder pain   Neurological: Positive for weakness.   Psychiatric/Behavioral: Positive for confusion (off and on ). Negative for hallucinations.     Objective:     Vital Signs (Most Recent):  Temp: 98 °F (36.7 °C) (09/06/19 1621)  Pulse: 67 (09/06/19 1621)  Resp: 16 (09/06/19 1621)  BP: 117/76 (09/06/19 1621)  SpO2: 95 % (09/06/19 1621) Vital Signs (24h Range):  Temp:  [97.9 °F (36.6 °C)-98.3 °F (36.8 °C)] 98 °F (36.7 °C)  Pulse:  [67-93] 67  Resp:  [16-18] 16  SpO2:  [5 %-95 %] 95 %  BP: ()/(51-83) 117/76     Weight: 67.5 kg (148 lb 13 oz)  Body mass index is 22.96 kg/m².    Intake/Output Summary (Last 24 hours) at " 9/6/2019 2017  Last data filed at 9/6/2019 1500  Gross per 24 hour   Intake --   Output 650 ml   Net -650 ml      Physical Exam   Constitutional: She is oriented to person, place, and time. She appears well-developed and well-nourished. No distress.   Elderly female, comfortable, calm, cooperative    HENT:   Head: Normocephalic and atraumatic.   Eyes: Pupils are equal, round, and reactive to light. EOM are normal. Right eye exhibits no discharge. Left eye exhibits no discharge.   Neck: Normal range of motion. Neck supple.   Cardiovascular: Normal rate and regular rhythm.   Murmur (2/6 systolic murmur) heard.  Good thrill over the AV fistula in the left arm   Pulmonary/Chest: Effort normal and breath sounds normal. She has no wheezes.   on supplemental O2   Abdominal: Soft. Bowel sounds are normal. She exhibits no distension. There is no rebound and no guarding.   Musculoskeletal:   L wrist in splint  Wearing the TLSO brace     Neurological: She is alert and oriented to person, place, and time.   Moving all four extremities   Skin: She is not diaphoretic.   Left upper extremity AVF   Psychiatric: She has a normal mood and affect. Her behavior is normal. Judgment and thought content normal.   Nursing note and vitals reviewed.      Significant Labs:   CBC: No results for input(s): WBC, HGB, HCT, PLT in the last 48 hours.  CMP:   Recent Labs   Lab 09/05/19  0452   *   K 4.5   CL 95   CO2 23   GLU 99   BUN 34*   CREATININE 5.1*   CALCIUM 9.3   ANIONGAP 14   EGFRNONAA 7.6*       Significant Imaging: I have reviewed all pertinent imaging results/findings within the past 24 hours.      Assessment/Plan:      * Physical deconditioning with generalized weakness and history of falls  Generalized weakness and physical deconditioning.  As per collateral 2 falls recently.  Has sustained left distal radial and ulna fractures, status post surgery.  Was receiving home PT/OT.  As per family not able to manage.  Patient now  agreeable to skilled nursing placement.  PT/OT following  case management consulted for skilled nursing placement.  Awaiting acceptance  Now with mild compression fracture L2, Pt evaluated by Ortho, recommended TLSO brace. Pt received her Brace today been fitted and  Feels much better with it.   Will be evaluated by PT/OT tomorrow before her transfer       ESRD (end stage renal disease)  Known ESRD on HD TTS.  Missed 2 sessions due to generalized weakness before admit and now Status post urgent dialysis 8/29.    HD as per Nephrology, resuming TTS  Renally dose all medications and avoid nephrotoxin drugs  appreciate Nephrology input      Compression fracture of L2 lumbar vertebra  Fell in the room 9/4/19 at 12:20 am, pt reported she needed to go to the rest room and got out without assistance and tripped over her cane at the bedside  Xray lumbar shows mild compression fracture L2  Norco and IV morphone 1 mg on board  Consulted Ortho, been evaluated and recommended LTSO Brace. recommended also pt f/u with pain mx as out pt for kyphoplasty if indicated at that point  PT/OT to cont therapy while awaiting placement  Pt is high risk for falls, hx of 2 falls at home before admit as well  Confusion is a contributing factor as pt mentioned she was confused the night she fell    Chronic anemia  Continue CLAIRE.      Dyspepsia  -chronic, continue famotidine and clinically monitor      Hyperphosphatemia  In ESRD patient.   - dialysis and low phosphorus diet  - trend labs      Fracture of left wrist  History of fall with sustained left distal radial and ulna fracture status post or if.  Left wrist currently in a splint.  Due to follow-up orthopedics,  09/05.  -home [facility] 5 q.6h p.r.n. with bowel regimen  -keep orthopedic follow-up      Chronic respiratory failure  On chronic home nocturnal oxygen.      Bipolar disorder        Chronic pain  History of chronic neck and back pain. Likely secondary to MSK/degenerative joint  disease.   Avoid Lyrica.      Borderline hypotension  Borderline hypertension, acute on chronic.  Improved on increased dose of midodrine.  -continue increased midodrine to 5 mg b.i.d.  -continue to monitor blood pressure clinically    Chronic obstructive pulmonary disease  Chronic medical condition.  Continuing home medication.  Monitoring.  No current signs of exacerbation.     GERD (gastroesophageal reflux disease)  Chronic medical condition.  Continuing home medication.  Monitoring.          Hyperlipidemia  Chronic medical condition.    Continuing home medication.  Monitoring.          Patient Active Problem List   Diagnosis    Colitis    SOB (shortness of breath)    Pyrexia    Hyperlipidemia    GERD (gastroesophageal reflux disease)    Renal insufficiency    Dehydration    Chest pain    Dry eye of left side    Chronic obstructive pulmonary disease    Nausea in adult    Right hip pain    COPD mixed type    Closed fracture of lower end of left radius with routine healing    ESRD (end stage renal disease)    Borderline hypotension    Physical deconditioning with generalized weakness and history of falls    Chronic pain    Bipolar disorder    Chronic respiratory failure    Hypothyroidism    Fracture of left wrist    Hyperphosphatemia    Dyspepsia    Chronic anemia    Compression fracture of L2 lumbar vertebra       VTE Risk Mitigation (From admission, onward)        Ordered     heparin (porcine) injection 5,000 Units  Every 8 hours      08/29/19 2050     IP VTE HIGH RISK PATIENT  Once      08/29/19 2050                Shaheen Turner MD  Department of Hospital Medicine   [facility]

## 2019-09-07 NOTE — PT/OT/SLP PROGRESS
Physical Therapy      Patient Name:  Althea Gaona   MRN:  [id number]    Patient not seen today secondary to refused this morning and was at Dialysis this afternoon. Will follow-up Monday (9/9).    Fabiana Brown, PT

## 2019-09-07 NOTE — SUBJECTIVE & OBJECTIVE
Interval History:  Patient seen and examined this morning.  Laying in bed.  Complained of severe back pain.  Requesting pain medication.  Discussed with patient that it is a scheduled medicine and I will check with the nurse.  Noted patient has received her brace and reported yesterday the brace helped with the pain and support.  Again discussed with the patient the plan for follow-up with the Pain Management as outpatient for further evaluation and management per Orthopedic.  Patient again requested that she be given enough of the pain medicine before she leaves for the nursing home.    Review of Systems   Constitutional: Negative for chills and fever.   HENT: Negative for ear discharge and postnasal drip.    Eyes: Negative for pain and redness.   Respiratory: Negative for shortness of breath.    Cardiovascular: Negative for chest pain and leg swelling.   Gastrointestinal: Negative for abdominal pain, nausea and vomiting.   Genitourinary: Negative.    Musculoskeletal: Positive for arthralgias ( right shoulder discomfort), back pain (since fall 9/4/19 and mild compression fracture L2 ) and neck pain.        Shoulder pain   Neurological: Positive for weakness.   Psychiatric/Behavioral: Positive for confusion (off and on ). Negative for hallucinations.     Objective:     Vital Signs (Most Recent):  Temp: 98 °F (36.7 °C) (09/07/19 0400)  Pulse: 68 (09/07/19 0400)  Resp: 18 (09/07/19 0400)  BP: (!) 141/71 (09/07/19 0400)  SpO2: (!) 94 % (09/07/19 0400) Vital Signs (24h Range):  Temp:  [97.7 °F (36.5 °C)-98.7 °F (37.1 °C)] 98 °F (36.7 °C)  Pulse:  [63-75] 68  Resp:  [16-18] 18  SpO2:  [5 %-96 %] 94 %  BP: (117-141)/(71-83) 141/71     Weight: 67.5 kg (148 lb 13 oz)  Body mass index is 22.96 kg/m².    Intake/Output Summary (Last 24 hours) at 9/7/2019 0807  Last data filed at 9/7/2019 0600  Gross per 24 hour   Intake 150 ml   Output 650 ml   Net -500 ml      Physical Exam   Constitutional: She is oriented to person, place,  and time. She appears well-developed and well-nourished. No distress.   Elderly female, comfortable, calm, cooperative    HENT:   Head: Normocephalic and atraumatic.   Eyes: Pupils are equal, round, and reactive to light. EOM are normal. Right eye exhibits no discharge. Left eye exhibits no discharge.   Neck: Normal range of motion. Neck supple.   Cardiovascular: Normal rate and regular rhythm.   Murmur (2/6 systolic murmur) heard.  Good thrill over the AV fistula in the left arm   Pulmonary/Chest: Effort normal and breath sounds normal. She has no wheezes.   on supplemental O2   Abdominal: Soft. Bowel sounds are normal. She exhibits no distension. There is no rebound and no guarding.   Musculoskeletal:   L wrist in splint  TLSO brace on bedside   Neurological: She is alert and oriented to person, place, and time.   Moving all four extremities   Skin: She is not diaphoretic.   Left upper extremity AVF   Psychiatric: She has a normal mood and affect. Her behavior is normal. Judgment and thought content normal.   Nursing note and vitals reviewed.      Significant Labs:   BMP:   Recent Labs   Lab 09/07/19  0518   GLU 90   *   K 5.0   CL 91*   CO2 23   BUN 36*   CREATININE 5.5*   CALCIUM 9.9     CBC: No results for input(s): WBC, HGB, HCT, PLT in the last 48 hours.  Magnesium: No results for input(s): MG in the last 48 hours.    Significant Imaging: I have reviewed all pertinent imaging results/findings within the past 24 hours.

## 2019-09-07 NOTE — PT/OT/SLP RE-EVAL
"Occupational Therapy   Re-evaluation    Name: Althea Gaona  MRN: [id number]  Admitting Diagnosis:  Physical deconditioning      Recommendations:     Discharge Recommendations: nursing facility, skilled  Discharge Equipment Recommendations:  none  Barriers to discharge:  None    Assessment:     Althea Gaona is a 79 y.o. female with a medical diagnosis of Physical deconditioning.  She presents with increased pain affecting patient's participation this session.  Performance deficits affecting function are weakness, impaired endurance, impaired self care skills, impaired balance, gait instability, impaired functional mobilty, pain.      Rehab Prognosis:  Good; patient would benefit from acute skilled OT services to address these deficits and reach maximum level of function.       Plan:     Patient to be seen 5 x/week to address the above listed problems via self-care/home management, therapeutic activities, therapeutic exercises  · Plan of Care Expires: 10/07/19  · Plan of Care Reviewed with: patient    Subjective     Chief Complaint: "My back hurts"  Patient/Family stated goals: to return home  Communicated with: nursing prior to session.  Pain/Comfort:  · Pain Rating 1: 10/10  · Location - Side 1: Right  · Location 1: lumbar spine  · Pain Addressed 1: Reposition, Nurse notified    Objective:     Communicated with: Nursing prior to session.  Patient found HOB elevated with: oxygen, peripheral IV, telemetry upon OT entry to room.    General Precautions: Standard, fall   Orthopedic Precautions:N/A   Braces: TLSO     Occupational Performance:    Bed Mobility:    · Patient completed Supine to Sit with contact guard assistance  · Patient completed Sit to Supine with stand by assistance    Functional Mobility/Transfers:  · Patient completed Sit <> Stand Transfer with contact guard assistance  with  rolling walker   · Patient completed Toilet Transfer Stand Pivot technique with contact guard assistance with  rolling " walker to BSC    Activities of Daily Living:  · Grooming: supervision for brushing hair lying in bed with HOB elevated   · Upper Body Dressing: Min A to don TLSO brace    · Toileting: stand by assistance while seated    Cognitive/Visual Perceptual:  Cognitive/Psychosocial Skills:     -       Oriented to: Person, Time and Situation   -       Follows Commands/attention:Easily distracted and Follows one-step commands  -       Memory: Impaired STM  -       Safety awareness/insight to disability: impaired   -       Mood/Affect/Coping skills/emotional control: Cooperative and Anxious    Physical Exam:  Balance:    -       sitting balance fair; standing balance fair  Postural examination/scapula alignment:    -       Rounded shoulders  -       Forward head  Upper Extremity Range of Motion:     -       Right Upper Extremity: ~100 degrees shoulder flexion, WFL distally; pain present in lower back  -       Left Upper Extremity: ~100 degrees shoulder flexion, limited ROM in wrist due to recent surgery; pain present in lower back  Upper Extremity Strength:    -       Right Upper Extremity: 3-/5 shoulder flexion, 3/5 distally  -       Left Upper Extremity: 3-/5 shoulder flexion, 3/5 distally; wrist NT due to pain   Strength:    -       Right Upper Extremity: Deficits:    -       Left Upper Extremity: Deficits:      AMPAC 6 Click:  AMPAC Total Score: 18    Treatment & Education:Education:    Educated patient on safety techniques with use of walker, instructions on how to don TLSO brace, and instructions on use of call bell if pt needs anything, not to get up out of bed alone.      Patient left supine with all lines intact, call button in reach, bed alarm on and nursing notified    GOALS:   Multidisciplinary Problems     Occupational Therapy Goals        Problem: Occupational Therapy Goal    Goal Priority Disciplines Outcome Interventions   Occupational Therapy Goal     OT, PT/OT     Description:  Goals to be met by: discharge      Patient will increase functional independence with ADLs by performing:    LE Dressing with Supervision.  Grooming while standing at sink with Supervision.  Toileting from toilet with Supervision for hygiene and clothing management.   Toilet transfer to toilet with Supervision.                       History:     Past Medical History:   Diagnosis Date    Alcoholism     no drink since 1984    Anxiety     Asthma     Bipolar disorder     Chronic kidney disease     COPD (chronic obstructive pulmonary disease)     GERD (gastroesophageal reflux disease)     Hypertension     Pancreatitis     Thyroid disease        Past Surgical History:   Procedure Laterality Date    APPENDECTOMY      CATARACT EXTRACTION Right     EYE SURGERY      HYSTERECTOMY      THYROIDECTOMY      THYROIDECTOMY         Time Tracking:     OT Date of Treatment: 09/07/19  OT Start Time: 1215  OT Stop Time: 1230  OT Total Time (min): 15 min    Billable Minutes:Re-eval 5  Self Care/Home Management 10    Audrey Hunt OT  9/7/2019

## 2019-09-07 NOTE — ASSESSMENT & PLAN NOTE
Fell in the room 9/4/19 at 12:20 am, pt reported she needed to go to the rest room and got out without assistance and tripped over her cane at the bedside  Xray lumbar shows mild compression fracture L2  Norco and IV morphone 1 mg on board  Consulted Ortho, been evaluated and recommended LTSO Brace. recommended also pt f/u with pain mx as out pt for kyphoplasty if indicated at that point  PT/OT to cont therapy while awaiting placement  Pt is high risk for falls, hx of 2 falls at home before admit as well  Confusion is a contributing factor as pt mentioned she was confused the night she fell

## 2019-09-07 NOTE — SUBJECTIVE & OBJECTIVE
"Interval History: Pt seen and examined today, Laying in bed comfortably. Pt wearing her back brace, reports they fitted her today and it is giving a good support to her back and helping with the pain. Pt inquired when can she move to her SNF facility, informed PT/OT has to evaluate her again and then we can transfer her. Pt said" I will need bunch of my pain medicine and dont forget that for me."  Other than that pt denied chest pain, SOB     Review of Systems   Constitutional: Negative for chills and fever.   HENT: Negative for ear discharge and postnasal drip.    Eyes: Negative for pain and redness.   Respiratory: Negative for shortness of breath.    Cardiovascular: Negative for chest pain and leg swelling.   Gastrointestinal: Negative for abdominal pain, nausea and vomiting.   Genitourinary: Negative.    Musculoskeletal: Positive for arthralgias ( right shoulder discomfort), back pain (since fall 9/4/19 and mild compression fracture L2 ) and neck pain.        Shoulder pain   Neurological: Positive for weakness.   Psychiatric/Behavioral: Positive for confusion (off and on ). Negative for hallucinations.     Objective:     Vital Signs (Most Recent):  Temp: 98 °F (36.7 °C) (09/06/19 1621)  Pulse: 67 (09/06/19 1621)  Resp: 16 (09/06/19 1621)  BP: 117/76 (09/06/19 1621)  SpO2: 95 % (09/06/19 1621) Vital Signs (24h Range):  Temp:  [97.9 °F (36.6 °C)-98.3 °F (36.8 °C)] 98 °F (36.7 °C)  Pulse:  [67-93] 67  Resp:  [16-18] 16  SpO2:  [5 %-95 %] 95 %  BP: ()/(51-83) 117/76     Weight: 67.5 kg (148 lb 13 oz)  Body mass index is 22.96 kg/m².    Intake/Output Summary (Last 24 hours) at 9/6/2019 2017  Last data filed at 9/6/2019 1500  Gross per 24 hour   Intake --   Output 650 ml   Net -650 ml      Physical Exam   Constitutional: She is oriented to person, place, and time. She appears well-developed and well-nourished. No distress.   Elderly female, comfortable, calm, cooperative    HENT:   Head: Normocephalic and " atraumatic.   Eyes: Pupils are equal, round, and reactive to light. EOM are normal. Right eye exhibits no discharge. Left eye exhibits no discharge.   Neck: Normal range of motion. Neck supple.   Cardiovascular: Normal rate and regular rhythm.   Murmur (2/6 systolic murmur) heard.  Good thrill over the AV fistula in the left arm   Pulmonary/Chest: Effort normal and breath sounds normal. She has no wheezes.   on supplemental O2   Abdominal: Soft. Bowel sounds are normal. She exhibits no distension. There is no rebound and no guarding.   Musculoskeletal:   L wrist in splint  Wearing the TLSO brace     Neurological: She is alert and oriented to person, place, and time.   Moving all four extremities   Skin: She is not diaphoretic.   Left upper extremity AVF   Psychiatric: She has a normal mood and affect. Her behavior is normal. Judgment and thought content normal.   Nursing note and vitals reviewed.      Significant Labs:   CBC: No results for input(s): WBC, HGB, HCT, PLT in the last 48 hours.  CMP:   Recent Labs   Lab 09/05/19  0452   *   K 4.5   CL 95   CO2 23   GLU 99   BUN 34*   CREATININE 5.1*   CALCIUM 9.3   ANIONGAP 14   EGFRNONAA 7.6*       Significant Imaging: I have reviewed all pertinent imaging results/findings within the past 24 hours.

## 2019-09-07 NOTE — PROGRESS NOTES
Progress Note  Nephrology    Consult Requested By: Shaheen Turner MD    Reason for Consult: ESRD, dependent on dialysis    SUBJECTIVE:     History of Present Illness:  80 y/o female patient known to our practice, has out patient hemodialysis 3x week on TTS schedule.  She missed her treatment on Tuesday.  When she came for dialysis today, out patient unit staff noted several changes.  She usually walks in of her own accord and is oriented at baseline.  Today, family was unable to get her out of the car d/t profound weakness, and she was not oriented, talking out of her head.  She was sent to ER via EMS.  Renal is consulted for dialysis orders.    8/29  Pt seen and evaluated in ER.  Mental status a bit better.  States she missed dialysis on Tuesday d/t being very ill with fever and felt terrible.  Noted, she is unsure what day it is right now, thinks it's Saturday.  It was reported to unit staff that pt is taking lyrica, which is on her allergy list, recently prescribed.  8/30  Sleeping  8/31  Sleeping.  K+ 4.6, Na+ 133.  Hypotensive.  HD due today.  9/1  No new labs today, BMP in am.  Pt reports feeling better, still weak.  Plans to go to SNF at discharge.  HD yesterday, 2L UF.  SBP low 100's today.  9/2  HD tomorrow, no acute need today.  Pt feeling better, VSS.  Awaiting SNF placement.  9/3  Seen nad examined on HD today.  Pt feeling better, VSS.  Awaiting SNF placement.  9/4  HD tomorrow, no acute need today.  Pt feeling better, VSS.  Awaiting SNF placement.  9/5  Seen and examined on HD today. Tolerating well.  9/6  HD tomorrow, no acute need today.  Pt feeling better, VSS.  Awaiting SNF placement.  9/7  Seen and examined on HD machine today. Tolerating well.    Assessment/plan:  1.  ESRD -- On TTS schedule, dose meds for HD. Renal diet.  2.  Anemia of chronic dz--CLAIRE with renal replacement therapy PRN  3.  SHPT--continue calcitriol, monitor Phos and Ca  4.  Hypotension--continue midodrine bid, UF as  tolerated.    Past Medical History:   Diagnosis Date    Alcoholism     no drink since     Anxiety     Asthma     Bipolar disorder     Chronic kidney disease     COPD (chronic obstructive pulmonary disease)     GERD (gastroesophageal reflux disease)     Hypertension     Pancreatitis     Thyroid disease      Past Surgical History:   Procedure Laterality Date    APPENDECTOMY      CATARACT EXTRACTION Right     EYE SURGERY      HYSTERECTOMY      THYROIDECTOMY      THYROIDECTOMY       Family History   Problem Relation Age of Onset    No Known Problems Mother     Diabetes Father     Heart disease Father         blood clot in lung went to heart    Cancer Sister         breast    Stroke Paternal Aunt     Cancer Sister         breast    Cancer Cousin         colon    Diabetes Cousin     Cancer Cousin         colon    COPD Sister          of COPD     Social History     Tobacco Use    Smoking status: Current Some Day Smoker     Packs/day: 1.00     Types: Cigarettes    Smokeless tobacco: Never Used   Substance Use Topics    Alcohol use: No    Drug use: Never       Review of patient's allergies indicates:   Allergen Reactions    Metoprolol Other (See Comments)     Grand-daughter/care giver reported Pt has over reaction to this drug, Bp bottoms out along with heart rate    Abilify [aripiprazole]      dizziness    Codeine      Other reaction(s): Unknown        Review of Systems:  General ROS: positive for fever, weakness  Psychological ROS: negative for - behavioral disorder or depression  ENT ROS: c/o headache  Hematological and Lymphatic ROS: negative for - bleeding problems or bruising  Endocrine ROS: negative for - temperature intolerance or unexpected weight changes  Respiratory ROS: no cough, shortness of breath, or wheezing  Cardiovascular ROS: + chest pain, no SOB  Gastrointestinal ROS: no abdominal pain, no n/v/c/d  Genito-Urinary ROS: no dysuria or trouble voiding  Musculoskeletal  "ROS: positive for weakness  Neurological ROS: "dizzy", "HA"  Dermatological ROS: no c/o skin lesion    OBJECTIVE:     Vital Signs Range (Last 24H):  Temp:  [97.7 °F (36.5 °C)-98.7 °F (37.1 °C)]   Pulse:  [63-75]   Resp:  [16-18]   BP: (117-141)/(71-83)   SpO2:  [5 %-96 %]     Physical Exam:  General- NAD noted  HEENT- WNL  Neck- supple  CV- Regular rate and rhythm  Resp- Lungs CTA Bilaterally, No increased WOB  GI- Non tender/non-distended, BS normoactive x4 quads  Extrem- No cyanosis, clubbing, edema.  Derm- skin w/d  Neuro-  Mostly confused, not at baseline     Body mass index is 22.96 kg/m².    Laboratory:  CBC:   Recent Labs   Lab 09/04/19  0510   WBC 8.10   RBC 3.82*   HGB 12.9   HCT 40.6      *   MCH 33.8*   MCHC 31.8*     CMP:   Recent Labs   Lab 09/07/19  0518   GLU 90   CALCIUM 9.9   *   K 5.0   CO2 23   CL 91*   BUN 36*   CREATININE 5.5*       Diagnostic Results:  labs pending      ASSESSMENT/PLAN:     Active Hospital Problems    Diagnosis  POA    *Physical deconditioning with generalized weakness and history of falls [R53.81]  Yes    Compression fracture of L2 lumbar vertebra [S32.020A]  No    Chronic anemia [D64.9]  Yes     Chronic    Dyspepsia [R10.13]  Yes     Chronic    Hyperphosphatemia [E83.39]  Yes    Borderline hypotension [I95.9]  Yes    Chronic pain [G89.29]  Yes     Chronic    Chronic respiratory failure [J96.10]  Yes     Chronic    Fracture of left wrist [S62.102A]  Yes     Chronic    Bipolar disorder [F31.9]  Yes     Chronic    Hypothyroidism [E03.9]  Yes     Chronic    ESRD (end stage renal disease) [N18.6]  Yes     Chronic    Chronic obstructive pulmonary disease [J44.9]  Yes     Chronic    Hyperlipidemia [E78.5]  Yes     Chronic    GERD (gastroesophageal reflux disease) [K21.9]  Yes     Chronic      Resolved Hospital Problems    Diagnosis Date Resolved POA    Hyperkalemia [E87.5] 09/05/2019 Yes    Encephalopathy [G93.40] 08/30/2019 Yes    Hyperkalemia " [E87.5] 08/30/2019 Yes    Constipation [K59.00] 09/02/2019 Yes         Thank you for allowing us to participate in the care of your patient. We will follow the patient and provide recommendations as needed.      Time spent seeing patient( greater than 1/2 spent in direct contact) :

## 2019-09-07 NOTE — NURSING
09/07/2019    Pt return from dialysis via bed transport by Aid and nurse total amount removed 1 L  per dialysis nurse

## 2019-09-08 LAB
GLUCOSE SERPL-MCNC: 105 MG/DL (ref 70–110)
GLUCOSE SERPL-MCNC: 114 MG/DL (ref 70–110)
GLUCOSE SERPL-MCNC: 151 MG/DL (ref 70–110)
GLUCOSE SERPL-MCNC: 178 MG/DL (ref 70–110)

## 2019-09-08 PROCEDURE — 82962 GLUCOSE BLOOD TEST: CPT

## 2019-09-08 PROCEDURE — 27000221 HC OXYGEN, UP TO 24 HOURS

## 2019-09-08 PROCEDURE — 94761 N-INVAS EAR/PLS OXIMETRY MLT: CPT

## 2019-09-08 PROCEDURE — 25000003 PHARM REV CODE 250: Performed by: INTERNAL MEDICINE

## 2019-09-08 PROCEDURE — 63600175 PHARM REV CODE 636 W HCPCS: Performed by: INTERNAL MEDICINE

## 2019-09-08 PROCEDURE — 94640 AIRWAY INHALATION TREATMENT: CPT

## 2019-09-08 PROCEDURE — 12000002 HC ACUTE/MED SURGE SEMI-PRIVATE ROOM

## 2019-09-08 RX ADMIN — FLUOXETINE 20 MG: 20 CAPSULE ORAL at 10:09

## 2019-09-08 RX ADMIN — HYDROCODONE BITARTRATE AND ACETAMINOPHEN 1 TABLET: 5; 325 TABLET ORAL at 05:09

## 2019-09-08 RX ADMIN — HEPARIN SODIUM 5000 UNITS: 5000 INJECTION INTRAVENOUS; SUBCUTANEOUS at 05:09

## 2019-09-08 RX ADMIN — BUSPIRONE HYDROCHLORIDE 10 MG: 5 TABLET ORAL at 09:09

## 2019-09-08 RX ADMIN — LEVOTHYROXINE SODIUM 100 MCG: 100 TABLET ORAL at 05:09

## 2019-09-08 RX ADMIN — GABAPENTIN 300 MG: 300 CAPSULE ORAL at 10:09

## 2019-09-08 RX ADMIN — HYDROCODONE BITARTRATE AND ACETAMINOPHEN 1 TABLET: 5; 325 TABLET ORAL at 12:09

## 2019-09-08 RX ADMIN — FAMOTIDINE 20 MG: 20 TABLET ORAL at 10:09

## 2019-09-08 RX ADMIN — MIDODRINE HYDROCHLORIDE 5 MG: 2.5 TABLET ORAL at 10:09

## 2019-09-08 RX ADMIN — MIDODRINE HYDROCHLORIDE 5 MG: 2.5 TABLET ORAL at 05:09

## 2019-09-08 RX ADMIN — ROSUVASTATIN CALCIUM 20 MG: 20 TABLET, FILM COATED ORAL at 09:09

## 2019-09-08 RX ADMIN — CALCITRIOL CAPSULES 0.25 MCG 0.25 MCG: 0.25 CAPSULE ORAL at 10:09

## 2019-09-08 RX ADMIN — HEPARIN SODIUM 5000 UNITS: 5000 INJECTION INTRAVENOUS; SUBCUTANEOUS at 09:09

## 2019-09-08 RX ADMIN — POLYETHYLENE GLYCOL 3350 17 G: 17 POWDER, FOR SOLUTION ORAL at 10:09

## 2019-09-08 RX ADMIN — BUSPIRONE HYDROCHLORIDE 10 MG: 5 TABLET ORAL at 10:09

## 2019-09-08 NOTE — PLAN OF CARE
09/08/19 1401   Discharge Reassessment   Assessment Type Discharge Planning Reassessment   Anticipated Discharge Disposition SNF   PT (09/06/2019) and OT (09/07/2019)  notes that were needed were sent to [facility] and [facility] via [employer] fax.

## 2019-09-08 NOTE — PROGRESS NOTES
[facility] Medicine  Progress Note    Patient Name: Althea Gaona  MRN: [id number]  Patient Class: IP- Inpatient   Admission Date: 8/29/2019  Length of Stay: 3 days  Attending Physician: Shaheen Turner MD  Primary Care Provider: Primary Doctor No        Subjective:     Principal Problem:Physical deconditioning        HPI:  Mrs. Gaona is a 79 years old female sent from hemodialysis due to encephalopathy and weakness.  As per HPI patient was assessed after urgent dialysis and was reportedly back to her baseline. As per collateral she is ESRD on HD TTS however missed HD session on Tuesday due to generalized weakness. On Thursday when she presented to HD session she was noted to be unlike herself, unable to ambulate, weak and confused and therefore referred to the ED. Her admissions labs were significant for hyperkalemia, otherwise at baseline, no signs of infection, non focal neurological examination. Denied any fevers, shortness of breath, abdominal pain, loose stools.      Overview/Hospital Course:  In the ED nephrology was consulted and patient underwent urgent hemodialysis session.  Following assessment after dialysis patient mental status back to baseline, alert and oriented, answering all questions appropriately.  There was a concern she was recently started on lyrica however unable to confirm and this is listed as medication allergy. She did complain of headache and chronic neck and back pain for which she is on chronic opioid therapy at home. She also has injury to the left wrist and following orthopedics for same.  She continued to endorse generalized weakness with debility, states has been getting PT/OT sessions at home for the last few weeks, reports noted improvement but still concerned.  Initially she was reluctant to consider skilled nursing placement however after discussion with family, patient more agreeable.  Blood pressure better controlled on increase dose of midodrine.   Nephrology following for regular home dialysis schedule TTS.  Medically stable for discharge to skilled nursing facility, awaiting placement.  9/4/19, pt fell in her room with c/o back pain. Xray lumbar shows Mild compression fracture at l2. Ortho Dr Mcarthur consulted. Recommended TLSO brace and F/U with pain mx out pt for eval for kyphoplasty  Pt to be evaluated by PT/OT before being transfer to SNF.    Interval History: Pt seen and examined. Laying in bed, asked if she can get her norco.  Asked patient if she was hurting patient said no but she wants her Norco because she has not received 1 yet today.  Next asked patient if she was wearing her brace she said no she has been laying in bed.  Patient denies chest pain, shortness of breath, Fever    Review of Systems   Constitutional: Negative for chills and fever.   HENT: Negative for ear discharge and postnasal drip.    Eyes: Negative for pain and redness.   Respiratory: Negative for shortness of breath.    Cardiovascular: Negative for chest pain and leg swelling.   Gastrointestinal: Negative for abdominal pain, nausea and vomiting.   Genitourinary: Negative.    Musculoskeletal: Positive for arthralgias ( right shoulder discomfort) and back pain (since fall 9/4/19 and mild compression fracture L2 ). Negative for neck pain.        Shoulder pain   Neurological: Positive for weakness.   Psychiatric/Behavioral: Positive for confusion (off and on ). Negative for hallucinations.     Objective:     Vital Signs (Most Recent):  Temp: 97.8 °F (36.6 °C) (09/08/19 1207)  Pulse: 72 (09/08/19 1207)  Resp: 17 (09/08/19 1207)  BP: (!) 89/58 (09/08/19 1207)  SpO2: (!) 93 % (09/08/19 1207) Vital Signs (24h Range):  Temp:  [97.8 °F (36.6 °C)-98.8 °F (37.1 °C)] 97.8 °F (36.6 °C)  Pulse:  [71-84] 72  Resp:  [16-19] 17  SpO2:  [92 %-94 %] 93 %  BP: ()/(58-72) 89/58     Weight: 67.5 kg (148 lb 13 oz)  Body mass index is 22.96 kg/m².    Intake/Output Summary (Last 24 hours) at  9/8/2019 1517  Last data filed at 9/7/2019 1635  Gross per 24 hour   Intake 500 ml   Output 1500 ml   Net -1000 ml      Physical Exam   Constitutional: She is oriented to person, place, and time. She appears well-developed and well-nourished. No distress.   Elderly female, comfortable, calm, cooperative    HENT:   Head: Normocephalic and atraumatic.   Eyes: Pupils are equal, round, and reactive to light. EOM are normal. Right eye exhibits no discharge. Left eye exhibits no discharge.   Neck: Normal range of motion. Neck supple.   Cardiovascular: Normal rate and regular rhythm.   Murmur (2/6 systolic murmur) heard.  Good thrill over the AV fistula in the left arm   Pulmonary/Chest: Effort normal and breath sounds normal. She has no wheezes.   on supplemental O2   Abdominal: Soft. Bowel sounds are normal. She exhibits no distension. There is no rebound and no guarding.   Musculoskeletal:   L wrist in splint  TLSO brace on bedside   Neurological: She is alert and oriented to person, place, and time.   Moving all four extremities   Skin: She is not diaphoretic.   Left upper extremity AVF   Psychiatric: She has a normal mood and affect. Her behavior is normal. Judgment and thought content normal.   Nursing note and vitals reviewed.      Significant Labs:   BMP:   Recent Labs   Lab 09/07/19  0518   GLU 90   *   K 5.0   CL 91*   CO2 23   BUN 36*   CREATININE 5.5*   CALCIUM 9.9     CBC: No results for input(s): WBC, HGB, HCT, PLT in the last 48 hours.    Significant Imaging: I have reviewed all pertinent imaging results/findings within the past 24 hours.      Assessment/Plan:      * Physical deconditioning with generalized weakness and history of falls  Generalized weakness and physical deconditioning.  As per collateral 2 falls recently before admission.  Has sustained left distal radial and ulna fractures, status post surgery.  Was receiving home PT/OT.  As per family not able to manage.  Patient now agreeable to  skilled nursing placement.  PT/OT following  case management consulted for skilled nursing placement.  Patient accepted but awaiting recent PT OT notes  09/04/2019, fell in the room --> mild compression fracture L2, Pt evaluated by Ortho, recommended TLSO brace. Pt received her Brace 9/6/19 been fitted and  Feels much better with it.   pt evaluated by PT/OT 9/7/19, information sent over to [facility] for approval      ESRD (end stage renal disease)  Known ESRD on HD TTS.  Missed 2 sessions due to generalized weakness before admit and now Status post urgent dialysis 8/29.    HD as per Nephrology, resuming TTS  Renally dose all medications and avoid nephrotoxin drugs  appreciate Nephrology input      Compression fracture of L2 lumbar vertebra  Fell in the room 9/4/19 at 12:20 am, pt reported she needed to go to the rest room and got out without assistance and tripped over her cane at the bedside  Xray lumbar shows mild compression fracture L2  Norco 5/325 mg 1 p.o. Q 4 hourly, changed from q.6  DC IV morphine   Consulted Ortho, been evaluated and recommended LTSO Brace. recommended also pt f/u with pain mx as out pt for pain management and kyphoplasty if indicated at that point  PT/OT therapy on hold since the fall/compression fracture.   Pt is high risk for falls, hx of 2 falls at home before admit as well  Confusion is a contributing factor as pt mentioned she was confused the night she fell  Patient received TLSO brace 09/06/2019, evaluated by PT OT 9/7/19, all notes were sent toe [facility] for evaluation  Awaiting to hear back    Chronic anemia  Continue CLAIRE.      Dyspepsia  -chronic, continue famotidine and clinically monitor      Hyperphosphatemia  In ESRD patient.   - dialysis and low phosphorus diet  - trend labs      Fracture of left wrist  History of fall with sustained left distal radial and ulna fracture status post or if.  Left wrist currently in a splint.  Due  to follow-up orthopedics,  09/05.  -home [place] 5 q.6h p.r.n. with bowel regimen  -keep orthopedic follow-up      Chronic respiratory failure  On chronic home nocturnal oxygen.      Bipolar disorder        Chronic pain  History of chronic neck and back pain. Likely secondary to MSK/degenerative joint disease.   Avoid Lyrica.      Borderline hypotension  Borderline hypertension, acute on chronic.  Improved on increased dose of midodrine.  -continue increased midodrine to 5 mg b.i.d.  -continue to monitor blood pressure clinically    Chronic obstructive pulmonary disease  Chronic medical condition.  Continuing home medication.  Monitoring.  No current signs of exacerbation.     GERD (gastroesophageal reflux disease)  Chronic medical condition.  Continuing home medication.  Monitoring.          Hyperlipidemia  Chronic medical condition.    Continuing home medication.  Monitoring.          VTE Risk Mitigation (From admission, onward)        Ordered     heparin (porcine) injection 5,000 Units  Every 8 hours      08/29/19 2050     IP VTE HIGH RISK PATIENT  Once      08/29/19 2050                Shaheen Turner MD  Department of Hospital Medicine   [facility]

## 2019-09-08 NOTE — ASSESSMENT & PLAN NOTE
Fell in the room 9/4/19 at 12:20 am, pt reported she needed to go to the rest room and got out without assistance and tripped over her cane at the bedside  Xray lumbar shows mild compression fracture L2  Norco 5/325 mg 1 p.o. Q 4 hourly, changed from q.6  DC IV morphine   Consulted Ortho, been evaluated and recommended LTSO Brace. recommended also pt f/u with pain mx as out pt for pain management and kyphoplasty if indicated at that point  PT/OT therapy on hold since the fall/compression fracture.   Pt is high risk for falls, hx of 2 falls at home before admit as well  Confusion is a contributing factor as pt mentioned she was confused the night she fell  Patient received TLSO brace 09/06/2019, evaluated by PT OT 9/7/19, all notes were sent toe [facility]  Awaiting to hear back

## 2019-09-08 NOTE — SUBJECTIVE & OBJECTIVE
Interval History: Pt seen and examined. Laying in bed, asked if she can get her norco.  Asked patient if she was hurting patient said no but she wants her Norco because she has not received 1 yet today.  Next asked patient if she was wearing her brace she said no she has been laying in bed.  Patient denies chest pain, shortness of breath, Fever    Review of Systems   Constitutional: Negative for chills and fever.   HENT: Negative for ear discharge and postnasal drip.    Eyes: Negative for pain and redness.   Respiratory: Negative for shortness of breath.    Cardiovascular: Negative for chest pain and leg swelling.   Gastrointestinal: Negative for abdominal pain, nausea and vomiting.   Genitourinary: Negative.    Musculoskeletal: Positive for arthralgias ( right shoulder discomfort) and back pain (since fall 9/4/19 and mild compression fracture L2 ). Negative for neck pain.        Shoulder pain   Neurological: Positive for weakness.   Psychiatric/Behavioral: Positive for confusion (off and on ). Negative for hallucinations.     Objective:     Vital Signs (Most Recent):  Temp: 97.8 °F (36.6 °C) (09/08/19 1207)  Pulse: 72 (09/08/19 1207)  Resp: 17 (09/08/19 1207)  BP: (!) 89/58 (09/08/19 1207)  SpO2: (!) 93 % (09/08/19 1207) Vital Signs (24h Range):  Temp:  [97.8 °F (36.6 °C)-98.8 °F (37.1 °C)] 97.8 °F (36.6 °C)  Pulse:  [71-84] 72  Resp:  [16-19] 17  SpO2:  [92 %-94 %] 93 %  BP: ()/(58-72) 89/58     Weight: 67.5 kg (148 lb 13 oz)  Body mass index is 22.96 kg/m².    Intake/Output Summary (Last 24 hours) at 9/8/2019 1517  Last data filed at 9/7/2019 1635  Gross per 24 hour   Intake 500 ml   Output 1500 ml   Net -1000 ml      Physical Exam   Constitutional: She is oriented to person, place, and time. She appears well-developed and well-nourished. No distress.   Elderly female, comfortable, calm, cooperative    HENT:   Head: Normocephalic and atraumatic.   Eyes: Pupils are equal, round, and reactive to light. EOM  are normal. Right eye exhibits no discharge. Left eye exhibits no discharge.   Neck: Normal range of motion. Neck supple.   Cardiovascular: Normal rate and regular rhythm.   Murmur (2/6 systolic murmur) heard.  Good thrill over the AV fistula in the left arm   Pulmonary/Chest: Effort normal and breath sounds normal. She has no wheezes.   on supplemental O2   Abdominal: Soft. Bowel sounds are normal. She exhibits no distension. There is no rebound and no guarding.   Musculoskeletal:   L wrist in splint  TLSO brace on bedside   Neurological: She is alert and oriented to person, place, and time.   Moving all four extremities   Skin: She is not diaphoretic.   Left upper extremity AVF   Psychiatric: She has a normal mood and affect. Her behavior is normal. Judgment and thought content normal.   Nursing note and vitals reviewed.      Significant Labs:   BMP:   Recent Labs   Lab 09/07/19  0518   GLU 90   *   K 5.0   CL 91*   CO2 23   BUN 36*   CREATININE 5.5*   CALCIUM 9.9     CBC: No results for input(s): WBC, HGB, HCT, PLT in the last 48 hours.    Significant Imaging: I have reviewed all pertinent imaging results/findings within the past 24 hours.

## 2019-09-08 NOTE — ASSESSMENT & PLAN NOTE
Generalized weakness and physical deconditioning.  As per collateral 2 falls recently before admission.  Has sustained left distal radial and ulna fractures, status post surgery.  Was receiving home PT/OT.  As per family not able to manage.  Patient now agreeable to skilled nursing placement.  PT/OT following  case management consulted for skilled nursing placement.  Patient accepted but awaiting recent PT OT notes  09/04/2019, fell in the room --> mild compression fracture L2, Pt evaluated by Ortho, recommended TLSO brace. Pt received her Brace 9/6/19 been fitted and  Feels much better with it.   pt evaluated by PT/OT 9/7/19, information sent over to [employer] [facility] and [facility] for approval

## 2019-09-08 NOTE — PROGRESS NOTES
Progress Note  Nephrology    Consult Requested By: Shaheen Turner MD    Reason for Consult: ESRD, dependent on dialysis    SUBJECTIVE:     History of Present Illness:  78 y/o female patient known to our practice, has out patient hemodialysis 3x week on TTS schedule.  She missed her treatment on Tuesday.  When she came for dialysis today, out patient unit staff noted several changes.  She usually walks in of her own accord and is oriented at baseline.  Today, family was unable to get her out of the car d/t profound weakness, and she was not oriented, talking out of her head.  She was sent to ER via EMS.  Renal is consulted for dialysis orders.    8/29  Pt seen and evaluated in ER.  Mental status a bit better.  States she missed dialysis on Tuesday d/t being very ill with fever and felt terrible.  Noted, she is unsure what day it is right now, thinks it's Saturday.  It was reported to unit staff that pt is taking lyrica, which is on her allergy list, recently prescribed.  8/30  Sleeping  8/31  Sleeping.  K+ 4.6, Na+ 133.  Hypotensive.  HD due today.  9/1  No new labs today, BMP in am.  Pt reports feeling better, still weak.  Plans to go to SNF at discharge.  HD yesterday, 2L UF.  SBP low 100's today.  9/2  HD tomorrow, no acute need today.  Pt feeling better, VSS.  Awaiting SNF placement.  9/3  Seen nad examined on HD today.  Pt feeling better, VSS.  Awaiting SNF placement.  9/4  HD tomorrow, no acute need today.  Pt feeling better, VSS.  Awaiting SNF placement.  9/5  Seen and examined on HD today. Tolerating well.  9/6  HD tomorrow, no acute need today.  Pt feeling better, VSS.  Awaiting SNF placement.  9/7  Seen and examined on HD machine today. Tolerating well.  9/8 VSS, no new complains, HD on Tuesday or PRN.    Assessment/plan:  1.  ESRD -- On TTS schedule, dose meds for HD. Renal diet.  2.  Anemia of chronic dz--CLAIRE with renal replacement therapy PRN  3.  SHPT--continue calcitriol, monitor Phos and Ca  4.   Hypotension--continue midodrine bid, UF as tolerated.    Past Medical History:   Diagnosis Date    Alcoholism     no drink since     Anxiety     Asthma     Bipolar disorder     Chronic kidney disease     COPD (chronic obstructive pulmonary disease)     GERD (gastroesophageal reflux disease)     Hypertension     Pancreatitis     Thyroid disease      Past Surgical History:   Procedure Laterality Date    APPENDECTOMY      CATARACT EXTRACTION Right     EYE SURGERY      HYSTERECTOMY      THYROIDECTOMY      THYROIDECTOMY       Family History   Problem Relation Age of Onset    No Known Problems Mother     Diabetes Father     Heart disease Father         blood clot in lung went to heart    Cancer Sister         breast    Stroke Paternal Aunt     Cancer Sister         breast    Cancer Cousin         colon    Diabetes Cousin     Cancer Cousin         colon    COPD Sister          of COPD     Social History     Tobacco Use    Smoking status: Current Some Day Smoker     Packs/day: 1.00     Types: Cigarettes    Smokeless tobacco: Never Used   Substance Use Topics    Alcohol use: No    Drug use: Never       Review of patient's allergies indicates:   Allergen Reactions    Metoprolol Other (See Comments)     Grand-daughter/care giver reported Pt has over reaction to this drug, Bp bottoms out along with heart rate    Abilify [aripiprazole]      dizziness    Codeine      Other reaction(s): Unknown        Review of Systems:  General ROS: positive for fever, weakness  Psychological ROS: negative for - behavioral disorder or depression  ENT ROS: c/o headache  Hematological and Lymphatic ROS: negative for - bleeding problems or bruising  Endocrine ROS: negative for - temperature intolerance or unexpected weight changes  Respiratory ROS: no cough, shortness of breath, or wheezing  Cardiovascular ROS: + chest pain, no SOB  Gastrointestinal ROS: no abdominal pain, no n/v/c/d  Genito-Urinary ROS: no  "dysuria or trouble voiding  Musculoskeletal ROS: positive for weakness  Neurological ROS: "dizzy", "HA"  Dermatological ROS: no c/o skin lesion    OBJECTIVE:     Vital Signs Range (Last 24H):  Temp:  [97.9 °F (36.6 °C)-98.8 °F (37.1 °C)]   Pulse:  [66-84]   Resp:  [16-19]   BP: ()/(51-85)   SpO2:  [92 %-94 %]     Physical Exam:  General- NAD noted  HEENT- WNL  Neck- supple  CV- Regular rate and rhythm  Resp- Lungs CTA Bilaterally, No increased WOB  GI- Non tender/non-distended, BS normoactive x4 quads  Extrem- No cyanosis, clubbing, edema.  Derm- skin w/d  Neuro-  Mostly confused, not at baseline     Body mass index is 22.96 kg/m².    Laboratory:  CBC:   Recent Labs   Lab 09/04/19  0510   WBC 8.10   RBC 3.82*   HGB 12.9   HCT 40.6      *   MCH 33.8*   MCHC 31.8*     CMP:   Recent Labs   Lab 09/07/19  0518   GLU 90   CALCIUM 9.9   *   K 5.0   CO2 23   CL 91*   BUN 36*   CREATININE 5.5*       Diagnostic Results:  labs pending      ASSESSMENT/PLAN:     Active Hospital Problems    Diagnosis  POA    *Physical deconditioning with generalized weakness and history of falls [R53.81]  Yes    Compression fracture of L2 lumbar vertebra [S32.020A]  No    Chronic anemia [D64.9]  Yes     Chronic    Dyspepsia [R10.13]  Yes     Chronic    Hyperphosphatemia [E83.39]  Yes    Borderline hypotension [I95.9]  Yes    Chronic pain [G89.29]  Yes     Chronic    Chronic respiratory failure [J96.10]  Yes     Chronic    Fracture of left wrist [S62.102A]  Yes     Chronic    Bipolar disorder [F31.9]  Yes     Chronic    Hypothyroidism [E03.9]  Yes     Chronic    ESRD (end stage renal disease) [N18.6]  Yes     Chronic    Chronic obstructive pulmonary disease [J44.9]  Yes     Chronic    Hyperlipidemia [E78.5]  Yes     Chronic    GERD (gastroesophageal reflux disease) [K21.9]  Yes     Chronic      Resolved Hospital Problems    Diagnosis Date Resolved POA    Hyperkalemia [E87.5] 09/05/2019 Yes    " Encephalopathy [G93.40] 08/30/2019 Yes    Hyperkalemia [E87.5] 08/30/2019 Yes    Constipation [K59.00] 09/02/2019 Yes         Thank you for allowing us to participate in the care of your patient. We will follow the patient and provide recommendations as needed.      Time spent seeing patient( greater than 1/2 spent in direct contact) :

## 2019-09-09 VITALS
WEIGHT: 148.81 LBS | HEIGHT: 68 IN | TEMPERATURE: 98 F | BODY MASS INDEX: 22.55 KG/M2 | HEART RATE: 90 BPM | SYSTOLIC BLOOD PRESSURE: 160 MMHG | OXYGEN SATURATION: 95 % | RESPIRATION RATE: 18 BRPM | DIASTOLIC BLOOD PRESSURE: 80 MMHG

## 2019-09-09 PROBLEM — E83.39 HYPERPHOSPHATEMIA: Status: RESOLVED | Noted: 2019-08-31 | Resolved: 2019-09-09

## 2019-09-09 LAB
ANION GAP SERPL CALC-SCNC: 13 MMOL/L (ref 8–16)
BUN SERPL-MCNC: 41 MG/DL (ref 8–23)
CALCIUM SERPL-MCNC: 9.7 MG/DL (ref 8.7–10.5)
CHLORIDE SERPL-SCNC: 97 MMOL/L (ref 95–110)
CO2 SERPL-SCNC: 25 MMOL/L (ref 23–29)
CREAT SERPL-MCNC: 5 MG/DL (ref 0.5–1.4)
ERYTHROCYTE [DISTWIDTH] IN BLOOD BY AUTOMATED COUNT: 13.9 % (ref 11.5–14.5)
EST. GFR  (AFRICAN AMERICAN): 9 ML/MIN/1.73 M^2
EST. GFR  (NON AFRICAN AMERICAN): 7.8 ML/MIN/1.73 M^2
GLUCOSE SERPL-MCNC: 110 MG/DL (ref 70–110)
GLUCOSE SERPL-MCNC: 112 MG/DL (ref 70–110)
GLUCOSE SERPL-MCNC: 114 MG/DL (ref 70–110)
GLUCOSE SERPL-MCNC: 128 MG/DL (ref 70–110)
HCT VFR BLD AUTO: 38.2 % (ref 37–48.5)
HGB BLD-MCNC: 12 G/DL (ref 12–16)
MCH RBC QN AUTO: 33.9 PG (ref 27–31)
MCHC RBC AUTO-ENTMCNC: 31.4 G/DL (ref 32–36)
MCV RBC AUTO: 108 FL (ref 82–98)
PLATELET # BLD AUTO: 337 K/UL (ref 150–350)
PMV BLD AUTO: 10.6 FL (ref 9.2–12.9)
POTASSIUM SERPL-SCNC: 4.2 MMOL/L (ref 3.5–5.1)
RBC # BLD AUTO: 3.54 M/UL (ref 4–5.4)
SODIUM SERPL-SCNC: 135 MMOL/L (ref 136–145)
WBC # BLD AUTO: 7.99 K/UL (ref 3.9–12.7)

## 2019-09-09 PROCEDURE — 97116 GAIT TRAINING THERAPY: CPT

## 2019-09-09 PROCEDURE — 99900035 HC TECH TIME PER 15 MIN (STAT)

## 2019-09-09 PROCEDURE — 94761 N-INVAS EAR/PLS OXIMETRY MLT: CPT

## 2019-09-09 PROCEDURE — 63600175 PHARM REV CODE 636 W HCPCS: Performed by: INTERNAL MEDICINE

## 2019-09-09 PROCEDURE — 25000003 PHARM REV CODE 250: Performed by: INTERNAL MEDICINE

## 2019-09-09 PROCEDURE — 80048 BASIC METABOLIC PNL TOTAL CA: CPT

## 2019-09-09 PROCEDURE — 85027 COMPLETE CBC AUTOMATED: CPT

## 2019-09-09 PROCEDURE — 36415 COLL VENOUS BLD VENIPUNCTURE: CPT

## 2019-09-09 PROCEDURE — 94640 AIRWAY INHALATION TREATMENT: CPT

## 2019-09-09 PROCEDURE — 97535 SELF CARE MNGMENT TRAINING: CPT

## 2019-09-09 PROCEDURE — 27000221 HC OXYGEN, UP TO 24 HOURS

## 2019-09-09 RX ORDER — POLYETHYLENE GLYCOL 3350 17 G/17G
17 POWDER, FOR SOLUTION ORAL DAILY PRN
Refills: 0
Start: 2019-09-09

## 2019-09-09 RX ORDER — BISACODYL 5 MG
5 TABLET, DELAYED RELEASE (ENTERIC COATED) ORAL DAILY PRN
Refills: 0 | COMMUNITY
Start: 2019-09-09 | End: 2024-03-15 | Stop reason: CLARIF

## 2019-09-09 RX ORDER — BUSPIRONE HYDROCHLORIDE 10 MG/1
10 TABLET ORAL 3 TIMES DAILY
Qty: 90 TABLET | Refills: 3 | Status: SHIPPED | OUTPATIENT
Start: 2019-09-09 | End: 2019-12-18 | Stop reason: SDUPTHER

## 2019-09-09 RX ORDER — MIDODRINE HYDROCHLORIDE 5 MG/1
5 TABLET ORAL 2 TIMES DAILY WITH MEALS
Qty: 60 TABLET | Refills: 11 | Status: SHIPPED | OUTPATIENT
Start: 2019-09-09 | End: 2020-09-08

## 2019-09-09 RX ORDER — HYDROCODONE BITARTRATE AND ACETAMINOPHEN 5; 325 MG/1; MG/1
1 TABLET ORAL EVERY 8 HOURS PRN
Qty: 20 TABLET | Refills: 0 | Status: ON HOLD
Start: 2019-09-09 | End: 2019-11-22 | Stop reason: HOSPADM

## 2019-09-09 RX ORDER — HYDROCODONE BITARTRATE AND ACETAMINOPHEN 5; 325 MG/1; MG/1
1 TABLET ORAL ONCE
Status: COMPLETED | OUTPATIENT
Start: 2019-09-09 | End: 2019-09-09

## 2019-09-09 RX ORDER — BISACODYL 5 MG
5 TABLET, DELAYED RELEASE (ENTERIC COATED) ORAL DAILY PRN
Status: DISCONTINUED | OUTPATIENT
Start: 2019-09-09 | End: 2019-09-09 | Stop reason: HOSPADM

## 2019-09-09 RX ORDER — GLYCERIN 1 G/1
1 SUPPOSITORY RECTAL ONCE
Status: DISCONTINUED | OUTPATIENT
Start: 2019-09-09 | End: 2019-09-09 | Stop reason: HOSPADM

## 2019-09-09 RX ADMIN — HYDROCODONE BITARTRATE AND ACETAMINOPHEN 1 TABLET: 5; 325 TABLET ORAL at 05:09

## 2019-09-09 RX ADMIN — HYDROCODONE BITARTRATE AND ACETAMINOPHEN 1 TABLET: 5; 325 TABLET ORAL at 10:09

## 2019-09-09 RX ADMIN — FLUOXETINE 20 MG: 20 CAPSULE ORAL at 09:09

## 2019-09-09 RX ADMIN — CALCITRIOL CAPSULES 0.25 MCG 0.25 MCG: 0.25 CAPSULE ORAL at 09:09

## 2019-09-09 RX ADMIN — GABAPENTIN 300 MG: 300 CAPSULE ORAL at 09:09

## 2019-09-09 RX ADMIN — POLYETHYLENE GLYCOL 3350 17 G: 17 POWDER, FOR SOLUTION ORAL at 09:09

## 2019-09-09 RX ADMIN — ALUMINUM HYDROXIDE, MAGNESIUM HYDROXIDE, AND SIMETHICONE 50 ML: 200; 200; 20 SUSPENSION ORAL at 12:09

## 2019-09-09 RX ADMIN — ARIPIPRAZOLE 5 MG: 5 TABLET ORAL at 12:09

## 2019-09-09 RX ADMIN — BUSPIRONE HYDROCHLORIDE 10 MG: 5 TABLET ORAL at 09:09

## 2019-09-09 RX ADMIN — HEPARIN SODIUM 5000 UNITS: 5000 INJECTION INTRAVENOUS; SUBCUTANEOUS at 05:09

## 2019-09-09 RX ADMIN — HYDROCODONE BITARTRATE AND ACETAMINOPHEN 1 TABLET: 5; 325 TABLET ORAL at 12:09

## 2019-09-09 RX ADMIN — MIDODRINE HYDROCHLORIDE 5 MG: 2.5 TABLET ORAL at 09:09

## 2019-09-09 RX ADMIN — FAMOTIDINE 20 MG: 20 TABLET ORAL at 01:09

## 2019-09-09 RX ADMIN — FLUTICASONE PROPIONATE 100 MCG: 50 SPRAY, METERED NASAL at 09:09

## 2019-09-09 RX ADMIN — LEVOTHYROXINE SODIUM 100 MCG: 100 TABLET ORAL at 05:09

## 2019-09-09 NOTE — PLAN OF CARE
09/09/19 0724   Patient Assessment/Suction   Level of Consciousness (AVPU) alert   Respiratory Effort Unlabored;Normal   Expansion/Accessory Muscles/Retractions no retractions;no use of accessory muscles   All Lung Fields Breath Sounds clear   Rhythm/Pattern, Respiratory unlabored   Cough Frequency infrequent   Cough Type good   PRE-TX-O2   O2 Device (Oxygen Therapy) nasal cannula   $ Is the patient on Low Flow Oxygen? Yes   Flow (L/min) 2   SpO2 98 %   Pulse Oximetry Type Intermittent   $ Pulse Oximetry - Multiple Charge Pulse Oximetry - Multiple   Pulse 80   Resp 18   Aerosol Therapy   $ Aerosol Therapy Charges PRN treatment not required   Inhaler   $ Inhaler Charges MDI (Metered Dose Inahler) Treatment;Mouth rinsed post treatment   Daily Review of Necessity (Inhaler) completed   Respiratory Treatment Status (Inhaler) given   Treatment Route (Inhaler) mouthpiece   Patient Position (Inhaler) semi-Singh's   Post Treatment Assessment (Inhaler) increased aeration   Signs of Intolerance (Inhaler) none   Breath Sounds Post-Respiratory Treatment   Throughout All Fields Post-Treatment All Fields   Throughout All Fields Post-Treatment aeration increased   Post-treatment Heart Rate (beats/min) 86   Post-treatment Resp Rate (breaths/min) 17

## 2019-09-09 NOTE — PLAN OF CARE
09/09/19 1625   Final Note   Assessment Type Final Discharge Note   Anticipated Discharge Disposition SNF   patient to discharge to [facility]. Dr. Turner put in discharge orders. Sent discharge orders to [facility] via [facility]. Radha from [facility] called and said can call report. Told Rajwinder LOMBARDO to call report to [facility] at [phone] to Palak LOMBARDO. [facility] will come  patient in [facility].

## 2019-09-09 NOTE — PT/OT/SLP PROGRESS
Physical Therapy Treatment    Patient Name:  Althea Gaona   MRN:  [id number]    Recommendations:     Discharge Recommendations:  nursing facility, skilled   Discharge Equipment Recommendations: none   Barriers to discharge: None    Assessment:     Althea Gaona is a 79 y.o. female admitted with a medical diagnosis of Physical deconditioning.  She presents with the following impairments/functional limitations:  weakness, impaired endurance, impaired self care skills, gait instability, impaired balance, impaired cognition. Pt tolerated gait training without reports of onset of back pain. Pt without LOB during ambulation, but required frequent verbal cuing for walker management. Pt requires min A to brittany brace. Pt limited by weakness and fatigue during tx.     Rehab Prognosis: Good; patient would benefit from acute skilled PT services to address these deficits and reach maximum level of function.    Recent Surgery: * No surgery found *      Plan:     During this hospitalization, patient to be seen daily to address the identified rehab impairments via gait training, therapeutic exercises and progress toward the following goals:    · Plan of Care Expires:  09/30/19    Subjective     Chief Complaint: BLE weakness  Patient/Family Comments/goals: return home   Pain/Comfort:  · Pain Rating 1: 0/10      Objective:     Communicated with RN prior to session.  Patient found left sidelying with oxygen, telemetry bed alarm and OT present upon PT entry to room.     General Precautions: Standard, fall   Orthopedic Precautions:    Braces:       Functional Mobility:  · Bed Mobility:     · Supine to Sit: stand by assistance  · Sit to Supine: stand by assistance  · Transfers:     · Sit to Stand:  contact guard assistance with rolling walker  · Gait: 70 feet with RW and Min A for walker management. PT required standing rest breaks during ambulation      AM-PAC 6 CLICK MOBILITY          Therapeutic Activities and Exercises:        Patient left HOB elevated with call button in reach and bed alarm on..    GOALS:   Multidisciplinary Problems     Physical Therapy Goals        Problem: Physical Therapy Goal    Goal Priority Disciplines Outcome Goal Variances Interventions   Physical Therapy Goal     PT, PT/OT Ongoing (interventions implemented as appropriate)     Description:  Goals to be met by: D/C     Patient will increase functional independence with mobility by performin. Supine to sit with Stand-by Assistance  2. Sit to stand transfer with Stand-by Assistance  3. Gait  x 50 feet with Supervision using Rolling Walker or cane.   4. Ascend and descend 3 stairs to allow safe return home                        Time Tracking:     PT Received On: 19  PT Start Time: 946     PT Stop Time: 1004  PT Total Time (min): 18 min     Billable Minutes: Gait Training 18    Treatment Type: Treatment  PT/PTA: PTA     PTA Visit Number: 1     Hillary Hammant, PTA  2019

## 2019-09-09 NOTE — PLAN OF CARE
Problem: Occupational Therapy Goal  Goal: Occupational Therapy Goal  Goals to be met by: discharge     Patient will increase functional independence with ADLs by performing:    LE Dressing with Supervision.  Grooming while standing at sink with Supervision.  Toileting from toilet with Supervision for hygiene and clothing management.   Toilet transfer to toilet with Supervision.      Outcome: Ongoing (interventions implemented as appropriate)  Audrey Hunt OT   09/09/2019

## 2019-09-09 NOTE — ASSESSMENT & PLAN NOTE
History of fall with sustained left distal radial and ulna fracture status post or if.  Left wrist currently in a splint.  Due to follow-up orthopedics,  09/05.  -home [place] 5 q.6h p.r.n. with bowel regimen  -keep orthopedic follow-up

## 2019-09-09 NOTE — DISCHARGE SUMMARY
[facility] Medicine  Discharge Summary      Patient Name: Althea Gaona  MRN: [id number]  Admission Date: 8/29/2019  Hospital Length of Stay: 4 days  Discharge Date and Time:  09/09/2019 1:24 PM  Attending Physician: Shaheen Turner MD   Discharging Provider: Shaheen Turner MD  Primary Care Provider: Primary Doctor No      HPI:   Mrs. Gaona is a 79 years old female sent from hemodialysis due to encephalopathy and weakness.  As per HPI patient was assessed after urgent dialysis and was reportedly back to her baseline. As per collateral she is ESRD on HD TTS however missed HD session on Tuesday due to generalized weakness. On Thursday when she presented to HD session she was noted to be unlike herself, unable to ambulate, weak and confused and therefore referred to the ED. Her admissions labs were significant for hyperkalemia, otherwise at baseline, no signs of infection, non focal neurological examination. Denied any fevers, shortness of breath, abdominal pain, loose stools.      * No surgery found *      Hospital Course:   In the ED nephrology was consulted and patient underwent urgent hemodialysis session.  Following assessment after dialysis patient mental status back to baseline, alert and oriented, answering all questions appropriately.  There was a concern she was recently started on lyrica however unable to confirm and this is listed as medication allergy. She did complain of headache and chronic neck and back pain for which she is on chronic opioid therapy at home. She also has injury to the left wrist and following orthopedics for same.  She continued to endorse generalized weakness with debility, states has been getting PT/OT sessions at home for the last few weeks, reports noted improvement but still concerned.  Initially she was reluctant to consider skilled nursing placement however after discussion with family, patient more agreeable.  Blood pressure better controlled on increase  dose of midodrine.  Nephrology following for regular home dialysis schedule TTS.  Medically stable for discharge to skilled nursing facility, awaiting placement.  9/4/19, pt fell in her room with c/o back pain. Xray lumbar shows Mild compression fracture at l2. Ortho Dr Mcarthur consulted. Recommended TLSO brace and F/U with pain mx out pt for eval for kyphoplasty  Pt evaluated by PT/OT, information faxed over to [email or web address] as well as [employer] [place] on 09/08/2019.  Awaiting to hear back  DO NOT PRESCRIBE LYRICA ON DISCHARGE     Consults:   Consults (From admission, onward)        Status Ordering Provider     Inpatient consult to Nephrology  Once     Provider:  Eran Solis MD    Acknowledged QUINTON SMITH     Inpatient consult to Orthopedic Surgery  Once     Provider:  Otf Mcarthur II, MD    Acknowledged SUKUMAR HANSEN     Inpatient consult to   Once     Provider:  (Not yet assigned)    Completed JESSICA CASTELLANOS     Inpatient consult to   Once     Provider:  (Not yet assigned)    Acknowledged WESTON WEEKS     Inpatient consult to Social Work/Case Management  Once     Provider:  (Not yet assigned)    Acknowledged SUKUMAR HANSEN     IP consult to case management  Once     Provider:  (Not yet assigned)    Completed QUINTON SMITH          No new Assessment & Plan notes have been filed under this hospital service since the last note was generated.  Service: Hospital Medicine    Final Active Diagnoses:    Diagnosis Date Noted POA    PRINCIPAL PROBLEM:  Physical deconditioning with generalized weakness and history of falls [R53.81] 08/30/2019 Yes    ESRD (end stage renal disease) [N18.6] 08/29/2019 Yes     Chronic    Compression fracture of L2 lumbar vertebra [S32.020A] 09/04/2019 No    Chronic anemia [D64.9] 09/02/2019 Yes     Chronic    Dyspepsia [R10.13] 09/01/2019 Yes     Chronic    Borderline hypotension [I95.9] 08/30/2019 Yes    Chronic pain [G89.29] 08/30/2019 Yes      Chronic    Chronic respiratory failure [J96.10] 08/30/2019 Yes     Chronic    Fracture of left wrist [S62.102A] 08/30/2019 Yes     Chronic    Bipolar disorder [F31.9] 08/30/2019 Yes     Chronic    Hypothyroidism [E03.9] 08/30/2019 Yes     Chronic    Chronic obstructive pulmonary disease [J44.9] 11/19/2018 Yes     Chronic    Hyperlipidemia [E78.5] 08/03/2015 Yes     Chronic    GERD (gastroesophageal reflux disease) [K21.9] 08/03/2015 Yes     Chronic      Problems Resolved During this Admission:    Diagnosis Date Noted Date Resolved POA    Hyperkalemia [E87.5] 09/05/2019 09/05/2019 Yes    Hyperphosphatemia [E83.39] 08/31/2019 09/09/2019 Yes    Encephalopathy [G93.40] 08/29/2019 08/30/2019 Yes    Hyperkalemia [E87.5] 08/03/2015 08/30/2019 Yes    Constipation [K59.00] 08/03/2015 09/02/2019 Yes       Discharged Condition: fair    Disposition: Skilled Nursing Facility    Follow Up:  Follow-up Information     Eran Solis MD. Schedule an appointment as soon as possible for a visit in 2 weeks.    Specialty:  Nephrology  Contact information:  [address] [place] [address] NEPHROLOGY [place]  [place] [address]  [phone]             Tab Mendez MD. Schedule an appointment as soon as possible for a visit in 1 week.    Specialties:  Sports Medicine, Orthopedic Surgery  Why:  Left wrist fracture, L2 compression fracture   Contact information:  [address] [place] [address] [address]  [place] [address]  [phone]                 Patient Instructions:      Diet renal     Diet diabetic     Diet renal     Notify your health care provider if you experience any of the following:  difficulty breathing or increased cough     Notify your health care provider if you experience any of the following:  persistent dizziness, light-headedness, or visual disturbances     Notify your health care provider if you experience any of the following:  increased confusion or weakness     Activity as tolerated     Activity as tolerated        Significant Diagnostic Studies: Labs:   BMP:   Recent Labs   Lab 09/09/19  0618   *   *   K 4.2   CL 97   CO2 25   BUN 41*   CREATININE 5.0*   CALCIUM 9.7    and CMP   Recent Labs   Lab 09/09/19  0618   *   K 4.2   CL 97   CO2 25   *   BUN 41*   CREATININE 5.0*   CALCIUM 9.7   ANIONGAP 13   ESTGFRAFRICA 9.0*   EGFRNONAA 7.8*       Pending Diagnostic Studies:     None         Medications:  Reconciled Home Medications:      Medication List      START taking these medications    bisacodyl 5 mg EC tablet  Commonly known as:  DULCOLAX  Take 1 tablet (5 mg total) by mouth daily as needed for Constipation.     epoetin jacob-epbx 10,000 unit/mL imjection  Commonly known as:  RETACRIT  Inject 0.34 mLs (3,400 Units total) into the skin every Tues, Thurs, Sat. With hemodialysis  Start taking on:  9/10/2019     polyethylene glycol 17 gram Pwpk  Commonly known as:  GLYCOLAX  Take 17 g by mouth daily as needed.        CHANGE how you take these medications    * busPIRone 10 MG tablet  Commonly known as:  BUSPAR  Take 10 mg by mouth 3 (three) times daily.  What changed:  Another medication with the same name was added. Make sure you understand how and when to take each.     * busPIRone 10 MG tablet  Commonly known as:  BUSPAR  Take 1 tablet (10 mg total) by mouth 3 (three) times daily.  What changed:  You were already taking a medication with the same name, and this prescription was added. Make sure you understand how and when to take each.     * HYDROcodone-acetaminophen 5-325 mg per tablet  Commonly known as:  NORCO  Take 1 tablet by mouth nightly as needed for Pain.  What changed:    · Another medication with the same name was added. Make sure you understand how and when to take each.  · Another medication with the same name was removed. Continue taking this medication, and follow the directions you see here.     * HYDROcodone-acetaminophen 5-325 mg per tablet  Commonly known as:  NORCO  Take 1  tablet by mouth every 8 (eight) hours as needed.  What changed:  You were already taking a medication with the same name, and this prescription was added. Make sure you understand how and when to take each.  Replaces:  HYDROcodone-acetaminophen 7.5-325 mg per tablet     midodrine 5 MG Tab  Commonly known as:  PROAMATINE  Take 1 tablet (5 mg total) by mouth 2 (two) times daily with meals.  What changed:    · how much to take  · additional instructions         * This list has 4 medication(s) that are the same as other medications prescribed for you. Read the directions carefully, and ask your doctor or other care provider to review them with you.            CONTINUE taking these medications    ARIPiprazole 2 MG Tab  Commonly known as:  ABILIFY  Take 5 mg by mouth once daily.     atorvastatin 40 MG tablet  Commonly known as:  LIPITOR  Take 40 mg by mouth once daily.     azithromycin 500 MG tablet  Commonly known as:  ZITHROMAX  One daily for yellow mucous, repeat if needed     calcitRIOL 0.25 MCG Cap  Commonly known as:  ROCALTROL  0.25 mcg once daily.     ciprofloxacin HCl 250 MG tablet  Commonly known as:  CIPRO  Take 250 mg by mouth 2 (two) times daily.     COMBIVENT  mcg/actuation inhaler  Generic drug:  albuterol-ipratropium  mcg  Inhale 2 puffs into the lungs every 6 (six) hours as needed for Wheezing.     famotidine 20 MG tablet  Commonly known as:  PEPCID  TK 1 T PO QHS     FLUoxetine 20 MG tablet  Take 20 mg by mouth once daily.     fluticasone propionate 50 mcg/actuation nasal spray  Commonly known as:  FLONASE  2 sprays (100 mcg total) by Each Nare route once daily.     fluticasone-umeclidin-vilanter 100-62.5-25 mcg Dsdv  Commonly known as:  TRELEGY ELLIPTA  Inhale 1 puff into the lungs once daily.     gabapentin 300 MG capsule  Commonly known as:  NEURONTIN  hs     levothyroxine 100 MCG tablet  Commonly known as:  SYNTHROID  Take 1 tab every day by oral route.     ondansetron 8 MG  tablet  Commonly known as:  ZOFRAN  Take 1 tablet (8 mg total) by mouth every 12 (twelve) hours as needed for Nausea.     polyvinyl alcohol (artificial tears) 1.4 % ophthalmic solution  Commonly known as:  LIQUIFILM TEARS  Place 1 drop into the left eye as needed.     predniSONE 20 MG tablet  Commonly known as:  DELTASONE  One daily for 3 days and repeat for flare of lung symptoms as intructed     rosuvastatin 20 MG tablet  Commonly known as:  CRESTOR  Take 20 mg by mouth once daily.        STOP taking these medications    HYDROcodone-acetaminophen 7.5-325 mg per tablet  Commonly known as:  NORCO  Replaced by:  HYDROcodone-acetaminophen 5-325 mg per tablet  You also have another medication with the same name that you need to continue taking as instructed.     ondansetron 4 MG Tbdl  Commonly known as:  ZOFRAN-ODT            Indwelling Lines/Drains at time of discharge:   Lines/Drains/Airways     Drain                 Hemodialysis AV Fistula Left upper arm -- days                Time spent on the discharge of patient: 38 minutes  Patient was seen and examined on the date of discharge and determined to be suitable for discharge.         Shaheen Turner MD  Department of Hospital Medicine  [facility]

## 2019-09-09 NOTE — PROGRESS NOTES
INPATIENT NEPHROLOGY PROGRESS NOTE  [facility] NEPHROLOGY    Patient Name: Althea Gaona  Date: 09/09/2019    Reason for consultation: ESRD    Chief Complaint:   Chief Complaint   Patient presents with    Weakness     Family states pt has been feeling weak and confused since being placed on Abilify       History of Present Illness:  Mrs. Gaona is a 79 years old female sent from hemodialysis due to encephalopathy and weakness. As per collateral she is ESRD on HD TTS however missed HD session on Tuesday due to generalized weakness. On Thursday when she presented to HD session she was noted to be unlike herself, unable to ambulate, weak and confused and therefore referred to the ED. Her admissions labs were significant for severe hyperkalemia, consulted for emergent dialysis.    · Interval History/Subjective:    - will be [facility] to [facility] today  - no cp, dyspnea, abd pain, edema    · Review of Systems: All 14 systems reviewed and negative, except as noted in HPI    Plan of Care:    Assessment:  ESRD  HTN  SHPT  Anemia of CKD    Plan:    - Continue HD TTS.  - BP/VS are stable.  - Last phos was at goal. Continue calcitriol.   - Hb > 12, hold CLAIRE with HD.    Thank you for allowing us to participate in this patient's care. We will continue to follow.    Medications:  No current facility-administered medications on file prior to encounter.      Current Outpatient Medications on File Prior to Encounter   Medication Sig Dispense Refill    ARIPiprazole (ABILIFY) 2 MG Tab Take 5 mg by mouth once daily.      atorvastatin (LIPITOR) 40 MG tablet Take 40 mg by mouth once daily.      busPIRone (BUSPAR) 10 MG tablet Take 10 mg by mouth 3 (three) times daily.      calcitRIOL (ROCALTROL) 0.25 MCG Cap 0.25 mcg once daily.   3    famotidine (PEPCID) 20 MG tablet TK 1 T PO QHS  1    fluoxetine (PROZAC) 20 MG tablet Take 20 mg by mouth once daily.      fluticasone propionate (FLONASE) 50 mcg/actuation nasal spray 2  sprays (100 mcg total) by Each Nare route once daily. 1 Bottle 11    fluticasone-umeclidin-vilanter (TRELEGY ELLIPTA) 100-62.5-25 mcg DsDv Inhale 1 puff into the lungs once daily. 1 each 11    gabapentin (NEURONTIN) 300 MG capsule hs      levothyroxine (SYNTHROID) 100 MCG tablet Take 1 tab every day by oral route.      midodrine (PROAMATINE) 5 MG Tab Take 2.5 mg by mouth 2 (two) times daily with meals. Hold is bp is greater than 140      rosuvastatin (CRESTOR) 20 MG tablet Take 20 mg by mouth once daily.      albuterol-ipratropium  mcg (COMBIVENT)  mcg/actuation inhaler Inhale 2 puffs into the lungs every 6 (six) hours as needed for Wheezing.      azithromycin (ZITHROMAX) 500 MG tablet One daily for yellow mucous, repeat if needed 3 tablet 3    ciprofloxacin HCl (CIPRO) 250 MG tablet Take 250 mg by mouth 2 (two) times daily.      HYDROcodone-acetaminophen (NORCO) 5-325 mg per tablet Take 1 tablet by mouth nightly as needed for Pain. 15 tablet 0    ondansetron (ZOFRAN) 8 MG tablet Take 1 tablet (8 mg total) by mouth every 12 (twelve) hours as needed for Nausea. 6 tablet 0    polyvinyl alcohol, artificial tears, (LIQUIFILM TEARS) 1.4 % ophthalmic solution Place 1 drop into the left eye as needed. 15 mL 0    predniSONE (DELTASONE) 20 MG tablet One daily for 3 days and repeat for flare of lung symptoms as intructed 12 tablet 0     Scheduled Meds:   sodium chloride 0.9%   Intravenous Once    ARIPiprazole  5 mg Oral Daily    busPIRone  10 mg Oral TID    calcitRIOL  0.25 mcg Oral Daily    epoetin jacob-ebpx (RETACRIT) injection  50 Units/kg Subcutaneous Every Tues, Thurs, Sat    famotidine  20 mg Oral Daily    FLUoxetine  20 mg Oral Daily    fluticasone propionate  2 spray Each Nostril Daily    fluticasone-umeclidin-vilanter  1 puff Inhalation Daily    gabapentin  300 mg Oral Daily    glycerin adult  1 suppository Rectal Once    heparin (porcine)  5,000 Units Subcutaneous Q8H     levothyroxine  100 mcg Oral Before breakfast    midodrine  5 mg Oral BID WM    polyethylene glycol  17 g Oral Daily    rosuvastatin  20 mg Oral Daily     Continuous Infusions:  PRN Meds:.acetaminophen, acetaminophen, bisacodyl, dextrose 50%, dextrose 50%, GI cocktail (mylanta 30 mL, lidocaine 2 % viscous 10 mL, dicyclomine 10 mL) 50 mL, glucagon (human recombinant), glucose, glucose, HYDROcodone-acetaminophen, magnesium oxide, magnesium oxide, ondansetron, ondansetron, ondansetron, polyethylene glycol, potassium chloride 10%, potassium chloride 10%, potassium chloride 10%, potassium, sodium phosphates, potassium, sodium phosphates, potassium, sodium phosphates, sodium chloride 0.9%    Allergies:  Metoprolol; Abilify [aripiprazole]; and Codeine    Vital Signs:  Temp Readings from Last 3 Encounters:   09/09/19 97.9 °F (36.6 °C)   11/18/16 97.6 °F (36.4 °C)   02/21/16 98 °F (36.7 °C) (Oral)       Pulse Readings from Last 3 Encounters:   09/09/19 74   08/05/19 68   07/15/19 79       BP Readings from Last 3 Encounters:   09/09/19 (!) 159/79   08/05/19 139/63   07/15/19 (!) 151/79       Weight:  Wt Readings from Last 3 Encounters:   08/29/19 67.5 kg (148 lb 13 oz)   08/05/19 70.3 kg (155 lb)   07/15/19 70.3 kg (155 lb)       Physical Exam:  Constitutional: nad, aao x 3  Heart: rrr, no m/r/g, wwp, no edema  Lungs: ctab, no w/r/r/c, no lb  Abdomen: s/nt/nd, +BS    Results:  Lab Results   Component Value Date     (L) 09/09/2019    K 4.2 09/09/2019    CL 97 09/09/2019    CO2 25 09/09/2019    BUN 41 (H) 09/09/2019    CREATININE 5.0 (H) 09/09/2019    CALCIUM 9.7 09/09/2019    ANIONGAP 13 09/09/2019    ESTGFRAFRICA 9.0 (A) 09/09/2019    EGFRNONAA 7.8 (A) 09/09/2019       Lab Results   Component Value Date    CALCIUM 9.7 09/09/2019    PHOS 4.5 09/03/2019       Recent Labs   Lab 09/09/19  0618   WBC 7.99   RBC 3.54*   HGB 12.0   HCT 38.2      *   MCH 33.9*   MCHC 31.4*       I have personally reviewed  pertinent radiological imaging and reports.    I have spent > 35 minutes providing care for this patient for the above diagnoses. These services have included chart/data/imaging review, evaluation, exam, formulation of plan, , note preparation, and discussions with staff involved in this patient's care.    Ruby Rodrigues MD MPH  [facility]  [address] [place] [id number]  [phone] (p)  [phone] (f)

## 2019-09-09 NOTE — PLAN OF CARE
Problem: Physical Therapy Goal  Goal: Physical Therapy Goal  Goals to be met by: D/C     Patient will increase functional independence with mobility by performin. Supine to sit with Stand-by Assistance  2. Sit to stand transfer with Stand-by Assistance  3. Gait  x 50 feet with Supervision using Rolling Walker or cane.   4. Ascend and descend 3 stairs to allow safe return home       Outcome: Ongoing (interventions implemented as appropriate)  Pt progressing toward eeting goals. Pt limited by confusion.

## 2019-09-09 NOTE — PT/OT/SLP PROGRESS
Occupational Therapy   Treatment    Name: Althea Gaona  MRN: [id number]  Admitting Diagnosis:  Physical deconditioning       Recommendations:     Discharge Recommendations: nursing facility, skilled  Discharge Equipment Recommendations:  none  Barriers to discharge:  None    Assessment:     Althea Gaona is a 79 y.o. female with a medical diagnosis of Physical deconditioning.  Pt agreeable to OT therapy session this AM. Performance deficits affecting function are weakness, impaired endurance, impaired self care skills, impaired balance, gait instability, impaired cognition, decreased safety awareness. Pt stated she was in no pain during session, yet kept asking for her pain medication (nursing notified).     Rehab Prognosis:  Fair; patient would benefit from acute skilled OT services to address these deficits and reach maximum level of function.       Plan:     Patient to be seen 5 x/week to address the above listed problems via self-care/home management, therapeutic activities, therapeutic exercises  · Plan of Care Expires: 10/07/19  · Plan of Care Reviewed with: patient    Subjective     Pain/Comfort:  · Pain Rating 1: 0/10    Objective:     Communicated with: nursing prior to session.  Patient found seated at bedside with nursing present  with telemetry, peripheral IV upon OT entry to room.    General Precautions: Standard, fall   Orthopedic Precautions:N/A   Braces: TLSO     Occupational Performance:     Bed Mobility:    · Patient completed Sit to Supine with stand by assistance     Functional Mobility/Transfers:  · Patient completed Sit <> Stand Transfer with stand by assistance  with  rolling walker   · Patient completed Toilet Transfer Step Transfer technique with contact guard assistance with  rolling walker  · Functional Mobility: pt amb from bedside <> toilet in bathroom <> bed with CGA with RW.    Activities of Daily Living:  · Grooming: Standby Assist seated EOB  · Upper Body Dressing: maximal  assistance to don TLSO brace  · Lower Body Dressing: minimum assistance to don brief   · Toileting: stand by assistance        [facility] 6 Click ADL: 20    Treatment & Education:  Pt with decreased safety awareness and impulsivity throughout session. Pt educated on safety precautions and techniques with use of walker and during transfers.     Patient left supine with all lines intact, call button in reach, bed alarm on and nursing notifiedEducation:      GOALS:   Multidisciplinary Problems     Occupational Therapy Goals        Problem: Occupational Therapy Goal    Goal Priority Disciplines Outcome Interventions   Occupational Therapy Goal     OT, PT/OT     Description:  Goals to be met by: discharge     Patient will increase functional independence with ADLs by performing:    LE Dressing with Supervision.  Grooming while standing at sink with Supervision.  Toileting from toilet with Supervision for hygiene and clothing management.   Toilet transfer to toilet with Supervision.                       Time Tracking:     OT Date of Treatment: 09/09/19  OT Start Time: 0924  OT Stop Time: 0949  OT Total Time (min): 25 min    Billable Minutes:Self Care/Home Management 25    Audrey Hunt OT  9/9/2019

## 2019-09-09 NOTE — ASSESSMENT & PLAN NOTE
Generalized weakness and physical deconditioning.  As per collateral 2 falls recently before admission.  Has sustained left distal radial and ulna fractures, status post surgery.  Was receiving home PT/OT.  As per family not able to manage.  Patient now agreeable to skilled nursing placement.  PT/OT following  case management consulted for skilled nursing placement.  Patient accepted but awaiting recent PT OT notes  09/04/2019, fell in the room --> mild compression fracture L2, Pt evaluated by Ortho, recommended TLSO brace. Pt received her Brace 9/6/19 been fitted and  Feels much better with it.   pt evaluated by PT/OT 9/7/19, information sent over to [facility] and [facility] for approval  09/09/2019:  PT and OT both evaluated the patient.  Awaiting acceptance at the [facility]

## 2019-09-09 NOTE — SUBJECTIVE & OBJECTIVE
Interval History:  Patient seen and examined this morning.  Patient is sitting on the bedside drinking her MiraLax.  Patient complained of constipation stating she has not moved her bowel in 7 days.  Nurse inform she did had a BM.  Order Dulcolax and glycerin suppository  Patient has been evaluated by OT in the room requested PT to evaluate patient as we need updated note for her placement  Patient wearing her TLSO brace.   Patient again wants her [place]    Review of Systems   Constitutional: Negative for chills and fever.   HENT: Negative for ear discharge and postnasal drip.    Eyes: Negative for pain and redness.   Respiratory: Negative for shortness of breath.    Cardiovascular: Negative for chest pain and leg swelling.   Gastrointestinal: Negative for abdominal pain, nausea and vomiting.   Genitourinary: Negative.    Musculoskeletal: Positive for arthralgias ( right shoulder discomfort) and back pain (since fall 9/4/19 and mild compression fracture L2 ). Negative for neck pain.        Shoulder pain   Neurological: Positive for weakness.   Psychiatric/Behavioral: Positive for confusion (off and on ). Negative for hallucinations.     Objective:     Vital Signs (Most Recent):  Temp: 97.9 °F (36.6 °C) (09/09/19 0800)  Pulse: 69 (09/09/19 0800)  Resp: 18 (09/09/19 0800)  BP: 125/82 (09/09/19 0800)  SpO2: 97 % (09/09/19 0800) Vital Signs (24h Range):  Temp:  [97.8 °F (36.6 °C)-98.6 °F (37 °C)] 97.9 °F (36.6 °C)  Pulse:  [59-80] 69  Resp:  [17-19] 18  SpO2:  [92 %-98 %] 97 %  BP: ()/(58-82) 125/82     Weight: 67.5 kg (148 lb 13 oz)  Body mass index is 22.96 kg/m².  No intake or output data in the 24 hours ending 09/09/19 1020   Physical Exam   Constitutional: She is oriented to person, place, and time. She appears well-developed and well-nourished. No distress.   Elderly female, comfortable, calm, cooperative    HENT:   Head: Normocephalic and atraumatic.   Eyes: Pupils are equal, round, and reactive to light.  EOM are normal. Right eye exhibits no discharge. Left eye exhibits no discharge.   Neck: Normal range of motion. Neck supple.   Cardiovascular: Normal rate and regular rhythm.   Murmur (2/6 systolic murmur) heard.  Good thrill over the AV fistula in the left arm   Pulmonary/Chest: Effort normal and breath sounds normal. She has no wheezes.   on supplemental O2   Abdominal: Soft. Bowel sounds are normal. She exhibits no distension. There is no rebound and no guarding.   Musculoskeletal:   L wrist in splint  TLSO brace on bedside   Neurological: She is alert and oriented to person, place, and time.   Moving all four extremities   Skin: She is not diaphoretic.   Left upper extremity AVF   Psychiatric: She has a normal mood and affect. Her behavior is normal. Judgment and thought content normal.   Nursing note and vitals reviewed.      Significant Labs:   BMP:   Recent Labs   Lab 09/09/19  0618   *   *   K 4.2   CL 97   CO2 25   BUN 41*   CREATININE 5.0*   CALCIUM 9.7     CBC:   Recent Labs   Lab 09/09/19  0618   WBC 7.99   HGB 12.0   HCT 38.2          Significant Imaging: I have reviewed all pertinent imaging results/findings within the past 24 hours.

## 2019-09-10 NOTE — PT/OT/SLP DISCHARGE
Occupational Therapy Discharge Summary    Althea Gaona  MRN: [id number]   Principal Problem: Physical deconditioning      Patient Discharged from acute Occupational Therapy on 9/9/2019.  Please refer to prior OT note dated 9/9/2019 for functional status.    Assessment:      Patient appropriate for care in another setting. Patient has not met goals.    Objective:     GOALS:   Multidisciplinary Problems     Occupational Therapy Goals     Not on file          Multidisciplinary Problems (Resolved)        Problem: Occupational Therapy Goal    Goal Priority Disciplines Outcome Interventions   Occupational Therapy Goal   (Resolved)     OT, PT/OT Outcome(s) achieved    Description:  Goals to be met by: discharge     Patient will increase functional independence with ADLs by performing:    LE Dressing with Supervision.  Grooming while standing at sink with Supervision.  Toileting from toilet with Supervision for hygiene and clothing management.   Toilet transfer to toilet with Supervision.                       Reasons for Discontinuation of Therapy Services  Transfer to alternate level of care.      Plan:     Patient Discharged to: Skilled Nursing Facility    Sachi Whitten OT  9/10/2019

## 2019-09-20 DIAGNOSIS — S52.552D OTHER CLOSED EXTRA-ARTICULAR FRACTURE OF DISTAL END OF LEFT RADIUS WITH ROUTINE HEALING, SUBSEQUENT ENCOUNTER: Primary | ICD-10-CM

## 2019-09-23 ENCOUNTER — HOSPITAL ENCOUNTER (OUTPATIENT)
Dept: RADIOLOGY | Facility: HOSPITAL | Age: 79
Discharge: HOME | End: 2019-09-23
Attending: ORTHOPAEDIC SURGERY
Payer: MEDICARE

## 2019-09-23 ENCOUNTER — OFFICE VISIT (OUTPATIENT)
Dept: ORTHOPEDICS | Facility: CLINIC | Age: 79
End: 2019-09-23
Payer: MEDICARE

## 2019-09-23 VITALS
HEART RATE: 71 BPM | HEIGHT: 68 IN | WEIGHT: 148 LBS | DIASTOLIC BLOOD PRESSURE: 59 MMHG | SYSTOLIC BLOOD PRESSURE: 121 MMHG | BODY MASS INDEX: 22.43 KG/M2

## 2019-09-23 DIAGNOSIS — S32.020A CLOSED COMPRESSION FRACTURE OF SECOND LUMBAR VERTEBRA, INITIAL ENCOUNTER: Primary | ICD-10-CM

## 2019-09-23 DIAGNOSIS — S52.552D OTHER CLOSED EXTRA-ARTICULAR FRACTURE OF DISTAL END OF LEFT RADIUS WITH ROUTINE HEALING, SUBSEQUENT ENCOUNTER: ICD-10-CM

## 2019-09-23 PROBLEM — S62.102A FRACTURE OF LEFT WRIST: Chronic | Status: RESOLVED | Noted: 2019-08-30 | Resolved: 2019-09-23

## 2019-09-23 PROCEDURE — 99999 PR PBB SHADOW E&M-EST. PATIENT-LVL III: CPT | Mod: PBBFAC,,, | Performed by: ORTHOPAEDIC SURGERY

## 2019-09-23 PROCEDURE — 99213 PR OFFICE/OUTPT VISIT, EST, LEVL III, 20-29 MIN: ICD-10-PCS | Mod: S$GLB,,, | Performed by: ORTHOPAEDIC SURGERY

## 2019-09-23 PROCEDURE — 99213 OFFICE O/P EST LOW 20 MIN: CPT | Mod: S$GLB,,, | Performed by: ORTHOPAEDIC SURGERY

## 2019-09-23 PROCEDURE — 73110 X-RAY EXAM OF WRIST: CPT | Mod: TC,PN,LT

## 2019-09-23 PROCEDURE — 1101F PT FALLS ASSESS-DOCD LE1/YR: CPT | Mod: CPTII,S$GLB,, | Performed by: ORTHOPAEDIC SURGERY

## 2019-09-23 PROCEDURE — 73110 XR WRIST COMPLETE 3 VIEWS LEFT: ICD-10-PCS | Mod: 26,LT,, | Performed by: RADIOLOGY

## 2019-09-23 PROCEDURE — 99999 PR PBB SHADOW E&M-EST. PATIENT-LVL III: ICD-10-PCS | Mod: PBBFAC,,, | Performed by: ORTHOPAEDIC SURGERY

## 2019-09-23 PROCEDURE — 73110 X-RAY EXAM OF WRIST: CPT | Mod: 26,LT,, | Performed by: RADIOLOGY

## 2019-09-23 PROCEDURE — 1101F PR PT FALLS ASSESS DOC 0-1 FALLS W/OUT INJ PAST YR: ICD-10-PCS | Mod: CPTII,S$GLB,, | Performed by: ORTHOPAEDIC SURGERY

## 2019-09-23 RX ORDER — OXYCODONE AND ACETAMINOPHEN 5; 325 MG/1; MG/1
1 TABLET ORAL EVERY 4 HOURS PRN
Qty: 20 TABLET | Refills: 0 | Status: ON HOLD | OUTPATIENT
Start: 2019-09-23 | End: 2019-10-18

## 2019-09-23 NOTE — PROGRESS NOTES
Past Medical History:   Diagnosis Date    Alcoholism     no drink since 1984    Anxiety     Asthma     Bipolar disorder     Chronic kidney disease     COPD (chronic obstructive pulmonary disease)     GERD (gastroesophageal reflux disease)     Hypertension     Pancreatitis     Thyroid disease        Past Surgical History:   Procedure Laterality Date    APPENDECTOMY      CATARACT EXTRACTION Right     EYE SURGERY      HYSTERECTOMY      THYROIDECTOMY      THYROIDECTOMY         Current Outpatient Medications   Medication Sig    albuterol-ipratropium  mcg (COMBIVENT)  mcg/actuation inhaler Inhale 2 puffs into the lungs every 6 (six) hours as needed for Wheezing.    ARIPiprazole (ABILIFY) 2 MG Tab Take 5 mg by mouth once daily.    atorvastatin (LIPITOR) 40 MG tablet Take 40 mg by mouth once daily.    azithromycin (ZITHROMAX) 500 MG tablet One daily for yellow mucous, repeat if needed    bisacodyl (DULCOLAX) 5 mg EC tablet Take 1 tablet (5 mg total) by mouth daily as needed for Constipation.    busPIRone (BUSPAR) 10 MG tablet Take 10 mg by mouth 3 (three) times daily.    busPIRone (BUSPAR) 10 MG tablet Take 1 tablet (10 mg total) by mouth 3 (three) times daily.    calcitRIOL (ROCALTROL) 0.25 MCG Cap 0.25 mcg once daily.     ciprofloxacin HCl (CIPRO) 250 MG tablet Take 250 mg by mouth 2 (two) times daily.    epoetin jacob-epbx (RETACRIT) 10,000 unit/mL imjection Inject 0.34 mLs (3,400 Units total) into the skin every Tues, Thurs, Sat. With hemodialysis    famotidine (PEPCID) 20 MG tablet TK 1 T PO QHS    fluoxetine (PROZAC) 20 MG tablet Take 20 mg by mouth once daily.    fluticasone propionate (FLONASE) 50 mcg/actuation nasal spray 2 sprays (100 mcg total) by Each Nare route once daily.    fluticasone-umeclidin-vilanter (TRELEGY ELLIPTA) 100-62.5-25 mcg DsDv Inhale 1 puff into the lungs once daily.    gabapentin (NEURONTIN) 300 MG capsule hs    HYDROcodone-acetaminophen (NORCO)  5-325 mg per tablet Take 1 tablet by mouth nightly as needed for Pain.    HYDROcodone-acetaminophen (NORCO) 5-325 mg per tablet Take 1 tablet by mouth every 8 (eight) hours as needed.    levothyroxine (SYNTHROID) 100 MCG tablet Take 1 tab every day by oral route.    midodrine (PROAMATINE) 5 MG Tab Take 1 tablet (5 mg total) by mouth 2 (two) times daily with meals.    ondansetron (ZOFRAN) 8 MG tablet Take 1 tablet (8 mg total) by mouth every 12 (twelve) hours as needed for Nausea.    polyethylene glycol (GLYCOLAX) 17 gram PwPk Take 17 g by mouth daily as needed.    polyvinyl alcohol, artificial tears, (LIQUIFILM TEARS) 1.4 % ophthalmic solution Place 1 drop into the left eye as needed.    predniSONE (DELTASONE) 20 MG tablet One daily for 3 days and repeat for flare of lung symptoms as intructed    rosuvastatin (CRESTOR) 20 MG tablet Take 20 mg by mouth once daily.     No current facility-administered medications for this visit.        Review of patient's allergies indicates:   Allergen Reactions    Metoprolol Other (See Comments)     Grand-daughter/care giver reported Pt has over reaction to this drug, Bp bottoms out along with heart rate    Abilify [aripiprazole]      dizziness    Codeine      Other reaction(s): Unknown       Family History   Problem Relation Age of Onset    No Known Problems Mother     Diabetes Father     Heart disease Father         blood clot in lung went to heart    Cancer Sister         breast    Stroke Paternal Aunt     Cancer Sister         breast    Cancer Cousin         colon    Diabetes Cousin     Cancer Cousin         colon    COPD Sister          of COPD       Social History     Socioeconomic History    Marital status:      Spouse name: Not on file    Number of children: Not on file    Years of education: Not on file    Highest education level: Not on file   Occupational History    Not on file   Social Needs    Financial resource strain: Not on file     Food insecurity:     Worry: Not on file     Inability: Not on file    Transportation needs:     Medical: Not on file     Non-medical: Not on file   Tobacco Use    Smoking status: Current Some Day Smoker     Packs/day: 1.00     Types: Cigarettes    Smokeless tobacco: Never Used   Substance and Sexual Activity    Alcohol use: No    Drug use: Never    Sexual activity: Not Currently   Lifestyle    Physical activity:     Days per week: Not on file     Minutes per session: Not on file    Stress: Not on file   Relationships    Social connections:     Talks on phone: Not on file     Gets together: Not on file     Attends Yarsanism service: Not on file     Active member of club or organization: Not on file     Attends meetings of clubs or organizations: Not on file     Relationship status: Not on file   Other Topics Concern    Not on file   Social History Narrative    Not on file       Chief Complaint:   Chief Complaint   Patient presents with    Post-op Evaluation     s/p left wrist ORIF 6/14/19        Date of surgery:  June 14th 2019    History of present illness:  79-year-old female who is 3 and half months from open reduction internal fixation of left distal radius fracture.  Patient is doing well in regards to her wrist.  Patient suffered a fracture to her back.  Pain in the back is an 8/10.  Wanting to know what to do.  She is in a lumbar corset.      Review of Systems:    Musculoskeletal:  See HPI        Physical Examination:    Vital Signs:    There were no vitals filed for this visit.    Body mass index is 22.84 kg/m².    This a well-developed, well nourished patient in no acute distress.  They are alert and oriented and cooperative to examination.  Pt.  Is in a wheelchair    Examination of the left wrist shows healed surgical incision.  Good range of motion without significant stiffness.  Neurovascularly intact.    X-rays:  X-rays of the left wrist are ordered and reviewed which show plate and  screws in good position.  Distal ulnar shaft fracture has good abundant callus noted.     Assessment::  Healing left distal radius fracture  Compression fracture    Plan:  I reviewed the findings with her today.  Refilled her pain medicine.  Referred her to 1 of the pain physicians for evaluation for possible kyphoplasty.    This note was created using [employer] voice recognition software that occasionally misinterpreted phrases or words.

## 2019-09-27 ENCOUNTER — TELEPHONE (OUTPATIENT)
Dept: FAMILY MEDICINE | Facility: CLINIC | Age: 79
End: 2019-09-27

## 2019-09-27 ENCOUNTER — OFFICE VISIT (OUTPATIENT)
Dept: PAIN MEDICINE | Facility: CLINIC | Age: 79
End: 2019-09-27
Payer: MEDICARE

## 2019-09-27 VITALS
BODY MASS INDEX: 22.42 KG/M2 | DIASTOLIC BLOOD PRESSURE: 56 MMHG | HEIGHT: 68 IN | WEIGHT: 147.94 LBS | HEART RATE: 74 BPM | SYSTOLIC BLOOD PRESSURE: 75 MMHG

## 2019-09-27 DIAGNOSIS — M48.50XA VERTEBRAL COMPRESSION FRACTURE: Primary | ICD-10-CM

## 2019-09-27 PROCEDURE — 1101F PT FALLS ASSESS-DOCD LE1/YR: CPT | Mod: CPTII,S$GLB,, | Performed by: ANESTHESIOLOGY

## 2019-09-27 PROCEDURE — 99499 RISK ADDL DX/OHS AUDIT: ICD-10-PCS | Mod: S$GLB,,, | Performed by: ANESTHESIOLOGY

## 2019-09-27 PROCEDURE — 99204 OFFICE O/P NEW MOD 45 MIN: CPT | Mod: S$GLB,,, | Performed by: ANESTHESIOLOGY

## 2019-09-27 PROCEDURE — 99999 PR PBB SHADOW E&M-EST. PATIENT-LVL III: ICD-10-PCS | Mod: PBBFAC,,, | Performed by: ANESTHESIOLOGY

## 2019-09-27 PROCEDURE — 1101F PR PT FALLS ASSESS DOC 0-1 FALLS W/OUT INJ PAST YR: ICD-10-PCS | Mod: CPTII,S$GLB,, | Performed by: ANESTHESIOLOGY

## 2019-09-27 PROCEDURE — 99999 PR PBB SHADOW E&M-EST. PATIENT-LVL III: CPT | Mod: PBBFAC,,, | Performed by: ANESTHESIOLOGY

## 2019-09-27 PROCEDURE — 99204 PR OFFICE/OUTPT VISIT, NEW, LEVL IV, 45-59 MIN: ICD-10-PCS | Mod: S$GLB,,, | Performed by: ANESTHESIOLOGY

## 2019-09-27 PROCEDURE — 99499 UNLISTED E&M SERVICE: CPT | Mod: S$GLB,,, | Performed by: ANESTHESIOLOGY

## 2019-09-27 RX ORDER — DICYCLOMINE HYDROCHLORIDE 10 MG/1
CAPSULE ORAL
COMMUNITY
End: 2024-03-15 | Stop reason: CLARIF

## 2019-09-27 RX ORDER — FLUCONAZOLE 150 MG/1
150 TABLET ORAL
Status: ON HOLD | COMMUNITY
Start: 2018-09-26 | End: 2019-10-18 | Stop reason: ALTCHOICE

## 2019-09-27 RX ORDER — OMEPRAZOLE 40 MG/1
CAPSULE, DELAYED RELEASE ORAL
Status: ON HOLD | COMMUNITY
End: 2019-10-18 | Stop reason: CLARIF

## 2019-09-27 RX ORDER — TRAZODONE HYDROCHLORIDE 50 MG/1
TABLET ORAL
COMMUNITY
End: 2019-11-25 | Stop reason: SDUPTHER

## 2019-09-27 RX ORDER — LORAZEPAM 0.5 MG/1
TABLET ORAL
COMMUNITY
End: 2019-10-15

## 2019-09-27 RX ORDER — MECLIZINE HCL 12.5 MG 12.5 MG/1
TABLET ORAL
COMMUNITY
Start: 2018-11-05 | End: 2020-06-06 | Stop reason: CLARIF

## 2019-09-27 RX ORDER — PROPRANOLOL HYDROCHLORIDE 40 MG/1
40 TABLET ORAL
Status: ON HOLD | COMMUNITY
Start: 2017-02-02 | End: 2019-11-22 | Stop reason: CLARIF

## 2019-09-27 RX ORDER — DENOSUMAB 60 MG/ML
INJECTION SUBCUTANEOUS
Refills: 0 | COMMUNITY
Start: 2019-08-29 | End: 2019-10-03 | Stop reason: SDUPTHER

## 2019-09-27 RX ORDER — LISINOPRIL 10 MG/1
10 TABLET ORAL
Status: ON HOLD | COMMUNITY
Start: 2017-03-23 | End: 2019-11-22 | Stop reason: CLARIF

## 2019-09-27 RX ORDER — ONDANSETRON 4 MG/1
TABLET, ORALLY DISINTEGRATING ORAL
COMMUNITY
End: 2020-06-06 | Stop reason: CLARIF

## 2019-09-27 RX ORDER — TRAMADOL HYDROCHLORIDE 50 MG/1
TABLET ORAL
COMMUNITY
End: 2020-06-06 | Stop reason: CLARIF

## 2019-09-27 RX ORDER — PANTOPRAZOLE SODIUM 40 MG/1
TABLET, DELAYED RELEASE ORAL
Status: ON HOLD | COMMUNITY
End: 2019-10-18

## 2019-09-27 RX ORDER — METHYLPREDNISOLONE 4 MG/1
4 TABLET ORAL
Status: ON HOLD | COMMUNITY
Start: 2017-01-19 | End: 2019-11-22 | Stop reason: CLARIF

## 2019-09-27 RX ORDER — MONTELUKAST SODIUM 10 MG/1
TABLET ORAL
Status: ON HOLD | COMMUNITY
End: 2019-11-22 | Stop reason: CLARIF

## 2019-09-27 RX ORDER — SUCRALFATE 1 G/10ML
10 SUSPENSION ORAL
Status: ON HOLD | COMMUNITY
End: 2019-10-18

## 2019-09-27 NOTE — H&P (VIEW-ONLY)
"  Referring Physician: Tab Mendez,*    PCP: Primary Doctor No    CC: back pain    HPI:   Althea Gaona is a 79 y.o. female with PMH significant for COPD, bipolar disorder, anxiety, CKD, GERD, hx of CVA, and HTN presents for the evaluation of back pain. Patient reports that she fell at [facility] around August 2019. Patient reports that she tripped on her cane and fell onto her buttocks. Prior to her fall, patient endorsed of "minor" aches and pain in her low back prior to her fall. Patient rates her pain as a 5/10 prior to her fall. Patient rates her pain as a constant 10/10 since her fall. Patient describes her pain as an aching pain. Patient reports of intermittent radiation down her RLE( not present prior to her fall). Patient reports that her pain has made it difficult to sleep at night.  Patient has been wearing a back brace with no benefit. Patient reports of taking Norco with no relief of her pain. Patient denies of any urinary/fecal incontinence, saddle anesthesia, or new falls.     Aggravating factors: bending, twisting, lifting     Mitigating factors: N/A    Relevant Surgeries: no    Interventional Therapies: yes; patient reports of getting back injections in the past but cannot recall her pain physician    :  9/13/2019: Norco 5-325 mg; #120 tablets  9/9/2019: Norco 5-325 mg; #20 tablets  8/7/2019: Tramadol 50 mg; 120 tablets       Non-pharmacologic Treatment:     · Physical Therapy: yes; participates with some benefit   · Ice/Heat: yes; heat with some benefit          Pain Medications:         · Currently taking: hydrocodone, gabapentin BID    Anticoagulation: no    ROS:  Review of Systems   Constitutional: Negative for chills and fever.   HENT: Negative for sore throat.    Eyes: Negative for visual disturbance.   Respiratory: Negative for shortness of breath.    Cardiovascular: Negative for chest pain.   Gastrointestinal: Negative for nausea and vomiting. "   Genitourinary: Negative for difficulty urinating.   Musculoskeletal: Positive for back pain.   Skin: Negative for rash.   Allergic/Immunologic: Negative for immunocompromised state.   Neurological: Negative for syncope.   Hematological: Does not bruise/bleed easily.   Psychiatric/Behavioral: Negative for suicidal ideas.        Past Medical History:   Diagnosis Date    Alcoholism     no drink since     Anxiety     Asthma     Bipolar disorder     Chronic kidney disease     COPD (chronic obstructive pulmonary disease)     GERD (gastroesophageal reflux disease)     Hypertension     Pancreatitis     Thyroid disease      Past Surgical History:   Procedure Laterality Date    APPENDECTOMY      CATARACT EXTRACTION Right     EYE SURGERY      HYSTERECTOMY      THYROIDECTOMY      THYROIDECTOMY       Family History   Problem Relation Age of Onset    No Known Problems Mother     Diabetes Father     Heart disease Father         blood clot in lung went to heart    Cancer Sister         breast    Stroke Paternal Aunt     Cancer Sister         breast    Cancer Cousin         colon    Diabetes Cousin     Cancer Cousin         colon    COPD Sister          of COPD     Social History     Socioeconomic History    Marital status:      Spouse name: Not on file    Number of children: Not on file    Years of education: Not on file    Highest education level: Not on file   Occupational History    Not on file   Social Needs    Financial resource strain: Not on file    Food insecurity:     Worry: Not on file     Inability: Not on file    Transportation needs:     Medical: Not on file     Non-medical: Not on file   Tobacco Use    Smoking status: Current Some Day Smoker     Packs/day: 1.00     Types: Cigarettes    Smokeless tobacco: Never Used   Substance and Sexual Activity    Alcohol use: No    Drug use: Never    Sexual activity: Not Currently   Lifestyle    Physical activity:     Days  "per week: Not on file     Minutes per session: Not on file    Stress: Not on file   Relationships    Social connections:     Talks on phone: Not on file     Gets together: Not on file     Attends Nondenominational service: Not on file     Active member of club or organization: Not on file     Attends meetings of clubs or organizations: Not on file     Relationship status: Not on file   Other Topics Concern    Not on file   Social History Narrative    Not on file         Allergies: See med card    Vitals:    09/27/19 1028   BP: (!) 75/56   Pulse: 74   Weight: 67.1 kg (147 lb 14.9 oz)   Height: 5' 7.5" (1.715 m)   PainSc: 10-Worst pain ever   PainLoc: Back         Physical exam:  Physical Exam   Constitutional: She is oriented to person, place, and time and well-developed, well-nourished, and in no distress.   HENT:   Head: Normocephalic and atraumatic.   Eyes: Conjunctivae and EOM are normal. Right eye exhibits no discharge. Left eye exhibits no discharge.   Cardiovascular: Normal rate.   Pulmonary/Chest: Effort normal and breath sounds normal. No respiratory distress.   Abdominal: Soft.   Neurological: She is alert and oriented to person, place, and time.   Skin: Skin is warm and dry. No rash noted. She is not diaphoretic.   Psychiatric: Mood, memory, affect and judgment normal.   Nursing note and vitals reviewed.       UPPER EXTREMITIES: Normal alignment, normal range of motion, no atrophy, no skin changes,  hair growth and nail growth normal and equal bilaterally. No swelling, no tenderness.    LOWER EXTREMITIES:  Normal alignment, normal range of motion, no atrophy, no skin changes,  hair growth and nail growth normal and equal bilaterally. No swelling, no tenderness.    LUMBAR SPINE  Lumbar spine: ROM is significantly limited with flexion extension and oblique extension with increased pain on passive motion.    Unable to perform Supine straight leg raise   Unable to perfomr Facet loading   Myofascial exam: " Tenderness to palpation across lumbar paraspinous muscles and at the midline lumbar spine    CRANIAL NERVES:  II:  PERRL bilaterally,   III,IV,VI: EOMI.    V:  Facial sensation equal bilaterally  VII:  Facial motor function normal.  VIII:  Hearing equal to finger rub bilaterally  IX/X: Gag normal, palate symmetric  XI:  Shoulder shrug equal, head turn equal  XII:  Tongue midline without fasciculations      MOTOR: Tone and bulk: normal bilateral upper and lower Strength: normal   Delt Bi Tri WE WF     R 5 5 5 5 5 5   L 5 5 5 5 5 5     IP ADD ABD Quad TA Gas HAM  R 5 5 5 5 5 5 5  L 5 5 5 5 5 5 5    SENSATION: Light touch and pinprick intact bilaterally  REFLEXES: normal, symmetric, nonbrisk.  Toes down, no clonus. Negative mcgovern's sign bilaterally.  GAIT: normal rise, base, steps, and arm swing.        Imaging: X-ray Lumbar spine (9/4/2019):  Bones are mildly demineralized. There is an acute mild compression fracture of the right half of the superior endplate of L2. Loss of height is 25%. There is no retropulsion. Spinal alignment is normal. There is degenerative disc disease and facet arthropathy at L5-S1.          Assessment: Althea Gaona is a 79 y.o. female with PMH significant for COPD, bipolar disorder, anxiety, CKD, GERD, hx of CVA, and HTN presents for the evaluation of back pain. Patient reports of severe pain s/p fall which was not present prior to her fall. Recent plain film of the lumbar spine significant for L2 compression fracture. Plan to order MRI to evaluate acuity of the fracture.     Plan:  - MRI lumbar spine without contrast ordered  - Okay to continue Norco as prescribed   - Encouraged patient to continue wearing her back brace and participate in PT as tolerated  - Will call patient with the results of MRI and possible schedule for L2 kyphoplasty in the future to provide pain relief.     Thank you for referring this interesting patient, and I look forward to continuing to collaborate in  her care.    Rosanna Khan MD  Pain Medicine

## 2019-09-27 NOTE — TELEPHONE ENCOUNTER
----- Message from Faith Coffman sent at 9/26/2019 10:33 AM CDT -----  Contact: May [facility]/ [facility] is calling to get the patient scheduled with a 1-2 week Follow up with dr. Ramsey for her Rehab. Please give May a call back   May's #[phone]

## 2019-09-27 NOTE — PROGRESS NOTES
"  Referring Physician: Tab Mendez,*    PCP: Primary Doctor No    CC: back pain    HPI:   Althea Gaona is a 79 y.o. female with PMH significant for COPD, bipolar disorder, anxiety, CKD, GERD, hx of CVA, and HTN presents for the evaluation of back pain. Patient reports that she fell at [facility] around August 2019. Patient reports that she tripped on her cane and fell onto her buttocks. Prior to her fall, patient endorsed of "minor" aches and pain in her low back prior to her fall. Patient rates her pain as a 5/10 prior to her fall. Patient rates her pain as a constant 10/10 since her fall. Patient describes her pain as an aching pain. Patient reports of intermittent radiation down her RLE( not present prior to her fall). Patient reports that her pain has made it difficult to sleep at night.  Patient has been wearing a back brace with no benefit. Patient reports of taking Norco with no relief of her pain. Patient denies of any urinary/fecal incontinence, saddle anesthesia, or new falls.     Aggravating factors: bending, twisting, lifting     Mitigating factors: N/A    Relevant Surgeries: no    Interventional Therapies: yes; patient reports of getting back injections in the past but cannot recall her pain physician    :  9/13/2019: Norco 5-325 mg; #120 tablets  9/9/2019: Norco 5-325 mg; #20 tablets  8/7/2019: Tramadol 50 mg; 120 tablets       Non-pharmacologic Treatment:     · Physical Therapy: yes; participates with some benefit   · Ice/Heat: yes; heat with some benefit          Pain Medications:         · Currently taking: hydrocodone, gabapentin BID    Anticoagulation: no    ROS:  Review of Systems   Constitutional: Negative for chills and fever.   HENT: Negative for sore throat.    Eyes: Negative for visual disturbance.   Respiratory: Negative for shortness of breath.    Cardiovascular: Negative for chest pain.   Gastrointestinal: Negative for nausea and vomiting. "   Genitourinary: Negative for difficulty urinating.   Musculoskeletal: Positive for back pain.   Skin: Negative for rash.   Allergic/Immunologic: Negative for immunocompromised state.   Neurological: Negative for syncope.   Hematological: Does not bruise/bleed easily.   Psychiatric/Behavioral: Negative for suicidal ideas.        Past Medical History:   Diagnosis Date    Alcoholism     no drink since     Anxiety     Asthma     Bipolar disorder     Chronic kidney disease     COPD (chronic obstructive pulmonary disease)     GERD (gastroesophageal reflux disease)     Hypertension     Pancreatitis     Thyroid disease      Past Surgical History:   Procedure Laterality Date    APPENDECTOMY      CATARACT EXTRACTION Right     EYE SURGERY      HYSTERECTOMY      THYROIDECTOMY      THYROIDECTOMY       Family History   Problem Relation Age of Onset    No Known Problems Mother     Diabetes Father     Heart disease Father         blood clot in lung went to heart    Cancer Sister         breast    Stroke Paternal Aunt     Cancer Sister         breast    Cancer Cousin         colon    Diabetes Cousin     Cancer Cousin         colon    COPD Sister          of COPD     Social History     Socioeconomic History    Marital status:      Spouse name: Not on file    Number of children: Not on file    Years of education: Not on file    Highest education level: Not on file   Occupational History    Not on file   Social Needs    Financial resource strain: Not on file    Food insecurity:     Worry: Not on file     Inability: Not on file    Transportation needs:     Medical: Not on file     Non-medical: Not on file   Tobacco Use    Smoking status: Current Some Day Smoker     Packs/day: 1.00     Types: Cigarettes    Smokeless tobacco: Never Used   Substance and Sexual Activity    Alcohol use: No    Drug use: Never    Sexual activity: Not Currently   Lifestyle    Physical activity:     Days  "per week: Not on file     Minutes per session: Not on file    Stress: Not on file   Relationships    Social connections:     Talks on phone: Not on file     Gets together: Not on file     Attends Hinduism service: Not on file     Active member of club or organization: Not on file     Attends meetings of clubs or organizations: Not on file     Relationship status: Not on file   Other Topics Concern    Not on file   Social History Narrative    Not on file         Allergies: See med card    Vitals:    09/27/19 1028   BP: (!) 75/56   Pulse: 74   Weight: 67.1 kg (147 lb 14.9 oz)   Height: 5' 7.5" (1.715 m)   PainSc: 10-Worst pain ever   PainLoc: Back         Physical exam:  Physical Exam   Constitutional: She is oriented to person, place, and time and well-developed, well-nourished, and in no distress.   HENT:   Head: Normocephalic and atraumatic.   Eyes: Conjunctivae and EOM are normal. Right eye exhibits no discharge. Left eye exhibits no discharge.   Cardiovascular: Normal rate.   Pulmonary/Chest: Effort normal and breath sounds normal. No respiratory distress.   Abdominal: Soft.   Neurological: She is alert and oriented to person, place, and time.   Skin: Skin is warm and dry. No rash noted. She is not diaphoretic.   Psychiatric: Mood, memory, affect and judgment normal.   Nursing note and vitals reviewed.       UPPER EXTREMITIES: Normal alignment, normal range of motion, no atrophy, no skin changes,  hair growth and nail growth normal and equal bilaterally. No swelling, no tenderness.    LOWER EXTREMITIES:  Normal alignment, normal range of motion, no atrophy, no skin changes,  hair growth and nail growth normal and equal bilaterally. No swelling, no tenderness.    LUMBAR SPINE  Lumbar spine: ROM is significantly limited with flexion extension and oblique extension with increased pain on passive motion.    Unable to perform Supine straight leg raise   Unable to perfomr Facet loading   Myofascial exam: " Tenderness to palpation across lumbar paraspinous muscles and at the midline lumbar spine    CRANIAL NERVES:  II:  PERRL bilaterally,   III,IV,VI: EOMI.    V:  Facial sensation equal bilaterally  VII:  Facial motor function normal.  VIII:  Hearing equal to finger rub bilaterally  IX/X: Gag normal, palate symmetric  XI:  Shoulder shrug equal, head turn equal  XII:  Tongue midline without fasciculations      MOTOR: Tone and bulk: normal bilateral upper and lower Strength: normal   Delt Bi Tri WE WF     R 5 5 5 5 5 5   L 5 5 5 5 5 5     IP ADD ABD Quad TA Gas HAM  R 5 5 5 5 5 5 5  L 5 5 5 5 5 5 5    SENSATION: Light touch and pinprick intact bilaterally  REFLEXES: normal, symmetric, nonbrisk.  Toes down, no clonus. Negative mcgovern's sign bilaterally.  GAIT: normal rise, base, steps, and arm swing.        Imaging: X-ray Lumbar spine (9/4/2019):  Bones are mildly demineralized. There is an acute mild compression fracture of the right half of the superior endplate of L2. Loss of height is 25%. There is no retropulsion. Spinal alignment is normal. There is degenerative disc disease and facet arthropathy at L5-S1.          Assessment: Althea Gaona is a 79 y.o. female with PMH significant for COPD, bipolar disorder, anxiety, CKD, GERD, hx of CVA, and HTN presents for the evaluation of back pain. Patient reports of severe pain s/p fall which was not present prior to her fall. Recent plain film of the lumbar spine significant for L2 compression fracture. Plan to order MRI to evaluate acuity of the fracture.     Plan:  - MRI lumbar spine without contrast ordered  - Okay to continue Norco as prescribed   - Encouraged patient to continue wearing her back brace and participate in PT as tolerated  - Will call patient with the results of MRI and possible schedule for L2 kyphoplasty in the future to provide pain relief.     Thank you for referring this interesting patient, and I look forward to continuing to collaborate in  her care.    Rosanna Khan MD  Pain Medicine

## 2019-09-27 NOTE — PROGRESS NOTES
Patient Althea Gaona, MRN [id number], was dependent on dialysis (ICD10 Z99.2) at the time of this visit on 9/27/19. This addendum is made to the medical record on 09/27/2019.

## 2019-09-27 NOTE — LETTER
September 27, 2019      Tab Mendez MD  [address] [place] [address]   [address]  [place] [address]           [facility]  [address] [place] [address]  [address]  [place] [address]  Phone: [phone]  Fax: [phone]          Patient: Althea Gaona   MR Number: [id number]   YOB: 1940   Date of Visit: 9/27/2019       Dear Dr. Tab Mendez:    Thank you for referring Althea Gaona to me for evaluation. Attached you will find relevant portions of my assessment and plan of care.    If you have questions, please do not hesitate to call me. I look forward to following Althea Gaona along with you.    Sincerely,    Rosanna Khan MD    Enclosure  CC:  No Recipients    If you would like to receive this communication electronically, please contact [email or web address] or [phone] to request more information on [employer] access.    For providers and/or their staff who would like to refer a patient to [facility], please contact us through our one-stop-shop provider referral line, [facility], at [phone].    If you feel you have received this communication in error or would no longer like to receive these types of communications, please e-mail [email or web address]

## 2019-09-30 ENCOUNTER — HOSPITAL ENCOUNTER (OUTPATIENT)
Dept: RADIOLOGY | Facility: HOSPITAL | Age: 79
Discharge: HOME | End: 2019-09-30
Attending: ANESTHESIOLOGY
Payer: MEDICARE

## 2019-09-30 DIAGNOSIS — M48.50XA VERTEBRAL COMPRESSION FRACTURE: ICD-10-CM

## 2019-09-30 PROCEDURE — 72148 MRI LUMBAR SPINE W/O DYE: CPT | Mod: 26,,, | Performed by: RADIOLOGY

## 2019-09-30 PROCEDURE — 72148 MRI LUMBAR SPINE WITHOUT CONTRAST: ICD-10-PCS | Mod: 26,,, | Performed by: RADIOLOGY

## 2019-09-30 PROCEDURE — 72148 MRI LUMBAR SPINE W/O DYE: CPT | Mod: TC

## 2019-10-01 ENCOUNTER — TELEPHONE (OUTPATIENT)
Dept: PAIN MEDICINE | Facility: CLINIC | Age: 79
End: 2019-10-01

## 2019-10-01 NOTE — TELEPHONE ENCOUNTER
----- Message from Rosanna Khan MD sent at 10/1/2019  1:17 PM CDT -----  Patient would like to be scheduled for a lumbar kyphoplasty at L2. MRI confirmed acute compression fracture.

## 2019-10-01 NOTE — TELEPHONE ENCOUNTER
Called the patient to inform her of the acute vertebral compression fracture located at L2. As per discussion during her last clinic visit, patient would like to proceed with kyphoplasty in the near future to help with her pain. All questions answered.     Rosanna Khan MD  Pain Management

## 2019-10-02 ENCOUNTER — TELEPHONE (OUTPATIENT)
Dept: FAMILY MEDICINE | Facility: CLINIC | Age: 79
End: 2019-10-02

## 2019-10-02 NOTE — TELEPHONE ENCOUNTER
Spoke with pt rescheduled her for tomorrow with Kasie at 2pm.She can get walker ordered at visit.

## 2019-10-02 NOTE — TELEPHONE ENCOUNTER
----- Message from Delaney Floyd sent at 10/2/2019  9:20 AM CDT -----  Miriam whitmore with [employer] is calling and pt would like a u call and a walker  [phone]

## 2019-10-03 ENCOUNTER — OFFICE VISIT (OUTPATIENT)
Dept: FAMILY MEDICINE | Facility: CLINIC | Age: 79
End: 2019-10-03
Payer: MEDICARE

## 2019-10-03 VITALS
SYSTOLIC BLOOD PRESSURE: 98 MMHG | BODY MASS INDEX: 22.82 KG/M2 | OXYGEN SATURATION: 96 % | DIASTOLIC BLOOD PRESSURE: 60 MMHG | HEIGHT: 65 IN | HEART RATE: 64 BPM | WEIGHT: 137 LBS

## 2019-10-03 DIAGNOSIS — F31.32 BIPOLAR AFFECTIVE DISORDER, CURRENTLY DEPRESSED, MODERATE: Chronic | ICD-10-CM

## 2019-10-03 DIAGNOSIS — M89.9 DISORDER OF BONE: ICD-10-CM

## 2019-10-03 DIAGNOSIS — S32.020D CLOSED COMPRESSION FRACTURE OF L2 LUMBAR VERTEBRA WITH ROUTINE HEALING, SUBSEQUENT ENCOUNTER: Primary | ICD-10-CM

## 2019-10-03 DIAGNOSIS — J44.1 CHRONIC OBSTRUCTIVE PULMONARY DISEASE WITH ACUTE EXACERBATION: Chronic | ICD-10-CM

## 2019-10-03 DIAGNOSIS — M80.00XD AGE-RELATED OSTEOPOROSIS WITH CURRENT PATHOLOGICAL FRACTURE WITH ROUTINE HEALING, SUBSEQUENT ENCOUNTER: ICD-10-CM

## 2019-10-03 DIAGNOSIS — N18.6 ESRD (END STAGE RENAL DISEASE): Chronic | ICD-10-CM

## 2019-10-03 PROCEDURE — 96372 PR INJECTION,THERAP/PROPH/DIAG2ST, IM OR SUBCUT: ICD-10-PCS | Mod: S$GLB,,, | Performed by: NURSE PRACTITIONER

## 2019-10-03 PROCEDURE — 96372 THER/PROPH/DIAG INJ SC/IM: CPT | Mod: S$GLB,,, | Performed by: NURSE PRACTITIONER

## 2019-10-03 PROCEDURE — 1111F PR DISCHARGE MEDS RECONCILED W/ CURRENT OUTPATIENT MED LIST: ICD-10-PCS | Mod: S$GLB,,, | Performed by: NURSE PRACTITIONER

## 2019-10-03 PROCEDURE — 99214 OFFICE O/P EST MOD 30 MIN: CPT | Mod: 25,S$GLB,, | Performed by: NURSE PRACTITIONER

## 2019-10-03 PROCEDURE — 1111F DSCHRG MED/CURRENT MED MERGE: CPT | Mod: S$GLB,,, | Performed by: NURSE PRACTITIONER

## 2019-10-03 PROCEDURE — 99214 PR OFFICE/OUTPT VISIT, EST, LEVL IV, 30-39 MIN: ICD-10-PCS | Mod: 25,S$GLB,, | Performed by: NURSE PRACTITIONER

## 2019-10-03 RX ORDER — DENOSUMAB 60 MG/ML
60 INJECTION SUBCUTANEOUS
Qty: 1 SYRINGE | Refills: 1
Start: 2019-10-03 | End: 2021-02-25

## 2019-10-03 RX ORDER — DOXYCYCLINE 100 MG/1
100 CAPSULE ORAL EVERY 12 HOURS
Qty: 14 CAPSULE | Refills: 0 | Status: SHIPPED | OUTPATIENT
Start: 2019-10-03 | End: 2019-10-10

## 2019-10-03 RX ORDER — DEXAMETHASONE SODIUM PHOSPHATE 4 MG/ML
4 INJECTION, SOLUTION INTRA-ARTICULAR; INTRALESIONAL; INTRAMUSCULAR; INTRAVENOUS; SOFT TISSUE ONCE
Status: COMPLETED | OUTPATIENT
Start: 2019-10-03 | End: 2019-10-03

## 2019-10-03 RX ADMIN — DEXAMETHASONE SODIUM PHOSPHATE 4 MG: 4 INJECTION, SOLUTION INTRA-ARTICULAR; INTRALESIONAL; INTRAMUSCULAR; INTRAVENOUS; SOFT TISSUE at 03:10

## 2019-10-03 NOTE — PROGRESS NOTES
SUBJECTIVE:         SUBJECTIVE:    Patient ID: Althea Gaona is a 79 y.o. female.    Chief Complaint: Hospital Follow Up    Pt here for HFU visit- presented to [facility] on 8/29/19 with confusion/encephalopathy noted on dialysis. After admit mental status seemed to return to baseline however while at [facility] pt fell and suffered L2 compression fracture. Was seen by Dr. Mcarthur and had TLSO brace applied- discharged to [facility] on 9/9/19 for rehab and discharged from there on Saturday 9/28/19. Pt reports during hospital/SNF stay wasn't eating very much, has lost about 10lbs. Has seen Dr. Khan and had lumbar MRI- scheduled for L2 kyphoplasty next Friday. Hospital discharge summary reviewed however discharge papers from [facility] not available. Midodrine dose increased during hospital stay. Pt did not bring in medication bottles and she really doesn't know exactly what she's taking.    Pt today c/oing of ongoing back pain and nausea- just finished with HD. Also reports congested cough and wheezing for the past couple days. Using trelegy daily and nebulizer once/twice a day. No fever/chills. Eating a little better since back home. Having a lot of stress living with mother in law and - chronic depression continues though denies any SI. Hasn't smoked in over a month now.      No results displayed because visit has over 200 results.          Past Medical History:   Diagnosis Date    Alcoholism     no drink since 1984    Anxiety     Asthma     Bipolar affect, depressed     Bipolar disorder     COPD (chronic obstructive pulmonary disease)     ESRD (end stage renal disease)     GERD (gastroesophageal reflux disease)     Hypertension     Pancreatitis     Thyroid disease      Past Surgical History:   Procedure Laterality Date    APPENDECTOMY      CATARACT EXTRACTION Right     EYE SURGERY      HYSTERECTOMY      THYROIDECTOMY      THYROIDECTOMY       Family History   Problem Relation Age of Onset     No Known Problems Mother     Diabetes Father     Heart disease Father         blood clot in lung went to heart    Cancer Sister         breast    Stroke Paternal Aunt     Cancer Sister         breast    Cancer Cousin         colon    Diabetes Cousin     Cancer Cousin         colon    COPD Sister          of COPD       Marital Status:   Alcohol History:  reports that she does not drink alcohol.  Tobacco History:  reports that she quit smoking about a year ago. Her smoking use included cigarettes. She smoked 1.00 pack per day. She has never used smokeless tobacco.  Drug History:  reports that she does not use drugs.    Review of patient's allergies indicates:   Allergen Reactions    Metoprolol Other (See Comments)     Grand-daughter/care giver reported Pt has over reaction to this drug, Bp bottoms out along with heart rate    Codeine      Other reaction(s): Unknown       Current Outpatient Medications:     busPIRone (BUSPAR) 10 MG tablet, Take 1 tablet (10 mg total) by mouth 3 (three) times daily., Disp: 90 tablet, Rfl: 3    epoetin jacob-epbx (RETACRIT) 10,000 unit/mL imjection, Inject 0.34 mLs (3,400 Units total) into the skin every es, Thurs, Sat. With hemodialysis, Disp: , Rfl:     fluticasone-umeclidin-vilanter (TRELEGY ELLIPTA) 100-62.5-25 mcg DsDv, Inhale 1 puff into the lungs once daily., Disp: 1 each, Rfl: 11    HYDROcodone-acetaminophen (NORCO) 5-325 mg per tablet, Take 1 tablet by mouth every 8 (eight) hours as needed., Disp: 20 tablet, Rfl: 0    midodrine (PROAMATINE) 5 MG Tab, Take 1 tablet (5 mg total) by mouth 2 (two) times daily with meals., Disp: 60 tablet, Rfl: 11    omeprazole (PRILOSEC) 40 MG capsule, omeprazole 40 mg capsule,delayed release  TAKE 1 CAPSULE BY MOUTH EVERY DAY IN THE MORNING, Disp: , Rfl:     albuterol-ipratropium  mcg (COMBIVENT)  mcg/actuation inhaler, Inhale 2 puffs into the lungs every 6 (six) hours as needed for Wheezing., Disp: , Rfl:      ARIPiprazole (ABILIFY) 2 MG Tab, Take 5 mg by mouth once daily., Disp: , Rfl:     atorvastatin (LIPITOR) 40 MG tablet, Take 40 mg by mouth once daily., Disp: , Rfl:     azithromycin (ZITHROMAX) 500 MG tablet, One daily for yellow mucous, repeat if needed (Patient not taking: Reported on 10/3/2019), Disp: 3 tablet, Rfl: 3    bisacodyl (DULCOLAX) 5 mg EC tablet, Take 1 tablet (5 mg total) by mouth daily as needed for Constipation., Disp: , Rfl: 0    busPIRone (BUSPAR) 10 MG tablet, Take 10 mg by mouth 3 (three) times daily., Disp: , Rfl:     calcitRIOL (ROCALTROL) 0.25 MCG Cap, 0.25 mcg once daily. , Disp: , Rfl: 3    ciprofloxacin HCl (CIPRO) 250 MG tablet, Take 250 mg by mouth 2 (two) times daily., Disp: , Rfl:     dicyclomine (BENTYL) 10 MG capsule, dicyclomine 10 mg capsule, Disp: , Rfl:     doxycycline (VIBRAMYCIN) 100 MG Cap, Take 1 capsule (100 mg total) by mouth every 12 (twelve) hours. for 7 days (Patient not taking: Reported on 10/3/2019), Disp: 14 capsule, Rfl: 0    famotidine (PEPCID) 20 MG tablet, TK 1 T PO QHS, Disp: , Rfl: 1    fluconazole (DIFLUCAN) 150 MG Tab, Take 150 mg by mouth., Disp: , Rfl:     fluoxetine (PROZAC) 20 MG tablet, Take 20 mg by mouth once daily., Disp: , Rfl:     fluticasone propionate (FLONASE) 50 mcg/actuation nasal spray, 2 sprays (100 mcg total) by Each Nare route once daily., Disp: 1 Bottle, Rfl: 11    gabapentin (NEURONTIN) 300 MG capsule, hs, Disp: , Rfl:     HYDROcodone-acetaminophen (NORCO) 5-325 mg per tablet, Take 1 tablet by mouth nightly as needed for Pain. (Patient not taking: Reported on 10/3/2019), Disp: 15 tablet, Rfl: 0    levothyroxine (SYNTHROID) 100 MCG tablet, Take 1 tab every day by oral route., Disp: , Rfl:     lisinopril 10 MG tablet, Take 10 mg by mouth., Disp: , Rfl:     LORazepam (ATIVAN) 0.5 MG tablet, lorazepam 0.5 mg tablet, Disp: , Rfl:     meclizine (ANTIVERT) 12.5 mg tablet, meclizine 12.5 mg tablet, Disp: , Rfl:      methylPREDNISolone (MEDROL, NGOZI,) 4 mg tablet, Take 4 mg by mouth., Disp: , Rfl:     montelukast (SINGULAIR) 10 mg tablet, montelukast 10 mg tablet, Disp: , Rfl:     ondansetron (ZOFRAN) 8 MG tablet, Take 1 tablet (8 mg total) by mouth every 12 (twelve) hours as needed for Nausea., Disp: 6 tablet, Rfl: 0    ondansetron (ZOFRAN-ODT) 4 MG TbDL, ondansetron 4 mg disintegrating tablet, Disp: , Rfl:     oxyCODONE-acetaminophen (PERCOCET) 5-325 mg per tablet, Take 1 tablet by mouth every 4 (four) hours as needed for Pain., Disp: 20 tablet, Rfl: 0    pantoprazole (PROTONIX) 40 MG tablet, pantoprazole 40 mg tablet,delayed release, Disp: , Rfl:     polyethylene glycol (GLYCOLAX) 17 gram PwPk, Take 17 g by mouth daily as needed., Disp: , Rfl: 0    polyvinyl alcohol, artificial tears, (LIQUIFILM TEARS) 1.4 % ophthalmic solution, Place 1 drop into the left eye as needed., Disp: 15 mL, Rfl: 0    predniSONE (DELTASONE) 20 MG tablet, One daily for 3 days and repeat for flare of lung symptoms as intructed (Patient not taking: Reported on 10/3/2019), Disp: 12 tablet, Rfl: 0    PROLIA 60 mg/mL Syrg, Inject 1 mL (60 mg total) into the skin every 6 (six) months., Disp: 1 Syringe, Rfl: 1    propranolol (INDERAL) 40 MG tablet, Take 40 mg by mouth., Disp: , Rfl:     rosuvastatin (CRESTOR) 20 MG tablet, Take 20 mg by mouth once daily., Disp: , Rfl:     sucralfate (CARAFATE) 100 mg/mL suspension, 10 mLs., Disp: , Rfl:     traMADol (ULTRAM) 50 mg tablet, tramadol 50 mg tablet, Disp: , Rfl:     traZODone (DESYREL) 50 MG tablet, trazodone 50 mg tablet, Disp: , Rfl:   No current facility-administered medications for this visit.     Review of Systems   Constitutional: Positive for appetite change and unexpected weight change (12lb weight loss since August visit). Negative for chills and fever.   Respiratory: Positive for cough and shortness of breath. Negative for wheezing.    Cardiovascular: Negative for chest pain, palpitations  "and leg swelling.   Gastrointestinal: Negative for abdominal pain, constipation and diarrhea.   Genitourinary: Negative for dysuria, frequency and hematuria.   Musculoskeletal: Positive for back pain. Negative for gait problem.   Skin: Negative for rash.   Neurological: Positive for dizziness. Negative for syncope and numbness.          Transitional Care Note    Family and/or Caretaker present at visit?  No.  Diagnostic tests reviewed/disposition: No diagnosic tests pending after this hospitalization.  Disease/illness education: yes  Home health/community services discussion/referrals: Patient does not have home health established from hospital visit.  They do not need home health.  If needed, we will set up home health for the patient.   Establishment or re-establishment of referral orders for community resources: No other necessary community resources.   Discussion with other health care providers: No discussion with other health care providers necessary.         Objective:      Vitals:    10/03/19 1410   BP: 98/60   Pulse: 64   SpO2: 96%   Weight: 62.1 kg (137 lb)   Height: 5' 5" (1.651 m)     Physical Exam   Constitutional: She appears well-developed and well-nourished.   Elderly WF appears older than stated age, constantly smacks lip   HENT:   Head: Normocephalic and atraumatic.   Cardiovascular: Normal rate and regular rhythm.   Pulmonary/Chest: Effort normal. She has wheezes (WH left upper lobe, diminished throughout). She has no rales.   Abdominal: Soft. She exhibits no distension. There is no tenderness.   Musculoskeletal: She exhibits no edema.   Neurological: She is alert. Gait (steady gait with rollator) normal.   Skin: Skin is warm and dry.         Assessment:       1. Closed compression fracture of L2 lumbar vertebra with routine healing, subsequent encounter    2. Chronic obstructive pulmonary disease with acute exacerbation    3. ESRD (end stage renal disease)    4. Bipolar affective disorder, " currently depressed, moderate    5. Age-related osteoporosis with current pathological fracture with routine healing, subsequent encounter    6. Disorder of bone         Plan:       Closed compression fracture of L2 lumbar vertebra with routine healing, subsequent encounter  -scheduled for kypho with Dr. Khan next week    Chronic obstructive pulmonary disease with acute exacerbation  -     doxycycline (VIBRAMYCIN) 100 MG Cap; Take 1 capsule (100 mg total) by mouth every 12 (twelve) hours. for 7 days (Patient not taking: Reported on 10/3/2019)  Dispense: 14 capsule; Refill: 0  -     dexamethasone injection 4 mg  -pt c/oing of increased SOB and cough though SOB is a chronic c/o of hers- sats stable and no distress on exam- will add abx and IM steroid given for mild exacerbation    ESRD (end stage renal disease)  -stable on HD under care of Dr. Solis    Bipolar affective disorder, currently depressed, moderate  - stable though still quite depressed and having family issues with living situation    Age-related osteoporosis with current pathological fracture with routine healing, subsequent encounter  -     PROLIA 60 mg/mL Syrg; Inject 1 mL (60 mg total) into the skin every 6 (six) months.  Dispense: 1 Syringe; Refill: 1  -prolia had actually been ordered at her visit last month prior to admit however will reorder in Epic system to start soon    Disorder of bone  -     DXA Bone Density Spine And Hip; Future; Expected date: 10/03/2019      Follow up for cancel Dr. Braulio Stratton in Feb, as scheduled in Nov with me.

## 2019-10-07 ENCOUNTER — TELEPHONE (OUTPATIENT)
Dept: FAMILY MEDICINE | Facility: CLINIC | Age: 79
End: 2019-10-07

## 2019-10-07 DIAGNOSIS — S32.020D CLOSED COMPRESSION FRACTURE OF L2 LUMBAR VERTEBRA WITH ROUTINE HEALING, SUBSEQUENT ENCOUNTER: Primary | ICD-10-CM

## 2019-10-07 DIAGNOSIS — Z91.81 RISK FOR FALLS: ICD-10-CM

## 2019-10-07 NOTE — TELEPHONE ENCOUNTER
----- Message from Kasie Lerma sent at 10/7/2019  7:58 AM CDT -----  Pt is requesting a walker please.

## 2019-10-08 ENCOUNTER — TELEPHONE (OUTPATIENT)
Dept: PAIN MEDICINE | Facility: CLINIC | Age: 79
End: 2019-10-08

## 2019-10-08 DIAGNOSIS — M48.50XA VERTEBRAL COMPRESSION FRACTURE: Primary | ICD-10-CM

## 2019-10-08 NOTE — TELEPHONE ENCOUNTER
----- Message from Max BOB Frisard sent at 10/8/2019 10:32 AM CDT -----  Contact: same  Patient called in and stated she needs to get an update on her procedure that was supposed to be scheduled for this Friday 10/11/19?    Patient call back number is [phone]

## 2019-10-08 NOTE — TELEPHONE ENCOUNTER
----- Message from Maddie Angeles sent at 10/8/2019 11:07 AM CDT -----  Type: Needs Medical Advice    Who Called:  Patient  Best Call Back Number: [phone]  Additional Information: Patient needs to speak with nurse concerning procedure and preop date/please call back to advise.

## 2019-10-15 ENCOUNTER — HOSPITAL ENCOUNTER (OUTPATIENT)
Dept: PREADMISSION TESTING | Facility: HOSPITAL | Age: 79
Discharge: HOME | End: 2019-10-15
Attending: ANESTHESIOLOGY
Payer: MEDICARE

## 2019-10-15 ENCOUNTER — TELEPHONE (OUTPATIENT)
Dept: PAIN MEDICINE | Facility: CLINIC | Age: 79
End: 2019-10-15

## 2019-10-15 VITALS — HEIGHT: 67 IN | WEIGHT: 146 LBS | BODY MASS INDEX: 22.91 KG/M2

## 2019-10-15 PROCEDURE — 99900104 DSU ONLY-NO CHARGE-EA ADD'L HR (STAT)

## 2019-10-15 PROCEDURE — 99900103 DSU ONLY-NO CHARGE-INITIAL HR (STAT)

## 2019-10-15 NOTE — TELEPHONE ENCOUNTER
----- Message from Rafia Brown RN sent at 10/15/2019  7:51 AM CDT -----  Please fax over consent for procedure Friday.  [phone]  Thanks

## 2019-10-15 NOTE — DISCHARGE INSTRUCTIONS

## 2019-10-17 ENCOUNTER — TELEPHONE (OUTPATIENT)
Dept: PAIN MEDICINE | Facility: CLINIC | Age: 79
End: 2019-10-17

## 2019-10-17 ENCOUNTER — ANESTHESIA EVENT (OUTPATIENT)
Dept: SURGERY | Facility: HOSPITAL | Age: 79
End: 2019-10-17
Payer: MEDICARE

## 2019-10-17 NOTE — TELEPHONE ENCOUNTER
----- Message from Gaye Prasad sent at 10/17/2019  9:50 AM CDT -----  Contact: patient  Patient requesting a call back regarding procedure tomorrow    [phone]

## 2019-10-18 ENCOUNTER — ANESTHESIA (OUTPATIENT)
Dept: SURGERY | Facility: HOSPITAL | Age: 79
End: 2019-10-18
Payer: MEDICARE

## 2019-10-18 ENCOUNTER — HOSPITAL ENCOUNTER (OUTPATIENT)
Facility: HOSPITAL | Age: 79
Discharge: HOME | End: 2019-10-18
Attending: ANESTHESIOLOGY | Admitting: ANESTHESIOLOGY
Payer: MEDICARE

## 2019-10-18 VITALS
OXYGEN SATURATION: 95 % | SYSTOLIC BLOOD PRESSURE: 121 MMHG | BODY MASS INDEX: 22.91 KG/M2 | WEIGHT: 146 LBS | RESPIRATION RATE: 16 BRPM | HEART RATE: 66 BPM | TEMPERATURE: 98 F | DIASTOLIC BLOOD PRESSURE: 64 MMHG | HEIGHT: 67 IN

## 2019-10-18 DIAGNOSIS — S32.020D COMPRESSION FRACTURE OF L2 VERTEBRA WITH ROUTINE HEALING, SUBSEQUENT ENCOUNTER: ICD-10-CM

## 2019-10-18 DIAGNOSIS — M48.50XA VERTEBRAL COMPRESSION FRACTURE: Primary | ICD-10-CM

## 2019-10-18 LAB — POTASSIUM SERPL-SCNC: 4 MMOL/L (ref 3.5–5.1)

## 2019-10-18 PROCEDURE — 25000003 PHARM REV CODE 250: Performed by: NURSE ANESTHETIST, CERTIFIED REGISTERED

## 2019-10-18 PROCEDURE — C1726 CATH, BAL DIL, NON-VASCULAR: HCPCS | Performed by: ANESTHESIOLOGY

## 2019-10-18 PROCEDURE — D9220A PRA ANESTHESIA: Mod: ANES,,, | Performed by: ANESTHESIOLOGY

## 2019-10-18 PROCEDURE — 84132 ASSAY OF SERUM POTASSIUM: CPT

## 2019-10-18 PROCEDURE — 22514 PERQ VERTEBRAL AUGMENTATION: CPT | Mod: ,,, | Performed by: ANESTHESIOLOGY

## 2019-10-18 PROCEDURE — 63600175 PHARM REV CODE 636 W HCPCS: Performed by: NURSE ANESTHETIST, CERTIFIED REGISTERED

## 2019-10-18 PROCEDURE — 37000008 HC ANESTHESIA 1ST 15 MINUTES: Performed by: ANESTHESIOLOGY

## 2019-10-18 PROCEDURE — 25500020 PHARM REV CODE 255: Performed by: ANESTHESIOLOGY

## 2019-10-18 PROCEDURE — 36000706: Performed by: ANESTHESIOLOGY

## 2019-10-18 PROCEDURE — 22514 PR PERQ VERTEBRAL AUGMENTATION UNI/BILAT LUMBAR: ICD-10-PCS | Mod: ,,, | Performed by: ANESTHESIOLOGY

## 2019-10-18 PROCEDURE — D9220A PRA ANESTHESIA: Mod: CRNA,,, | Performed by: NURSE ANESTHETIST, CERTIFIED REGISTERED

## 2019-10-18 PROCEDURE — 63600175 PHARM REV CODE 636 W HCPCS: Performed by: ANESTHESIOLOGY

## 2019-10-18 PROCEDURE — 71000033 HC RECOVERY, INTIAL HOUR: Performed by: ANESTHESIOLOGY

## 2019-10-18 PROCEDURE — 99900103 DSU ONLY-NO CHARGE-INITIAL HR (STAT): Performed by: ANESTHESIOLOGY

## 2019-10-18 PROCEDURE — 37000009 HC ANESTHESIA EA ADD 15 MINS: Performed by: ANESTHESIOLOGY

## 2019-10-18 PROCEDURE — 25000003 PHARM REV CODE 250: Performed by: ANESTHESIOLOGY

## 2019-10-18 PROCEDURE — 99900104 DSU ONLY-NO CHARGE-EA ADD'L HR (STAT): Performed by: ANESTHESIOLOGY

## 2019-10-18 PROCEDURE — 36415 COLL VENOUS BLD VENIPUNCTURE: CPT

## 2019-10-18 PROCEDURE — 71000015 HC POSTOP RECOV 1ST HR: Performed by: ANESTHESIOLOGY

## 2019-10-18 PROCEDURE — 36000707: Performed by: ANESTHESIOLOGY

## 2019-10-18 PROCEDURE — 71000039 HC RECOVERY, EACH ADD'L HOUR: Performed by: ANESTHESIOLOGY

## 2019-10-18 PROCEDURE — D9220A PRA ANESTHESIA: ICD-10-PCS | Mod: ANES,,, | Performed by: ANESTHESIOLOGY

## 2019-10-18 PROCEDURE — C1713 ANCHOR/SCREW BN/BN,TIS/BN: HCPCS | Performed by: ANESTHESIOLOGY

## 2019-10-18 PROCEDURE — D9220A PRA ANESTHESIA: ICD-10-PCS | Mod: CRNA,,, | Performed by: NURSE ANESTHETIST, CERTIFIED REGISTERED

## 2019-10-18 DEVICE — CEMENT AUTOPLEX KIT W/VERTEPLE: Type: IMPLANTABLE DEVICE | Site: SPINE LUMBAR | Status: FUNCTIONAL

## 2019-10-18 RX ORDER — LIDOCAINE HYDROCHLORIDE 10 MG/ML
INJECTION, SOLUTION EPIDURAL; INFILTRATION; INTRACAUDAL; PERINEURAL
Status: DISCONTINUED | OUTPATIENT
Start: 2019-10-18 | End: 2019-10-18 | Stop reason: HOSPADM

## 2019-10-18 RX ORDER — HYDROCODONE BITARTRATE AND ACETAMINOPHEN 5; 325 MG/1; MG/1
1 TABLET ORAL EVERY 6 HOURS PRN
Qty: 28 TABLET | Refills: 0 | Status: SHIPPED | OUTPATIENT
Start: 2019-10-18 | End: 2019-12-12 | Stop reason: SDUPTHER

## 2019-10-18 RX ORDER — LIDOCAINE HCL/PF 100 MG/5ML
SYRINGE (ML) INTRAVENOUS
Status: DISCONTINUED | OUTPATIENT
Start: 2019-10-18 | End: 2019-10-18

## 2019-10-18 RX ORDER — PHENYLEPHRINE HYDROCHLORIDE 10 MG/ML
INJECTION INTRAVENOUS
Status: DISCONTINUED | OUTPATIENT
Start: 2019-10-18 | End: 2019-10-18

## 2019-10-18 RX ORDER — SODIUM CHLORIDE, SODIUM LACTATE, POTASSIUM CHLORIDE, CALCIUM CHLORIDE 600; 310; 30; 20 MG/100ML; MG/100ML; MG/100ML; MG/100ML
INJECTION, SOLUTION INTRAVENOUS CONTINUOUS
Status: DISCONTINUED | OUTPATIENT
Start: 2019-10-18 | End: 2019-10-18 | Stop reason: HOSPADM

## 2019-10-18 RX ORDER — PROPOFOL 10 MG/ML
VIAL (ML) INTRAVENOUS CONTINUOUS PRN
Status: DISCONTINUED | OUTPATIENT
Start: 2019-10-18 | End: 2019-10-18

## 2019-10-18 RX ORDER — SODIUM CHLORIDE, SODIUM LACTATE, POTASSIUM CHLORIDE, CALCIUM CHLORIDE 600; 310; 30; 20 MG/100ML; MG/100ML; MG/100ML; MG/100ML
INJECTION, SOLUTION INTRAVENOUS ONCE AS NEEDED
Status: DISCONTINUED | OUTPATIENT
Start: 2019-10-18 | End: 2019-10-18 | Stop reason: HOSPADM

## 2019-10-18 RX ORDER — BUPIVACAINE HYDROCHLORIDE AND EPINEPHRINE 2.5; 5 MG/ML; UG/ML
INJECTION, SOLUTION EPIDURAL; INFILTRATION; INTRACAUDAL; PERINEURAL
Status: DISCONTINUED | OUTPATIENT
Start: 2019-10-18 | End: 2019-10-18 | Stop reason: HOSPADM

## 2019-10-18 RX ORDER — MIDAZOLAM HYDROCHLORIDE 1 MG/ML
INJECTION, SOLUTION INTRAMUSCULAR; INTRAVENOUS
Status: DISCONTINUED | OUTPATIENT
Start: 2019-10-18 | End: 2019-10-18

## 2019-10-18 RX ORDER — FENTANYL CITRATE 50 UG/ML
INJECTION, SOLUTION INTRAMUSCULAR; INTRAVENOUS
Status: DISCONTINUED | OUTPATIENT
Start: 2019-10-18 | End: 2019-10-18

## 2019-10-18 RX ORDER — SODIUM CHLORIDE 9 MG/ML
INJECTION, SOLUTION INTRAVENOUS CONTINUOUS
Status: DISCONTINUED | OUTPATIENT
Start: 2019-10-18 | End: 2019-10-18 | Stop reason: HOSPADM

## 2019-10-18 RX ORDER — ONDANSETRON 2 MG/ML
INJECTION INTRAMUSCULAR; INTRAVENOUS
Status: DISCONTINUED | OUTPATIENT
Start: 2019-10-18 | End: 2019-10-18

## 2019-10-18 RX ORDER — CEFAZOLIN SODIUM 1 G/50ML
1 SOLUTION INTRAVENOUS
Status: COMPLETED | OUTPATIENT
Start: 2019-10-18 | End: 2019-10-18

## 2019-10-18 RX ORDER — FENTANYL CITRATE 50 UG/ML
25 INJECTION, SOLUTION INTRAMUSCULAR; INTRAVENOUS EVERY 5 MIN PRN
Status: COMPLETED | OUTPATIENT
Start: 2019-10-18 | End: 2019-10-18

## 2019-10-18 RX ORDER — LIDOCAINE HYDROCHLORIDE 10 MG/ML
1 INJECTION, SOLUTION EPIDURAL; INFILTRATION; INTRACAUDAL; PERINEURAL ONCE
Status: COMPLETED | OUTPATIENT
Start: 2019-10-18 | End: 2019-10-18

## 2019-10-18 RX ORDER — KETAMINE HYDROCHLORIDE 10 MG/ML
INJECTION, SOLUTION INTRAMUSCULAR; INTRAVENOUS
Status: DISCONTINUED | OUTPATIENT
Start: 2019-10-18 | End: 2019-10-18

## 2019-10-18 RX ORDER — ACETAMINOPHEN 10 MG/ML
INJECTION, SOLUTION INTRAVENOUS
Status: DISCONTINUED | OUTPATIENT
Start: 2019-10-18 | End: 2019-10-18

## 2019-10-18 RX ORDER — HYDROCODONE BITARTRATE AND ACETAMINOPHEN 5; 325 MG/1; MG/1
1 TABLET ORAL EVERY 6 HOURS PRN
Qty: 28 TABLET | Refills: 0 | Status: SHIPPED | OUTPATIENT
Start: 2019-10-18 | End: 2019-10-25

## 2019-10-18 RX ORDER — ONDANSETRON 2 MG/ML
4 INJECTION INTRAMUSCULAR; INTRAVENOUS ONCE AS NEEDED
Status: DISCONTINUED | OUTPATIENT
Start: 2019-10-18 | End: 2019-10-18 | Stop reason: HOSPADM

## 2019-10-18 RX ADMIN — SODIUM CHLORIDE, SODIUM LACTATE, POTASSIUM CHLORIDE, AND CALCIUM CHLORIDE: .6; .31; .03; .02 INJECTION, SOLUTION INTRAVENOUS at 11:10

## 2019-10-18 RX ADMIN — PROPOFOL 20 MCG/KG/MIN: 10 INJECTION, EMULSION INTRAVENOUS at 12:10

## 2019-10-18 RX ADMIN — FENTANYL CITRATE 25 MCG: 0.05 INJECTION, SOLUTION INTRAMUSCULAR; INTRAVENOUS at 01:10

## 2019-10-18 RX ADMIN — PHENYLEPHRINE HYDROCHLORIDE 100 MCG: 10 INJECTION INTRAVENOUS at 12:10

## 2019-10-18 RX ADMIN — ONDANSETRON 4 MG: 2 INJECTION, SOLUTION INTRAMUSCULAR; INTRAVENOUS at 12:10

## 2019-10-18 RX ADMIN — MIDAZOLAM 1 MG: 1 INJECTION INTRAMUSCULAR; INTRAVENOUS at 12:10

## 2019-10-18 RX ADMIN — FENTANYL CITRATE 50 MCG: 50 INJECTION, SOLUTION INTRAMUSCULAR; INTRAVENOUS at 12:10

## 2019-10-18 RX ADMIN — SODIUM CHLORIDE: 0.9 INJECTION, SOLUTION INTRAVENOUS at 11:10

## 2019-10-18 RX ADMIN — LIDOCAINE HYDROCHLORIDE 20 MG: 20 INJECTION, SOLUTION INTRAVENOUS at 12:10

## 2019-10-18 RX ADMIN — LIDOCAINE HYDROCHLORIDE: 10 INJECTION, SOLUTION EPIDURAL; INFILTRATION; INTRACAUDAL; PERINEURAL at 11:10

## 2019-10-18 RX ADMIN — CEFAZOLIN SODIUM 1 G: 1 SOLUTION INTRAVENOUS at 12:10

## 2019-10-18 RX ADMIN — ACETAMINOPHEN 750 MG: 10 INJECTION, SOLUTION INTRAVENOUS at 12:10

## 2019-10-18 RX ADMIN — KETAMINE HYDROCHLORIDE 15 MG: 10 INJECTION, SOLUTION INTRAMUSCULAR; INTRAVENOUS at 12:10

## 2019-10-18 NOTE — OR NURSING
Pre-op complete. Pt states she is comfortable.  is not present at this time. Text notifications initiated. Update given.

## 2019-10-18 NOTE — OP NOTE
PROCEDURE PERFORMED: Transpedicular Kyphoplasty at the L2 level(s), on the bilateral side                                                                               PREPROCEDURE DIAGNOSIS:                                                      1.  Wedge compression fracture of the L2 lumbar vertebra         2.  Pain at these levels of compression fracture.                            3.  No myelopathy or retropulsed fragments.                                                                                                             POSTPROCEDURE DIAGNOSIS:   Same    SURGEON:  Rosanna Khan MD                                                                                                                         Anesthesia: Local MAC per Anesthesia                                                                                                                 LOCAL ANESTHETIC USED:  Lidocaine 1% at each side of vertebra, 4 ml at each level                                                                               ESTIMATED BLOOD LOSS: 20 mL                                                                                                                        COMPLICATIONS: none                                                                                                                                 BONE MARROW BIOPSY:  N/A                                                                               TECHNIQUE: A time out was taken to identify patient, procedure and side, and allergies were reviewed. With the patient lying in the prone position and after receiving the intravenous sedation, the area was prepped and draped using the usual sterile fashion, using ChloraPrep and a body fenestrated drape. The area of the compression fracture(s) was determined under fluoroscopic guidance using the AP and lateral views.  Local anesthetic was given with a 25-gauge 1.5 inch needle.  That was followed with completing the  local anesthetic injection with a 3.5inch 22-gauge spinal needle going down all the way to the periosteum of the desired pedicle. A 2-mm longitudinal incision was made around the 22-gauge spinal needle to allow introduction of the trocar and cannula to the vertebral body.  Through a  transpedicular approach, the trocar and cannula were introduced and advanced slowly.  Once in the posterior one-third of the vertebral body, a drill was placed and advanced to the anterior one-third of the vertebral body. This was performed L2.  Kyphon balloons were then placed in each trochar and inflated under live fluoroscopy until noted to be filling the vertebral body, no height change noted, no movement of the end plates noted. The methylmethacrylate resin was mixed and approximately 6 ml was injected using. Minimal escape was observed while introducing the cement and this was done under live fluoroscopy.  The cannula was applied to each trocar under live fluoroscopy as well.  Each trocar and cannula was removed under live fluoroscopy in a very slow fashion to observe the contrast-impregnated cement. After the trocars were removed, the sites were cleaned and dried. Dermabond was then used to close the incisions.                                                                                The patient layed supine for 60min after the procedure. The patient was monitored after the procedure with no change in neuromuscular status. The patient was given post-procedure and discharge instructions at home.  Instruction regarding bandage were given.  The patient was advised to call if developing any severe pain neurologic changes. Otherwise the patient will follow up with us in 1 week at our clinic.

## 2019-10-18 NOTE — INTERVAL H&P NOTE
The patient has been examined and the H&P has been reviewed:    I concur with the findings and no changes have occurred since H&P was written.     This patient has been cleared for surgery in an ambulatory surgical facility    ASA 3,  Mallampatti Score 3  No history of anesthetic complications  Sedation per anesthesia.     Anesthesia/Surgery risks, benefits and alternative options discussed and understood by patient/family.          Active Hospital Problems    Diagnosis  POA    Vertebral compression fracture [M48.50XA]  Yes      Resolved Hospital Problems   No resolved problems to display.

## 2019-10-18 NOTE — ANESTHESIA POSTPROCEDURE EVALUATION
Anesthesia Post Evaluation    Patient: Althea Gaona    Procedure(s) Performed: Procedure(s) (LRB):  Kyphoplasty (N/A)    Final Anesthesia Type: MAC  Patient location during evaluation: PACU  Patient participation: Yes- Able to Participate  Level of consciousness: awake and alert  Post-procedure vital signs: reviewed and stable  Pain management: adequate  Airway patency: patent  PONV status at discharge: No PONV  Anesthetic complications: no      Cardiovascular status: hemodynamically stable  Respiratory status: unassisted and room air  Hydration status: euvolemic  Follow-up not needed.          Vitals Value Taken Time   /64 10/18/2019  2:17 PM   Temp 36.7 °C (98.1 °F) 10/18/2019  2:15 PM   Pulse 66 10/18/2019  2:17 PM   Resp 16 10/18/2019  2:17 PM   SpO2 95 % 10/18/2019  2:17 PM         Event Time     Out of Recovery 14:16:00          Pain/Lulu Score: Pain Rating Prior to Med Admin: 5 (10/18/2019  2:16 PM)  Pain Rating Post Med Admin: 4 (10/18/2019  2:16 PM)  Lulu Score: 10 (10/18/2019  2:20 PM)

## 2019-10-18 NOTE — DISCHARGE INSTRUCTIONS
Post-Procedure instructions: These instructions are for general purpose only. For specific Post-Procedure instructions, please follow the written instructions given to you following the procedure.  If you received sedation during the procedure, do not drive, operate machinery or make any important decisions until the next 24-48 hours after surgery.   If you have stopped taking Aspirin prior to the procedure, please restart taking it from the day after your procedure. You may restart your Plavix or Coumadin the day after your procedure unless otherwise instructed.  Do not shower or soak in the bathtub for 1-2 days after your surgery.   Remove all small bandages on your incision 24-48 hours after the surgery.   No heavy lifting or strenuous activity until you are seen and cleared by the doctor.       Please seek medical help immediately if any of the following symptoms occur:  1. Severe increase in your usual pain or appearance of new pain. 2. Prolonged or increasing weakness or numbness in the legs or arms. 3. Drainage, redness, active bleeding, or increased swelling at the injection site. 4. Temperature over 100.0 degrees F. 5. Headache that increases when your head is upright and decreases when you lie flat. 6. Shortness of breath, chest pain, or problems breathing. DO NOT LEAVE A MESSAGE ON THE PROCEDURE PHONE LINE      Please contact [place] or Melanie [phone] if your physical condition changes prior to your procedure, if you should need to delay or cancel your procedure or if you have any questions about your procedure or these instructions.  Please follow up with Dr. Siddiqui on                   at _______                   Please make follow up appt. with     .  Your follow up appt. will be determined AFTER procedure.  Your next procedure is scheduled for    . You will be called with instructions and arrival time several days prior to procedure          Discharge Instructions: After Your  "Surgery/Procedure  Youve just had surgery. During surgery you were given medicine called anesthesia to keep you relaxed and free of pain. After surgery you may have some pain or nausea. This is common. Here are some tips for feeling better and getting well after surgery.     Stay on schedule with your medication.   Going home  Your doctor or nurse will show you how to take care of yourself when you go home. He or she will also answer your questions. Have an adult family member or friend drive you home.      For your safety we recommend these precaution for the first 24 hours after your procedure:  · Do not drive or use heavy equipment.  · Do not make important decisions or sign legal papers.  · Do not drink alcohol.  · Have someone stay with you, if needed. He or she can watch for problems and help keep you safe.  · Your concentration, balance, coordination, and judgement may be impaired for many hours after anesthesia.  Use caution when ambulating or standing up.     · You may feel weak and "washed out" after anesthesia and surgery.      Subtle residual effects of general anesthesia or sedation with regional / local anesthesia can last more than 24 hours.  Rest for the remainder of the day or longer if your Doctor/Surgeon has advised you to do so.  Although you may feel normal within the first 24 hours, your reflexes and mental ability may be impaired without you realizing it.  You may feel dizzy, lightheaded or sleepy for 24 hours or longer.      Be sure to go to all follow-up visits with your doctor. And rest after your surgery for as long as your doctor tells you to.  Coping with pain  If you have pain after surgery, pain medicine will help you feel better. Take it as told, before pain becomes severe. Also, ask your doctor or pharmacist about other ways to control pain. This might be with heat, ice, or relaxation. And follow any other instructions your surgeon or nurse gives you.  Tips for taking pain " medicine  To get the best relief possible, remember these points:  · Pain medicines can upset your stomach. Taking them with a little food may help.  · Most pain relievers taken by mouth need at least 20 to 30 minutes to start to work.  · Taking medicine on a schedule can help you remember to take it. Try to time your medicine so that you can take it before starting an activity. This might be before you get dressed, go for a walk, or sit down for dinner.  · Constipation is a common side effect of pain medicines. Call your doctor before taking any medicines such as laxatives or stool softeners to help ease constipation. Also ask if you should skip any foods. Drinking lots of fluids and eating foods such as fruits and vegetables that are high in fiber can also help. Remember, do not take laxatives unless your surgeon has prescribed them.  · Drinking alcohol and taking pain medicine can cause dizziness and slow your breathing. It can even be deadly. Do not drink alcohol while taking pain medicine.  · Pain medicine can make you react more slowly to things. Do not drive or run machinery while taking pain medicine.  Your health care provider may tell you to take acetaminophen to help ease your pain. Ask him or her how much you are supposed to take each day. Acetaminophen or other pain relievers may interact with your prescription medicines or other over-the-counter (OTC) drugs. Some prescription medicines have acetaminophen and other ingredients. Using both prescription and OTC acetaminophen for pain can cause you to overdose. Read the labels on your OTC medicines with care. This will help you to clearly know the list of ingredients, how much to take, and any warnings. It may also help you not take too much acetaminophen. If you have questions or do not understand the information, ask your pharmacist or health care provider to explain it to you before you take the OTC medicine.  Managing nausea  Some people have an upset  stomach after surgery. This is often because of anesthesia, pain, or pain medicine, or the stress of surgery. These tips will help you handle nausea and eat healthy foods as you get better. If you were on a special food plan before surgery, ask your doctor if you should follow it while you get better. These tips may help:  · Do not push yourself to eat. Your body will tell you when to eat and how much.  · Start off with clear liquids and soup. They are easier to digest.  · Next try semi-solid foods, such as mashed potatoes, applesauce, and gelatin, as you feel ready.  · Slowly move to solid foods. Dont eat fatty, rich, or spicy foods at first.  · Do not force yourself to have 3 large meals a day. Instead eat smaller amounts more often.  · Take pain medicines with a small amount of solid food, such as crackers or toast, to avoid nausea.     Call your surgeon if  · You still have pain an hour after taking medicine. The medicine may not be strong enough.  · You feel too sleepy, dizzy, or groggy. The medicine may be too strong.  · You have side effects like nausea, vomiting, or skin changes, such as rash, itching, or hives.       If you have obstructive sleep apnea  You were given anesthesia medicine during surgery to keep you comfortable and free of pain. After surgery, you may have more apnea spells because of this medicine and other medicines you were given. The spells may last longer than usual.   At home:  · Keep using the continuous positive airway pressure (CPAP) device when you sleep. Unless your health care provider tells you not to, use it when you sleep, day or night. CPAP is a common device used to treat obstructive sleep apnea.  · Talk with your provider before taking any pain medicine, muscle relaxants, or sedatives. Your provider will tell you about the possible dangers of taking these medicines.  © 3764-0553 [employer]. [address] [place] [address], [place] [address]. All rights reserved. This  information is not intended as a substitute for professional medical care. Always follow your healthcare professional's instructions.  Post op instructions for prevention of DVT  What is deep vein thrombosis?  Deep vein thrombosis (DVT) is the medical term for blood clots in the deep veins of the leg.  These blood clots can be dangerous.  A DVT can block a blood vessel and keep blood from getting where it needs to go.  Another problem is that the clot can travel to other parts of the body such as the lungs.  A clot that travels to the lungs is called a pulmonary embolus (PE) and can cause serious problems with breathing which can lead to death.  Am I at risk for DVT/PE?  If you are not very active, you are at risk of DVT.  Anyone confined to bed, sitting for long periods of time, recovering from surgery, etc. increases the risk of DVT.  Other risk factors are cancer diagnosis, certain medications, estrogen replacement in any form,older age, obesity, pregnancy, smoking, history of clotting disorders, and dehydration.  How will I know if I have a DVT?   Swelling in the lower leg   Pain   Warmth, redness, hardness or bulging of the vein  If you have any of these symptoms, call your doctors office right away.  Some people will not have any symptoms until the clot moves to the lungs.  What are the symptoms of a PE?   Panting, shortness of breath, or trouble breathing   Sharp, knife-like chest pain when you breathe   Coughing or coughing up blood   Rapid heartbeat  If you have any of these symptoms or get worse quickly, call 911 for emergency treatment.  How can I prevent a DVT?   Avoid long periods of inactivity and dont cross your legs--get up and walk around every hour or so.   Stay active--walking after surgery is highly encouraged.  This means you should get out of the house and walk in the neighborhood.  Going up and down stairs will not impair healing (unless advised against such activity by your  doctor).     Drink plenty of noncaffeinated, nonalcoholic fluids each day to prevent dehydration.   Wear special support stockings, if they have been advised by your doctor.   If you travel, stop at least once an hour and walk around.   Avoid smoking (assistance with stopping is available through your healthcare provider)  Always notify your doctor if you are not able to follow the post operative instructions that are given to you at the time of discharge.  It may be necessary to prescribe one of the medications available to prevent DVT.  Discharge Instructions for Kyphoplasty  Fractures in the bones of the spine (vertebrae) can cause severe back pain and loss of movement. You had a procedure called kyphoplasty to cement the fractures in your spine, restore the height of the vertebrae, and help relieve pain. Using image-guided X-rays, your doctor made 1 or 2 small cuts (incisions) in your back for each vertebra treated. The doctor put a balloon on each side of the broken vertebra and inflated the balloons until they expanded the right amount. Then the balloons were removed. The spaces created by the balloons were filled with orthopedic cement. This gave strength and stability to your vertebra. The following are instructions to help you care for your back when you are at home.  Home care  · Take your medicine exactly as directed.  · Remove the small bandages on your incision 24 to 48 hours after the surgery.  · Dont shower or soak in a bathtub for 1 to 2 days after the surgery.  · Use an ice pack or bag of frozen peas--or something similar--wrapped in a thin towel to reduce the swelling and pain around incision sites. Put the ice pack on the area for 20 minutes, then remove it for 20 minutes. Repeat as needed.  · Wear your brace, if you were told to do so by your doctor. And to help stay flexible, bend as much as the brace allows you to.  · For the first 1 to 2 days after the surgery, keep your head raised up  when you are lying down.  · Take short walks. Start by walking for 5 minutes at a time. Then gradually build up your time and distance.  · Dont drive for 2 days after surgery. And never drive while taking opioid pain medicine.  · Ask your healthcare provider when you can begin lifting objects again. Ask him or her about any weight limits for lifting.  Follow-up  Make a follow-up appointment as directed by your doctor.     When to seek medical attention  Call 911 right away if you have any of the following:  · Chest pain  · Shortness of breath  Otherwise, call your doctor right away if you have any of the following:  · Increased redness, swelling, drainage, or warmth around the incision sites  · Severe pain at the incision site  · Weakness, numbness, or tingling in your legs  · Fever above 100.4°F  (38.0°C) or shaking chills   Date Last Reviewed: 11/16/2015  © 0618-4883 [employer] [address] [place] [address], [place] [address]. All rights reserved. This information is not intended as a substitute for professional medical care. Always follow your healthcare professional's instructions.

## 2019-10-18 NOTE — DISCHARGE SUMMARY
[facility]  Discharge Note  Short Stay    Admit Date: 10/18/2019    Discharge Date and Time: 10/18/2019    Attending Physician: Rosanna Khan MD     Discharge Provider: Rosanna Khan    Diagnoses:  Active Hospital Problems    Diagnosis  POA    *Vertebral compression fracture [M48.50XA]  Yes      Resolved Hospital Problems   No resolved problems to display.       Hospital Course: Kyphoplasty    Discharged Condition: Good    Final Diagnoses:   Active Hospital Problems    Diagnosis  POA    *Vertebral compression fracture [M48.50XA]  Yes      Resolved Hospital Problems   No resolved problems to display.       Disposition: Home or Self Care    Follow up/Patient Instructions:    Medications:  Reconciled Home Medications:      Medication List      CHANGE how you take these medications    * HYDROcodone-acetaminophen 5-325 mg per tablet  Commonly known as:  NORCO  Take 1 tablet by mouth nightly as needed for Pain.  What changed:  Another medication with the same name was added. Make sure you understand how and when to take each.     * HYDROcodone-acetaminophen 5-325 mg per tablet  Commonly known as:  NORCO  Take 1 tablet by mouth every 8 (eight) hours as needed.  What changed:  Another medication with the same name was added. Make sure you understand how and when to take each.     * HYDROcodone-acetaminophen 5-325 mg per tablet  Commonly known as:  NORCO  Take 1 tablet by mouth every 6 (six) hours as needed for Pain.  What changed:  You were already taking a medication with the same name, and this prescription was added. Make sure you understand how and when to take each.         * This list has 3 medication(s) that are the same as other medications prescribed for you. Read the directions carefully, and ask your doctor or other care provider to review them with you.            CONTINUE taking these medications    ARIPiprazole 2 MG Tab  Commonly known as:  ABILIFY  Take 5 mg by mouth every evening.      atorvastatin 40 MG tablet  Commonly known as:  LIPITOR  Take 40 mg by mouth once daily.     bisacodyl 5 mg EC tablet  Commonly known as:  DULCOLAX  Take 1 tablet (5 mg total) by mouth daily as needed for Constipation.     * busPIRone 10 MG tablet  Commonly known as:  BUSPAR  Take 10 mg by mouth 3 (three) times daily.     * busPIRone 10 MG tablet  Commonly known as:  BUSPAR  Take 1 tablet (10 mg total) by mouth 3 (three) times daily.     calcitRIOL 0.25 MCG Cap  Commonly known as:  ROCALTROL  0.25 mcg once daily.     COMBIVENT  mcg/actuation inhaler  Generic drug:  albuterol-ipratropium  mcg  Inhale 2 puffs into the lungs every 6 (six) hours as needed for Wheezing.     dicyclomine 10 MG capsule  Commonly known as:  BENTYL  dicyclomine 10 mg capsule     epoetin jacob-epbx 10,000 unit/mL imjection  Commonly known as:  RETACRIT  Inject 0.34 mLs (3,400 Units total) into the skin every Tues, Thurs, Sat. With hemodialysis     famotidine 20 MG tablet  Commonly known as:  PEPCID  TK 1 T PO QHS     FLUoxetine 20 MG tablet  Take 20 mg by mouth once daily.     fluticasone propionate 50 mcg/actuation nasal spray  Commonly known as:  FLONASE  2 sprays (100 mcg total) by Each Nare route once daily.     fluticasone-umeclidin-vilanter 100-62.5-25 mcg Dsdv  Commonly known as:  TRELEGY ELLIPTA  Inhale 1 puff into the lungs once daily.     gabapentin 300 MG capsule  Commonly known as:  NEURONTIN  hs     levothyroxine 100 MCG tablet  Commonly known as:  SYNTHROID  Take 1 tab every day by oral route.     lisinopril 10 MG tablet  Take 10 mg by mouth.     meclizine 12.5 mg tablet  Commonly known as:  ANTIVERT  meclizine 12.5 mg tablet     MEDROL (NGOZI) 4 mg tablet  Generic drug:  methylPREDNISolone  Take 4 mg by mouth.     midodrine 5 MG Tab  Commonly known as:  PROAMATINE  Take 1 tablet (5 mg total) by mouth 2 (two) times daily with meals.     montelukast 10 mg tablet  Commonly known as:  SINGULAIR  montelukast 10 mg tablet      multivitamin capsule  Take 1 capsule by mouth once daily.     ondansetron 4 MG Tbdl  Commonly known as:  ZOFRAN-ODT  ondansetron 4 mg disintegrating tablet     ondansetron 8 MG tablet  Commonly known as:  ZOFRAN  Take 1 tablet (8 mg total) by mouth every 12 (twelve) hours as needed for Nausea.     polyethylene glycol 17 gram Pwpk  Commonly known as:  GLYCOLAX  Take 17 g by mouth daily as needed.     polyvinyl alcohol (artificial tears) 1.4 % ophthalmic solution  Commonly known as:  LIQUIFILM TEARS  Place 1 drop into the left eye as needed.     predniSONE 20 MG tablet  Commonly known as:  DELTASONE  One daily for 3 days and repeat for flare of lung symptoms as intructed     PROLIA 60 mg/mL Syrg  Generic drug:  denosumab  Inject 1 mL (60 mg total) into the skin every 6 (six) months.     propranolol 40 MG tablet  Commonly known as:  INDERAL  Take 40 mg by mouth.     rosuvastatin 20 MG tablet  Commonly known as:  CRESTOR  Take 20 mg by mouth once daily.     traMADol 50 mg tablet  Commonly known as:  ULTRAM  tramadol 50 mg tablet     traZODone 50 MG tablet  Commonly known as:  DESYREL  trazodone 50 mg tablet         * This list has 2 medication(s) that are the same as other medications prescribed for you. Read the directions carefully, and ask your doctor or other care provider to review them with you.              Discharge Procedure Orders   Diet general     Call MD for:  temperature >100.4     Call MD for:  persistent nausea and vomiting     Call MD for:  severe uncontrolled pain     Call MD for:  difficulty breathing, headache or visual disturbances     Call MD for:  redness, tenderness, or signs of infection (pain, swelling, redness, odor or green/yellow discharge around incision site)     Call MD for:  hives     Call MD for:  persistent dizziness or light-headedness     Call MD for:  extreme fatigue        Follow up with MD in 2-3 weeks    Discharge Procedure Orders (must include Diet, Follow-up, Activity):    Discharge Procedure Orders (must include Diet, Follow-up, Activity)   Diet general     Call MD for:  temperature >100.4     Call MD for:  persistent nausea and vomiting     Call MD for:  severe uncontrolled pain     Call MD for:  difficulty breathing, headache or visual disturbances     Call MD for:  redness, tenderness, or signs of infection (pain, swelling, redness, odor or green/yellow discharge around incision site)     Call MD for:  hives     Call MD for:  persistent dizziness or light-headedness     Call MD for:  extreme fatigue

## 2019-10-18 NOTE — ANESTHESIA PREPROCEDURE EVALUATION
10/18/2019  Althea Gaona is a 79 y.o., female.    Anesthesia Evaluation    I have reviewed the Patient Summary Reports.    I have reviewed the Nursing Notes.   I have reviewed the Medications.     Review of Systems  Anesthesia Hx:  No problems with previous Anesthesia    Social:  Former Smoker, Alcohol Use    Cardiovascular:   Hypertension hyperlipidemia    Pulmonary:   COPD Asthma    Renal/:   Chronic Renal Disease, ESRD    Hepatic/GI:   GERD    Neurological:   CVA    Endocrine:   Hypothyroidism    Psych:   Psychiatric History          Physical Exam  General:  Well nourished    Airway/Jaw/Neck:  Airway Findings: Mouth Opening: Normal Tongue: Normal  General Airway Assessment: Adult  Mallampati: I  TM Distance: Normal, at least 6 cm        Eyes/Ears/Nose:  EYES/EARS/NOSE FINDINGS: Normal   Dental:  DENTAL FINDINGS: Normal   Chest/Lungs:  Chest/Lungs Findings: Clear to auscultation, Normal Respiratory Rate     Heart/Vascular:  Heart Findings: Rate: Normal  Rhythm: Regular Rhythm        Mental Status:  Mental Status Findings:  Cooperative, Alert and Oriented         Anesthesia Plan  Type of Anesthesia, risks & benefits discussed:  Anesthesia Type:  MAC  Patient's Preference:   Intra-op Monitoring Plan: standard ASA monitors  Intra-op Monitoring Plan Comments:   Post Op Pain Control Plan: IV/PO Opioids PRN  Post Op Pain Control Plan Comments:   Induction:   IV  Beta Blocker:  Patient is on a Beta-Blocker and has received one dose within the past 24 hours (No further documentation required).       Informed Consent: Patient understands risks and agrees with Anesthesia plan.  Questions answered. Anesthesia consent signed with patient.  ASA Score: 3     Day of Surgery Review of History & Physical:    H&P update referred to the provider.         Ready For Surgery From Anesthesia Perspective.

## 2019-10-18 NOTE — PLAN OF CARE
Discharge instructions given to pt and son who voice understanding.  Taking po fluids without nausea.  Pain 4/10 and tolerable per pt.  Band aids x2 to mid back, dry and intact

## 2019-10-18 NOTE — TRANSFER OF CARE
"Anesthesia Transfer of Care Note    Patient: Althea Gaona    Procedure(s) Performed: Procedure(s) (LRB):  Kyphoplasty (N/A)    Patient location: PACU    Anesthesia Type: MAC    Transport from OR: Transported from OR on 2-3 L/min O2 by NC with adequate spontaneous ventilation    Post pain: adequate analgesia    Post assessment: no apparent anesthetic complications and tolerated procedure well    Post vital signs: stable    Level of consciousness: awake, alert and oriented    Nausea/Vomiting: no nausea/vomiting    Complications: none    Transfer of care protocol was followed      Last vitals:   Visit Vitals  BP (!) 95/51 (BP Location: Right arm, Patient Position: Lying)   Pulse 62   Temp 36.4 °C (97.5 °F) (Skin)   Resp (!) 22   Ht 5' 7" (1.702 m)   Wt 66.2 kg (146 lb)   SpO2 100%   Breastfeeding? No   BMI 22.87 kg/m²     "

## 2019-10-18 NOTE — PLAN OF CARE
Dr robertson released from pacu + thrill / bruit in l ac(dialysis cath ) int ivf skin w+d pain a 3/10 at present  On l side not r md aware instr pt if not better by f/u appt to discuss with md  bandaids on back dry intact  No nausea no emesis alert and orient x 4  Wears 02 at home 3 liters at nite instr she must do so tonight 02 sat 0n room air at present 92-94%

## 2019-10-21 ENCOUNTER — TELEPHONE (OUTPATIENT)
Dept: FAMILY MEDICINE | Facility: CLINIC | Age: 79
End: 2019-10-21

## 2019-10-21 NOTE — TELEPHONE ENCOUNTER
----- Message from Tania Doherty sent at 10/21/2019 10:03 AM CDT -----  Contact: Althea  The patient  called and said someone called her and said her walker was in. She does not know who. I asked her if they gave her delivery date she said no one told her anything. She has P[employer]. pts # [phone] GH

## 2019-10-24 ENCOUNTER — TELEPHONE (OUTPATIENT)
Dept: PAIN MEDICINE | Facility: CLINIC | Age: 79
End: 2019-10-24

## 2019-10-24 NOTE — TELEPHONE ENCOUNTER
----- Message from Maddie French sent at 10/24/2019 11:02 AM CDT -----  Type: Needs Medical Advice    Who Called:  Patient   Symptoms (please be specific):  Pain entire left side   How long has patient had these symptoms:  Since procedure on 10/18/19  Pharmacy name and phone #:    Best Call Back Number: [phone]  Additional Information: states pain is the same or more than before procedure.

## 2019-11-04 ENCOUNTER — OFFICE VISIT (OUTPATIENT)
Dept: PAIN MEDICINE | Facility: CLINIC | Age: 79
End: 2019-11-04
Payer: MEDICARE

## 2019-11-04 VITALS
WEIGHT: 146 LBS | HEIGHT: 67 IN | SYSTOLIC BLOOD PRESSURE: 93 MMHG | HEART RATE: 84 BPM | DIASTOLIC BLOOD PRESSURE: 61 MMHG | BODY MASS INDEX: 22.91 KG/M2

## 2019-11-04 DIAGNOSIS — M51.36 DDD (DEGENERATIVE DISC DISEASE), LUMBAR: Primary | ICD-10-CM

## 2019-11-04 DIAGNOSIS — M54.16 LUMBAR RADICULOPATHY: ICD-10-CM

## 2019-11-04 DIAGNOSIS — M54.16 LUMBAR RADICULOPATHY: Primary | ICD-10-CM

## 2019-11-04 PROCEDURE — 99214 PR OFFICE/OUTPT VISIT, EST, LEVL IV, 30-39 MIN: ICD-10-PCS | Mod: S$GLB,,, | Performed by: ANESTHESIOLOGY

## 2019-11-04 PROCEDURE — 99999 PR PBB SHADOW E&M-EST. PATIENT-LVL IV: CPT | Mod: PBBFAC,,, | Performed by: ANESTHESIOLOGY

## 2019-11-04 PROCEDURE — 99214 OFFICE O/P EST MOD 30 MIN: CPT | Mod: S$GLB,,, | Performed by: ANESTHESIOLOGY

## 2019-11-04 PROCEDURE — 99999 PR PBB SHADOW E&M-EST. PATIENT-LVL IV: ICD-10-PCS | Mod: PBBFAC,,, | Performed by: ANESTHESIOLOGY

## 2019-11-04 PROCEDURE — 1101F PT FALLS ASSESS-DOCD LE1/YR: CPT | Mod: CPTII,S$GLB,, | Performed by: ANESTHESIOLOGY

## 2019-11-04 PROCEDURE — 1101F PR PT FALLS ASSESS DOC 0-1 FALLS W/OUT INJ PAST YR: ICD-10-PCS | Mod: CPTII,S$GLB,, | Performed by: ANESTHESIOLOGY

## 2019-11-04 NOTE — H&P (VIEW-ONLY)
"FOLLOW UP NOTE:     CHIEF COMPLAINT: back pain    INITIAL HISTORY OF PRESENT ILLNESS: Althea Gaona is a 79 y.o. female with PMH significant for COPD, bipolar disorder, anxiety, CKD, GERD, hx of CVA, and HTN presents for the evaluation of back pain. Patient reports that she fell at [facility] around August 2019. Patient reports that she tripped on her cane and fell onto her buttocks. Prior to her fall, patient endorsed of "minor" aches and pain in her low back prior to her fall. Patient rates her pain as a 5/10 prior to her fall. Patient rates her pain as a constant 10/10 since her fall. Patient describes her pain as an aching pain. Patient reports of intermittent radiation down her RLE( not present prior to her fall). Patient reports that her pain has made it difficult to sleep at night.  Patient has been wearing a back brace with no benefit. Patient reports of taking Norco with no relief of her pain. Patient denies of any urinary/fecal incontinence, saddle anesthesia, or new falls.      Aggravating factors: bending, twisting, lifting      Mitigating factors: N/A    INTERVAL HISTORY OF PRESENT ILLNESS: Althea Gaona is a 79 y.o. female with PMH significant for COPD, bipolar disorder, anxiety, CKD, GERD, hx of CVA, and HTN presents as an established patient for the continued management of back pain. The patient is s/p L2 balloon kyphoplasty on 10/18/2019, and she reports that her current pain is an 8/10. The patient is able to ambulate today with the assistance of a single point cane (wheelchair bound and unable to stand independently during initial consultation). The patient is not requiring the use of her back brace today. The patient reports that her current pain is different than the pain she was experiencing during her initial consultation. The patient localizes her pain to the right side of her back with intermittent radiation into her RLE. The patient denies of any significant " "interval changes in her health since her last clinic visit. Patient denies of any urinary/fecal incontinence, saddle anesthesia, or weakness.     INTERVENTIONAL PAIN HISTORY:  10/18/2019: L2 kyphoplasty     CURRENT PAIN MEDICATIONS:   Hydrocodone  gabapentin BID    PAIN SCORES:  Current: Pain is 8/10.    ROS:  Review of Systems   Constitutional: Negative for chills and fever.   HENT: Negative for sore throat.    Eyes: Negative for visual disturbance.   Respiratory: Negative for shortness of breath.    Cardiovascular: Negative for chest pain.   Gastrointestinal: Negative for nausea and vomiting.   Genitourinary: Negative for difficulty urinating.   Musculoskeletal: Positive for back pain.   Skin: Negative for rash.   Allergic/Immunologic: Negative for immunocompromised state.   Neurological: Negative for syncope.   Hematological: Does not bruise/bleed easily.   Psychiatric/Behavioral: Negative for suicidal ideas.        MEDICAL, SURGICAL, FAMILY, SOCIAL HX: reviewed    MEDICATIONS/ALLERGIES: reviewed    PHYSICAL EXAM:    VITALS: Vitals reviewed.   Vitals:    11/04/19 1117   BP: 93/61   Pulse: 84   Weight: 66.2 kg (146 lb)   Height: 5' 7" (1.702 m)   PainSc:   8   PainLoc: Back       Physical Exam   Constitutional: She is oriented to person, place, and time and well-developed, well-nourished, and in no distress.   HENT:   Head: Normocephalic and atraumatic.   Eyes: Conjunctivae and EOM are normal. Right eye exhibits no discharge. Left eye exhibits no discharge.   Cardiovascular: Normal rate.   Pulmonary/Chest: Effort normal and breath sounds normal. No respiratory distress.   Abdominal: Soft.   Neurological: She is alert and oriented to person, place, and time.   Skin: Skin is warm and dry. No rash noted. She is not diaphoretic.   Psychiatric: Mood, memory, affect and judgment normal.   Nursing note and vitals reviewed.       EXTREMITIES:    Gen: No cyanosis, edema, varicosities, or tenderness to palpation BLE   Skin: " Warm, pink, dry, no rashes, no lesions BLE   Strength: 5/5 motor strength BLE   ROM: hips, knees and ankles without pain or instability.     SPINE:    L-spine ROM: Limited ROM to flexion, extension, bilateral roation, and bilateral lateral flexion with increased pain with passive motion   Positive facet loading bilaterally.   Straight Leg Raise: Positive on the right in the supine position  SI Joint: Negative tenderness to palpation bilaterally    NEUROLOGICAL:    Gen: No clonus or spasticity.   Gait: Normal without antalgic lean   DTR's: 2+ in bilateral patellar, and ankle   BABINSKI: Absent bilaterally  Sensory: Intact to light touch and proprioception BLE    IMAGING:    MRI Lumbar spine without contrast (9/30/2019):    L2-L3: Minimal diffuse disc bulge.  There is no facet arthropathy.  There is no neuroforaminal stenosis.  There is no spinal canal stenosis.    L3-L4: Minimal diffuse disc bulge.  Mild bilateral facet arthropathy.  There is no neuroforaminal stenosis.  There is no spinal canal stenosis.    L4-L5: Grade 1 anterolisthesis with uncovering the disc and superimposed moderate diffuse disc bulge.  Moderate bilateral facet arthropathy.  Mild bilateral neural foraminal stenosis.  Mild spinal canal stenosis.    L5-S1: Diffuse disc bulge with superimposed central disc protrusion, which possibly abuts the descending right S1 nerve root.  Moderate bilateral facet arthropathy.  Moderate bilateral neural foraminal stenosis.  There is no spinal canal stenosis.    ASSESSMENT: Althea Gaona is a 79 y.o. female with PMH significant for COPD, bipolar disorder, anxiety, CKD, GERD, hx of CVA, and HTN presents as an established patient for the continued management of back pain. The patient is s/p L2 balloon kyphoplasty on 10/18/2019 with improved functional status and pain but now presents with right sided back pain with radiation down her RLE. I suspect her most recent kyphoplasty provided the patient with benefit  as her functional status has improved (previously wheel-chair bound secondary to pain). MRI significant for DDD and right S1 nerve root involvement. Treatment plan outlined below.     PLAN:  1. Schedule for L5-S1 interlaminar epidural steroid injection (right paramedian approach)   2. Continue Norco and gabapentin BID as prescribed  3. RTC for the procedure as outlined above    Rosanna Khan MD  Pain Medicine

## 2019-11-04 NOTE — PROGRESS NOTES
"FOLLOW UP NOTE:     CHIEF COMPLAINT: back pain    INITIAL HISTORY OF PRESENT ILLNESS: Althea Gaona is a 79 y.o. female with PMH significant for COPD, bipolar disorder, anxiety, CKD, GERD, hx of CVA, and HTN presents for the evaluation of back pain. Patient reports that she fell at [facility] around August 2019. Patient reports that she tripped on her cane and fell onto her buttocks. Prior to her fall, patient endorsed of "minor" aches and pain in her low back prior to her fall. Patient rates her pain as a 5/10 prior to her fall. Patient rates her pain as a constant 10/10 since her fall. Patient describes her pain as an aching pain. Patient reports of intermittent radiation down her RLE( not present prior to her fall). Patient reports that her pain has made it difficult to sleep at night.  Patient has been wearing a back brace with no benefit. Patient reports of taking Norco with no relief of her pain. Patient denies of any urinary/fecal incontinence, saddle anesthesia, or new falls.      Aggravating factors: bending, twisting, lifting      Mitigating factors: N/A    INTERVAL HISTORY OF PRESENT ILLNESS: Althea Gaona is a 79 y.o. female with PMH significant for COPD, bipolar disorder, anxiety, CKD, GERD, hx of CVA, and HTN presents as an established patient for the continued management of back pain. The patient is s/p L2 balloon kyphoplasty on 10/18/2019, and she reports that her current pain is an 8/10. The patient is able to ambulate today with the assistance of a single point cane (wheelchair bound and unable to stand independently during initial consultation). The patient is not requiring the use of her back brace today. The patient reports that her current pain is different than the pain she was experiencing during her initial consultation. The patient localizes her pain to the right side of her back with intermittent radiation into her RLE. The patient denies of any significant " "interval changes in her health since her last clinic visit. Patient denies of any urinary/fecal incontinence, saddle anesthesia, or weakness.     INTERVENTIONAL PAIN HISTORY:  10/18/2019: L2 kyphoplasty     CURRENT PAIN MEDICATIONS:   Hydrocodone  gabapentin BID    PAIN SCORES:  Current: Pain is 8/10.    ROS:  Review of Systems   Constitutional: Negative for chills and fever.   HENT: Negative for sore throat.    Eyes: Negative for visual disturbance.   Respiratory: Negative for shortness of breath.    Cardiovascular: Negative for chest pain.   Gastrointestinal: Negative for nausea and vomiting.   Genitourinary: Negative for difficulty urinating.   Musculoskeletal: Positive for back pain.   Skin: Negative for rash.   Allergic/Immunologic: Negative for immunocompromised state.   Neurological: Negative for syncope.   Hematological: Does not bruise/bleed easily.   Psychiatric/Behavioral: Negative for suicidal ideas.        MEDICAL, SURGICAL, FAMILY, SOCIAL HX: reviewed    MEDICATIONS/ALLERGIES: reviewed    PHYSICAL EXAM:    VITALS: Vitals reviewed.   Vitals:    11/04/19 1117   BP: 93/61   Pulse: 84   Weight: 66.2 kg (146 lb)   Height: 5' 7" (1.702 m)   PainSc:   8   PainLoc: Back       Physical Exam   Constitutional: She is oriented to person, place, and time and well-developed, well-nourished, and in no distress.   HENT:   Head: Normocephalic and atraumatic.   Eyes: Conjunctivae and EOM are normal. Right eye exhibits no discharge. Left eye exhibits no discharge.   Cardiovascular: Normal rate.   Pulmonary/Chest: Effort normal and breath sounds normal. No respiratory distress.   Abdominal: Soft.   Neurological: She is alert and oriented to person, place, and time.   Skin: Skin is warm and dry. No rash noted. She is not diaphoretic.   Psychiatric: Mood, memory, affect and judgment normal.   Nursing note and vitals reviewed.       EXTREMITIES:    Gen: No cyanosis, edema, varicosities, or tenderness to palpation BLE   Skin: " Warm, pink, dry, no rashes, no lesions BLE   Strength: 5/5 motor strength BLE   ROM: hips, knees and ankles without pain or instability.     SPINE:    L-spine ROM: Limited ROM to flexion, extension, bilateral roation, and bilateral lateral flexion with increased pain with passive motion   Positive facet loading bilaterally.   Straight Leg Raise: Positive on the right in the supine position  SI Joint: Negative tenderness to palpation bilaterally    NEUROLOGICAL:    Gen: No clonus or spasticity.   Gait: Normal without antalgic lean   DTR's: 2+ in bilateral patellar, and ankle   BABINSKI: Absent bilaterally  Sensory: Intact to light touch and proprioception BLE    IMAGING:    MRI Lumbar spine without contrast (9/30/2019):    L2-L3: Minimal diffuse disc bulge.  There is no facet arthropathy.  There is no neuroforaminal stenosis.  There is no spinal canal stenosis.    L3-L4: Minimal diffuse disc bulge.  Mild bilateral facet arthropathy.  There is no neuroforaminal stenosis.  There is no spinal canal stenosis.    L4-L5: Grade 1 anterolisthesis with uncovering the disc and superimposed moderate diffuse disc bulge.  Moderate bilateral facet arthropathy.  Mild bilateral neural foraminal stenosis.  Mild spinal canal stenosis.    L5-S1: Diffuse disc bulge with superimposed central disc protrusion, which possibly abuts the descending right S1 nerve root.  Moderate bilateral facet arthropathy.  Moderate bilateral neural foraminal stenosis.  There is no spinal canal stenosis.    ASSESSMENT: Althea Gaona is a 79 y.o. female with PMH significant for COPD, bipolar disorder, anxiety, CKD, GERD, hx of CVA, and HTN presents as an established patient for the continued management of back pain. The patient is s/p L2 balloon kyphoplasty on 10/18/2019 with improved functional status and pain but now presents with right sided back pain with radiation down her RLE. I suspect her most recent kyphoplasty provided the patient with benefit  as her functional status has improved (previously wheel-chair bound secondary to pain). MRI significant for DDD and right S1 nerve root involvement. Treatment plan outlined below.     PLAN:  1. Schedule for L5-S1 interlaminar epidural steroid injection (right paramedian approach)   2. Continue Norco and gabapentin BID as prescribed  3. RTC for the procedure as outlined above    Rosanna Khan MD  Pain Medicine

## 2019-11-22 ENCOUNTER — HOSPITAL ENCOUNTER (OUTPATIENT)
Facility: HOSPITAL | Age: 79
Discharge: HOME | End: 2019-11-22
Attending: ANESTHESIOLOGY | Admitting: ANESTHESIOLOGY
Payer: MEDICARE

## 2019-11-22 VITALS
HEART RATE: 77 BPM | DIASTOLIC BLOOD PRESSURE: 61 MMHG | SYSTOLIC BLOOD PRESSURE: 104 MMHG | TEMPERATURE: 98 F | RESPIRATION RATE: 16 BRPM | OXYGEN SATURATION: 95 % | HEIGHT: 67 IN | BODY MASS INDEX: 22.91 KG/M2 | WEIGHT: 146 LBS

## 2019-11-22 DIAGNOSIS — M51.36 DEGENERATIVE DISC DISEASE, LUMBAR: Primary | ICD-10-CM

## 2019-11-22 PROBLEM — M51.369 DEGENERATIVE DISC DISEASE, LUMBAR: Status: ACTIVE | Noted: 2019-11-22

## 2019-11-22 PROCEDURE — 25500020 PHARM REV CODE 255: Performed by: ANESTHESIOLOGY

## 2019-11-22 PROCEDURE — 36000705 HC OR TIME LEV I EA ADD 15 MIN: Performed by: ANESTHESIOLOGY

## 2019-11-22 PROCEDURE — 62323 PR INJ LUMBAR/SACRAL, W/IMAGING GUIDANCE: ICD-10-PCS | Mod: ,,, | Performed by: ANESTHESIOLOGY

## 2019-11-22 PROCEDURE — 25000003 PHARM REV CODE 250: Performed by: ANESTHESIOLOGY

## 2019-11-22 PROCEDURE — 99900104 DSU ONLY-NO CHARGE-EA ADD'L HR (STAT): Performed by: ANESTHESIOLOGY

## 2019-11-22 PROCEDURE — 36000704 HC OR TIME LEV I 1ST 15 MIN: Performed by: ANESTHESIOLOGY

## 2019-11-22 PROCEDURE — 99900103 DSU ONLY-NO CHARGE-INITIAL HR (STAT): Performed by: ANESTHESIOLOGY

## 2019-11-22 PROCEDURE — 62323 NJX INTERLAMINAR LMBR/SAC: CPT | Mod: ,,, | Performed by: ANESTHESIOLOGY

## 2019-11-22 PROCEDURE — 99152 PR MOD CONSCIOUS SEDATION, SAME PHYS, 5+ YRS, FIRST 15 MIN: ICD-10-PCS | Mod: ,,, | Performed by: ANESTHESIOLOGY

## 2019-11-22 PROCEDURE — 63600175 PHARM REV CODE 636 W HCPCS: Performed by: ANESTHESIOLOGY

## 2019-11-22 PROCEDURE — 99152 MOD SED SAME PHYS/QHP 5/>YRS: CPT | Mod: ,,, | Performed by: ANESTHESIOLOGY

## 2019-11-22 PROCEDURE — 71000015 HC POSTOP RECOV 1ST HR: Performed by: ANESTHESIOLOGY

## 2019-11-22 RX ORDER — MIDAZOLAM HYDROCHLORIDE 1 MG/ML
INJECTION INTRAMUSCULAR; INTRAVENOUS
Status: DISCONTINUED | OUTPATIENT
Start: 2019-11-22 | End: 2019-11-22 | Stop reason: HOSPADM

## 2019-11-22 RX ORDER — DEXAMETHASONE SODIUM PHOSPHATE 100 MG/10ML
INJECTION INTRAMUSCULAR; INTRAVENOUS
Status: DISCONTINUED | OUTPATIENT
Start: 2019-11-22 | End: 2019-11-22 | Stop reason: HOSPADM

## 2019-11-22 RX ORDER — LIDOCAINE HYDROCHLORIDE 10 MG/ML
INJECTION, SOLUTION EPIDURAL; INFILTRATION; INTRACAUDAL; PERINEURAL
Status: DISCONTINUED | OUTPATIENT
Start: 2019-11-22 | End: 2019-11-22 | Stop reason: HOSPADM

## 2019-11-22 RX ORDER — SODIUM CHLORIDE, SODIUM LACTATE, POTASSIUM CHLORIDE, CALCIUM CHLORIDE 600; 310; 30; 20 MG/100ML; MG/100ML; MG/100ML; MG/100ML
INJECTION, SOLUTION INTRAVENOUS ONCE AS NEEDED
Status: ACTIVE | OUTPATIENT
Start: 2019-11-22 | End: 2031-04-20

## 2019-11-22 NOTE — DISCHARGE INSTRUCTIONS
Recovery After Procedural Sedation (Adult)  You have been given medicine by vein to make you sleep during your surgery. This may have included both a pain medicine and sleeping medicine. Most of the effects have worn off. But you may still have some drowsiness for the next 6 to 8 hours.  Home care  Follow these guidelines when you get home:  · For the next 8 hours, you should be watched by a responsible adult. This person should make sure your condition is not getting worse.  · Don't drink any alcohol for the next 24 hours.  · Don't drive, operate dangerous machinery, or make important business or personal decisions during the next 24 hours.  Note: Your healthcare provider may tell you not to take any medicine by mouth for pain or sleep in the next 4 hours. These medicines may react with the medicines you were given in the hospital. This could cause a much stronger response than usual.  Follow-up care  Follow up with your healthcare provider if you are not alert and back to your usual level of activity within 12 hours.  When to seek medical advice  Call your healthcare provider right away if any of these occur:  · Drowsiness gets worse  · Weakness or dizziness gets worse  · Repeated vomiting  · You can't be awakened   Date Last Reviewed: 10/18/2016  © 3698-0440 [employer]. [address] [place] [address], [place] [address]. All rights reserved. This information is not intended as a substitute for professional medical care. Always follow your healthcare professional's instructions.    Epidural Steroid Injection  Tips for recovery  · You may use an ice pack at your injection site for comfort.  · You may shower this evening.   · Do not use a heating pad or take tub baths or swim for 2 days.  · Take your usual medication for pain if needed.  · Dont drive the day of the procedure.  · Wait until tomorrow to resume any blood thinners (aspirin, Plavix, Coumadin) but you may resume all your other medications  today.  When to call your doctor  Call right away if you notice any of the following symptoms:  · Severe pain or headache  · Fever or chills  · Redness or swelling around the injection site   · Difficulty breathing  · Vomiting  · Increasing numbness or weakness in arms or legs  · Steroids can increase your blood sugar, moniter carefully if you are a diabetic.    General Information:    1.  Do not drink alcoholic beverages including beer for 24 hours or as long as you are on pain medication..  2.  Do not drive a motor vehicle, operate machinery or power tools, or signs legal papers for 24 hours or as long as you are on pain medication.   3.  You may experience light-headedness, dizziness, and sleepiness following surgery. Please do not stay alone. A responsible adult should be with you for this 24 hour period.  4.  Go home and rest.  5. Progress slowly to a normal diet unless instructed.  Otherwise, begin with liquids such as soft drinks, then soup and crackers working up to solid foods. Drink plenty of nonalcoholic fluids.  6.  Certain anesthetics and pain medications produce nausea and vomiting in certain individuals. If nausea becomes a problem at home, call you doctor.  7. A nurse will be calling you sometime after surgery. Do not be alarmed. This is our way of finding out how you are doing.  8. Several times every hour while you are awake, take 2-3 deep breaths and cough. If you had stomach surgery hold a pillow or rolled towel firmly against your stomach before you cough. This will help with any pain the cough might cause.  9. Several times every hour while you are awake, pump and flex your feet 5-6 times and do foot circles. This will help prevent blood clots.  10.Call your doctor for severe pain, bleeding, fever, or signs or symptoms of infection (pain, swelling, redness, foul odor, drainage).    Post op instructions for prevention of DVT  What is deep vein thrombosis?  Deep vein thrombosis (DVT) is the  medical term for blood clots in the deep veins of the leg.  These blood clots can be dangerous.  A DVT can block a blood vessel and keep blood from getting where it needs to go.  Another problem is that the clot can travel to other parts of the body such as the lungs.  A clot that travels to the lungs is called a pulmonary embolus (PE) and can cause serious problems with breathing which can lead to death.  Am I at risk for DVT/PE?  If you are not very active, you are at risk of DVT.  Anyone confined to bed, sitting for long periods of time, recovering from surgery, etc. increases the risk of DVT.  Other risk factors are cancer diagnosis, certain medications, estrogen replacement in any form,older age, obesity, pregnancy, smoking, history of clotting disorders, and dehydration.  How will I know if I have a DVT?   Swelling in the lower leg   Pain   Warmth, redness, hardness or bulging of the vein  If you have any of these symptoms, call your doctors office right away.  Some people will not have any symptoms until the clot moves to the lungs.  What are the symptoms of a PE?   Panting, shortness of breath, or trouble breathing   Sharp, knife-like chest pain when you breathe   Coughing or coughing up blood   Rapid heartbeat  If you have any of these symptoms or get worse quickly, call 911 for emergency treatment.  How can I prevent a DVT?   Avoid long periods of inactivity and dont cross your legs--get up and walk around every hour or so.   Stay active--walking after surgery is highly encouraged.  This means you should get out of the house and walk in the neighborhood.  Going up and down stairs will not impair healing (unless advised against such activity by your doctor).     Drink plenty of noncaffeinated, nonalcoholic fluids each day to prevent dehydration.   Wear special support stockings, if they have been advised by your doctor.   If you travel, stop at least once an hour and walk around.   Avoid  smoking (assistance with stopping is available through your healthcare provider)  Always notify your doctor if you are not able to follow the post operative instructions that are given to you at the time of discharge.  It may be necessary to prescribe one of the medications available to prevent DVT.    We hope your stay was comfortable as you heal now, mend and rest.    For we have enjoyed taking care of you by giving your our best.    And as you get better, by regaining your health and strength;   We count it as a privilege to have served you and hope your time at [facility] was well spent.      Thank  You!!!

## 2019-11-22 NOTE — DISCHARGE SUMMARY
[facility]  Discharge Note  Short Stay    Admit Date: 11/22/2019    Discharge Date and Time: 11/22/2019    Attending Physician: Rosanna Khan MD     Discharge Provider: Rosanna Khan MD    Diagnoses:  Active Hospital Problems    Diagnosis  POA    *Degenerative disc disease, lumbar [M51.36]  Yes      Resolved Hospital Problems   No resolved problems to display.       Hospital Course: Lumbar interlaminar epidural steroid injection     Discharged Condition: Good    Final Diagnoses:   Active Hospital Problems    Diagnosis  POA    *Degenerative disc disease, lumbar [M51.36]  Yes      Resolved Hospital Problems   No resolved problems to display.       Disposition: Home or Self Care    Follow up/Patient Instructions:    Medications:  Reconciled Home Medications:      Medication List      CHANGE how you take these medications    * HYDROcodone-acetaminophen 5-325 mg per tablet  Commonly known as:  NORCO  Take 1 tablet by mouth nightly as needed for Pain.  What changed:  Another medication with the same name was removed. Continue taking this medication, and follow the directions you see here.     * HYDROcodone-acetaminophen 5-325 mg per tablet  Commonly known as:  NORCO  Take 1 tablet by mouth every 6 (six) hours as needed for Pain (Post-operative pain control).  What changed:  Another medication with the same name was removed. Continue taking this medication, and follow the directions you see here.         * This list has 2 medication(s) that are the same as other medications prescribed for you. Read the directions carefully, and ask your doctor or other care provider to review them with you.            CONTINUE taking these medications    ARIPiprazole 2 MG Tab  Commonly known as:  ABILIFY  Take 5 mg by mouth every evening.     atorvastatin 40 MG tablet  Commonly known as:  LIPITOR  Take 40 mg by mouth once daily.     bisacodyl 5 mg EC tablet  Commonly known as:  DULCOLAX  Take 1 tablet (5 mg total) by  mouth daily as needed for Constipation.     * busPIRone 10 MG tablet  Commonly known as:  BUSPAR  Take 10 mg by mouth 3 (three) times daily.     * busPIRone 10 MG tablet  Commonly known as:  BUSPAR  Take 1 tablet (10 mg total) by mouth 3 (three) times daily.     calcitRIOL 0.25 MCG Cap  Commonly known as:  ROCALTROL  0.25 mcg once daily.     Combivent  mcg/actuation inhaler  Generic drug:  albuterol-ipratropium  mcg  Inhale 2 puffs into the lungs every 6 (six) hours as needed for Wheezing.     dicyclomine 10 MG capsule  Commonly known as:  BENTYL  dicyclomine 10 mg capsule     epoetin jacob-epbx 10,000 unit/mL imjection  Commonly known as:  RETACRIT  Inject 0.34 mLs (3,400 Units total) into the skin every Tues, Thurs, Sat. With hemodialysis     famotidine 20 MG tablet  Commonly known as:  PEPCID  TK 1 T PO QHS     FLUoxetine 20 MG tablet  Take 20 mg by mouth once daily.     fluticasone propionate 50 mcg/actuation nasal spray  Commonly known as:  FLONASE  2 sprays (100 mcg total) by Each Nare route once daily.     fluticasone-umeclidin-vilanter 100-62.5-25 mcg Dsdv  Commonly known as:  Trelegy Ellipta  Inhale 1 puff into the lungs once daily.     gabapentin 300 MG capsule  Commonly known as:  NEURONTIN  hs     levothyroxine 100 MCG tablet  Commonly known as:  SYNTHROID  Take 1 tab every day by oral route.     meclizine 12.5 mg tablet  Commonly known as:  ANTIVERT  meclizine 12.5 mg tablet     midodrine 5 MG Tab  Commonly known as:  PROAMATINE  Take 1 tablet (5 mg total) by mouth 2 (two) times daily with meals.     multivitamin capsule  Take 1 capsule by mouth once daily.     ondansetron 4 MG Tbdl  Commonly known as:  ZOFRAN-ODT  ondansetron 4 mg disintegrating tablet     ondansetron 8 MG tablet  Commonly known as:  ZOFRAN  Take 1 tablet (8 mg total) by mouth every 12 (twelve) hours as needed for Nausea.     polyethylene glycol 17 gram Pwpk  Commonly known as:  GLYCOLAX  Take 17 g by mouth daily as  needed.     polyvinyl alcohol (artificial tears) 1.4 % ophthalmic solution  Commonly known as:  LIQUIFILM TEARS  Place 1 drop into the left eye as needed.     Prolia 60 mg/mL Syrg  Generic drug:  denosumab  Inject 1 mL (60 mg total) into the skin every 6 (six) months.     rosuvastatin 20 MG tablet  Commonly known as:  CRESTOR  Take 20 mg by mouth once daily.     traMADol 50 mg tablet  Commonly known as:  ULTRAM  tramadol 50 mg tablet     traZODone 50 MG tablet  Commonly known as:  DESYREL  trazodone 50 mg tablet         * This list has 2 medication(s) that are the same as other medications prescribed for you. Read the directions carefully, and ask your doctor or other care provider to review them with you.              Discharge Procedure Orders   Diet general     Call MD for:  temperature >100.4     Call MD for:  persistent nausea and vomiting     Call MD for:  severe uncontrolled pain     Call MD for:  difficulty breathing, headache or visual disturbances     Call MD for:  redness, tenderness, or signs of infection (pain, swelling, redness, odor or green/yellow discharge around incision site)     Call MD for:  hives     Call MD for:  persistent dizziness or light-headedness     Call MD for:  extreme fatigue        Follow up with MD in 2-3 weeks    Discharge Procedure Orders (must include Diet, Follow-up, Activity):   Discharge Procedure Orders (must include Diet, Follow-up, Activity)   Diet general     Call MD for:  temperature >100.4     Call MD for:  persistent nausea and vomiting     Call MD for:  severe uncontrolled pain     Call MD for:  difficulty breathing, headache or visual disturbances     Call MD for:  redness, tenderness, or signs of infection (pain, swelling, redness, odor or green/yellow discharge around incision site)     Call MD for:  hives     Call MD for:  persistent dizziness or light-headedness     Call MD for:  extreme fatigue

## 2019-11-22 NOTE — INTERVAL H&P NOTE
The patient has been examined and the H&P has been reviewed:    I concur with the findings and no changes have occurred since H&P was written.     This patient has been cleared for surgery in an ambulatory surgical facility    ASA 3,  Mallampatti Score 3  No history of anesthetic complications  Plan for RN IV sedation      Anesthesia/Surgery risks, benefits and alternative options discussed and understood by patient/family.          Active Hospital Problems    Diagnosis  POA    Degenerative disc disease, lumbar [M51.36]  Yes      Resolved Hospital Problems   No resolved problems to display.

## 2019-11-22 NOTE — PLAN OF CARE
Dr Khan spoke with the pt and answered all her questions    Pt Is calm, denies pan and nausea  discussed discharge instructions    All questions answered/pt called her  for      Pt was able to dress herself.  Walker at the bedside.

## 2019-11-22 NOTE — OP NOTE
PROCEDURE DATE: 11/22/2019    PROCEDURE:  Lumbar Interlaminar epidural steroid injection at L5-S1 under fluoroscopic guidance.    DIAGNOSIS: LUMBAR DISC DISPLACEMENT WITHOUT MYELOPATHY  POSTOP DIAGNOSIS: SAME    PHYSICIAN: Rosanna Khan M.D.    MEDICATIONS INJECTED: 10 mg dexamethasone with 4 ml of preservative free NaCl    LOCAL ANESTHETIC INJECTED:    Lidocaine 1% 3 ml total    SEDATION MEDICATIONS:  RN IV sedation    ESTIMATED BLOOD LOSS:  none    COMPLICATIONS:  none    TECHNIQUE:  Time-out taken to identify patient and procedure prior to starting the procedure.  With the patient laying in a prone position, the area was prepped and draped in the usual sterile fashion using ChloraPrep and a fenestrated drape.  After determining the target level with an AP fluoroscopic view, local anesthetic was given using a 25-gauge 1.5 inch needle by raising a wheal and then infiltrating toward the interlaminar entry space.  A 3.5 inch 20-gauge Touhy needle was introduced under AP fluoroscopic guidance to the interlaminar space of L5-S1. Once the trajectory was established, the needle was visualized in the lateral view and advanced using loss of resistance technique. Once in the desired position, 3 mL contrast was injected to confirm placement and there was no vascular uptake nor intrathecal spread.  The medication was then injected slowly. The patient tolerated the procedure well.      The patient was monitored after the procedure.   They were given post-procedure and discharge instructions to follow at home.  The patient was discharged in a stable condition.

## 2019-11-25 ENCOUNTER — OFFICE VISIT (OUTPATIENT)
Dept: FAMILY MEDICINE | Facility: CLINIC | Age: 79
End: 2019-11-25
Payer: MEDICARE

## 2019-11-25 VITALS
HEART RATE: 76 BPM | OXYGEN SATURATION: 93 % | BODY MASS INDEX: 22.13 KG/M2 | HEIGHT: 67 IN | SYSTOLIC BLOOD PRESSURE: 110 MMHG | DIASTOLIC BLOOD PRESSURE: 68 MMHG | WEIGHT: 141 LBS

## 2019-11-25 DIAGNOSIS — M51.36 DEGENERATIVE DISC DISEASE, LUMBAR: ICD-10-CM

## 2019-11-25 DIAGNOSIS — N18.6 ESRD (END STAGE RENAL DISEASE): Chronic | ICD-10-CM

## 2019-11-25 DIAGNOSIS — M80.00XD AGE-RELATED OSTEOPOROSIS WITH CURRENT PATHOLOGICAL FRACTURE WITH ROUTINE HEALING: ICD-10-CM

## 2019-11-25 DIAGNOSIS — J44.9 CHRONIC OBSTRUCTIVE PULMONARY DISEASE, UNSPECIFIED COPD TYPE: Chronic | ICD-10-CM

## 2019-11-25 DIAGNOSIS — S32.020D COMPRESSION FRACTURE OF L2 VERTEBRA WITH ROUTINE HEALING, SUBSEQUENT ENCOUNTER: ICD-10-CM

## 2019-11-25 DIAGNOSIS — F31.32 BIPOLAR AFFECTIVE DISORDER, CURRENTLY DEPRESSED, MODERATE: Primary | Chronic | ICD-10-CM

## 2019-11-25 PROCEDURE — 1101F PR PT FALLS ASSESS DOC 0-1 FALLS W/OUT INJ PAST YR: ICD-10-PCS | Mod: S$GLB,,, | Performed by: NURSE PRACTITIONER

## 2019-11-25 PROCEDURE — 99213 PR OFFICE/OUTPT VISIT, EST, LEVL III, 20-29 MIN: ICD-10-PCS | Mod: S$GLB,,, | Performed by: NURSE PRACTITIONER

## 2019-11-25 PROCEDURE — 1159F PR MEDICATION LIST DOCUMENTED IN MEDICAL RECORD: ICD-10-PCS | Mod: S$GLB,,, | Performed by: NURSE PRACTITIONER

## 2019-11-25 PROCEDURE — 1159F MED LIST DOCD IN RCRD: CPT | Mod: S$GLB,,, | Performed by: NURSE PRACTITIONER

## 2019-11-25 PROCEDURE — 1101F PT FALLS ASSESS-DOCD LE1/YR: CPT | Mod: S$GLB,,, | Performed by: NURSE PRACTITIONER

## 2019-11-25 PROCEDURE — 99213 OFFICE O/P EST LOW 20 MIN: CPT | Mod: S$GLB,,, | Performed by: NURSE PRACTITIONER

## 2019-11-25 RX ORDER — IPRATROPIUM BROMIDE AND ALBUTEROL 20; 100 UG/1; UG/1
SPRAY, METERED RESPIRATORY (INHALATION)
Refills: 0 | COMMUNITY
Start: 2019-11-07 | End: 2020-06-06 | Stop reason: CLARIF

## 2019-11-25 RX ORDER — MULTIVITAMIN
1 TABLET ORAL DAILY
Refills: 11 | COMMUNITY
Start: 2019-11-14 | End: 2024-03-15 | Stop reason: CLARIF

## 2019-11-25 RX ORDER — FLUOXETINE 20 MG/1
20 TABLET ORAL DAILY
Qty: 90 TABLET | Refills: 1 | Status: SHIPPED | OUTPATIENT
Start: 2019-11-25 | End: 2020-02-17 | Stop reason: SDUPTHER

## 2019-11-25 RX ORDER — TRAZODONE HYDROCHLORIDE 100 MG/1
100 TABLET ORAL NIGHTLY PRN
Refills: 2 | Status: ON HOLD | COMMUNITY
Start: 2019-11-23 | End: 2024-03-19 | Stop reason: HOSPADM

## 2019-11-25 NOTE — PROGRESS NOTES
SUBJECTIVE:    Patient ID: Althea Gaona is a 79 y.o. female.    Chief Complaint: Follow-up (did not bring bottles, states she only need refill on Prozac ac)    Pt here for regular f/u. Since last visit had L2 kyphoplasty and lumbar JOS with Dr. Khan. Pt c/oing of left groin pain which has been bothering her a few months.  Son who's a nurse came in [facility]- administered her first dose of prolia yesterday at home which she had picked up at the pharmacy. Son reports she seems to be doing fairly well- using her walker regularly and gait stable. No recent falls.  No issues on HD      Admission on 10/18/2019, Discharged on 10/18/2019   Component Date Value Ref Range Status    Potassium 10/18/2019 4.0  3.5 - 5.1 mmol/L Final   Lab Visit on 10/15/2019   Component Date Value Ref Range Status    WBC 10/15/2019 8.69  3.90 - 12.70 K/uL Final    RBC 10/15/2019 3.32* 4.00 - 5.40 M/uL Final    Hemoglobin 10/15/2019 11.1* 12.0 - 16.0 g/dL Final    Hematocrit 10/15/2019 37.4  37.0 - 48.5 % Final    Mean Corpuscular Volume 10/15/2019 113* 82 - 98 fL Final    Mean Corpuscular Hemoglobin 10/15/2019 33.4* 27.0 - 31.0 pg Final    Mean Corpuscular Hemoglobin Conc 10/15/2019 29.7* 32.0 - 36.0 g/dL Final    RDW 10/15/2019 14.5  11.5 - 14.5 % Final    Platelets 10/15/2019 344  150 - 350 K/uL Final    MPV 10/15/2019 11.0  9.2 - 12.9 fL Final    Gran # (ANC) 10/15/2019 5.5  1.8 - 7.7 K/uL Final    Immature Grans (Abs) 10/15/2019 0.04  0.00 - 0.04 K/uL Final    Lymph # 10/15/2019 2.1  1.0 - 4.8 K/uL Final    Mono # 10/15/2019 0.9  0.3 - 1.0 K/uL Final    Eos # 10/15/2019 0.1  0.0 - 0.5 K/uL Final    Baso # 10/15/2019 0.05  0.00 - 0.20 K/uL Final    nRBC 10/15/2019 0  0 /100 WBC Final    Gran% 10/15/2019 63.3  38.0 - 73.0 % Final    Lymph% 10/15/2019 24.2  18.0 - 48.0 % Final    Mono% 10/15/2019 9.8  4.0 - 15.0 % Final    Eosinophil% 10/15/2019 1.6  0.0 - 8.0 % Final    Basophil% 10/15/2019 0.6  0.0 - 1.9 % Final     Differential Method 10/15/2019 Automated   Final    Sodium 10/15/2019 145  136 - 145 mmol/L Final    Potassium 10/15/2019 4.2  3.5 - 5.1 mmol/L Final    Chloride 10/15/2019 107  95 - 110 mmol/L Final    CO2 10/15/2019 28  23 - 29 mmol/L Final    Glucose 10/15/2019 84  70 - 110 mg/dL Final    BUN, Bld 10/15/2019 25* 8 - 23 mg/dL Final    Creatinine 10/15/2019 4.4* 0.5 - 1.4 mg/dL Final    Calcium 10/15/2019 9.4  8.7 - 10.5 mg/dL Final    Total Protein 10/15/2019 6.5  6.0 - 8.4 g/dL Final    Albumin 10/15/2019 3.4* 3.5 - 5.2 g/dL Final    Total Bilirubin 10/15/2019 0.3  0.1 - 1.0 mg/dL Final    Alkaline Phosphatase 10/15/2019 109  55 - 135 U/L Final    AST 10/15/2019 17  10 - 40 U/L Final    ALT 10/15/2019 12  10 - 44 U/L Final    Anion Gap 10/15/2019 10  8 - 16 mmol/L Final    eGFR if African American 10/15/2019 10* >60 mL/min/1.73 m^2 Final    eGFR if non African American 10/15/2019 9* >60 mL/min/1.73 m^2 Final   No results displayed because visit has over 200 results.          Past Medical History:   Diagnosis Date    Alcoholism     no drink since 1984    Anxiety     Asthma     Bipolar affect, depressed     Bipolar disorder     COPD (chronic obstructive pulmonary disease)     Degenerative disc disease, lumbar 11/22/2019    Dialysis patient     ESRD (end stage renal disease)     GERD (gastroesophageal reflux disease)     Hypertension     Pancreatitis     Stroke     Thyroid disease      Past Surgical History:   Procedure Laterality Date    APPENDECTOMY      CATARACT EXTRACTION Right     DIALYSIS FISTULA CREATION      left upper arm    EPIDURAL STEROID INJECTION INTO LUMBAR SPINE N/A 11/22/2019    Procedure: Injection-steroid-epidural-lumbar;  Surgeon: Rosanna Khan MD;  Location: [facility];  Service: Pain Management;  Laterality: N/A;  L5-S1      EYE SURGERY      FIXATION KYPHOPLASTY N/A 10/18/2019    Procedure: Kyphoplasty;  Surgeon: Rosanna Khan MD;  [facility];   Service: Pain Management;  Laterality: N/A;  L2    HYSTERECTOMY      THYROIDECTOMY      THYROIDECTOMY       Family History   Problem Relation Age of Onset    No Known Problems Mother     Diabetes Father     Heart disease Father         blood clot in lung went to heart    Cancer Sister         breast    Stroke Paternal Aunt     Cancer Sister         breast    Cancer Cousin         colon    Diabetes Cousin     Cancer Cousin         colon    COPD Sister          of COPD       Marital Status:   Alcohol History:  reports that she does not drink alcohol.  Tobacco History:  reports that she quit smoking about 13 months ago. Her smoking use included cigarettes. She smoked 1.00 pack per day. She has never used smokeless tobacco.  Drug History:  reports that she does not use drugs.    Review of patient's allergies indicates:   Allergen Reactions    Metoprolol Other (See Comments)     Grand-daughter/care giver reported Pt has over reaction to this drug, Bp bottoms out along with heart rate    Codeine      Other reaction(s): Unknown       Current Outpatient Medications:     HYDROcodone-acetaminophen (NORCO) 5-325 mg per tablet, Take 1 tablet by mouth every 6 (six) hours as needed for Pain (Post-operative pain control)., Disp: 28 tablet, Rfl: 0    PROLIA 60 mg/mL Syrg, Inject 1 mL (60 mg total) into the skin every 6 (six) months., Disp: 1 Syringe, Rfl: 1    albuterol-ipratropium  mcg (COMBIVENT)  mcg/actuation inhaler, Inhale 2 puffs into the lungs every 6 (six) hours as needed for Wheezing., Disp: , Rfl:     ARIPiprazole (ABILIFY) 2 MG Tab, Take 5 mg by mouth every evening. , Disp: , Rfl:     atorvastatin (LIPITOR) 40 MG tablet, Take 40 mg by mouth once daily., Disp: , Rfl:     bisacodyl (DULCOLAX) 5 mg EC tablet, Take 1 tablet (5 mg total) by mouth daily as needed for Constipation., Disp: , Rfl: 0    busPIRone (BUSPAR) 10 MG tablet, Take 1 tablet (10 mg total) by mouth 3 (three)  times daily., Disp: 90 tablet, Rfl: 3    calcitRIOL (ROCALTROL) 0.25 MCG Cap, 0.25 mcg once daily. , Disp: , Rfl: 3    COMBIVENT RESPIMAT  mcg/actuation inhaler, INL 1 PUFF PO Q 6 H PRN, Disp: , Rfl: 0    DAILY-YANDY tablet, TK 1 T PO QD, Disp: , Rfl: 11    dicyclomine (BENTYL) 10 MG capsule, dicyclomine 10 mg capsule, Disp: , Rfl:     epoetin jacob-epbx (RETACRIT) 10,000 unit/mL imjection, Inject 0.34 mLs (3,400 Units total) into the skin every Tues, Thurs, Sat. With hemodialysis, Disp: , Rfl:     famotidine (PEPCID) 20 MG tablet, TK 1 T PO QHS, Disp: , Rfl: 1    FLUoxetine 20 MG tablet, Take 1 tablet (20 mg total) by mouth once daily., Disp: 90 tablet, Rfl: 1    fluticasone propionate (FLONASE) 50 mcg/actuation nasal spray, 2 sprays (100 mcg total) by Each Nare route once daily., Disp: 1 Bottle, Rfl: 11    fluticasone-umeclidin-vilanter (TRELEGY ELLIPTA) 100-62.5-25 mcg DsDv, Inhale 1 puff into the lungs once daily., Disp: 1 each, Rfl: 11    gabapentin (NEURONTIN) 300 MG capsule, hs, Disp: , Rfl:     levothyroxine (SYNTHROID) 100 MCG tablet, Take 1 tab every day by oral route., Disp: , Rfl:     meclizine (ANTIVERT) 12.5 mg tablet, meclizine 12.5 mg tablet, Disp: , Rfl:     midodrine (PROAMATINE) 5 MG Tab, Take 1 tablet (5 mg total) by mouth 2 (two) times daily with meals., Disp: 60 tablet, Rfl: 11    ondansetron (ZOFRAN) 8 MG tablet, Take 1 tablet (8 mg total) by mouth every 12 (twelve) hours as needed for Nausea., Disp: 6 tablet, Rfl: 0    ondansetron (ZOFRAN-ODT) 4 MG TbDL, ondansetron 4 mg disintegrating tablet, Disp: , Rfl:     polyethylene glycol (GLYCOLAX) 17 gram PwPk, Take 17 g by mouth daily as needed., Disp: , Rfl: 0    polyvinyl alcohol, artificial tears, (LIQUIFILM TEARS) 1.4 % ophthalmic solution, Place 1 drop into the left eye as needed., Disp: 15 mL, Rfl: 0    rosuvastatin (CRESTOR) 20 MG tablet, Take 20 mg by mouth once daily., Disp: , Rfl:     traMADol (ULTRAM) 50 mg tablet,  "tramadol 50 mg tablet, Disp: , Rfl:     traZODone (DESYREL) 100 MG tablet, TK 1 T PO QHS, Disp: , Rfl: 2  No current facility-administered medications for this visit.     Facility-Administered Medications Ordered in Other Visits:     lactated ringers infusion, , Intravenous, Once PRN, Rosanna Khan MD    Review of Systems   Constitutional: Negative for chills and fever.   HENT: Negative for sore throat.    Respiratory: Positive for shortness of breath (with longer walking or heavy exertion, uses nebs prn; wears O2 at night and prn). Negative for cough and wheezing.    Cardiovascular: Negative for chest pain, palpitations and leg swelling.   Gastrointestinal: Negative for abdominal pain, constipation and diarrhea.   Genitourinary: Negative for dysuria and hematuria.   Musculoskeletal: Positive for back pain. Gait problem: improved using walker.   Skin: Negative for rash.   Neurological: Positive for dizziness (occ after HD). Negative for syncope and headaches.   Psychiatric/Behavioral: Negative for dysphoric mood (overall stable at present).          Objective:      Vitals:    11/25/19 1340   BP: 110/68   Pulse: 76   SpO2: (!) 93%   Weight: 64 kg (141 lb)   Height: 5' 7" (1.702 m)     Physical Exam   Constitutional: She is oriented to person, place, and time. She appears well-developed and well-nourished.   HENT:   Head: Normocephalic and atraumatic.   Right Ear: Tympanic membrane and ear canal normal.   Left Ear: Tympanic membrane and ear canal normal.   Mouth/Throat: Mucous membranes are normal. No posterior oropharyngeal erythema.   Neck: Neck supple. Carotid bruit is not present.   Cardiovascular: Normal rate and regular rhythm. Exam reveals no gallop and no friction rub.   No murmur heard.  Pulmonary/Chest: Effort normal. No respiratory distress. She has wheezes (Fine wheeze right lower lobe). She has no rales.   Abdominal: Soft. She exhibits no distension. There is no tenderness.   Lymphadenopathy:     She " has no cervical adenopathy.   Neurological: She is alert and oriented to person, place, and time. She has normal strength. Gait ( gait stable with walker use, able to stand from a seated position without use of hands) normal.   Skin: Skin is warm and dry. No rash noted.   Psychiatric: She has a normal mood and affect.   Nursing note and vitals reviewed.        Assessment:       1. Bipolar affective disorder, currently depressed, moderate    2. Chronic obstructive pulmonary disease, unspecified COPD type    3. Degenerative disc disease, lumbar    4. Compression fracture of L2 vertebra with routine healing, subsequent encounter    5. ESRD (end stage renal disease)    6. Age-related osteoporosis with current pathological fracture with routine healing           Plan:       Bipolar affective disorder, currently depressed, moderate  -     FLUoxetine 20 MG tablet; Take 1 tablet (20 mg total) by mouth once daily.  Dispense: 90 tablet; Refill: 1  -overall seems to be doing well    Chronic obstructive pulmonary disease, unspecified COPD type  -stable    Degenerative disc disease, lumbar  -improved after recent JOS, follow-up Dr. Khan as scheduled    Compression fracture of L2 vertebra with routine healing, subsequent encounter  -pain improved after kypho    ESRD (end stage renal disease)  -stable on hemodialysis under care of Dr. Maurice    Age-related osteoporosis with current pathological fracture with routine healing  -patient received 1st dose of Prolia yesterday.  Patient/son advised she will be due repeat in 6 months, therapy plan initiated    Follow up in about 4 months (around 3/25/2020) for HTN/COPD.        11/25/2019 Kasie Hancock NP

## 2019-11-26 RX ORDER — MONTELUKAST SODIUM 10 MG/1
10 TABLET ORAL NIGHTLY
Qty: 90 TABLET | Refills: 1 | Status: SHIPPED | OUTPATIENT
Start: 2019-11-26 | End: 2019-12-26

## 2019-11-26 NOTE — TELEPHONE ENCOUNTER
----- Message from Faith Coffman sent at 11/26/2019 10:31 AM CST -----  Contact: Althea Gaona  Needs refill on her Montelukast Sodium 40MG tab and Advair inhaler   [facility] to [facility] [address]  Pt# [phone]

## 2019-12-02 ENCOUNTER — TELEPHONE (OUTPATIENT)
Dept: INFUSION THERAPY | Facility: HOSPITAL | Age: 79
End: 2019-12-02

## 2019-12-03 ENCOUNTER — TELEPHONE (OUTPATIENT)
Dept: FAMILY MEDICINE | Facility: CLINIC | Age: 79
End: 2019-12-03

## 2019-12-03 DIAGNOSIS — F31.32 BIPOLAR AFFECTIVE DISORDER, CURRENTLY DEPRESSED, MODERATE: Chronic | ICD-10-CM

## 2019-12-03 NOTE — TELEPHONE ENCOUNTER
----- Message from Kasie Lerma sent at 12/3/2019 10:43 AM CST -----  Pt needs a PA for the RX refill for Fluoxetine 20 mg. Can you call in a partial refill  so she has some pills until the PA is completed? [facility]. Pt #[phone]

## 2019-12-03 NOTE — TELEPHONE ENCOUNTER
Tried calling pharmacy to see why this pt needs a PA or why they won't fill the medication. On hold for over 10 minutes and could not get anyone. LMOR for pt to call back.

## 2019-12-12 DIAGNOSIS — S32.020D COMPRESSION FRACTURE OF L2 VERTEBRA WITH ROUTINE HEALING, SUBSEQUENT ENCOUNTER: Primary | ICD-10-CM

## 2019-12-12 RX ORDER — HYDROCODONE BITARTRATE AND ACETAMINOPHEN 5; 325 MG/1; MG/1
1 TABLET ORAL EVERY 6 HOURS PRN
Qty: 28 TABLET | Refills: 0 | Status: ON HOLD | OUTPATIENT
Start: 2019-12-12 | End: 2020-03-06 | Stop reason: SDUPTHER

## 2019-12-12 RX ORDER — HYDROCODONE BITARTRATE AND ACETAMINOPHEN 5; 325 MG/1; MG/1
1 TABLET ORAL EVERY 6 HOURS PRN
Qty: 28 TABLET | Refills: 0 | Status: SHIPPED | OUTPATIENT
Start: 2019-12-12 | End: 2019-12-12

## 2019-12-12 NOTE — TELEPHONE ENCOUNTER
LAST OV 11/4/2019          ----- Message from Adam Norton sent at 12/12/2019  1:02 PM CST -----  Contact: pt  Type:  RX Refill Request    Who Called:  pt  Refill or New Rx:  refill  RX Name and Strength:  HYDROcodone-acetaminophen (NORCO) 5-325 mg per tablet  How is the patient currently taking it? (ex. 1XDay):  4 x day  Is this a 30 day or 90 day RX:  30  Preferred Pharmacy with phone number:      [facility] #[id number] [address] & [address] [address]  Phone: [phone] Fax: [phone]    Local or Mail Order:    Ordering Provider:    Kev Call Back Number:  [phone]  Additional Information:  Pt got an injection and now in great back pain. Please call to advise

## 2019-12-18 RX ORDER — BUSPIRONE HYDROCHLORIDE 10 MG/1
10 TABLET ORAL 3 TIMES DAILY
Qty: 270 TABLET | Refills: 1 | Status: SHIPPED | OUTPATIENT
Start: 2019-12-18 | End: 2020-02-17 | Stop reason: SDUPTHER

## 2019-12-18 RX ORDER — ROSUVASTATIN CALCIUM 20 MG/1
20 TABLET, COATED ORAL DAILY
Qty: 90 TABLET | Refills: 1 | Status: SHIPPED | OUTPATIENT
Start: 2019-12-18 | End: 2024-03-15 | Stop reason: CLARIF

## 2019-12-18 NOTE — TELEPHONE ENCOUNTER
Spoke with pt - needs a refill on Buspar. Also needs a refill on cholesterol medication. States she doesn't know the name of it - has not taken it in a long time. Was out and never refilled it. Per [facility] - Crestor 20mg last filled in July. Both set up for Kasie.

## 2019-12-26 ENCOUNTER — TELEPHONE (OUTPATIENT)
Dept: PAIN MEDICINE | Facility: CLINIC | Age: 79
End: 2019-12-26

## 2019-12-26 NOTE — TELEPHONE ENCOUNTER
----- Message from Sharee Cannon sent at 12/26/2019 10:34 AM CST -----  Type: Needs Medical Advice    Who Called:  patient  Symptoms (please be specific):  Barely walking due to pain  How long has patient had these symptoms:  She fell last week and hurt her back  Pharmacy name and phone #:    [facility] #[id number] - [address] & [address] [address]  Phone: [phone] Fax: [phone]  Best Call Back Number: [phone]  Additional Information: Thank you!

## 2020-01-10 ENCOUNTER — OFFICE VISIT (OUTPATIENT)
Dept: PAIN MEDICINE | Facility: CLINIC | Age: 80
End: 2020-01-10
Payer: MEDICARE

## 2020-01-10 VITALS
HEART RATE: 73 BPM | WEIGHT: 141.13 LBS | DIASTOLIC BLOOD PRESSURE: 63 MMHG | SYSTOLIC BLOOD PRESSURE: 98 MMHG | HEIGHT: 67 IN | BODY MASS INDEX: 22.15 KG/M2

## 2020-01-10 DIAGNOSIS — Z87.81 HISTORY OF VERTEBRAL COMPRESSION FRACTURE: Primary | ICD-10-CM

## 2020-01-10 DIAGNOSIS — M54.16 LUMBAR RADICULOPATHY: ICD-10-CM

## 2020-01-10 DIAGNOSIS — M51.36 DDD (DEGENERATIVE DISC DISEASE), LUMBAR: ICD-10-CM

## 2020-01-10 PROCEDURE — 99999 PR PBB SHADOW E&M-EST. PATIENT-LVL V: ICD-10-PCS | Mod: PBBFAC,,, | Performed by: ANESTHESIOLOGY

## 2020-01-10 PROCEDURE — 99999 PR PBB SHADOW E&M-EST. PATIENT-LVL V: CPT | Mod: PBBFAC,,, | Performed by: ANESTHESIOLOGY

## 2020-01-10 PROCEDURE — 1101F PR PT FALLS ASSESS DOC 0-1 FALLS W/OUT INJ PAST YR: ICD-10-PCS | Mod: CPTII,S$GLB,, | Performed by: ANESTHESIOLOGY

## 2020-01-10 PROCEDURE — 1159F MED LIST DOCD IN RCRD: CPT | Mod: S$GLB,,, | Performed by: ANESTHESIOLOGY

## 2020-01-10 PROCEDURE — 1159F PR MEDICATION LIST DOCUMENTED IN MEDICAL RECORD: ICD-10-PCS | Mod: S$GLB,,, | Performed by: ANESTHESIOLOGY

## 2020-01-10 PROCEDURE — 1101F PT FALLS ASSESS-DOCD LE1/YR: CPT | Mod: CPTII,S$GLB,, | Performed by: ANESTHESIOLOGY

## 2020-01-10 PROCEDURE — 99214 PR OFFICE/OUTPT VISIT, EST, LEVL IV, 30-39 MIN: ICD-10-PCS | Mod: S$GLB,,, | Performed by: ANESTHESIOLOGY

## 2020-01-10 PROCEDURE — 1125F PR PAIN SEVERITY QUANTIFIED, PAIN PRESENT: ICD-10-PCS | Mod: S$GLB,,, | Performed by: ANESTHESIOLOGY

## 2020-01-10 PROCEDURE — 99214 OFFICE O/P EST MOD 30 MIN: CPT | Mod: S$GLB,,, | Performed by: ANESTHESIOLOGY

## 2020-01-10 PROCEDURE — 1125F AMNT PAIN NOTED PAIN PRSNT: CPT | Mod: S$GLB,,, | Performed by: ANESTHESIOLOGY

## 2020-01-10 RX ORDER — HYDROCODONE BITARTRATE AND ACETAMINOPHEN 5; 325 MG/1; MG/1
1 TABLET ORAL EVERY 8 HOURS PRN
Qty: 90 TABLET | Refills: 0 | Status: ON HOLD | OUTPATIENT
Start: 2020-01-10 | End: 2020-03-06 | Stop reason: HOSPADM

## 2020-01-10 NOTE — PROGRESS NOTES
Patient Althea Gaona, MRN [id number], was dependent on dialysis (ICD10 Z99.2) at the time of this visit on 1/10/20. This addendum is made to the medical record on 01/10/2020.

## 2020-01-10 NOTE — PROGRESS NOTES
"FOLLOW UP NOTE:     CHIEF COMPLAINT: back pain    INITIAL HISTORY OF PRESENT ILLNESS: Althea Gaona is a 79 y.o. female with PMH significant for COPD, bipolar disorder, anxiety, CKD, GERD, hx of CVA, and HTN presents for the evaluation of back pain. Patient reports that she fell at [facility] around August 2019. Patient reports that she tripped on her cane and fell onto her buttocks. Prior to her fall, patient endorsed of "minor" aches and pain in her low back prior to her fall. Patient rates her pain as a 5/10 prior to her fall. Patient rates her pain as a constant 10/10 since her fall. Patient describes her pain as an aching pain. Patient reports of intermittent radiation down her RLE( not present prior to her fall). Patient reports that her pain has made it difficult to sleep at night.  Patient has been wearing a back brace with no benefit. Patient reports of taking Norco with no relief of her pain. Patient denies of any urinary/fecal incontinence, saddle anesthesia, or new falls.      Aggravating factors: bending, twisting, lifting        INTERVAL HISTORY OF PRESENT ILLNESS: Althea Gaona is a 79 y.o. female with PMH significant for COPD, bipolar disorder, anxiety, CKD, GERD, hx of CVA, and HTN presents as an established patient for the continued management of back pain. The patient is s/p L5-S1 lumbar interlaminar epidural steroid injection on 11/22/2019, and she reports of 85% relief. The patient reports of a recurrence of her pain after no inciting incident or trauma. Patient reports of pain across her lower back with radiation down her leg down into her foot. The patient denies of any significant changes in her health since her last appointment. The patient also denies of any changes in the character of her pain since her last appointment.     INTERVENTIONAL PAIN HISTORY:  11/22/2019: Lumbar Interlaminar epidural steroid injection at L5-S1 - 85% relief   10/18/2019: L2 kyphoplasty " "    CURRENT PAIN MEDICATIONS:   Norco 5-325 mg PO TID    ROS:  Review of Systems   Constitutional: Negative for chills and fever.   HENT: Negative for sore throat.    Eyes: Negative for visual disturbance.   Respiratory: Negative for shortness of breath.    Cardiovascular: Negative for chest pain.   Gastrointestinal: Negative for nausea and vomiting.   Genitourinary: Negative for difficulty urinating.   Musculoskeletal: Positive for back pain.   Skin: Negative for rash.   Allergic/Immunologic: Negative for immunocompromised state.   Neurological: Negative for syncope.   Hematological: Does not bruise/bleed easily.   Psychiatric/Behavioral: Negative for suicidal ideas.        MEDICAL, SURGICAL, FAMILY, SOCIAL HX: reviewed    MEDICATIONS/ALLERGIES: reviewed    PHYSICAL EXAM:    VITALS: Vitals reviewed.   Vitals:    01/10/20 1044   BP: 98/63   Pulse: 73   Weight: 64 kg (141 lb 1.5 oz)   Height: 5' 7" (1.702 m)   PainSc:   9   PainLoc: Back       Physical Exam   Constitutional: She is oriented to person, place, and time and well-developed, well-nourished, and in no distress.   HENT:   Head: Normocephalic and atraumatic.   Eyes: Conjunctivae and EOM are normal. Right eye exhibits no discharge. Left eye exhibits no discharge.   Cardiovascular: Normal rate.   Pulmonary/Chest: Effort normal and breath sounds normal. No respiratory distress.   Abdominal: Soft.   Neurological: She is alert and oriented to person, place, and time.   Skin: Skin is warm and dry. No rash noted. She is not diaphoretic.   Psychiatric: Mood, memory, affect and judgment normal.   Nursing note and vitals reviewed.     EXTREMITIES:    Gen: No cyanosis, edema, varicosities, or tenderness to palpation BLE   Skin: Warm, pink, dry, no rashes, no lesions BLE   Strength: 5/5 motor strength BLE   ROM: hips, knees and ankles without pain or instability.     SPINE:    L-spine ROM: Limited ROM to flexion, extension, bilateral roation, and bilateral lateral " flexion with increased pain with passive motion   Positive facet loading bilaterally.   Straight Leg Raise: Positive on the right in the supine position  SI Joint: Negative tenderness to palpation bilaterally     NEUROLOGICAL:    Gen: No clonus or spasticity.   Gait: Normal without antalgic lean   DTR's: 2+ in bilateral patellar, and ankle   BABINSKI: Absent bilaterally  Sensory: Intact to light touch and proprioception BLE    IMAGING: no new imaging to review    ASSESSMENT: Althea Gaona is a 79 y.o. female with PMH significant for COPD, bipolar disorder, anxiety, CKD, GERD, hx of CVA, and HTN presents as an established patient for the continued management of back pain. The patient is s/p L5-S1 lumbar interlaminar epidural steroid injection on 11/22/2019, and she reports of 85% relief. Patient reports of a recurrence of her pain. Treatment plan outlined below.     PLAN:  1. Schedule for L5-S1 lumbar interlaminar epidural steroid injection   2. Prescribed Norco 5-325 mg PO TID; #90 tablets; 0 refills;  reviewed with no discrepancies  3. RTC in 3-4 weeks for post-procedural follow-up; drug screen to be performed at that time.     Rosanna Khan MD  Pain Medicine

## 2020-01-10 NOTE — H&P (VIEW-ONLY)
"FOLLOW UP NOTE:     CHIEF COMPLAINT: back pain    INITIAL HISTORY OF PRESENT ILLNESS: Althea Gaona is a 79 y.o. female with PMH significant for COPD, bipolar disorder, anxiety, CKD, GERD, hx of CVA, and HTN presents for the evaluation of back pain. Patient reports that she fell at [facility] around August 2019. Patient reports that she tripped on her cane and fell onto her buttocks. Prior to her fall, patient endorsed of "minor" aches and pain in her low back prior to her fall. Patient rates her pain as a 5/10 prior to her fall. Patient rates her pain as a constant 10/10 since her fall. Patient describes her pain as an aching pain. Patient reports of intermittent radiation down her RLE( not present prior to her fall). Patient reports that her pain has made it difficult to sleep at night.  Patient has been wearing a back brace with no benefit. Patient reports of taking Norco with no relief of her pain. Patient denies of any urinary/fecal incontinence, saddle anesthesia, or new falls.      Aggravating factors: bending, twisting, lifting        INTERVAL HISTORY OF PRESENT ILLNESS: Althea Gaona is a 79 y.o. female with PMH significant for COPD, bipolar disorder, anxiety, CKD, GERD, hx of CVA, and HTN presents as an established patient for the continued management of back pain. The patient is s/p L5-S1 lumbar interlaminar epidural steroid injection on 11/22/2019, and she reports of 85% relief. The patient reports of a recurrence of her pain after no inciting incident or trauma. Patient reports of pain across her lower back with radiation down her leg down into her foot. The patient denies of any significant changes in her health since her last appointment. The patient also denies of any changes in the character of her pain since her last appointment.     INTERVENTIONAL PAIN HISTORY:  11/22/2019: Lumbar Interlaminar epidural steroid injection at L5-S1 - 85% relief   10/18/2019: L2 kyphoplasty " "    CURRENT PAIN MEDICATIONS:   Norco 5-325 mg PO TID    ROS:  Review of Systems   Constitutional: Negative for chills and fever.   HENT: Negative for sore throat.    Eyes: Negative for visual disturbance.   Respiratory: Negative for shortness of breath.    Cardiovascular: Negative for chest pain.   Gastrointestinal: Negative for nausea and vomiting.   Genitourinary: Negative for difficulty urinating.   Musculoskeletal: Positive for back pain.   Skin: Negative for rash.   Allergic/Immunologic: Negative for immunocompromised state.   Neurological: Negative for syncope.   Hematological: Does not bruise/bleed easily.   Psychiatric/Behavioral: Negative for suicidal ideas.        MEDICAL, SURGICAL, FAMILY, SOCIAL HX: reviewed    MEDICATIONS/ALLERGIES: reviewed    PHYSICAL EXAM:    VITALS: Vitals reviewed.   Vitals:    01/10/20 1044   BP: 98/63   Pulse: 73   Weight: 64 kg (141 lb 1.5 oz)   Height: 5' 7" (1.702 m)   PainSc:   9   PainLoc: Back       Physical Exam   Constitutional: She is oriented to person, place, and time and well-developed, well-nourished, and in no distress.   HENT:   Head: Normocephalic and atraumatic.   Eyes: Conjunctivae and EOM are normal. Right eye exhibits no discharge. Left eye exhibits no discharge.   Cardiovascular: Normal rate.   Pulmonary/Chest: Effort normal and breath sounds normal. No respiratory distress.   Abdominal: Soft.   Neurological: She is alert and oriented to person, place, and time.   Skin: Skin is warm and dry. No rash noted. She is not diaphoretic.   Psychiatric: Mood, memory, affect and judgment normal.   Nursing note and vitals reviewed.     EXTREMITIES:    Gen: No cyanosis, edema, varicosities, or tenderness to palpation BLE   Skin: Warm, pink, dry, no rashes, no lesions BLE   Strength: 5/5 motor strength BLE   ROM: hips, knees and ankles without pain or instability.     SPINE:    L-spine ROM: Limited ROM to flexion, extension, bilateral roation, and bilateral lateral " flexion with increased pain with passive motion   Positive facet loading bilaterally.   Straight Leg Raise: Positive on the right in the supine position  SI Joint: Negative tenderness to palpation bilaterally     NEUROLOGICAL:    Gen: No clonus or spasticity.   Gait: Normal without antalgic lean   DTR's: 2+ in bilateral patellar, and ankle   BABINSKI: Absent bilaterally  Sensory: Intact to light touch and proprioception BLE    IMAGING: no new imaging to review    ASSESSMENT: Althea Gaona is a 79 y.o. female with PMH significant for COPD, bipolar disorder, anxiety, CKD, GERD, hx of CVA, and HTN presents as an established patient for the continued management of back pain. The patient is s/p L5-S1 lumbar interlaminar epidural steroid injection on 11/22/2019, and she reports of 85% relief. Patient reports of a recurrence of her pain. Treatment plan outlined below.     PLAN:  1. Schedule for L5-S1 lumbar interlaminar epidural steroid injection   2. Prescribed Norco 5-325 mg PO TID; #90 tablets; 0 refills;  reviewed with no discrepancies  3. RTC in 3-4 weeks for post-procedural follow-up; drug screen to be performed at that time.     Rosanna Khan MD  Pain Medicine

## 2020-01-13 DIAGNOSIS — M51.36 DEGENERATION OF LUMBAR INTERVERTEBRAL DISC: Primary | ICD-10-CM

## 2020-01-16 ENCOUNTER — EXTERNAL HOME HEALTH (OUTPATIENT)
Dept: HOME HEALTH SERVICES | Facility: HOSPITAL | Age: 80
End: 2020-01-16

## 2020-01-17 ENCOUNTER — TELEPHONE (OUTPATIENT)
Dept: PAIN MEDICINE | Facility: CLINIC | Age: 80
End: 2020-01-17

## 2020-01-17 NOTE — TELEPHONE ENCOUNTER
----- Message from Yolanda Cates sent at 1/17/2020  9:58 AM CST -----  Contact: Patient  Type: Needs Medical Advice    Who Called:  Patient  Best Call Back Number:   Additional Information: Calling to speak with the nurse about her medication HYDROcodone-acetaminophen (NORCO) 5-325 mg per tablet. She advised that it made her sick and dizzy.

## 2020-01-20 ENCOUNTER — TELEPHONE (OUTPATIENT)
Dept: HOME HEALTH SERVICES | Facility: HOSPITAL | Age: 80
End: 2020-01-20

## 2020-01-20 NOTE — TELEPHONE ENCOUNTER
[facility] Order # [id number] OT Eval with [facility] - Dr. Zachary Ramsey

## 2020-01-20 NOTE — TELEPHONE ENCOUNTER
[facility] Order # [id number] with [facility] - Dr. Zachary Ramsey

## 2020-02-03 ENCOUNTER — HOSPITAL ENCOUNTER (OUTPATIENT)
Facility: AMBULARY SURGERY CENTER | Age: 80
Discharge: HOME | End: 2020-02-03
Attending: ANESTHESIOLOGY | Admitting: ANESTHESIOLOGY
Payer: MEDICARE

## 2020-02-03 DIAGNOSIS — M51.36 DEGENERATIVE DISC DISEASE, LUMBAR: Primary | ICD-10-CM

## 2020-02-03 PROCEDURE — 99152 PR MOD CONSCIOUS SEDATION, SAME PHYS, 5+ YRS, FIRST 15 MIN: ICD-10-PCS | Mod: ,,, | Performed by: ANESTHESIOLOGY

## 2020-02-03 PROCEDURE — 62323 PR INJ LUMBAR/SACRAL, W/IMAGING GUIDANCE: ICD-10-PCS | Mod: ,,, | Performed by: ANESTHESIOLOGY

## 2020-02-03 PROCEDURE — 62323 NJX INTERLAMINAR LMBR/SAC: CPT | Performed by: ANESTHESIOLOGY

## 2020-02-03 PROCEDURE — 99152 MOD SED SAME PHYS/QHP 5/>YRS: CPT | Mod: ,,, | Performed by: ANESTHESIOLOGY

## 2020-02-03 PROCEDURE — 62323 NJX INTERLAMINAR LMBR/SAC: CPT | Mod: ,,, | Performed by: ANESTHESIOLOGY

## 2020-02-03 RX ORDER — SODIUM CHLORIDE 9 MG/ML
INJECTION, SOLUTION INTRAMUSCULAR; INTRAVENOUS; SUBCUTANEOUS
Status: DISCONTINUED | OUTPATIENT
Start: 2020-02-03 | End: 2020-02-03 | Stop reason: HOSPADM

## 2020-02-03 RX ORDER — DEXAMETHASONE SODIUM PHOSPHATE 10 MG/ML
INJECTION INTRAMUSCULAR; INTRAVENOUS
Status: DISCONTINUED | OUTPATIENT
Start: 2020-02-03 | End: 2020-02-03 | Stop reason: HOSPADM

## 2020-02-03 RX ORDER — MIDAZOLAM HYDROCHLORIDE 2 MG/2ML
INJECTION, SOLUTION INTRAMUSCULAR; INTRAVENOUS
Status: DISCONTINUED | OUTPATIENT
Start: 2020-02-03 | End: 2020-02-03 | Stop reason: HOSPADM

## 2020-02-03 RX ORDER — LIDOCAINE HYDROCHLORIDE 10 MG/ML
INJECTION, SOLUTION EPIDURAL; INFILTRATION; INTRACAUDAL; PERINEURAL
Status: DISCONTINUED | OUTPATIENT
Start: 2020-02-03 | End: 2020-02-03 | Stop reason: HOSPADM

## 2020-02-03 RX ORDER — SODIUM CHLORIDE 9 MG/ML
INJECTION, SOLUTION INTRAVENOUS CONTINUOUS
Status: ACTIVE | OUTPATIENT
Start: 2020-02-03

## 2020-02-03 RX ORDER — FENTANYL CITRATE 50 UG/ML
INJECTION, SOLUTION INTRAMUSCULAR; INTRAVENOUS
Status: DISCONTINUED | OUTPATIENT
Start: 2020-02-03 | End: 2020-02-03 | Stop reason: HOSPADM

## 2020-02-03 RX ORDER — SODIUM CHLORIDE, SODIUM LACTATE, POTASSIUM CHLORIDE, CALCIUM CHLORIDE 600; 310; 30; 20 MG/100ML; MG/100ML; MG/100ML; MG/100ML
INJECTION, SOLUTION INTRAVENOUS ONCE AS NEEDED
Status: ACTIVE | OUTPATIENT
Start: 2020-02-03 | End: 2031-07-02

## 2020-02-03 RX ADMIN — SODIUM CHLORIDE: 9 INJECTION, SOLUTION INTRAVENOUS at 03:02

## 2020-02-03 NOTE — DISCHARGE INSTRUCTIONS
Before leaving, please make sure you have all your personal belongings such as glasses, purses, wallets, keys, cell phones, jewelry, jackets etc     Pain injection instructions:     This procedure may take a couple weeks to relieve pain  You may get some pain relief from the local anesthetic initally.    No driving for 24 hrs.   Activity as tolerated- gradually increase activities.  Dont lift over 10 lbs for 24 hrs   No heat at injection sites x 2 days. No heating pads, hot tubs, saunas, or swimming in any body of water or pool for 2 days.  Use ice pack for mild swelling and for comfort , apply for 20 minutes, remove for 20 minute intervals. No direct contact of ice itself  to skin.  May shower today.   Do not allow shower water to beat on injection site for 2 days.No tub baths for two days.      Resume Aspirin, Plavix, or Coumadin the day after the procedure unless otherwise instructed.   If diabetic,monitor your glucose carefully as steroids can increase your glucose level    Seek immediate medical help for:   Severe increase in your usual pain or appearance of new pain.  Prolonged (more than 8 hours) or increasing weakness or numbness in the legs or arms. Numbing medicine was injected and can affect the messages to and from the brain and legs or arms.  .    Fever above 100.4 degrees F ,Drainage,redness,active bleeding, or increased swelling at the injection site.  Headache, shortness of breath, chest pain, or breathing problems.    After Surgery:  Always be aware that any surgery can cause these symptoms:    Pain- Medication can be prescribed for pain to decrease your pain but may not completely take your pain away. Over the Counter pain medicine my be enough and you can always use Ice and rest to help ease pain.    Bleeding- a little bleeding after a surgery is usually within normal.  If there is a lot of blood you need to notify your MD.  Emergency treatments of bleeding are cold application, elevation of the  bleeding site and compression.    Infection- Infection after surgery is NOT a normal occurrence.  Signs of infection are fever, swelling, hot to touch the incision.  If this occurs notify your MD immediately.    Nausea- this can be common after a surgery especially if you have had anesthesia medicine or are taking pain medicine.  Steroids have a side effect of nausea sometimes. Staying on clear liquids, bland foods, gingerale, or over the counter anti nausea medicines can help.  If you vomit more than once, notify your MD.  Anti Nausea medicines can be prescribed.

## 2020-02-03 NOTE — OP NOTE
PROCEDURE DATE: 2/3/2020    PROCEDURE:  Lumbar interlaminar epidural steroid injection at L5-S1 under fluoroscopic guidance.    DIAGNOSIS: LUMBAR DISC DISPLACEMENT WITHOUT MYELOPATHY  POSTOP DIAGNOSIS: SAME    PHYSICIAN: Rosanna Khan M.D.    MEDICATIONS INJECTED:10  mg dexamethasone with 4 ml of preservative free NaCl    LOCAL ANESTHETIC INJECTED:    Lidocaine 1% 3 ml total    SEDATION MEDICATIONS: RN IV sedation    ESTIMATED BLOOD LOSS:  none    COMPLICATIONS:  none    TECHNIQUE:  Time-out taken to identify patient and procedure prior to starting the procedure.  With the patient laying in a prone position, the area was prepped and draped in the usual sterile fashion using ChloraPrep and a fenestrated drape.  After determining the target level with an AP fluoroscopic view, local anesthetic was given using a 25-gauge 1.5 inch needle by raising a wheal and then infiltrating toward the interlaminar entry space.  A 3.5 inch 20-gauge Touhy needle was introduced under AP fluoroscopic guidance to the interlaminar space of L5-S1. Once the trajectory was established, the needle was visualized in the lateral view and advanced using loss of resistance technique. Once in the desired position, 3 mL contrast was injected to confirm placement and there was no vascular uptake nor intrathecal spread.  The medication was then injected slowly. The patient tolerated the procedure well.      The patient was monitored after the procedure.   They were given post-procedure and discharge instructions to follow at home.  The patient was discharged in a stable condition.

## 2020-02-03 NOTE — DISCHARGE SUMMARY
[facility]  Discharge Note  Short Stay    Admit Date: 2/3/2020    Discharge Date and Time: 2/3/2020    Attending Physician: Rosanna Khan MD     Discharge Provider: Rosanna Khan    Diagnoses:  Active Hospital Problems    Diagnosis  POA    *Degenerative disc disease, lumbar [M51.36]  Yes      Resolved Hospital Problems   No resolved problems to display.       Hospital Course: Lumbar interlaminar epidural steroid injection     Discharged Condition: Good    Final Diagnoses:   Active Hospital Problems    Diagnosis  POA    *Degenerative disc disease, lumbar [M51.36]  Yes      Resolved Hospital Problems   No resolved problems to display.       Disposition: Home or Self Care    Follow up/Patient Instructions:    Medications:  Reconciled Home Medications:      Medication List      CONTINUE taking these medications    ARIPiprazole 2 MG Tab  Commonly known as:  ABILIFY  Take 5 mg by mouth every evening.     bisacodyL 5 mg EC tablet  Commonly known as:  DULCOLAX  Take 1 tablet (5 mg total) by mouth daily as needed for Constipation.     busPIRone 10 MG tablet  Commonly known as:  BUSPAR  Take 1 tablet (10 mg total) by mouth 3 (three) times daily.     calcitRIOL 0.25 MCG Cap  Commonly known as:  ROCALTROL  0.25 mcg once daily.     * Combivent  mcg/actuation inhaler  Generic drug:  albuterol-ipratropium  mcg  Inhale 2 puffs into the lungs every 6 (six) hours as needed for Wheezing.     * Combivent Respimat  mcg/actuation inhaler  Generic drug:  ipratropium-albuterol  INL 1 PUFF PO Q 6 H PRN     Daily-Russ per tablet  Generic drug:  multivitamin  TK 1 T PO QD     dicyclomine 10 MG capsule  Commonly known as:  BENTYL  dicyclomine 10 mg capsule     epoetin jacob-epbx 10,000 unit/mL imjection  Commonly known as:  RETACRIT  Inject 0.34 mLs (3,400 Units total) into the skin every Tues, Thurs, Sat. With hemodialysis     famotidine 20 MG tablet  Commonly known as:  PEPCID  TK 1 T PO QHS     FLUoxetine  20 MG tablet  Take 1 tablet (20 mg total) by mouth once daily.     fluticasone propionate 50 mcg/actuation nasal spray  Commonly known as:  FLONASE  2 sprays (100 mcg total) by Each Nare route once daily.     fluticasone-umeclidin-vilanter 100-62.5-25 mcg Dsdv  Commonly known as:  Trelegy Ellipta  Inhale 1 puff into the lungs once daily.     gabapentin 300 MG capsule  Commonly known as:  NEURONTIN  hs     * HYDROcodone-acetaminophen 5-325 mg per tablet  Commonly known as:  NORCO  Take 1 tablet by mouth every 6 (six) hours as needed for Pain.     * HYDROcodone-acetaminophen 5-325 mg per tablet  Commonly known as:  NORCO  Take 1 tablet by mouth every 8 (eight) hours as needed for Pain.     levothyroxine 100 MCG tablet  Commonly known as:  SYNTHROID  Take 1 tab every day by oral route.     meclizine 12.5 mg tablet  Commonly known as:  ANTIVERT  meclizine 12.5 mg tablet     midodrine 5 MG Tab  Commonly known as:  PROAMATINE  Take 1 tablet (5 mg total) by mouth 2 (two) times daily with meals.     ondansetron 4 MG Tbdl  Commonly known as:  ZOFRAN-ODT  ondansetron 4 mg disintegrating tablet     ondansetron 8 MG tablet  Commonly known as:  ZOFRAN  Take 1 tablet (8 mg total) by mouth every 12 (twelve) hours as needed for Nausea.     polyethylene glycol 17 gram Pwpk  Commonly known as:  GLYCOLAX  Take 17 g by mouth daily as needed.     polyvinyl alcohol (artificial tears) 1.4 % ophthalmic solution  Commonly known as:  LIQUIFILM TEARS  Place 1 drop into the left eye as needed.     Prolia 60 mg/mL Syrg  Generic drug:  denosumab  Inject 1 mL (60 mg total) into the skin every 6 (six) months.     rosuvastatin 20 MG tablet  Commonly known as:  CRESTOR  Take 1 tablet (20 mg total) by mouth once daily.     traMADol 50 mg tablet  Commonly known as:  ULTRAM  tramadol 50 mg tablet     traZODone 100 MG tablet  Commonly known as:  DESYREL  TK 1 T PO QHS         * This list has 4 medication(s) that are the same as other medications  prescribed for you. Read the directions carefully, and ask your doctor or other care provider to review them with you.              Discharge Procedure Orders   Diet general     Call MD for:  temperature >100.4     Call MD for:  persistent nausea and vomiting     Call MD for:  severe uncontrolled pain     Call MD for:  difficulty breathing, headache or visual disturbances     Call MD for:  redness, tenderness, or signs of infection (pain, swelling, redness, odor or green/yellow discharge around incision site)     Call MD for:  hives     Call MD for:  persistent dizziness or light-headedness     Call MD for:  extreme fatigue        Follow up with MD in 2-3 weeks    Discharge Procedure Orders (must include Diet, Follow-up, Activity):   Discharge Procedure Orders (must include Diet, Follow-up, Activity)   Diet general     Call MD for:  temperature >100.4     Call MD for:  persistent nausea and vomiting     Call MD for:  severe uncontrolled pain     Call MD for:  difficulty breathing, headache or visual disturbances     Call MD for:  redness, tenderness, or signs of infection (pain, swelling, redness, odor or green/yellow discharge around incision site)     Call MD for:  hives     Call MD for:  persistent dizziness or light-headedness     Call MD for:  extreme fatigue

## 2020-02-03 NOTE — PLAN OF CARE
Patient sitting in wheelchair and states she is ready to go home. She denies pain, nausea weakness or dizziness. Patient's vital signs are stable. Injection site stable. Patient states she is ready to go home. All belongings listed on pre op check list have been returned to patient. Spousehere and states he is ready to take pt home and he is driving her home

## 2020-02-06 VITALS
HEIGHT: 67 IN | DIASTOLIC BLOOD PRESSURE: 61 MMHG | WEIGHT: 141 LBS | OXYGEN SATURATION: 98 % | HEART RATE: 69 BPM | TEMPERATURE: 97 F | SYSTOLIC BLOOD PRESSURE: 118 MMHG | BODY MASS INDEX: 22.13 KG/M2 | RESPIRATION RATE: 18 BRPM

## 2020-02-17 ENCOUNTER — OFFICE VISIT (OUTPATIENT)
Dept: FAMILY MEDICINE | Facility: CLINIC | Age: 80
End: 2020-02-17
Payer: MEDICARE

## 2020-02-17 VITALS
HEIGHT: 67 IN | HEART RATE: 68 BPM | DIASTOLIC BLOOD PRESSURE: 54 MMHG | WEIGHT: 140 LBS | SYSTOLIC BLOOD PRESSURE: 92 MMHG | BODY MASS INDEX: 21.97 KG/M2

## 2020-02-17 DIAGNOSIS — F31.32 BIPOLAR AFFECTIVE DISORDER, CURRENTLY DEPRESSED, MODERATE: Primary | Chronic | ICD-10-CM

## 2020-02-17 DIAGNOSIS — M80.00XD AGE-RELATED OSTEOPOROSIS WITH CURRENT PATHOLOGICAL FRACTURE WITH ROUTINE HEALING: ICD-10-CM

## 2020-02-17 DIAGNOSIS — E03.9 ACQUIRED HYPOTHYROIDISM: Chronic | ICD-10-CM

## 2020-02-17 DIAGNOSIS — R29.818 EXTRAPYRAMIDAL SYMPTOM: ICD-10-CM

## 2020-02-17 DIAGNOSIS — E78.2 MIXED HYPERLIPIDEMIA: Chronic | ICD-10-CM

## 2020-02-17 DIAGNOSIS — J44.9 CHRONIC OBSTRUCTIVE PULMONARY DISEASE, UNSPECIFIED COPD TYPE: Chronic | ICD-10-CM

## 2020-02-17 DIAGNOSIS — N18.6 ESRD (END STAGE RENAL DISEASE): Chronic | ICD-10-CM

## 2020-02-17 PROCEDURE — 99214 OFFICE O/P EST MOD 30 MIN: CPT | Mod: S$GLB,,, | Performed by: NURSE PRACTITIONER

## 2020-02-17 PROCEDURE — 1101F PT FALLS ASSESS-DOCD LE1/YR: CPT | Mod: S$GLB,,, | Performed by: NURSE PRACTITIONER

## 2020-02-17 PROCEDURE — 99214 PR OFFICE/OUTPT VISIT, EST, LEVL IV, 30-39 MIN: ICD-10-PCS | Mod: S$GLB,,, | Performed by: NURSE PRACTITIONER

## 2020-02-17 PROCEDURE — 1159F PR MEDICATION LIST DOCUMENTED IN MEDICAL RECORD: ICD-10-PCS | Mod: S$GLB,,, | Performed by: NURSE PRACTITIONER

## 2020-02-17 PROCEDURE — 1159F MED LIST DOCD IN RCRD: CPT | Mod: S$GLB,,, | Performed by: NURSE PRACTITIONER

## 2020-02-17 PROCEDURE — 1101F PR PT FALLS ASSESS DOC 0-1 FALLS W/OUT INJ PAST YR: ICD-10-PCS | Mod: S$GLB,,, | Performed by: NURSE PRACTITIONER

## 2020-02-17 RX ORDER — BUSPIRONE HYDROCHLORIDE 10 MG/1
10 TABLET ORAL 3 TIMES DAILY
Qty: 270 TABLET | Refills: 1 | Status: SHIPPED | OUTPATIENT
Start: 2020-02-17 | End: 2024-12-02

## 2020-02-17 RX ORDER — MONTELUKAST SODIUM 10 MG/1
10 TABLET ORAL NIGHTLY
COMMUNITY
End: 2020-05-21 | Stop reason: SDUPTHER

## 2020-02-17 RX ORDER — ARIPIPRAZOLE 2 MG/1
2 TABLET ORAL NIGHTLY
Qty: 90 TABLET | Refills: 1 | Status: SHIPPED | OUTPATIENT
Start: 2020-02-17

## 2020-02-17 RX ORDER — FLUOXETINE 20 MG/1
20 TABLET ORAL DAILY
Qty: 90 TABLET | Refills: 1 | Status: SHIPPED | OUTPATIENT
Start: 2020-02-17 | End: 2020-03-16 | Stop reason: CLARIF

## 2020-02-17 RX ORDER — BENZTROPINE MESYLATE 1 MG/1
1 TABLET ORAL 2 TIMES DAILY
Qty: 60 TABLET | Refills: 2 | Status: ON HOLD | OUTPATIENT
Start: 2020-02-17 | End: 2024-03-19 | Stop reason: HOSPADM

## 2020-02-17 NOTE — PROGRESS NOTES
SUBJECTIVE:    Patient ID: Althea Gaona is a 80 y.o. female.    Chief Complaint: Follow-up (med refills, brought only a couple of bottles// SW)    Pt here for regular f/u. Pt reports she ran out of her buspar early because she's been taking 10mg AM and mid day and then 20mg at night. Also asking for steroid shot and tramadol RX. Has been seeing Dr. Khan for pain mgmt, had 2nd lumbar JOS on 2/3/2020 which helped some with back pain. Denies any falls.  Dialysis on [id number]        Admission on 10/18/2019, Discharged on 10/18/2019   Component Date Value Ref Range Status    Potassium 10/18/2019 4.0  3.5 - 5.1 mmol/L Final   Lab Visit on 10/15/2019   Component Date Value Ref Range Status    WBC 10/15/2019 8.69  3.90 - 12.70 K/uL Final    RBC 10/15/2019 3.32* 4.00 - 5.40 M/uL Final    Hemoglobin 10/15/2019 11.1* 12.0 - 16.0 g/dL Final    Hematocrit 10/15/2019 37.4  37.0 - 48.5 % Final    Mean Corpuscular Volume 10/15/2019 113* 82 - 98 fL Final    Mean Corpuscular Hemoglobin 10/15/2019 33.4* 27.0 - 31.0 pg Final    Mean Corpuscular Hemoglobin Conc 10/15/2019 29.7* 32.0 - 36.0 g/dL Final    RDW 10/15/2019 14.5  11.5 - 14.5 % Final    Platelets 10/15/2019 344  150 - 350 K/uL Final    MPV 10/15/2019 11.0  9.2 - 12.9 fL Final    Gran # (ANC) 10/15/2019 5.5  1.8 - 7.7 K/uL Final    Immature Grans (Abs) 10/15/2019 0.04  0.00 - 0.04 K/uL Final    Lymph # 10/15/2019 2.1  1.0 - 4.8 K/uL Final    Mono # 10/15/2019 0.9  0.3 - 1.0 K/uL Final    Eos # 10/15/2019 0.1  0.0 - 0.5 K/uL Final    Baso # 10/15/2019 0.05  0.00 - 0.20 K/uL Final    nRBC 10/15/2019 0  0 /100 WBC Final    Gran% 10/15/2019 63.3  38.0 - 73.0 % Final    Lymph% 10/15/2019 24.2  18.0 - 48.0 % Final    Mono% 10/15/2019 9.8  4.0 - 15.0 % Final    Eosinophil% 10/15/2019 1.6  0.0 - 8.0 % Final    Basophil% 10/15/2019 0.6  0.0 - 1.9 % Final    Differential Method 10/15/2019 Automated   Final    Sodium 10/15/2019 145  136 - 145 mmol/L  Final    Potassium 10/15/2019 4.2  3.5 - 5.1 mmol/L Final    Chloride 10/15/2019 107  95 - 110 mmol/L Final    CO2 10/15/2019 28  23 - 29 mmol/L Final    Glucose 10/15/2019 84  70 - 110 mg/dL Final    BUN, Bld 10/15/2019 25* 8 - 23 mg/dL Final    Creatinine 10/15/2019 4.4* 0.5 - 1.4 mg/dL Final    Calcium 10/15/2019 9.4  8.7 - 10.5 mg/dL Final    Total Protein 10/15/2019 6.5  6.0 - 8.4 g/dL Final    Albumin 10/15/2019 3.4* 3.5 - 5.2 g/dL Final    Total Bilirubin 10/15/2019 0.3  0.1 - 1.0 mg/dL Final    Alkaline Phosphatase 10/15/2019 109  55 - 135 U/L Final    AST 10/15/2019 17  10 - 40 U/L Final    ALT 10/15/2019 12  10 - 44 U/L Final    Anion Gap 10/15/2019 10  8 - 16 mmol/L Final    eGFR if African American 10/15/2019 10* >60 mL/min/1.73 m^2 Final    eGFR if non African American 10/15/2019 9* >60 mL/min/1.73 m^2 Final   No results displayed because visit has over 200 results.          Past Medical History:   Diagnosis Date    Alcoholism     no drink since 1984    Anxiety     Asthma     Bipolar affect, depressed     Bipolar disorder     COPD (chronic obstructive pulmonary disease)     Degenerative disc disease, lumbar 11/22/2019    Dialysis patient     ESRD (end stage renal disease)     GERD (gastroesophageal reflux disease)     Hypertension     Limb alert care status     NO BP/IV LEFT ARM    Pancreatitis     Stroke     Thyroid disease      Past Surgical History:   Procedure Laterality Date    APPENDECTOMY      CATARACT EXTRACTION Right     DIALYSIS FISTULA CREATION      left upper arm    EPIDURAL STEROID INJECTION INTO LUMBAR SPINE N/A 11/22/2019    Procedure: Injection-steroid-epidural-lumbar;  Surgeon: Rosanna Khan MD;  Location: [facility] OR;  Service: Pain Management;  Laterality: N/A;  L5-S1      EPIDURAL STEROID INJECTION INTO LUMBAR SPINE N/A 2/3/2020    Procedure: Injection-steroid-epidural-lumbar;  Surgeon: Rosanna Khan MD;  Location: [facility] OR;  Service: Pain  Management;  Laterality: N/A;  L5-S1    EYE SURGERY      FIXATION KYPHOPLASTY N/A 10/18/2019    Procedure: Kyphoplasty;  Surgeon: Rosanna Khan MD;  Location: [facility];  Service: Pain Management;  Laterality: N/A;  L2    HYSTERECTOMY      THYROIDECTOMY      THYROIDECTOMY       Family History   Problem Relation Age of Onset    No Known Problems Mother     Diabetes Father     Heart disease Father         blood clot in lung went to heart    Cancer Sister         breast    Stroke Paternal Aunt     Cancer Sister         breast    Cancer Cousin         colon    Diabetes Cousin     Cancer Cousin         colon    COPD Sister          of COPD       Marital Status:   Alcohol History:  reports that she does not drink alcohol.  Tobacco History:  reports that she quit smoking about 16 months ago. Her smoking use included cigarettes. She smoked 1.00 pack per day. She has never used smokeless tobacco.  Drug History:  reports that she does not use drugs.    Health Maintenance Topics with due status: Not Due       Topic Last Completion Date    Lipid Panel 2015    TETANUS VACCINE 2019     Immunization History   Administered Date(s) Administered    Influenza - High Dose - PF (65 years and older) 2017    PPD Test 2019    Pneumococcal 2018    Pneumococcal Conjugate - 13 Valent 2018    Pneumococcal Polysaccharide - 23 Valent 2017    Tdap 2019    Varicella 2018    Zoster 2017    Zoster Recombinant 2018, 2019       Review of patient's allergies indicates:   Allergen Reactions    Metoprolol Other (See Comments)     Grand-daughter/care giver reported Pt has over reaction to this drug, Bp bottoms out along with heart rate    Codeine      Other reaction(s): Unknown       Current Outpatient Medications:     ARIPiprazole (ABILIFY) 2 MG Tab, Take 1 tablet (2 mg total) by mouth every evening., Disp: 90 tablet, Rfl: 1    busPIRone  (BUSPAR) 10 MG tablet, Take 1 tablet (10 mg total) by mouth 3 (three) times daily., Disp: 270 tablet, Rfl: 1    famotidine (PEPCID) 20 MG tablet, Take 20 mg by mouth every evening. , Disp: , Rfl: 1    FLUoxetine 20 MG tablet, Take 1 tablet (20 mg total) by mouth once daily., Disp: 90 tablet, Rfl: 1    fluticasone-umeclidin-vilanter (TRELEGY ELLIPTA) 100-62.5-25 mcg DsDv, Inhale 1 puff into the lungs once daily., Disp: 1 each, Rfl: 11    levothyroxine (SYNTHROID) 100 MCG tablet, Take 1 tab every day by oral route., Disp: , Rfl:     montelukast (SINGULAIR) 10 mg tablet, Take 10 mg by mouth every evening., Disp: , Rfl:     rosuvastatin (CRESTOR) 20 MG tablet, Take 1 tablet (20 mg total) by mouth once daily., Disp: 90 tablet, Rfl: 1    albuterol-ipratropium  mcg (COMBIVENT)  mcg/actuation inhaler, Inhale 2 puffs into the lungs every 6 (six) hours as needed for Wheezing., Disp: , Rfl:     bisacodyl (DULCOLAX) 5 mg EC tablet, Take 1 tablet (5 mg total) by mouth daily as needed for Constipation., Disp: , Rfl: 0    calcitRIOL (ROCALTROL) 0.25 MCG Cap, 0.25 mcg once daily. , Disp: , Rfl: 3    COMBIVENT RESPIMAT  mcg/actuation inhaler, INL 1 PUFF PO Q 6 H PRN, Disp: , Rfl: 0    DAILY-YANDY tablet, TK 1 T PO QD, Disp: , Rfl: 11    dicyclomine (BENTYL) 10 MG capsule, dicyclomine 10 mg capsule, Disp: , Rfl:     epoetin jacob-epbx (RETACRIT) 10,000 unit/mL imjection, Inject 0.34 mLs (3,400 Units total) into the skin every Tues, Thurs, Sat. With hemodialysis, Disp: , Rfl:     HYDROcodone-acetaminophen (NORCO) 5-325 mg per tablet, Take 1 tablet by mouth every 6 (six) hours as needed for Pain., Disp: 28 tablet, Rfl: 0    HYDROcodone-acetaminophen (NORCO) 5-325 mg per tablet, Take 1 tablet by mouth every 8 (eight) hours as needed for Pain., Disp: 90 tablet, Rfl: 0    meclizine (ANTIVERT) 12.5 mg tablet, meclizine 12.5 mg tablet, Disp: , Rfl:     midodrine (PROAMATINE) 5 MG Tab, Take 1 tablet (5 mg  total) by mouth 2 (two) times daily with meals., Disp: 60 tablet, Rfl: 11    ondansetron (ZOFRAN) 8 MG tablet, Take 1 tablet (8 mg total) by mouth every 12 (twelve) hours as needed for Nausea., Disp: 6 tablet, Rfl: 0    ondansetron (ZOFRAN-ODT) 4 MG TbDL, ondansetron 4 mg disintegrating tablet, Disp: , Rfl:     polyethylene glycol (GLYCOLAX) 17 gram PwPk, Take 17 g by mouth daily as needed., Disp: , Rfl: 0    polyvinyl alcohol, artificial tears, (LIQUIFILM TEARS) 1.4 % ophthalmic solution, Place 1 drop into the left eye as needed., Disp: 15 mL, Rfl: 0    PROLIA 60 mg/mL Syrg, Inject 1 mL (60 mg total) into the skin every 6 (six) months., Disp: 1 Syringe, Rfl: 1    traMADol (ULTRAM) 50 mg tablet, tramadol 50 mg tablet, Disp: , Rfl:     traZODone (DESYREL) 100 MG tablet, TK 1 T PO QHS, Disp: , Rfl: 2  No current facility-administered medications for this visit.     Facility-Administered Medications Ordered in Other Visits:     0.9%  NaCl infusion, , Intravenous, Continuous, Rosanna Khan MD, Stopped at 02/03/20 1611    lactated ringers infusion, , Intravenous, Once PRN, Rosanna Khan MD    lactated ringers infusion, , Intravenous, Once PRN, Rosanna Khan MD    Review of Systems   Constitutional: Positive for appetite change (eating smaller amts-) and unexpected weight change (wt down 6lbs since Nov- states eats smaller amts and gets full faster). Negative for chills and fever.   Respiratory: Positive for shortness of breath (mild SOB with exertion). Negative for cough and wheezing.    Cardiovascular: Negative for chest pain, palpitations and leg swelling.   Gastrointestinal: Negative for abdominal pain, constipation and diarrhea.   Genitourinary: Negative for dysuria and hematuria.   Musculoskeletal: Positive for back pain.   Skin: Negative for rash.   Neurological: Negative for dizziness, speech difficulty and headaches.   Psychiatric/Behavioral: Negative for dysphoric mood. The patient is  "nervous/anxious (states increased her buspar d/t anxeity which helps).           Objective:      Vitals:    02/17/20 1140 02/17/20 1333   BP: (!) 80/48 (!) 92/54   Pulse: 68    Weight: 63.5 kg (140 lb)    Height: 5' 7" (1.702 m)      Physical Exam   Constitutional: She is oriented to person, place, and time. She appears well-developed and well-nourished.   Pronounced lip smacking movements- seems worse than previous   HENT:   Head: Normocephalic and atraumatic.   Right Ear: Tympanic membrane and ear canal normal.   Left Ear: Tympanic membrane and ear canal normal.   Mouth/Throat: Mucous membranes are normal. No posterior oropharyngeal erythema.   Neck: Neck supple. Carotid bruit is not present.   Cardiovascular: Normal rate and regular rhythm. Exam reveals no gallop and no friction rub.   No murmur heard.  Pulmonary/Chest: Effort normal and breath sounds normal. No respiratory distress. She has no wheezes. She has no rales.   Abdominal: Soft. She exhibits no distension. There is no tenderness.   Musculoskeletal: She exhibits no edema.   Lymphadenopathy:     She has no cervical adenopathy.   Neurological: She is alert and oriented to person, place, and time. She has normal strength. Gait normal.   Skin: Skin is warm and dry. No rash noted.   Psychiatric: She has a normal mood and affect.   Nursing note and vitals reviewed.        Assessment:       1. Bipolar affective disorder, currently depressed, moderate    2. Chronic obstructive pulmonary disease, unspecified COPD type    3. ESRD (end stage renal disease)    4. Age-related osteoporosis with current pathological fracture with routine healing    5. Extrapyramidal symptom    6. Mixed hyperlipidemia    7. Acquired hypothyroidism           Plan:       Bipolar affective disorder, currently depressed, moderate  -     FLUoxetine 20 MG tablet; Take 1 tablet (20 mg total) by mouth once daily.  Dispense: 90 tablet; Refill: 1  -     ARIPiprazole (ABILIFY) 2 MG Tab; Take 1 " tablet (2 mg total) by mouth every evening.  Dispense: 90 tablet; Refill: 1  -     busPIRone (BUSPAR) 10 MG tablet; Take 1 tablet (10 mg total) by mouth 3 (three) times daily.  Dispense: 270 tablet; Refill: 1  -patient advised I recommend she continue BuSpar 10 mg t.i.d. as prescribed.  She tells me her depression has been well controlled recently with long hx of psych illness. She is noted to have more pronounced EPS symptoms with lip smacking so will add cogentin    Chronic obstructive pulmonary disease, unspecified COPD type  -     fluticasone-umeclidin-vilanter (TRELEGY ELLIPTA) 100-62.5-25 mcg DsDv; Inhale 1 puff into the lungs once daily.  Dispense: 1 each; Refill: 11  -stable    ESRD (end stage renal disease)  -     CBC auto differential; Future; Expected date: 02/17/2020  -     Comprehensive metabolic panel; Future; Expected date: 02/17/2020  -     TSH w/reflex to FT4; Future; Expected date: 02/17/2020  -stable on dialysis 3 times a week    Age-related osteoporosis with current pathological fracture with routine healing  -started on Prolia in November    Extrapyramidal symptom  -add Cogentin b.i.d. for lip smacking motions    Mixed hyperlipidemia  -     Lipid panel; Future; Expected date: 02/17/2020  -patient advised she is due for follow-up labs, to be drawn today    Acquired hypothyroidism  -labs drawn today, will call with results    Follow up in about 4 months (around 6/17/2020) for HTN, lipids; labwork today.        2/17/2020 Kasie Hancock NP

## 2020-02-18 ENCOUNTER — TELEPHONE (OUTPATIENT)
Dept: FAMILY MEDICINE | Facility: CLINIC | Age: 80
End: 2020-02-18

## 2020-02-18 LAB
ALBUMIN SERPL-MCNC: 4.4 G/DL (ref 3.6–5.1)
ALBUMIN/GLOB SERPL: 1.8 (CALC) (ref 1–2.5)
ALP SERPL-CCNC: 69 U/L (ref 37–153)
ALT SERPL-CCNC: 10 U/L (ref 6–29)
AST SERPL-CCNC: 13 U/L (ref 10–35)
BASOPHILS # BLD AUTO: 26 CELLS/UL (ref 0–200)
BASOPHILS NFR BLD AUTO: 0.3 %
BILIRUB SERPL-MCNC: 0.5 MG/DL (ref 0.2–1.2)
BUN SERPL-MCNC: 26 MG/DL (ref 7–25)
BUN/CREAT SERPL: 6 (CALC) (ref 6–22)
CALCIUM SERPL-MCNC: 9.9 MG/DL (ref 8.6–10.4)
CHLORIDE SERPL-SCNC: 100 MMOL/L (ref 98–110)
CHOLEST SERPL-MCNC: 154 MG/DL
CHOLEST/HDLC SERPL: 2.8 (CALC)
CO2 SERPL-SCNC: 28 MMOL/L (ref 20–32)
CREAT SERPL-MCNC: 4.22 MG/DL (ref 0.6–0.93)
EOSINOPHIL # BLD AUTO: 77 CELLS/UL (ref 15–500)
EOSINOPHIL NFR BLD AUTO: 0.9 %
ERYTHROCYTE [DISTWIDTH] IN BLOOD BY AUTOMATED COUNT: 12.7 % (ref 11–15)
GFRSERPLBLD MDRD-ARVRAT: 9 ML/MIN/1.73M2
GLOBULIN SER CALC-MCNC: 2.5 G/DL (CALC) (ref 1.9–3.7)
GLUCOSE SERPL-MCNC: 102 MG/DL (ref 65–99)
HCT VFR BLD AUTO: 36.9 % (ref 35–45)
HDLC SERPL-MCNC: 56 MG/DL
HGB BLD-MCNC: 11.8 G/DL (ref 11.7–15.5)
LDLC SERPL CALC-MCNC: 71 MG/DL (CALC)
LYMPHOCYTES # BLD AUTO: 1118 CELLS/UL (ref 850–3900)
LYMPHOCYTES NFR BLD AUTO: 13 %
MCH RBC QN AUTO: 33.3 PG (ref 27–33)
MCHC RBC AUTO-ENTMCNC: 32 G/DL (ref 32–36)
MCV RBC AUTO: 104.2 FL (ref 80–100)
MONOCYTES # BLD AUTO: 611 CELLS/UL (ref 200–950)
MONOCYTES NFR BLD AUTO: 7.1 %
NEUTROPHILS # BLD AUTO: 6768 CELLS/UL (ref 1500–7800)
NEUTROPHILS NFR BLD AUTO: 78.7 %
NONHDLC SERPL-MCNC: 98 MG/DL (CALC)
PLATELET # BLD AUTO: 237 THOUSAND/UL (ref 140–400)
PMV BLD REES-ECKER: 11.8 FL (ref 7.5–12.5)
POTASSIUM SERPL-SCNC: 4.4 MMOL/L (ref 3.5–5.3)
PROT SERPL-MCNC: 6.9 G/DL (ref 6.1–8.1)
RBC # BLD AUTO: 3.54 MILLION/UL (ref 3.8–5.1)
SODIUM SERPL-SCNC: 139 MMOL/L (ref 135–146)
TRIGL SERPL-MCNC: 205 MG/DL
TSH SERPL-ACNC: 2.82 MIU/L (ref 0.4–4.5)
WBC # BLD AUTO: 8.6 THOUSAND/UL (ref 3.8–10.8)

## 2020-02-19 ENCOUNTER — TELEPHONE (OUTPATIENT)
Dept: FAMILY MEDICINE | Facility: CLINIC | Age: 80
End: 2020-02-19

## 2020-02-19 NOTE — TELEPHONE ENCOUNTER
----- Message from Kasie Hancock NP sent at 2/18/2020  8:56 PM CST -----  Please call pt and let her know overall labs are stable. No anemia on blood count. Liver and thyroid levels are within normal range. Triglycerides are mildly elevated at 205 though LDL level well controlled- continue current medication.

## 2020-02-19 NOTE — PROGRESS NOTES
Please call pt and let her know overall labs are stable. No anemia on blood count. Liver and thyroid levels are within normal range. Triglycerides are mildly elevated at 205 though LDL level well controlled- continue current medication.

## 2020-02-20 ENCOUNTER — TELEPHONE (OUTPATIENT)
Dept: PAIN MEDICINE | Facility: CLINIC | Age: 80
End: 2020-02-20

## 2020-02-20 DIAGNOSIS — Z87.81 HISTORY OF VERTEBRAL COMPRESSION FRACTURE: ICD-10-CM

## 2020-02-20 DIAGNOSIS — M51.36 DEGENERATIVE DISC DISEASE, LUMBAR: Primary | ICD-10-CM

## 2020-02-20 RX ORDER — HYDROCODONE BITARTRATE AND ACETAMINOPHEN 5; 325 MG/1; MG/1
1 TABLET ORAL EVERY 8 HOURS PRN
Qty: 90 TABLET | Refills: 0 | Status: ON HOLD | OUTPATIENT
Start: 2020-02-20 | End: 2020-03-06 | Stop reason: HOSPADM

## 2020-02-20 RX ORDER — ONDANSETRON HYDROCHLORIDE 8 MG/1
8 TABLET, FILM COATED ORAL EVERY 8 HOURS PRN
Qty: 20 TABLET | Refills: 2 | Status: SHIPPED | OUTPATIENT
Start: 2020-02-20 | End: 2020-05-21 | Stop reason: SDUPTHER

## 2020-02-20 NOTE — TELEPHONE ENCOUNTER
----- Message from Gianna Garcia sent at 2/20/2020 10:04 AM CST -----  Refills on Zofran to [facility] [address]    # [phone]

## 2020-02-20 NOTE — TELEPHONE ENCOUNTER
----- Message from Afsaneh Murry sent at 2/20/2020 10:48 AM CST -----  Contact: Althea pt  Type: Needs Medical Advice    Who Called:  Althea  Symptoms (please be specific):  She received an injection and still in a lot of pain. Can't sleep so much pain  How long has patient had these symptoms:  ongoing  Pharmacy name and phone #:        [facility] #[id number] [address] [address]  Phone: [phone] Fax: [phone]      Best Call Back Number: [phone]  Additional Information: Pls call pt regarding sending her something for pain.

## 2020-02-23 ENCOUNTER — HOSPITAL ENCOUNTER (EMERGENCY)
Facility: HOSPITAL | Age: 80
Discharge: HOME | End: 2020-02-23
Attending: EMERGENCY MEDICINE
Payer: MEDICARE

## 2020-02-23 VITALS
DIASTOLIC BLOOD PRESSURE: 68 MMHG | HEIGHT: 67 IN | WEIGHT: 140 LBS | SYSTOLIC BLOOD PRESSURE: 112 MMHG | RESPIRATION RATE: 16 BRPM | OXYGEN SATURATION: 99 % | HEART RATE: 71 BPM | TEMPERATURE: 98 F | BODY MASS INDEX: 21.97 KG/M2

## 2020-02-23 DIAGNOSIS — Z87.81 H/O REDUCTION OF CLOSED FRACTURE: ICD-10-CM

## 2020-02-23 DIAGNOSIS — R52 PAIN: ICD-10-CM
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
## 2020-02-23 PROCEDURE — 99283 EMERGENCY DEPT VISIT LOW MDM: CPT | Mod: 25

## 2020-02-23 PROCEDURE — 25605 CLTX DST RDL FX/EPHYS SEP W/: CPT | Mod: LT

## 2020-02-23 PROCEDURE — 25000003 PHARM REV CODE 250: Performed by: EMERGENCY MEDICINE

## 2020-02-23 RX ORDER — LIDOCAINE HYDROCHLORIDE 10 MG/ML
5 INJECTION, SOLUTION EPIDURAL; INFILTRATION; INTRACAUDAL; PERINEURAL
Status: COMPLETED | OUTPATIENT
Start: 2020-02-23 | End: 2020-02-23

## 2020-02-23 RX ORDER — ACETAMINOPHEN 500 MG
500 TABLET ORAL
Status: COMPLETED | OUTPATIENT
Start: 2020-02-23 | End: 2020-02-23

## 2020-02-23 RX ADMIN — IBUPROFEN 600 MG: 400 TABLET, FILM COATED ORAL at 04:02

## 2020-02-23 RX ADMIN — LIDOCAINE HYDROCHLORIDE: 10 INJECTION, SOLUTION EPIDURAL; INFILTRATION; INTRACAUDAL; PERINEURAL at 04:02

## 2020-02-23 RX ADMIN — ACETAMINOPHEN 500 MG: 500 TABLET, FILM COATED ORAL at 04:02

## 2020-02-23 NOTE — DISCHARGE INSTRUCTIONS
Please use your wheelchair to ambulate.  You will need to follow up with the orthopedic surgeon who completed surgery on her wrist prior.  We have provided you an orthopedic surgeon to call.  You will need to be evaluated in the office and have the fracture repaired.  If you develop severe pain, numbness or tingling please loosen the splint return to the ER for repeat evaluation.  Please take your home medications for pain.

## 2020-02-27 ENCOUNTER — OFFICE VISIT (OUTPATIENT)
Dept: ORTHOPEDICS | Facility: CLINIC | Age: 80
End: 2020-02-27
Payer: MEDICARE

## 2020-02-27 VITALS
HEART RATE: 82 BPM | HEIGHT: 67 IN | BODY MASS INDEX: 21.97 KG/M2 | DIASTOLIC BLOOD PRESSURE: 62 MMHG | WEIGHT: 140 LBS | SYSTOLIC BLOOD PRESSURE: 110 MMHG

## 2020-02-27 DIAGNOSIS — S52.202A FRACTURE OF RADIAL SHAFT WITH ULNA, CLOSED, LEFT, INITIAL ENCOUNTER: Primary | ICD-10-CM

## 2020-02-27 DIAGNOSIS — S52.302A FRACTURE OF RADIAL SHAFT WITH ULNA, CLOSED, LEFT, INITIAL ENCOUNTER: Primary | ICD-10-CM

## 2020-02-27 PROCEDURE — 1101F PR PT FALLS ASSESS DOC 0-1 FALLS W/OUT INJ PAST YR: ICD-10-PCS | Mod: CPTII,S$GLB,, | Performed by: ORTHOPAEDIC SURGERY

## 2020-02-27 PROCEDURE — 1125F PR PAIN SEVERITY QUANTIFIED, PAIN PRESENT: ICD-10-PCS | Mod: S$GLB,,, | Performed by: ORTHOPAEDIC SURGERY

## 2020-02-27 PROCEDURE — 1125F AMNT PAIN NOTED PAIN PRSNT: CPT | Mod: S$GLB,,, | Performed by: ORTHOPAEDIC SURGERY

## 2020-02-27 PROCEDURE — 99999 PR PBB SHADOW E&M-EST. PATIENT-LVL III: CPT | Mod: PBBFAC,,, | Performed by: ORTHOPAEDIC SURGERY

## 2020-02-27 PROCEDURE — 1159F MED LIST DOCD IN RCRD: CPT | Mod: S$GLB,,, | Performed by: ORTHOPAEDIC SURGERY

## 2020-02-27 PROCEDURE — 99999 PR PBB SHADOW E&M-EST. PATIENT-LVL III: ICD-10-PCS | Mod: PBBFAC,,, | Performed by: ORTHOPAEDIC SURGERY

## 2020-02-27 PROCEDURE — 1101F PT FALLS ASSESS-DOCD LE1/YR: CPT | Mod: CPTII,S$GLB,, | Performed by: ORTHOPAEDIC SURGERY

## 2020-02-27 PROCEDURE — 99214 OFFICE O/P EST MOD 30 MIN: CPT | Mod: 57,S$GLB,, | Performed by: ORTHOPAEDIC SURGERY

## 2020-02-27 PROCEDURE — 99214 PR OFFICE/OUTPT VISIT, EST, LEVL IV, 30-39 MIN: ICD-10-PCS | Mod: 57,S$GLB,, | Performed by: ORTHOPAEDIC SURGERY

## 2020-02-27 PROCEDURE — 1159F PR MEDICATION LIST DOCUMENTED IN MEDICAL RECORD: ICD-10-PCS | Mod: S$GLB,,, | Performed by: ORTHOPAEDIC SURGERY

## 2020-02-27 NOTE — H&P (VIEW-ONLY)
Past Medical History:   Diagnosis Date    Alcoholism     no drink since 1984    Anxiety     Asthma     Bipolar affect, depressed     Bipolar disorder     COPD (chronic obstructive pulmonary disease)     Degenerative disc disease, lumbar 11/22/2019    Dialysis patient     ESRD (end stage renal disease)     GERD (gastroesophageal reflux disease)     Hypertension     Limb alert care status     NO BP/IV LEFT ARM    Pancreatitis     Stroke     Thyroid disease        Past Surgical History:   Procedure Laterality Date    APPENDECTOMY      CATARACT EXTRACTION Right     DIALYSIS FISTULA CREATION      left upper arm    EPIDURAL STEROID INJECTION INTO LUMBAR SPINE N/A 11/22/2019    Procedure: Injection-steroid-epidural-lumbar;  Surgeon: Rosanna Khan MD;  Location: [facility] OR;  Service: Pain Management;  Laterality: N/A;  L5-S1      EPIDURAL STEROID INJECTION INTO LUMBAR SPINE N/A 2/3/2020    Procedure: Injection-steroid-epidural-lumbar;  Surgeon: Rosanna Khan MD;  Location: [facility] OR;  Service: Pain Management;  Laterality: N/A;  L5-S1    EYE SURGERY      FIXATION KYPHOPLASTY N/A 10/18/2019    Procedure: Kyphoplasty;  Surgeon: Rosanna Khan MD;  Location: [facility] OR;  Service: Pain Management;  Laterality: N/A;  L2    HYSTERECTOMY      THYROIDECTOMY      THYROIDECTOMY         Current Outpatient Medications   Medication Sig    albuterol-ipratropium  mcg (COMBIVENT)  mcg/actuation inhaler Inhale 2 puffs into the lungs every 6 (six) hours as needed for Wheezing.    ARIPiprazole (ABILIFY) 2 MG Tab Take 1 tablet (2 mg total) by mouth every evening.    benztropine (COGENTIN) 1 MG tablet Take 1 tablet (1 mg total) by mouth 2 (two) times daily.    bisacodyl (DULCOLAX) 5 mg EC tablet Take 1 tablet (5 mg total) by mouth daily as needed for Constipation.    busPIRone (BUSPAR) 10 MG tablet Take 1 tablet (10 mg total) by mouth 3 (three) times daily.    calcitRIOL (ROCALTROL) 0.25 MCG Cap 0.25  mcg once daily.     COMBIVENT RESPIMAT  mcg/actuation inhaler INL 1 PUFF PO Q 6 H PRN    DAILY-YANDY tablet TK 1 T PO QD    dicyclomine (BENTYL) 10 MG capsule dicyclomine 10 mg capsule    epoetin jacob-epbx (RETACRIT) 10,000 unit/mL imjection Inject 0.34 mLs (3,400 Units total) into the skin every Tues, Thurs, Sat. With hemodialysis    famotidine (PEPCID) 20 MG tablet Take 20 mg by mouth every evening.     FLUoxetine 20 MG tablet Take 1 tablet (20 mg total) by mouth once daily.    fluticasone-umeclidin-vilanter (TRELEGY ELLIPTA) 100-62.5-25 mcg DsDv Inhale 1 puff into the lungs once daily.    HYDROcodone-acetaminophen (NORCO) 5-325 mg per tablet Take 1 tablet by mouth every 6 (six) hours as needed for Pain.    HYDROcodone-acetaminophen (NORCO) 5-325 mg per tablet Take 1 tablet by mouth every 8 (eight) hours as needed for Pain.    HYDROcodone-acetaminophen (NORCO) 5-325 mg per tablet Take 1 tablet by mouth every 8 (eight) hours as needed for Pain.    levothyroxine (SYNTHROID) 100 MCG tablet Take 1 tab every day by oral route.    meclizine (ANTIVERT) 12.5 mg tablet meclizine 12.5 mg tablet    midodrine (PROAMATINE) 5 MG Tab Take 1 tablet (5 mg total) by mouth 2 (two) times daily with meals.    montelukast (SINGULAIR) 10 mg tablet Take 10 mg by mouth every evening.    ondansetron (ZOFRAN) 8 MG tablet Take 1 tablet (8 mg total) by mouth every 8 (eight) hours as needed for Nausea.    ondansetron (ZOFRAN-ODT) 4 MG TbDL ondansetron 4 mg disintegrating tablet    polyethylene glycol (GLYCOLAX) 17 gram PwPk Take 17 g by mouth daily as needed.    polyvinyl alcohol, artificial tears, (LIQUIFILM TEARS) 1.4 % ophthalmic solution Place 1 drop into the left eye as needed.    PROLIA 60 mg/mL Syrg Inject 1 mL (60 mg total) into the skin every 6 (six) months.    rosuvastatin (CRESTOR) 20 MG tablet Take 1 tablet (20 mg total) by mouth once daily.    traMADol (ULTRAM) 50 mg tablet tramadol 50 mg tablet     traZODone (DESYREL) 100 MG tablet TK 1 T PO QHS     No current facility-administered medications for this visit.      Facility-Administered Medications Ordered in Other Visits   Medication    0.9%  NaCl infusion    lactated ringers infusion    lactated ringers infusion       Review of patient's allergies indicates:   Allergen Reactions    Metoprolol Other (See Comments)     Grand-daughter/care giver reported Pt has over reaction to this drug, Bp bottoms out along with heart rate    Codeine      Other reaction(s): Unknown       Family History   Problem Relation Age of Onset    No Known Problems Mother     Diabetes Father     Heart disease Father         blood clot in lung went to heart    Cancer Sister         breast    Stroke Paternal Aunt     Cancer Sister         breast    Cancer Cousin         colon    Diabetes Cousin     Cancer Cousin         colon    COPD Sister          of COPD       Social History     Socioeconomic History    Marital status:      Spouse name: Not on file    Number of children: Not on file    Years of education: Not on file    Highest education level: Not on file   Occupational History    Not on file   Social Needs    Financial resource strain: Not on file    Food insecurity:     Worry: Not on file     Inability: Not on file    Transportation needs:     Medical: Not on file     Non-medical: Not on file   Tobacco Use    Smoking status: Former Smoker     Packs/day: 1.00     Types: Cigarettes     Last attempt to quit: 10/1/2018     Years since quittin.4    Smokeless tobacco: Never Used   Substance and Sexual Activity    Alcohol use: No    Drug use: Never    Sexual activity: Not Currently   Lifestyle    Physical activity:     Days per week: Not on file     Minutes per session: Not on file    Stress: Not on file   Relationships    Social connections:     Talks on phone: Not on file     Gets together: Not on file     Attends Yazdanism service: Not on  file     Active member of club or organization: Not on file     Attends meetings of clubs or organizations: Not on file     Relationship status: Not on file   Other Topics Concern    Not on file   Social History Narrative    Not on file       Chief Complaint:   Chief Complaint   Patient presents with    Left Wrist - Injury    Left Forearm - Injury       Date of surgery:  June 14th 2019 open reduction internal fixation of left distal radius fracture    History of present illness:  80-year-old female who had another fall on February 23rd.  Landed onto her wrist again. Patient has a periprosthetic fracture at the base of the plate.  Pain is an 8/10.  Had a reduction and splint done at [facility].      Review of Systems:    Musculoskeletal:  See HPI        Physical Examination:    Vital Signs:    Vitals:    02/27/20 0902   BP: 110/62   Pulse: 82       Body mass index is 21.93 kg/m².    This a well-developed, well nourished patient in no acute distress.  They are alert and oriented and cooperative to examination.  Pt.  Comes in in a wheelchair    Examination of upper extremities show sugar-tong splint in place. Able to wiggle her fingers and thumb briefly but very hesitant due to pain.  Brisk capillary refill in all digits.    Heart is regular rate without obvious murmurs   Normal respiratory effort without audible wheezing  Abdomen is soft and nontender     X-rays:  X-rays of the left wrist are reviewed which show a fracture at the base of the previous distal radius plate involving the radial shaft.     Assessment:  Left periprosthetic radial shaft fracture    Plan:  I reviewed the findings with her today.  Patient needs surgical fixation of the left radial shaft.  Plan is for hardware removal of the prior plate with the open reduction internal fixation of the radial shaft fracture itself.  Risks, benefits, and alternatives to the procedure were explained to the patient including but not limited to damage to  nerves, arteries, blood vessels, bones, tendons, ligaments, stiffness, instability, infection, DVT, PE, as well as general anesthetic complications including seizure, stroke, heart attack and even death. The patient understood these risks and wished to proceed and signed the informed consent.       This note was created using M Modal voice recognition software that occasionally misinterpreted phrases or words.

## 2020-02-27 NOTE — PROGRESS NOTES
Past Medical History:   Diagnosis Date    Alcoholism     no drink since 1984    Anxiety     Asthma     Bipolar affect, depressed     Bipolar disorder     COPD (chronic obstructive pulmonary disease)     Degenerative disc disease, lumbar 11/22/2019    Dialysis patient     ESRD (end stage renal disease)     GERD (gastroesophageal reflux disease)     Hypertension     Limb alert care status     NO BP/IV LEFT ARM    Pancreatitis     Stroke     Thyroid disease        Past Surgical History:   Procedure Laterality Date    APPENDECTOMY      CATARACT EXTRACTION Right     DIALYSIS FISTULA CREATION      left upper arm    EPIDURAL STEROID INJECTION INTO LUMBAR SPINE N/A 11/22/2019    Procedure: Injection-steroid-epidural-lumbar;  Surgeon: Rosanna Khan MD;  Location: [facility] OR;  Service: Pain Management;  Laterality: N/A;  L5-S1      EPIDURAL STEROID INJECTION INTO LUMBAR SPINE N/A 2/3/2020    Procedure: Injection-steroid-epidural-lumbar;  Surgeon: Rosanna Khan MD;  Location: [facility] OR;  Service: Pain Management;  Laterality: N/A;  L5-S1    EYE SURGERY      FIXATION KYPHOPLASTY N/A 10/18/2019    Procedure: Kyphoplasty;  Surgeon: Rosanna Khan MD;  Location: [facility] OR;  Service: Pain Management;  Laterality: N/A;  L2    HYSTERECTOMY      THYROIDECTOMY      THYROIDECTOMY         Current Outpatient Medications   Medication Sig    albuterol-ipratropium  mcg (COMBIVENT)  mcg/actuation inhaler Inhale 2 puffs into the lungs every 6 (six) hours as needed for Wheezing.    ARIPiprazole (ABILIFY) 2 MG Tab Take 1 tablet (2 mg total) by mouth every evening.    benztropine (COGENTIN) 1 MG tablet Take 1 tablet (1 mg total) by mouth 2 (two) times daily.    bisacodyl (DULCOLAX) 5 mg EC tablet Take 1 tablet (5 mg total) by mouth daily as needed for Constipation.    busPIRone (BUSPAR) 10 MG tablet Take 1 tablet (10 mg total) by mouth 3 (three) times daily.    calcitRIOL (ROCALTROL) 0.25 MCG Cap 0.25  mcg once daily.     COMBIVENT RESPIMAT  mcg/actuation inhaler INL 1 PUFF PO Q 6 H PRN    DAILY-YANDY tablet TK 1 T PO QD    dicyclomine (BENTYL) 10 MG capsule dicyclomine 10 mg capsule    epoetin jacob-epbx (RETACRIT) 10,000 unit/mL imjection Inject 0.34 mLs (3,400 Units total) into the skin every Tues, Thurs, Sat. With hemodialysis    famotidine (PEPCID) 20 MG tablet Take 20 mg by mouth every evening.     FLUoxetine 20 MG tablet Take 1 tablet (20 mg total) by mouth once daily.    fluticasone-umeclidin-vilanter (TRELEGY ELLIPTA) 100-62.5-25 mcg DsDv Inhale 1 puff into the lungs once daily.    HYDROcodone-acetaminophen (NORCO) 5-325 mg per tablet Take 1 tablet by mouth every 6 (six) hours as needed for Pain.    HYDROcodone-acetaminophen (NORCO) 5-325 mg per tablet Take 1 tablet by mouth every 8 (eight) hours as needed for Pain.    HYDROcodone-acetaminophen (NORCO) 5-325 mg per tablet Take 1 tablet by mouth every 8 (eight) hours as needed for Pain.    levothyroxine (SYNTHROID) 100 MCG tablet Take 1 tab every day by oral route.    meclizine (ANTIVERT) 12.5 mg tablet meclizine 12.5 mg tablet    midodrine (PROAMATINE) 5 MG Tab Take 1 tablet (5 mg total) by mouth 2 (two) times daily with meals.    montelukast (SINGULAIR) 10 mg tablet Take 10 mg by mouth every evening.    ondansetron (ZOFRAN) 8 MG tablet Take 1 tablet (8 mg total) by mouth every 8 (eight) hours as needed for Nausea.    ondansetron (ZOFRAN-ODT) 4 MG TbDL ondansetron 4 mg disintegrating tablet    polyethylene glycol (GLYCOLAX) 17 gram PwPk Take 17 g by mouth daily as needed.    polyvinyl alcohol, artificial tears, (LIQUIFILM TEARS) 1.4 % ophthalmic solution Place 1 drop into the left eye as needed.    PROLIA 60 mg/mL Syrg Inject 1 mL (60 mg total) into the skin every 6 (six) months.    rosuvastatin (CRESTOR) 20 MG tablet Take 1 tablet (20 mg total) by mouth once daily.    traMADol (ULTRAM) 50 mg tablet tramadol 50 mg tablet     traZODone (DESYREL) 100 MG tablet TK 1 T PO QHS     No current facility-administered medications for this visit.      Facility-Administered Medications Ordered in Other Visits   Medication    0.9%  NaCl infusion    lactated ringers infusion    lactated ringers infusion       Review of patient's allergies indicates:   Allergen Reactions    Metoprolol Other (See Comments)     Grand-daughter/care giver reported Pt has over reaction to this drug, Bp bottoms out along with heart rate    Codeine      Other reaction(s): Unknown       Family History   Problem Relation Age of Onset    No Known Problems Mother     Diabetes Father     Heart disease Father         blood clot in lung went to heart    Cancer Sister         breast    Stroke Paternal Aunt     Cancer Sister         breast    Cancer Cousin         colon    Diabetes Cousin     Cancer Cousin         colon    COPD Sister          of COPD       Social History     Socioeconomic History    Marital status:      Spouse name: Not on file    Number of children: Not on file    Years of education: Not on file    Highest education level: Not on file   Occupational History    Not on file   Social Needs    Financial resource strain: Not on file    Food insecurity:     Worry: Not on file     Inability: Not on file    Transportation needs:     Medical: Not on file     Non-medical: Not on file   Tobacco Use    Smoking status: Former Smoker     Packs/day: 1.00     Types: Cigarettes     Last attempt to quit: 10/1/2018     Years since quittin.4    Smokeless tobacco: Never Used   Substance and Sexual Activity    Alcohol use: No    Drug use: Never    Sexual activity: Not Currently   Lifestyle    Physical activity:     Days per week: Not on file     Minutes per session: Not on file    Stress: Not on file   Relationships    Social connections:     Talks on phone: Not on file     Gets together: Not on file     Attends [employer] service: Not on  file     Active member of club or organization: Not on file     Attends meetings of clubs or organizations: Not on file     Relationship status: Not on file   Other Topics Concern    Not on file   Social History Narrative    Not on file       Chief Complaint:   Chief Complaint   Patient presents with    Left Wrist - Injury    Left Forearm - Injury       Date of surgery:  June 14th 2019 open reduction internal fixation of left distal radius fracture    History of present illness:  80-year-old female who had another fall on February 23rd.  Landed onto her wrist again. Patient has a periprosthetic fracture at the base of the plate.  Pain is an 8/10.  Had a reduction and splint done at [facility].      Review of Systems:    Musculoskeletal:  See HPI        Physical Examination:    Vital Signs:    Vitals:    02/27/20 0902   BP: 110/62   Pulse: 82       Body mass index is 21.93 kg/m².    This a well-developed, well nourished patient in no acute distress.  They are alert and oriented and cooperative to examination.  Pt.  Comes in in a wheelchair    Examination of upper extremities show sugar-tong splint in place. Able to wiggle her fingers and thumb briefly but very hesitant due to pain.  Brisk capillary refill in all digits.    Heart is regular rate without obvious murmurs   Normal respiratory effort without audible wheezing  Abdomen is soft and nontender     X-rays:  X-rays of the left wrist are reviewed which show a fracture at the base of the previous distal radius plate involving the radial shaft.     Assessment:  Left periprosthetic radial shaft fracture    Plan:  I reviewed the findings with her today.  Patient needs surgical fixation of the left radial shaft.  Plan is for hardware removal of the prior plate with the open reduction internal fixation of the radial shaft fracture itself.  Risks, benefits, and alternatives to the procedure were explained to the patient including but not limited to damage to  nerves, arteries, blood vessels, bones, tendons, ligaments, stiffness, instability, infection, DVT, PE, as well as general anesthetic complications including seizure, stroke, heart attack and even death. The patient understood these risks and wished to proceed and signed the informed consent.       This note was created using M Modal voice recognition software that occasionally misinterpreted phrases or words.

## 2020-02-28 RX ORDER — CEFAZOLIN SODIUM 2 G/50ML
2 SOLUTION INTRAVENOUS
Status: CANCELLED | OUTPATIENT
Start: 2020-02-28

## 2020-03-04 ENCOUNTER — HOSPITAL ENCOUNTER (OUTPATIENT)
Dept: RADIOLOGY | Facility: HOSPITAL | Age: 80
Discharge: HOME | End: 2020-03-04
Attending: ORTHOPAEDIC SURGERY
Payer: MEDICARE

## 2020-03-04 ENCOUNTER — HOSPITAL ENCOUNTER (OUTPATIENT)
Dept: PREADMISSION TESTING | Facility: HOSPITAL | Age: 80
Discharge: HOME | End: 2020-03-04
Attending: ORTHOPAEDIC SURGERY
Payer: MEDICARE

## 2020-03-04 DIAGNOSIS — S52.302A FRACTURE OF RADIAL SHAFT WITH ULNA, CLOSED, LEFT, INITIAL ENCOUNTER: ICD-10-CM

## 2020-03-04 DIAGNOSIS — S52.202A FRACTURE OF RADIAL SHAFT WITH ULNA, CLOSED, LEFT, INITIAL ENCOUNTER: ICD-10-CM

## 2020-03-04 PROCEDURE — 99900103 DSU ONLY-NO CHARGE-INITIAL HR (STAT)

## 2020-03-04 PROCEDURE — 71046 X-RAY EXAM CHEST 2 VIEWS: CPT | Mod: 26,,, | Performed by: RADIOLOGY

## 2020-03-04 PROCEDURE — 99900104 DSU ONLY-NO CHARGE-EA ADD'L HR (STAT)

## 2020-03-04 PROCEDURE — 71046 XR CHEST PA AND LATERAL: ICD-10-PCS | Mod: 26,,, | Performed by: RADIOLOGY

## 2020-03-04 PROCEDURE — 71046 X-RAY EXAM CHEST 2 VIEWS: CPT | Mod: TC,FY

## 2020-03-04 NOTE — DISCHARGE INSTRUCTIONS
To confirm, Your doctor has instructed you that surgery is scheduled for: 3/6/20  Lolita  [phone]    Please report to [facility] the morning of surgery. You must check-in and receive a wristband before going to your procedure.    Pre-Op will call the afternoon prior to surgery between 1:00 and 6:00 PM with the final arrival time.  Phone number: [phone]    PLEASE NOTE:  The surgery schedule has many variables which may affect the time of your surgery case.  Family members should be available if your surgery time changes.  Plan to be here the day of your procedure between 4-6 hours.    MEDICATIONS:  TAKE ONLY THESE MEDICATIONS WITH A SMALL SIP OF WATER THE MORNING OF YOUR PROCEDURE:    Inhalers, Cogentin, levothyroxine, Buspar, prozac, pain Pill-if needed, Midodrine-if needed    DO NOT TAKE THESE MEDICATIONS 5-7 DAYS PRIOR to your procedure or per your surgeon's request: ASPIRIN, ALEVE, ADVIL, IBUPROFEN, FISH OIL VITAMIN E(Multivitamin), HERBALS  (May take Tylenol)                               ONLY if you are prescribed any types of blood thinners such as:  Aspirin, Coumadin, Plavix, Pradaxa, Xarelto, Aggrenox, Effient, Eliquis, Savasya, Brilinta, or any other, ask your surgeon whether you should stop taking them and how long before surgery you should stop.  You may also need to verify with the prescribing physician if it is ok to stop your medication.      INSTRUCTIONS IMPORTANT!!  · Do not eat or drink anything between midnight and the time of your procedure- this includes gum, mints, and candy.  · Do not smoke or drink alcoholic beverages 24 hours prior to your procedure.  · Shower the night before AND the morning of your procedure with a Chlorhexidine wash such as Hibiclens or Dial antibacterial soap from the neck down.  Do not get it on your face or in your eyes.  You may use your own shampoo and face wash. This helps your skin to be as bacteria free as possible.     · If you wear contact lenses, dentures, hearing aids or glasses, bring a container to put them in during surgery and give to a family member for safe keeping.  Please leave all jewelry, piercing's and valuables at home.   · DO NOT remove hair from the surgery site.  Do not shave the incision site unless you are given specific instructions to do so.    · ONLY if you have been diagnosed with sleep apnea please bring your C-PAP machine.  · ONLY if you wear home oxygen please bring your portable oxygen tank the day of your procedure.  · ONLY if you have a history of OPEN HEART SURGERY you will need a clearance from your Cardiologist per Anesthesia.      · ONLY for patients requiring bowel prep, written instructions will be given by your doctor's office.  · ONLY if you have a neuro stimulator, please bring the controller with you the morning of surgery  · ONLY if a type and screen test is needed before surgery, please return:  · If your doctor has scheduled you for an overnight stay, bring a small overnight bag with any personal items you need.  · Make arrangements in advance for transportation home by a responsible adult.  It is not safe to drive a vehicle during the 24 hours after anesthesia.      · Visiting hours are 10:00AM to 8:30PM.  For the safety of all patients, visitors under the age of 12 are not allowed above the first floor of the hospital.    · All [facility] facilities and properties are tobacco free.  Smoking is NOT allowed.       If you have any questions about these instructions, call Pre-Op Admit  Nursing at [phone] or the Pre-Op Day Surgery Unit at [phone].

## 2020-03-05 ENCOUNTER — ANESTHESIA EVENT (OUTPATIENT)
Dept: SURGERY | Facility: HOSPITAL | Age: 80
End: 2020-03-05
Payer: MEDICARE

## 2020-03-06 ENCOUNTER — HOSPITAL ENCOUNTER (OUTPATIENT)
Facility: HOSPITAL | Age: 80
Discharge: HOME | End: 2020-03-06
Attending: ORTHOPAEDIC SURGERY | Admitting: ORTHOPAEDIC SURGERY
Payer: MEDICARE

## 2020-03-06 ENCOUNTER — ANESTHESIA (OUTPATIENT)
Dept: SURGERY | Facility: HOSPITAL | Age: 80
End: 2020-03-06
Payer: MEDICARE

## 2020-03-06 VITALS
OXYGEN SATURATION: 95 % | HEIGHT: 67 IN | TEMPERATURE: 98 F | BODY MASS INDEX: 21.97 KG/M2 | RESPIRATION RATE: 18 BRPM | SYSTOLIC BLOOD PRESSURE: 98 MMHG | WEIGHT: 140 LBS | DIASTOLIC BLOOD PRESSURE: 54 MMHG | HEART RATE: 89 BPM

## 2020-03-06 DIAGNOSIS — S52.202A FRACTURE OF RADIAL SHAFT WITH ULNA, CLOSED, LEFT, INITIAL ENCOUNTER: ICD-10-CM

## 2020-03-06 DIAGNOSIS — S32.020D COMPRESSION FRACTURE OF L2 VERTEBRA WITH ROUTINE HEALING, SUBSEQUENT ENCOUNTER: ICD-10-CM

## 2020-03-06 DIAGNOSIS — S52.302A FRACTURE OF RADIAL SHAFT WITH ULNA, CLOSED, LEFT, INITIAL ENCOUNTER: ICD-10-CM

## 2020-03-06 PROCEDURE — 27200750 HC INSULATED NEEDLE/ STIMUPLEX: Performed by: ANESTHESIOLOGY

## 2020-03-06 PROCEDURE — C1713 ANCHOR/SCREW BN/BN,TIS/BN: HCPCS | Performed by: ORTHOPAEDIC SURGERY

## 2020-03-06 PROCEDURE — 27200651 HC AIRWAY, LMA: Performed by: ANESTHESIOLOGY

## 2020-03-06 PROCEDURE — 20680 REMOVAL OF IMPLANT DEEP: CPT | Mod: 51,,, | Performed by: ORTHOPAEDIC SURGERY

## 2020-03-06 PROCEDURE — 76942 ECHO GUIDE FOR BIOPSY: CPT | Mod: 26,,, | Performed by: ANESTHESIOLOGY

## 2020-03-06 PROCEDURE — 71000039 HC RECOVERY, EACH ADD'L HOUR: Performed by: ORTHOPAEDIC SURGERY

## 2020-03-06 PROCEDURE — D9220A PRA ANESTHESIA: Mod: ANES,,, | Performed by: ANESTHESIOLOGY

## 2020-03-06 PROCEDURE — 25000003 PHARM REV CODE 250: Performed by: ANESTHESIOLOGY

## 2020-03-06 PROCEDURE — 76942 PR U/S GUIDANCE FOR NEEDLE GUIDANCE: ICD-10-PCS | Mod: 26,,, | Performed by: ANESTHESIOLOGY

## 2020-03-06 PROCEDURE — D9220A PRA ANESTHESIA: ICD-10-PCS | Mod: CRNA,,, | Performed by: NURSE ANESTHETIST, CERTIFIED REGISTERED

## 2020-03-06 PROCEDURE — 71000015 HC POSTOP RECOV 1ST HR: Performed by: ORTHOPAEDIC SURGERY

## 2020-03-06 PROCEDURE — 76942 ECHO GUIDE FOR BIOPSY: CPT | Mod: 59 | Performed by: ANESTHESIOLOGY

## 2020-03-06 PROCEDURE — 37000008 HC ANESTHESIA 1ST 15 MINUTES: Performed by: ORTHOPAEDIC SURGERY

## 2020-03-06 PROCEDURE — 99900103 DSU ONLY-NO CHARGE-INITIAL HR (STAT): Performed by: ORTHOPAEDIC SURGERY

## 2020-03-06 PROCEDURE — 36000710: Performed by: ORTHOPAEDIC SURGERY

## 2020-03-06 PROCEDURE — 64415 NJX AA&/STRD BRCH PLXS IMG: CPT | Mod: 59,LT,, | Performed by: ANESTHESIOLOGY

## 2020-03-06 PROCEDURE — 25515 OPTX RADIAL SHAFT FRACTURE: CPT | Mod: 22,LT,, | Performed by: ORTHOPAEDIC SURGERY

## 2020-03-06 PROCEDURE — S0020 INJECTION, BUPIVICAINE HYDRO: HCPCS | Performed by: ANESTHESIOLOGY

## 2020-03-06 PROCEDURE — 27201423 OPTIME MED/SURG SUP & DEVICES STERILE SUPPLY: Performed by: ORTHOPAEDIC SURGERY

## 2020-03-06 PROCEDURE — 99900104 DSU ONLY-NO CHARGE-EA ADD'L HR (STAT): Performed by: ORTHOPAEDIC SURGERY

## 2020-03-06 PROCEDURE — D9220A PRA ANESTHESIA: Mod: CRNA,,, | Performed by: NURSE ANESTHETIST, CERTIFIED REGISTERED

## 2020-03-06 PROCEDURE — 36000711: Performed by: ORTHOPAEDIC SURGERY

## 2020-03-06 PROCEDURE — 25515 PR OPEN TREATMENT RADIAL SHAFT FRACTURE: ICD-10-PCS | Mod: 22,LT,, | Performed by: ORTHOPAEDIC SURGERY

## 2020-03-06 PROCEDURE — 20680 PR REMOVAL DEEP IMPLANT: ICD-10-PCS | Mod: 51,,, | Performed by: ORTHOPAEDIC SURGERY

## 2020-03-06 PROCEDURE — 71000033 HC RECOVERY, INTIAL HOUR: Performed by: ORTHOPAEDIC SURGERY

## 2020-03-06 PROCEDURE — 63600175 PHARM REV CODE 636 W HCPCS: Performed by: NURSE ANESTHETIST, CERTIFIED REGISTERED

## 2020-03-06 PROCEDURE — D9220A PRA ANESTHESIA: ICD-10-PCS | Mod: ANES,,, | Performed by: ANESTHESIOLOGY

## 2020-03-06 PROCEDURE — 37000009 HC ANESTHESIA EA ADD 15 MINS: Performed by: ORTHOPAEDIC SURGERY

## 2020-03-06 PROCEDURE — 64415 PR NERVE BLOCK INJ, ANES/STEROID, BRACHIAL PLEXUS, INCL IMAG GUIDANCE: ICD-10-PCS | Mod: 59,LT,, | Performed by: ANESTHESIOLOGY

## 2020-03-06 PROCEDURE — 64415 NJX AA&/STRD BRCH PLXS IMG: CPT | Performed by: ANESTHESIOLOGY

## 2020-03-06 PROCEDURE — C9290 INJ, BUPIVACAINE LIPOSOME: HCPCS | Performed by: ANESTHESIOLOGY

## 2020-03-06 PROCEDURE — 63600175 PHARM REV CODE 636 W HCPCS: Performed by: ANESTHESIOLOGY

## 2020-03-06 PROCEDURE — 63600175 PHARM REV CODE 636 W HCPCS: Performed by: ORTHOPAEDIC SURGERY

## 2020-03-06 PROCEDURE — 25000003 PHARM REV CODE 250: Performed by: NURSE ANESTHETIST, CERTIFIED REGISTERED

## 2020-03-06 PROCEDURE — 63600175 PHARM REV CODE 636 W HCPCS

## 2020-03-06 DEVICE — PLATE TACK TEMP 3 IN: Type: IMPLANTABLE DEVICE | Site: ULNA | Status: FUNCTIONAL

## 2020-03-06 DEVICE — SCREW BNE LOK HEXLB 3.5X12: Type: IMPLANTABLE DEVICE | Site: ULNA | Status: FUNCTIONAL

## 2020-03-06 RX ORDER — HYDROCODONE BITARTRATE AND ACETAMINOPHEN 5; 325 MG/1; MG/1
1 TABLET ORAL EVERY 4 HOURS PRN
Status: DISCONTINUED | OUTPATIENT
Start: 2020-03-06 | End: 2020-03-06 | Stop reason: HOSPADM

## 2020-03-06 RX ORDER — DEXAMETHASONE SODIUM PHOSPHATE 4 MG/ML
INJECTION, SOLUTION INTRA-ARTICULAR; INTRALESIONAL; INTRAMUSCULAR; INTRAVENOUS; SOFT TISSUE
Status: DISCONTINUED | OUTPATIENT
Start: 2020-03-06 | End: 2020-03-06

## 2020-03-06 RX ORDER — SODIUM CHLORIDE 0.9 % (FLUSH) 0.9 %
10 SYRINGE (ML) INJECTION
Status: DISCONTINUED | OUTPATIENT
Start: 2020-03-06 | End: 2020-03-06 | Stop reason: HOSPADM

## 2020-03-06 RX ORDER — FENTANYL CITRATE 50 UG/ML
INJECTION, SOLUTION INTRAMUSCULAR; INTRAVENOUS
Status: DISCONTINUED | OUTPATIENT
Start: 2020-03-06 | End: 2020-03-06

## 2020-03-06 RX ORDER — SODIUM CHLORIDE, SODIUM LACTATE, POTASSIUM CHLORIDE, CALCIUM CHLORIDE 600; 310; 30; 20 MG/100ML; MG/100ML; MG/100ML; MG/100ML
INJECTION, SOLUTION INTRAVENOUS CONTINUOUS
Status: ACTIVE | OUTPATIENT
Start: 2020-03-06

## 2020-03-06 RX ORDER — LIDOCAINE HCL/PF 100 MG/5ML
SYRINGE (ML) INTRAVENOUS
Status: DISCONTINUED | OUTPATIENT
Start: 2020-03-06 | End: 2020-03-06

## 2020-03-06 RX ORDER — HYDROCODONE BITARTRATE AND ACETAMINOPHEN 5; 325 MG/1; MG/1
1 TABLET ORAL EVERY 4 HOURS PRN
Qty: 30 TABLET | Refills: 0 | Status: SHIPPED | OUTPATIENT
Start: 2020-03-06 | End: 2020-06-06 | Stop reason: CLARIF

## 2020-03-06 RX ORDER — OXYCODONE HYDROCHLORIDE 5 MG/1
5 TABLET ORAL ONCE AS NEEDED
Status: DISCONTINUED | OUTPATIENT
Start: 2020-03-06 | End: 2020-03-06 | Stop reason: HOSPADM

## 2020-03-06 RX ORDER — EPHEDRINE SULFATE 50 MG/ML
INJECTION, SOLUTION INTRAVENOUS
Status: DISCONTINUED | OUTPATIENT
Start: 2020-03-06 | End: 2020-03-06

## 2020-03-06 RX ORDER — FENTANYL CITRATE 50 UG/ML
25 INJECTION, SOLUTION INTRAMUSCULAR; INTRAVENOUS EVERY 5 MIN PRN
Status: DISCONTINUED | OUTPATIENT
Start: 2020-03-06 | End: 2020-03-06 | Stop reason: HOSPADM

## 2020-03-06 RX ORDER — ACETAMINOPHEN 10 MG/ML
INJECTION, SOLUTION INTRAVENOUS
Status: DISCONTINUED | OUTPATIENT
Start: 2020-03-06 | End: 2020-03-06

## 2020-03-06 RX ORDER — CEFAZOLIN SODIUM 2 G/50ML
2 SOLUTION INTRAVENOUS
Status: COMPLETED | OUTPATIENT
Start: 2020-03-06 | End: 2020-03-06

## 2020-03-06 RX ORDER — LIDOCAINE HYDROCHLORIDE 10 MG/ML
1 INJECTION, SOLUTION EPIDURAL; INFILTRATION; INTRACAUDAL; PERINEURAL ONCE
Status: ACTIVE | OUTPATIENT
Start: 2020-03-06

## 2020-03-06 RX ORDER — MIDAZOLAM HYDROCHLORIDE 1 MG/ML
INJECTION, SOLUTION INTRAMUSCULAR; INTRAVENOUS
Status: DISCONTINUED | OUTPATIENT
Start: 2020-03-06 | End: 2020-03-06

## 2020-03-06 RX ORDER — PROPOFOL 10 MG/ML
VIAL (ML) INTRAVENOUS
Status: DISCONTINUED | OUTPATIENT
Start: 2020-03-06 | End: 2020-03-06

## 2020-03-06 RX ORDER — PHENYLEPHRINE HYDROCHLORIDE 10 MG/ML
INJECTION INTRAVENOUS
Status: DISCONTINUED | OUTPATIENT
Start: 2020-03-06 | End: 2020-03-06

## 2020-03-06 RX ORDER — ONDANSETRON HYDROCHLORIDE 2 MG/ML
INJECTION, SOLUTION INTRAMUSCULAR; INTRAVENOUS
Status: DISCONTINUED | OUTPATIENT
Start: 2020-03-06 | End: 2020-03-06

## 2020-03-06 RX ORDER — ONDANSETRON 2 MG/ML
4 INJECTION INTRAMUSCULAR; INTRAVENOUS ONCE AS NEEDED
Status: DISCONTINUED | OUTPATIENT
Start: 2020-03-06 | End: 2020-03-06 | Stop reason: HOSPADM

## 2020-03-06 RX ORDER — BUPIVACAINE HYDROCHLORIDE 5 MG/ML
INJECTION, SOLUTION EPIDURAL; INTRACAUDAL
Status: DISCONTINUED | OUTPATIENT
Start: 2020-03-06 | End: 2020-03-06

## 2020-03-06 RX ADMIN — FENTANYL CITRATE 50 MCG: 50 INJECTION, SOLUTION INTRAMUSCULAR; INTRAVENOUS at 10:03

## 2020-03-06 RX ADMIN — PHENYLEPHRINE HYDROCHLORIDE 100 MCG: 10 INJECTION INTRAVENOUS at 11:03

## 2020-03-06 RX ADMIN — FENTANYL CITRATE 50 MCG: 50 INJECTION, SOLUTION INTRAMUSCULAR; INTRAVENOUS at 09:03

## 2020-03-06 RX ADMIN — FENTANYL CITRATE 50 MCG: 50 INJECTION, SOLUTION INTRAMUSCULAR; INTRAVENOUS at 11:03

## 2020-03-06 RX ADMIN — BUPIVACAINE HYDROCHLORIDE 10 ML: 5 INJECTION, SOLUTION EPIDURAL; INTRACAUDAL; PERINEURAL at 06:03

## 2020-03-06 RX ADMIN — PHENYLEPHRINE HYDROCHLORIDE 100 MCG: 10 INJECTION INTRAVENOUS at 10:03

## 2020-03-06 RX ADMIN — PROPOFOL 80 MG: 10 INJECTION, EMULSION INTRAVENOUS at 10:03

## 2020-03-06 RX ADMIN — CEFAZOLIN SODIUM 2 G: 2 SOLUTION INTRAVENOUS at 10:03

## 2020-03-06 RX ADMIN — ACETAMINOPHEN 1000 MG: 10 INJECTION, SOLUTION INTRAVENOUS at 10:03

## 2020-03-06 RX ADMIN — SODIUM CHLORIDE: 9 INJECTION, SOLUTION INTRAVENOUS at 10:03

## 2020-03-06 RX ADMIN — EPHEDRINE SULFATE 25 MG: 50 INJECTION, SOLUTION INTRAMUSCULAR; INTRAVENOUS; SUBCUTANEOUS at 12:03

## 2020-03-06 RX ADMIN — PHENYLEPHRINE HYDROCHLORIDE 50 MCG: 10 INJECTION INTRAVENOUS at 11:03

## 2020-03-06 RX ADMIN — LIDOCAINE HYDROCHLORIDE 50 MG: 20 INJECTION, SOLUTION INTRAVENOUS at 10:03

## 2020-03-06 RX ADMIN — EPHEDRINE SULFATE 25 MG: 50 INJECTION, SOLUTION INTRAMUSCULAR; INTRAVENOUS; SUBCUTANEOUS at 11:03

## 2020-03-06 RX ADMIN — SODIUM CHLORIDE, SODIUM GLUCONATE, SODIUM ACETATE, POTASSIUM CHLORIDE, MAGNESIUM CHLORIDE, SODIUM PHOSPHATE, DIBASIC, AND POTASSIUM PHOSPHATE: .53; .5; .37; .037; .03; .012; .00082 INJECTION, SOLUTION INTRAVENOUS at 09:03

## 2020-03-06 RX ADMIN — MIDAZOLAM 1 MG: 1 INJECTION INTRAMUSCULAR; INTRAVENOUS at 09:03

## 2020-03-06 RX ADMIN — DEXAMETHASONE SODIUM PHOSPHATE 4 MG: 4 INJECTION, SOLUTION INTRAMUSCULAR; INTRAVENOUS at 10:03

## 2020-03-06 RX ADMIN — ONDANSETRON 4 MG: 2 INJECTION, SOLUTION INTRAMUSCULAR; INTRAVENOUS at 10:03

## 2020-03-06 RX ADMIN — BUPIVACAINE 10 ML: 13.3 INJECTION, SUSPENSION, LIPOSOMAL INFILTRATION at 09:03

## 2020-03-06 NOTE — OR NURSING
"Splint removed in OR.  Dialysis Shunt noted to left forearm.  MD used 4" esmark to exsanguinate left forearm prior to incision.  GRUPO ALANIS  "

## 2020-03-06 NOTE — OP NOTE
[facility]  Orthopedic  Surgery  Operative Note    SUMMARY     Date of Procedure: 3/6/2020     Procedure: Procedure(s) (LRB):  ORIF, FOREARM (Left) including radial shaft fracture  REMOVAL,ORTHOPEDIC HARDWARE,UPPER EXTREMITY (Left)  deep     Surgeon(s) and Role:     * Tab Mendez MD - Primary    Assisting Surgeon: Umair Guerin    Pre-Operative Diagnosis:  Periprosthetic Fracture of radial shaft left, initial encounter [S52.202A, S52.302A]    Post-Operative Diagnosis: Post-Op Diagnosis Codes:     * periprosthetic Fracture of radial shaft  closed, left, initial encounter [S52.202A, S52.302A]    Anesthesia: General    Description of the Findings of the Procedure:  Patient had a periprosthetic fracture of the left radial shaft just at the proximal screw of her prior distal radius plate.  Distal radius fracture was fully healed.    Complications: No    Estimated Blood Loss (EBL): 100 mL    Tourniquet time:  None used due to Shunt    Implants: * No implants in log *    Specimens:   Specimen (12h ago, onward)    None                  Condition: Good    Disposition: PACU - hemodynamically stable.    Attestation: I was present and scrubbed for the entire procedure.    INDICATIONS FOR THE PROCEDURE: A 80-year-old female who last week sustained a same level fall. The patient had a radius fracture   and    alignment was less than adequate and discussion with the patient was had about   treatment with a possible closed reduction and pinning versus open reduction.   They understood this and signed informed consent.    PROCEDURE IN DETAIL: The patient's left wrist was marked prior to coming to   the Operating Room. Once a formal timeout was done in which correct patient,   procedure and op site were all correctly identified and confirmed by the entire   operating team, a 2 g Ancef were given prior to surgical incision. MAC   anesthesia was induced. The patient's left upper extremity was prepped and    draped in normal sterile fashion.Standard volar approach to the wrist that was previous used was extended proximally.  Skin and soft tissue was taken down through skin and soft tissue. FCR tendon was   identified and fascia was released both volarly and dorsally.  Particular attention was used to release it distally into some scar tissue.  The interval between the FCR and the brachioradialis was then easily identified at the fracture site.  The fracture was   then  exposed with just blunt dissection using a Key elevator, fracture was seen, and had some   comminution right at the fracture site.  Fracture was hard to keep reduced due to the prior healed fracture and the scar tissue around the plate.  We exposed the plate in all at screws initially and then remove them using the screwdriver.  We then turned our attention to the fracture itself.  We were able to reduce it well with a   combination of a length as well as ulnar deviation. While holding the fracture   reduce, we then placed a six hole Acumed LC DCP plate on and got it into the appropriate   position and then confirmed anatomic reduction.  While holding the fracture reduced and holding the plate in place, we drilled and placed our 1st 4 0 bicortical screw fracture distal.  We then placed 1 proximally to get fixation on both sides of the fracture site.  We then filled both oblong holes that were left with 4 screws as well.  We then placed the locking screws in place for 6 cortices of fixation both proximally and distally to the fracture site.       final x-rays were taken,  AP, lateral and oblique planes showing anatomic reduction of the fracture site   with appropriate screw length and plate placement. We then proceeded with   closing.  Wound was copiously irrigated with normal   saline. Subcutaneous tissue was closed using 2-0 Vicryl. Skin was closed using  running 3-0 Monocryl, Mastisol and Steri-Strips. Sterile dressing was applied.  She was placed in  a volar and dorsal slab splint, extubated, awakened, transferred from   the Operating Room to the Recovery Room in stable condition.    Twenty-two modifier is being applied due to the difficulty of this case.  Tourniquet was not able to be used due to the patient's vascular shunt in the proximal forearm.  This was also periprosthetic fracture in which an abundant amount of scar and callus had to be debrided for proper safe exposure and protection of neurovascular structures.

## 2020-03-06 NOTE — INTERVAL H&P NOTE
The patient has been examined and the H&P has been reviewed:    I concur with the findings and no changes have occurred since H&P was written.    Anesthesia/Surgery risks, benefits and alternative options discussed and understood by patient/family.          Active Hospital Problems    Diagnosis  POA    Fracture of radial shaft with ulna, closed, left, initial encounter [S52.202A, S52.302A]  Yes      Resolved Hospital Problems   No resolved problems to display.

## 2020-03-06 NOTE — DISCHARGE INSTRUCTIONS
"Discharge Instructions: After Your Surgery/Procedure  Youve just had surgery. During surgery you were given medicine called anesthesia to keep you relaxed and free of pain. After surgery you may have some pain or nausea. This is common. Here are some tips for feeling better and getting well after surgery.     Stay on schedule with your medication.   Going home  Your doctor or nurse will show you how to take care of yourself when you go home. He or she will also answer your questions. Have an adult family member or friend drive you home.      For your safety we recommend these precaution for the first 24 hours after your procedure:  · Do not drive or use heavy equipment.  · Do not make important decisions or sign legal papers.  · Do not drink alcohol.  · Have someone stay with you, if needed. He or she can watch for problems and help keep you safe.  · Your concentration, balance, coordination, and judgement may be impaired for many hours after anesthesia.  Use caution when ambulating or standing up.     · You may feel weak and "washed out" after anesthesia and surgery.      Subtle residual effects of general anesthesia or sedation with regional / local anesthesia can last more than 24 hours.  Rest for the remainder of the day or longer if your Doctor/Surgeon has advised you to do so.  Although you may feel normal within the first 24 hours, your reflexes and mental ability may be impaired without you realizing it.  You may feel dizzy, lightheaded or sleepy for 24 hours or longer.      Be sure to go to all follow-up visits with your doctor. And rest after your surgery for as long as your doctor tells you to.  Coping with pain  If you have pain after surgery, pain medicine will help you feel better. Take it as told, before pain becomes severe. Also, ask your doctor or pharmacist about other ways to control pain. This might be with heat, ice, or relaxation. And follow any other instructions your surgeon or nurse gives " you.  Tips for taking pain medicine  To get the best relief possible, remember these points:  · Pain medicines can upset your stomach. Taking them with a little food may help.  · Most pain relievers taken by mouth need at least 20 to 30 minutes to start to work.  · Taking medicine on a schedule can help you remember to take it. Try to time your medicine so that you can take it before starting an activity. This might be before you get dressed, go for a walk, or sit down for dinner.  · Constipation is a common side effect of pain medicines. Call your doctor before taking any medicines such as laxatives or stool softeners to help ease constipation. Also ask if you should skip any foods. Drinking lots of fluids and eating foods such as fruits and vegetables that are high in fiber can also help. Remember, do not take laxatives unless your surgeon has prescribed them.  · Drinking alcohol and taking pain medicine can cause dizziness and slow your breathing. It can even be deadly. Do not drink alcohol while taking pain medicine.  · Pain medicine can make you react more slowly to things. Do not drive or run machinery while taking pain medicine.  Your health care provider may tell you to take acetaminophen to help ease your pain. Ask him or her how much you are supposed to take each day. Acetaminophen or other pain relievers may interact with your prescription medicines or other over-the-counter (OTC) drugs. Some prescription medicines have acetaminophen and other ingredients. Using both prescription and OTC acetaminophen for pain can cause you to overdose. Read the labels on your OTC medicines with care. This will help you to clearly know the list of ingredients, how much to take, and any warnings. It may also help you not take too much acetaminophen. If you have questions or do not understand the information, ask your pharmacist or health care provider to explain it to you before you take the OTC medicine.  Managing  nausea  Some people have an upset stomach after surgery. This is often because of anesthesia, pain, or pain medicine, or the stress of surgery. These tips will help you handle nausea and eat healthy foods as you get better. If you were on a special food plan before surgery, ask your doctor if you should follow it while you get better. These tips may help:  · Do not push yourself to eat. Your body will tell you when to eat and how much.  · Start off with clear liquids and soup. They are easier to digest.  · Next try semi-solid foods, such as mashed potatoes, applesauce, and gelatin, as you feel ready.  · Slowly move to solid foods. Dont eat fatty, rich, or spicy foods at first.  · Do not force yourself to have 3 large meals a day. Instead eat smaller amounts more often.  · Take pain medicines with a small amount of solid food, such as crackers or toast, to avoid nausea.     Call your surgeon if  · You still have pain an hour after taking medicine. The medicine may not be strong enough.  · You feel too sleepy, dizzy, or groggy. The medicine may be too strong.  · You have side effects like nausea, vomiting, or skin changes, such as rash, itching, or hives.       If you have obstructive sleep apnea  You were given anesthesia medicine during surgery to keep you comfortable and free of pain. After surgery, you may have more apnea spells because of this medicine and other medicines you were given. The spells may last longer than usual.   At home:  · Keep using the continuous positive airway pressure (CPAP) device when you sleep. Unless your health care provider tells you not to, use it when you sleep, day or night. CPAP is a common device used to treat obstructive sleep apnea.  · Talk with your provider before taking any pain medicine, muscle relaxants, or sedatives. Your provider will tell you about the possible dangers of taking these medicines.  © 6491-9770 [employer]. [address] [place] [address]  [place] [address]. All rights reserved. This information is not intended as a substitute for professional medical care. Always follow your healthcare professional's instructions.      General Information:    1.  Do not drink alcoholic beverages including beer for 24 hours or as long as you are on pain medication..  2.  Do not drive a motor vehicle, operate machinery or power tools, or signs legal papers for 24 hours or as long as you are on pain medication.   3.  You may experience light-headedness, dizziness, and sleepiness following surgery. Please do not stay alone. A responsible adult should be with you for this 24 hour period.  4.  Go home and rest.    5. Progress slowly to a normal diet unless instructed.  Otherwise, begin with liquids such as soft drinks, then soup and crackers working up to solid foods. Drink plenty of nonalcoholic fluids.  6.  Certain anesthetics and pain medications produce nausea and vomiting in certain       individuals. If nausea becomes a problem at home, call you doctor.    7. A nurse will be calling you sometime after surgery. Do not be alarmed. This is our way of finding out how you are doing.    8. Several times every hour while you are awake, take 2-3 deep breaths and cough. If you had stomach surgery hold a pillow or rolled towel firmly against your stomach before you cough. This will help with any pain the cough might cause.  9. Several times every hour while you are awake, pump and flex your feet 5-6 times and do foot circles. This will help prevent blood clots.    10.Call your doctor for severe pain, bleeding, fever, or signs or symptoms of infection (pain, swelling, redness, foul odor, drainage).    Post op instructions for prevention of DVT  What is deep vein thrombosis?  Deep vein thrombosis (DVT) is the medical term for blood clots in the deep veins of the leg.  These blood clots can be dangerous.  A DVT can block a blood vessel and keep blood from getting where it needs to go.   Another problem is that the clot can travel to other parts of the body such as the lungs.  A clot that travels to the lungs is called a pulmonary embolus (PE) and can cause serious problems with breathing which can lead to death.  Am I at risk for DVT/PE?  If you are not very active, you are at risk of DVT.  Anyone confined to bed, sitting for long periods of time, recovering from surgery, etc. increases the risk of DVT.  Other risk factors are cancer diagnosis, certain medications, estrogen replacement in any form,older age, obesity, pregnancy, smoking, history of clotting disorders, and dehydration.  How will I know if I have a DVT?   Swelling in the lower leg   Pain   Warmth, redness, hardness or bulging of the vein  If you have any of these symptoms, call your doctors office right away.  Some people will not have any symptoms until the clot moves to the lungs.  What are the symptoms of a PE?   Panting, shortness of breath, or trouble breathing   Sharp, knife-like chest pain when you breathe   Coughing or coughing up blood   Rapid heartbeat  If you have any of these symptoms or get worse quickly, call 911 for emergency treatment.  How can I prevent a DVT?   Avoid long periods of inactivity and dont cross your legs--get up and walk around every hour or so.   Stay active--walking after surgery is highly encouraged.  This means you should get out of the house and walk in the neighborhood.  Going up and down stairs will not impair healing (unless advised against such activity by your doctor).     Drink plenty of noncaffeinated, nonalcoholic fluids each day to prevent dehydration.   Wear special support stockings, if they have been advised by your doctor.   If you travel, stop at least once an hour and walk around.   Avoid smoking (assistance with stopping is available through your healthcare provider)  Always notify your doctor if you are not able to follow the post operative instructions that are  given to you at the time of discharge.  It may be necessary to prescribe one of the medications available to prevent DVT.    We hope your stay was comfortable as you heal now, mend and rest.    For we have enjoyed taking care of you by giving your our best.    And as you get better, by regaining your health and strength;   We count it as a privilege to have served you and hope your time at [facility] was well spent.      Thank  You!!!

## 2020-03-06 NOTE — PLAN OF CARE
Denies pain.  Vs stable. aao x 4.  Tolerated po fluids.  No c/o nausea voiced.  Will discharge when MD available to sign the discharge pain rx and teaching completed if continues to meet discharge instructions at that time.

## 2020-03-06 NOTE — DISCHARGE SUMMARY
[facility]  Discharge Note  Short Stay    Admit Date: 3/6/2020    Discharge Date and Time: 3/6/2020    Attending Physician: Tab Mendez MD     Discharge Provider: Tab Mendez    Diagnoses:  Active Hospital Problems    Diagnosis  POA    *Fracture of radial shaft with ulna, closed, left, initial encounter [S52.202A, S52.302A]  Yes      Resolved Hospital Problems   No resolved problems to display.       Discharged Condition: good    Hospital Course: Patient was admitted for an outpatient procedure and tolerated the procedure well with no complications.    Final Diagnoses: Same as principal problem.    Disposition: Home or Self Care    Follow up/Patient Instructions:    Medications:  Reconciled Home Medications:      Medication List      CHANGE how you take these medications    HYDROcodone-acetaminophen 5-325 mg per tablet  Commonly known as:  NORCO  Take 1 tablet by mouth every 4 (four) hours as needed for Pain.  What changed:    · when to take this  · Another medication with the same name was removed. Continue taking this medication, and follow the directions you see here.        CONTINUE taking these medications    ARIPiprazole 2 MG Tab  Commonly known as:  ABILIFY  Take 1 tablet (2 mg total) by mouth every evening.     benztropine 1 MG tablet  Commonly known as:  COGENTIN  Take 1 tablet (1 mg total) by mouth 2 (two) times daily.     bisacodyL 5 mg EC tablet  Commonly known as:  DULCOLAX  Take 1 tablet (5 mg total) by mouth daily as needed for Constipation.     busPIRone 10 MG tablet  Commonly known as:  BUSPAR  Take 1 tablet (10 mg total) by mouth 3 (three) times daily.     calcitRIOL 0.25 MCG Cap  Commonly known as:  ROCALTROL  0.25 mcg once daily.     * Combivent  mcg/actuation inhaler  Generic drug:  albuterol-ipratropium  mcg  Inhale 2 puffs into the lungs every 6 (six) hours as needed for Wheezing.     * Combivent Respimat  mcg/actuation inhaler  Generic  drug:  ipratropium-albuterol  INL 1 PUFF PO Q 6 H PRN     Daily-Russ per tablet  Generic drug:  multivitamin  TK 1 T PO QD     dicyclomine 10 MG capsule  Commonly known as:  BENTYL  dicyclomine 10 mg capsule     epoetin jacob-epbx 10,000 unit/mL imjection  Commonly known as:  RETACRIT  Inject 0.34 mLs (3,400 Units total) into the skin every Tues, Thurs, Sat. With hemodialysis     famotidine 20 MG tablet  Commonly known as:  PEPCID  Take 20 mg by mouth every evening.     FLUoxetine 20 MG tablet  Take 1 tablet (20 mg total) by mouth once daily.     fluticasone-umeclidin-vilanter 100-62.5-25 mcg Dsdv  Commonly known as:  Trelegy Ellipta  Inhale 1 puff into the lungs once daily.     levothyroxine 100 MCG tablet  Commonly known as:  SYNTHROID  Take 1 tab every day by oral route.     meclizine 12.5 mg tablet  Commonly known as:  ANTIVERT  meclizine 12.5 mg tablet     midodrine 5 MG Tab  Commonly known as:  PROAMATINE  Take 1 tablet (5 mg total) by mouth 2 (two) times daily with meals.     montelukast 10 mg tablet  Commonly known as:  SINGULAIR  Take 10 mg by mouth every evening.     ondansetron 4 MG Tbdl  Commonly known as:  ZOFRAN-ODT  ondansetron 4 mg disintegrating tablet     ondansetron 8 MG tablet  Commonly known as:  ZOFRAN  Take 1 tablet (8 mg total) by mouth every 8 (eight) hours as needed for Nausea.     polyethylene glycol 17 gram Pwpk  Commonly known as:  GLYCOLAX  Take 17 g by mouth daily as needed.     polyvinyl alcohol (artificial tears) 1.4 % ophthalmic solution  Commonly known as:  LIQUIFILM TEARS  Place 1 drop into the left eye as needed.     Prolia 60 mg/mL Syrg  Generic drug:  denosumab  Inject 1 mL (60 mg total) into the skin every 6 (six) months.     rosuvastatin 20 MG tablet  Commonly known as:  CRESTOR  Take 1 tablet (20 mg total) by mouth once daily.     traMADol 50 mg tablet  Commonly known as:  ULTRAM  tramadol 50 mg tablet     traZODone 100 MG tablet  Commonly known as:  DESYREL  TK 1 T PO QHS          * This list has 2 medication(s) that are the same as other medications prescribed for you. Read the directions carefully, and ask your doctor or other care provider to review them with you.              Discharge Procedure Orders   Diet general     Ice to affected area     Lifting restrictions     No driving, operating heavy equipment or signing legal documents while taking pain medication     Call MD for:  temperature >100.4     Call MD for:  persistent nausea and vomiting     Call MD for:  severe uncontrolled pain     Call MD for:  difficulty breathing, headache or visual disturbances     Call MD for:  redness, tenderness, or signs of infection (pain, swelling, redness, odor or green/yellow discharge around incision site)     Call MD for:  hives     Call MD for:  persistent dizziness or light-headedness     Call MD for:  extreme fatigue     Remove dressing in 48 hours     Leave dressing on - Keep it clean, dry, and intact until clinic visit     Follow-up Information     Tab Mendez MD In 2 weeks.    Specialties:  Sports Medicine, Orthopedic Surgery  Why:  For wound re-check  Contact information:  [address] [place] [address] [address] Mike  [facility] [address]  [phone]                   Discharge Procedure Orders (must include Diet, Follow-up, Activity):   Discharge Procedure Orders (must include Diet, Follow-up, Activity)   Diet general     Ice to affected area     Lifting restrictions     No driving, operating heavy equipment or signing legal documents while taking pain medication     Call MD for:  temperature >100.4     Call MD for:  persistent nausea and vomiting     Call MD for:  severe uncontrolled pain     Call MD for:  difficulty breathing, headache or visual disturbances     Call MD for:  redness, tenderness, or signs of infection (pain, swelling, redness, odor or green/yellow discharge around incision site)     Call MD for:  hives     Call MD for:  persistent dizziness or light-headedness     Call MD  for:  extreme fatigue     Remove dressing in 48 hours     Leave dressing on - Keep it clean, dry, and intact until clinic visit

## 2020-03-06 NOTE — TRANSFER OF CARE
"Anesthesia Transfer of Care Note    Patient: Althea Gaona    Procedure(s) Performed: Procedure(s) (LRB):  ORIF, FOREARM (Left)  REMOVAL,ORTHOPEDIC HARDWARE,UPPER EXTREMITY (Left)    Patient location: PACU    Anesthesia Type: general and regional    Transport from OR: Transported from OR on 2-3 L/min O2 by NC with adequate spontaneous ventilation    Post pain: adequate analgesia    Post assessment: no apparent anesthetic complications and tolerated procedure well    Post vital signs: stable    Level of consciousness: awake, alert and oriented    Nausea/Vomiting: no nausea/vomiting    Complications: none    Transfer of care protocol was followed      Last vitals:   Visit Vitals  BP (!) 89/53   Pulse 74   Temp 36.3 °C (97.3 °F) (Skin)   Resp 16   Ht 5' 7" (1.702 m)   Wt 63.5 kg (140 lb)   SpO2 99%   Breastfeeding? No   BMI 21.93 kg/m²     "

## 2020-03-06 NOTE — PROGRESS NOTES
Dr. Blanchard informed patient was eating a mint on preop arrival, patient is on dialysis Tuesdays, Thursdays, Saturdays and that she c/o nausea.  States using home oxygen.  o2 sat % on RA.

## 2020-03-06 NOTE — PLAN OF CARE
Pt transferred to phase II. VSS, denies pain, dressing to L arm cdi, sling on to L arm, tolerated clear liquids without N/V. Report given to GRUPO Toribio.

## 2020-03-06 NOTE — ANESTHESIA PREPROCEDURE EVALUATION
03/06/2020  Althea Gaona is a 80 y.o., female.    Pre-op Assessment    I have reviewed the Patient Summary Reports.     I have reviewed the Nursing Notes.   I have reviewed the Medications.     Review of Systems  Anesthesia Hx:  No problems with previous Anesthesia    Social:  Former Smoker, Alcohol Use    Hematology/Oncology:         -- Anemia:   Cardiovascular:   Hypertension hyperlipidemia    Pulmonary:   COPD Asthma    Renal/:   Chronic Renal Disease, ESRD    Hepatic/GI:   GERD    Musculoskeletal:   Arthritis     Neurological:   CVA    Endocrine:   Hypothyroidism    Psych:   Psychiatric History          Physical Exam  General:  Well nourished    Airway/Jaw/Neck:  Airway Findings: Mouth Opening: Normal Tongue: Normal  General Airway Assessment: Adult  Mallampati: I  TM Distance: Normal, at least 6 cm        Eyes/Ears/Nose:  EYES/EARS/NOSE FINDINGS: Normal   Dental:  Dental Findings: Edentulous   Chest/Lungs:  Chest/Lungs Findings: Clear to auscultation, Normal Respiratory Rate     Heart/Vascular:  Heart Findings: Rate: Normal  Rhythm: Regular Rhythm        Mental Status:  Mental Status Findings:  Cooperative, Alert and Oriented         Anesthesia Plan  Type of Anesthesia, risks & benefits discussed:  Anesthesia Type:  general  Patient's Preference:   Intra-op Monitoring Plan: standard ASA monitors  Intra-op Monitoring Plan Comments:   Post Op Pain Control Plan: IV/PO Opioids PRN, multimodal analgesia and peripheral nerve block  Post Op Pain Control Plan Comments:   Induction:   IV  Beta Blocker:  Patient is on a Beta-Blocker and has received one dose within the past 24 hours (No further documentation required).       Informed Consent: Patient understands risks and agrees with Anesthesia plan.  Questions answered. Anesthesia consent signed with patient.  ASA Score: 3     Day of Surgery Review of  History & Physical: I have interviewed and examined the patient. I have reviewed the patient's H&P dated:    H&P update referred to the surgeon.         Ready For Surgery From Anesthesia Perspective.

## 2020-03-06 NOTE — PROGRESS NOTES
Clarified dressing is meant to be clean dry intact until the clinic visit with Dr. Mendez. Spouse and patient informed.

## 2020-03-07 NOTE — ANESTHESIA PROCEDURE NOTES
Peripheral Block    Patient location during procedure: pre-op   Block not for primary anesthetic.  Reason for block: at surgeon's request and post-op pain management   Post-op Pain Location: left arm   Start time: 3/6/2020 9:10 AM  Timeout: 3/6/2020 9:10 AM   End time: 3/6/2020 9:15 AM    Staffing  Authorizing Provider: Perez Blanchard MD  Performing Provider: Perez Blanchard MD    Preanesthetic Checklist  Completed: patient identified, site marked, surgical consent, pre-op evaluation, timeout performed, IV checked, risks and benefits discussed and monitors and equipment checked  Peripheral Block  Patient position: supine  Prep: ChloraPrep  Patient monitoring: heart rate, cardiac monitor, continuous pulse ox, continuous capnometry and frequent blood pressure checks  Block type: supraclavicular  Laterality: left  Injection technique: single shot  Needle  Needle type: Stimuplex   Needle gauge: 22 G  Needle length: 2 in  Needle localization: anatomical landmarks and ultrasound guidance   -ultrasound image captured on disc.  Assessment  Injection assessment: negative aspiration, negative parasthesia and local visualized surrounding nerve  Paresthesia pain: none  Heart rate change: no  Slow fractionated injection: yes  Additional Notes  VSS.  [facility] RN monitoring vitals throughout procedure.  Patient tolerated procedure well.

## 2020-03-07 NOTE — ANESTHESIA POSTPROCEDURE EVALUATION
Anesthesia Post Evaluation    Patient: Althea Gaona    Procedure(s) Performed: Procedure(s) (LRB):  ORIF, FOREARM (Left)  REMOVAL,ORTHOPEDIC HARDWARE,UPPER EXTREMITY (Left)    Final Anesthesia Type: general    Patient location during evaluation: PACU  Patient participation: Yes- Able to Participate  Level of consciousness: awake and alert  Post-procedure vital signs: reviewed and stable  Pain management: adequate  Airway patency: patent    PONV status at discharge: No PONV  Anesthetic complications: no      Cardiovascular status: hemodynamically stable  Respiratory status: unassisted and room air  Hydration status: euvolemic  Follow-up not needed.          Vitals Value Taken Time   BP 98/54 3/6/2020  1:51 PM   Temp 36.5 °C (97.7 °F) 3/6/2020  1:44 PM   Pulse 89 3/6/2020  1:51 PM   Resp 18 3/6/2020  1:51 PM   SpO2 95 % 3/6/2020  1:51 PM         Event Time     Out of Recovery 13:44:00          Pain/Lulu Score: Pain Rating Post Med Admin: 0 (3/6/2020  1:58 PM)  Lulu Score: 10 (3/6/2020  2:20 PM)

## 2020-03-16 ENCOUNTER — TELEPHONE (OUTPATIENT)
Dept: FAMILY MEDICINE | Facility: CLINIC | Age: 80
End: 2020-03-16

## 2020-03-16 RX ORDER — FLUOXETINE HYDROCHLORIDE 20 MG/1
20 CAPSULE ORAL DAILY
Qty: 90 CAPSULE | Refills: 1 | Status: SHIPPED | OUTPATIENT
Start: 2020-03-16 | End: 2024-03-15 | Stop reason: CLARIF

## 2020-03-16 NOTE — TELEPHONE ENCOUNTER
----- Message from Sweetie Pryor MA sent at 3/16/2020 10:51 AM CDT -----      ----- Message -----  From: lEly Tony  Sent: 3/15/2020   3:24 PM CDT  To: Zachary Ramsey [facility]    Med PA 3/15/20

## 2020-03-17 DIAGNOSIS — S52.302A FRACTURE OF RADIAL SHAFT WITH ULNA, CLOSED, LEFT, INITIAL ENCOUNTER: Primary | ICD-10-CM

## 2020-03-17 DIAGNOSIS — S52.202A FRACTURE OF RADIAL SHAFT WITH ULNA, CLOSED, LEFT, INITIAL ENCOUNTER: Primary | ICD-10-CM

## 2020-03-20 DIAGNOSIS — S52.202A FRACTURE OF RADIAL SHAFT WITH ULNA, CLOSED, LEFT, INITIAL ENCOUNTER: Primary | ICD-10-CM

## 2020-03-20 DIAGNOSIS — S52.302A FRACTURE OF RADIAL SHAFT WITH ULNA, CLOSED, LEFT, INITIAL ENCOUNTER: Primary | ICD-10-CM

## 2020-03-24 ENCOUNTER — TELEPHONE (OUTPATIENT)
Dept: FAMILY MEDICINE | Facility: CLINIC | Age: 80
End: 2020-03-24

## 2020-03-24 DIAGNOSIS — M51.36 DEGENERATIVE DISC DISEASE, LUMBAR: ICD-10-CM

## 2020-03-24 DIAGNOSIS — F31.32 BIPOLAR AFFECTIVE DISORDER, CURRENTLY DEPRESSED, MODERATE: Primary | ICD-10-CM

## 2020-03-24 DIAGNOSIS — Z91.81 RISK FOR FALLS: ICD-10-CM

## 2020-03-24 NOTE — TELEPHONE ENCOUNTER
Let them know I will send order for  however we have had some problems finding  agency to take patients right now. I still recommend she f/u with psychiatry also

## 2020-03-24 NOTE — TELEPHONE ENCOUNTER
Spoke to patient, says  at clinic is currently speaking with patients son and does not want to upset patients . Should I still call patient ? Patient is still in dialysis clinic. Please advise. Thanks

## 2020-03-24 NOTE — TELEPHONE ENCOUNTER
"Received phone call from Rula Harris,  at [facility] [phone] re pt. She reported to phone room staff pt was expressing suicidial thoughts. I instructed them to inform dialysis staff to call 911 and have pt transported to ER. Dialysis called back stating pt was refusing ER, stating she didn't want to go to ER d/t COVID19 concerns. Rula requested we call pt "to tweak her meds"    Please call pt/ and advise them if she has expressed suicidal thoughts or concerns she needs to be seen in ER. If the pt refuses but the  has concerns then he can either call 911 and report it or contact the 's office to have order of protective custody filed. Also ask who the last psychiatrist she has seen because she needs to f/u with psychiatry- I believe she needs to be under psychiatry care and will not adjust her meds. Need to refer her to [place] if she doesn't have a current psychiatrist.  "

## 2020-03-25 ENCOUNTER — TELEPHONE (OUTPATIENT)
Dept: FAMILY MEDICINE | Facility: CLINIC | Age: 80
End: 2020-03-25

## 2020-03-25 NOTE — TELEPHONE ENCOUNTER
----- Message from Sweetie Pryor MA sent at 3/25/2020  8:07 AM CDT -----      ----- Message -----  From: Elly Tony  Sent: 3/24/2020   5:26 PM CDT  To: Kasie Hancock Staff    [facility]

## 2020-03-25 NOTE — TELEPHONE ENCOUNTER
----- Message from Sonal Vera MA sent at 3/25/2020 11:17 AM CDT -----  Katelynn from [facility] called in to advise the the recommend  is unable to provide a psych nurse at this time.      Katelynn - [phone]    Fax [phone]

## 2020-03-26 ENCOUNTER — TELEPHONE (OUTPATIENT)
Dept: FAMILY MEDICINE | Facility: CLINIC | Age: 80
End: 2020-03-26

## 2020-03-26 NOTE — TELEPHONE ENCOUNTER
----- Message from Tonja Barrios sent at 3/26/2020  9:09 AM CDT -----  narda hill/ [facility] is calling back about pt   [phone]

## 2020-04-02 ENCOUNTER — TELEPHONE (OUTPATIENT)
Dept: FAMILY MEDICINE | Facility: CLINIC | Age: 80
End: 2020-04-02

## 2020-04-02 NOTE — TELEPHONE ENCOUNTER
----- Message from Faith Coffman sent at 4/2/2020 11:56 AM CDT -----  Contact: Neena hill/ Yuriy Chaudhary is calling to check on the status of Home health Psych Nurse and physical therapy orders for the patient. Please give her a call back # [phone]

## 2020-04-03 NOTE — TELEPHONE ENCOUNTER
Spoke to Neena at [facility] to let her know the home health psych nurse and physical therapy was approved

## 2020-04-06 ENCOUNTER — TELEPHONE (OUTPATIENT)
Dept: FAMILY MEDICINE | Facility: CLINIC | Age: 80
End: 2020-04-06

## 2020-04-06 NOTE — TELEPHONE ENCOUNTER
----- Message from Sweetie Pryor MA sent at 4/6/2020  2:30 PM CDT -----      ----- Message -----  From: Tejal Wall  Sent: 4/6/2020  11:49 AM CDT  To: Zachary Ramsey Staff    [facility], 4/02/20

## 2020-04-06 NOTE — TELEPHONE ENCOUNTER
Please call [facility] and ask them to assist pt with establishing  psychiatric care if she hasn't recently seen anyone. She used to see psych I think at [place] but she definitely needs to be under care of mental health provider- I have refilled her meds but due to her long hx of depression/bipolar feel she needs to be seeing someone regularly.

## 2020-04-06 NOTE — TELEPHONE ENCOUNTER
Spoke to Gale at [facility] regarding message below she stated pt is having psychiatric care. They are seeing pt once a week for a total of 4 weeks. She stated they  Have 3 weeks remaining with the pt. The pt's insurance is only allowing 4 weeks of psych care

## 2020-04-07 ENCOUNTER — TELEPHONE (OUTPATIENT)
Dept: FAMILY MEDICINE | Facility: CLINIC | Age: 80
End: 2020-04-07

## 2020-04-07 NOTE — TELEPHONE ENCOUNTER
----- Message from Tonja Barrios sent at 4/7/2020  8:33 AM CDT -----  Fax was sent to dr duque by mistake from [facility] please disregard   Gale -[phone]

## 2020-04-08 ENCOUNTER — EXTERNAL HOME HEALTH (OUTPATIENT)
Dept: HOME HEALTH SERVICES | Facility: HOSPITAL | Age: 80
End: 2020-04-08

## 2020-04-09 ENCOUNTER — DOCUMENT SCAN (OUTPATIENT)
Dept: HOME HEALTH SERVICES | Facility: HOSPITAL | Age: 80
End: 2020-04-09

## 2020-04-09 ENCOUNTER — TELEPHONE (OUTPATIENT)
Dept: FAMILY MEDICINE | Facility: CLINIC | Age: 80
End: 2020-04-09

## 2020-04-09 ENCOUNTER — HOSPITAL ENCOUNTER (EMERGENCY)
Facility: HOSPITAL | Age: 80
Discharge: HOME | End: 2020-04-09
Attending: EMERGENCY MEDICINE
Payer: MEDICARE

## 2020-04-09 VITALS
RESPIRATION RATE: 22 BRPM | OXYGEN SATURATION: 96 % | TEMPERATURE: 98 F | DIASTOLIC BLOOD PRESSURE: 73 MMHG | BODY MASS INDEX: 21.97 KG/M2 | HEIGHT: 67 IN | SYSTOLIC BLOOD PRESSURE: 118 MMHG | HEART RATE: 65 BPM | WEIGHT: 140 LBS

## 2020-04-09 DIAGNOSIS — R05.9 COUGH: Primary | ICD-10-CM

## 2020-04-09 LAB — SARS-COV-2 RDRP RESP QL NAA+PROBE: NEGATIVE

## 2020-04-09 PROCEDURE — U0002 COVID-19 LAB TEST NON-CDC: HCPCS

## 2020-04-09 PROCEDURE — 99283 EMERGENCY DEPT VISIT LOW MDM: CPT | Mod: 25

## 2020-04-09 RX ORDER — GUAIFENESIN/DEXTROMETHORPHAN 100-10MG/5
5 SYRUP ORAL 4 TIMES DAILY PRN
Qty: 120 ML | Refills: 0 | Status: SHIPPED | OUTPATIENT
Start: 2020-04-09 | End: 2020-04-19

## 2020-04-09 NOTE — TELEPHONE ENCOUNTER
Spoke to patient, given verbal auth to send results to Dialysis clinic so care can continue uninterrupted.

## 2020-04-09 NOTE — TELEPHONE ENCOUNTER
----- Message from Gianna Garcia sent at 4/9/2020  1:47 PM CDT -----  Contact: Yuriy ESCUDERO @ 1:15 wanting to confirm if the patients  made contact with our office to test the pt for COVID-19 screening test. CB Jody Renee [phone]

## 2020-04-09 NOTE — TELEPHONE ENCOUNTER
----- Message from Faith Coffman sent at 4/9/2020  9:49 AM CDT -----  Contact: Althea Rojas  Fluoxetine has been approved til 12/31/2020

## 2020-04-09 NOTE — ED PROVIDER NOTES
"Encounter Date: 4/9/2020    SCRIBE #1 NOTE: I, Lorenza Estrella, am scribing for, and in the presence of, Jf Ulrich MD.       History     Chief Complaint   Patient presents with    Cough     Sent from dialysis       Time seen by provider: 11:35 AM on 04/09/2020    Althea Gaona is a 80 y.o. female with PMHx of COPD, asthma, HTN, and ESRD who presents to the ED with an onset of cough, sore throat, and runny nose. Patient states symptoms have "persisted for a while." She was referred to ED after dialysis treatment. The patient denies fever or any other symptoms at this time. No pertinent cardiopulmonary PSHx.    The history is provided by the patient.     Review of patient's allergies indicates:   Allergen Reactions    Metoprolol Other (See Comments)     Grand-daughter/care giver reported Pt has over reaction to this drug, Bp bottoms out along with heart rate    Codeine Other (See Comments)     Other reaction(s): Unknown "shaky" "crazy" "dizzy" stated per patient       Past Medical History:   Diagnosis Date    Alcoholism     no drink since 1984    Anxiety     Asthma     Bipolar affect, depressed     Bipolar disorder     COPD (chronic obstructive pulmonary disease)     Degenerative disc disease, lumbar 11/22/2019    Dialysis patient     ESRD (end stage renal disease)     Full dentures     GERD (gastroesophageal reflux disease)     Hypertension     Pt states low b/p    Limb alert care status     NO BP/IV LEFT ARM    Pancreatitis     Stroke     Thyroid disease      Past Surgical History:   Procedure Laterality Date    APPENDECTOMY      CATARACT EXTRACTION Right     DIALYSIS FISTULA CREATION      left upper arm    EPIDURAL STEROID INJECTION INTO LUMBAR SPINE N/A 11/22/2019    Procedure: Injection-steroid-epidural-lumbar;  Surgeon: Rosanna Khan MD;  Location: [facility];  Service: Pain Management;  Laterality: N/A;  L5-S1      EPIDURAL STEROID INJECTION INTO LUMBAR SPINE N/A 2/3/2020    " Procedure: Injection-steroid-epidural-lumbar;  Surgeon: Rosanna Khan MD;  Location: [facility] OR;  Service: Pain Management;  Laterality: N/A;  L5-S1    EYE SURGERY      FIXATION KYPHOPLASTY N/A 10/18/2019    Procedure: Kyphoplasty;  Surgeon: Rosanna Khan MD;  Location: [facility] OR;  Service: Pain Management;  Laterality: N/A;  L2    HYSTERECTOMY      OPEN REDUCTION AND INTERNAL FIXATION (ORIF) OF INJURY OF FOREARM Left 3/6/2020    Procedure: ORIF, FOREARM;  Surgeon: Tab Mendez MD;  Location: [facility] OR;  Service: Orthopedics;  Laterality: Left;    THYROIDECTOMY      THYROIDECTOMY       Family History   Problem Relation Age of Onset    No Known Problems Mother     Diabetes Father     Heart disease Father         blood clot in lung went to heart    Cancer Sister         breast    Stroke Paternal Aunt     Cancer Sister         breast    Cancer Cousin         colon    Diabetes Cousin     Cancer Cousin         colon    COPD Sister          of COPD     Social History     Tobacco Use    Smoking status: Former Smoker     Packs/day: 1.00     Types: Cigarettes     Last attempt to quit: 10/1/2018     Years since quittin.5    Smokeless tobacco: Never Used   Substance Use Topics    Alcohol use: No    Drug use: Never     Review of Systems   Constitutional: Negative for fever.   HENT: Positive for rhinorrhea and sore throat.    Respiratory: Positive for cough. Negative for shortness of breath.    Cardiovascular: Negative for chest pain.   Gastrointestinal: Negative for nausea.   Genitourinary: Negative for dysuria.   Musculoskeletal: Negative for back pain.   Skin: Negative for rash.   Neurological: Negative for weakness.   Hematological: Does not bruise/bleed easily.       Physical Exam     Initial Vitals [20 1053]   BP Pulse Resp Temp SpO2   106/63 72 (!) 32 98.4 °F (36.9 °C) 95 %      MAP       --         Physical Exam    Nursing note and vitals reviewed.  Constitutional: She appears  well-developed and well-nourished.  Non-toxic appearance. No distress.   HENT:   Head: Normocephalic and atraumatic.   Eyes: EOM are normal. Pupils are equal, round, and reactive to light.   Neck: Normal range of motion. Neck supple. No neck rigidity. No JVD present.   Cardiovascular: Normal rate, regular rhythm, normal heart sounds and intact distal pulses. Exam reveals no gallop and no friction rub.    No murmur heard.  2+ pulses distally.   Pulmonary/Chest: Effort normal and breath sounds normal. No respiratory distress. She has no wheezes. She has no rhonchi. She has no rales.   Musculoskeletal: Normal range of motion.   Fistula in left arm. Palpable thrill.   Neurological: She is alert and oriented to person, place, and time.   Speaking in full sentences.   Skin: Skin is warm and dry.   Psychiatric: She has a normal mood and affect. Her speech is normal and behavior is normal. She is not actively hallucinating.         ED Course   Procedures  Labs Reviewed   SARS-COV-2 RNA AMPLIFICATION, QUAL    Narrative:     What symptom criteria does the patient meet?->Cough          Imaging Results          X-Ray Chest AP Portable (Final result)  Result time 04/09/20 12:05:16    Final result by Sushma Jimenez MD (04/09/20 12:05:16)                 Impression:      Very slight left basilar stranding suggesting atelectasis.  This appears just slightly more prominent than on the prior exam but with the right lung base appearing clear today.      Electronically signed by: Sushma Jimenez MD  Date:    04/09/2020  Time:    12:05             Narrative:    EXAMINATION:  XR CHEST AP PORTABLE    CLINICAL HISTORY:  Suspected Covid-19 Virus Infection;    TECHNIQUE:  Single frontal view of the chest was performed.    COMPARISON:  03/04/2020    FINDINGS:  The cardiomediastinal silhouette is stable.  There is very slight left basilar opacification suggesting atelectasis.  Right lung base appears clear.  No pleural effusion..             "                     Medical Decision Making:   History:   Old Medical Records: I decided to obtain old medical records.  Initial Assessment:   80-year-old woman with end-stage renal disease on hemodialysis is sent from dialysis clinic for evaluation of cough.  Patient has been having a cough for "a while" with associated rhinorrhea and sore throat.  She has no fever, no shortness of breath.  COVID-19 test is negative.  She is not hypoxic or exhibiting any significantly increased work of breathing.  Chest x-ray performed shows very slight left basilar stranding suggestive of atelectasis which is only slightly more prominent than previous exam.  Patient's symptoms are likely secondary to follow the allergies versus viral syndrome.  I do not think she requires inpatient admission.  Supportive therapy discussed.  Patient advised to return if her symptoms worsen, she experienced chest pain, significant shortness of breath, weakness, dizziness or any other concerns.  She is discharged improved in no acute distress.  Clinical Tests:   Lab Tests: Ordered and Reviewed  Radiological Study: Ordered and Reviewed            Scribe Attestation:   Scribe #1: I performed the above scribed service and the documentation accurately describes the services I performed. I attest to the accuracy of the note.     I, Serg Pedraza, personally performed the services described in this documentation. All medical record entries made by the scribe were at my direction and in my presence.  I have reviewed the chart and agree that the record reflects my personal performance and is accurate and complete. Jf Ulrich MD.                      Clinical Impression:       ICD-10-CM ICD-9-CM   1. Cough R05 786.2         Disposition:   Disposition: Discharged  Condition: Stable     ED Disposition Condition    Discharge Stable        ED Prescriptions     Medication Sig Dispense Start Date End Date Auth. Provider    dextromethorphan-guaifenesin "  mg/5 ml (ROBITUSSIN-DM)  mg/5 mL liquid Take 5 mLs by mouth 4 (four) times daily as needed (cough). 120 mL 4/9/2020 4/19/2020 Jf Ulrich MD        Follow-up Information     Follow up With Specialties Details Why Contact Info    Zachary Ramsey MD Family Medicine Schedule an appointment as soon as possible for a visit   [address]  [address]  [place] [address]  [phone]      [facility] Emergency Medicine  As needed, If symptoms worsen [address] [address] [phone]                                     Jf Ulrich MD  04/09/20 3264

## 2020-04-09 NOTE — DISCHARGE INSTRUCTIONS
Your evaluated day for cough.  Your COVID-19 test was negative.  This does not mean you do not have that virus as the test is approximately 70-80% sensitive meaning there is a 20-30% chance you may still have it.  However, your oxygen levels are normal and you are not significantly short of breath to the point where you need to be hospitalized at this time.  I have prescribed cough medicine for you.  Return if your symptoms become significantly worse.  Please follow-up with her primary care doctor.

## 2020-04-14 ENCOUNTER — TELEPHONE (OUTPATIENT)
Dept: ORTHOPEDICS | Facility: CLINIC | Age: 80
End: 2020-04-14

## 2020-04-14 ENCOUNTER — TELEPHONE (OUTPATIENT)
Dept: FAMILY MEDICINE | Facility: CLINIC | Age: 80
End: 2020-04-14

## 2020-04-14 NOTE — TELEPHONE ENCOUNTER
Spoke with pt  let him know per Dr. Ramsey pt needs to go to dialysis or she will get more short of breath, nausea, and weakness.  stated okay and he is making her go to dialysis now.

## 2020-04-14 NOTE — TELEPHONE ENCOUNTER
----- Message from Maddie French sent at 4/14/2020 11:12 AM CDT -----  .Name of Caller patient   Reason for Visit/Symptoms   Reschedule patient post op appointment  [facility]    [phone]  Preferred Date/Time of Appointment on a Monday, Wednesday or Friday  Interested in Virtual Visit (yes/no) no  Additional Information

## 2020-04-14 NOTE — TELEPHONE ENCOUNTER
Returned call to patient to schedule an appointment. Appointment made and patient verbalized understanding of appointment date, time, and location.

## 2020-04-16 ENCOUNTER — TELEPHONE (OUTPATIENT)
Dept: ORTHOPEDICS | Facility: CLINIC | Age: 80
End: 2020-04-16

## 2020-04-16 NOTE — TELEPHONE ENCOUNTER
----- Message from Tania Gallowayrosalio sent at 4/16/2020  9:42 AM CDT -----  Contact:  Derek  Type:  Sooner Apoointment Request    Caller is requesting a sooner appointment.  Caller declined first available appointment listed below.  Caller will not accept being placed on the waitlist and is requesting a message be sent to doctor.    Name of Caller:  Derek  When is the first available appointment?  6/4  Symptoms:  Post op appt her old cast is failing apart, she sick 3 wks ago and had to postpone and now you rescheduling.  Patient has be seen but has limited availability onTues, and Thcheri and Sat  Best Call Back Number:  [phone] (home)   Additional Information:  na

## 2020-04-20 DIAGNOSIS — S52.302A FRACTURE OF RADIAL SHAFT WITH ULNA, CLOSED, LEFT, INITIAL ENCOUNTER: Primary | ICD-10-CM

## 2020-04-20 DIAGNOSIS — S52.202A FRACTURE OF RADIAL SHAFT WITH ULNA, CLOSED, LEFT, INITIAL ENCOUNTER: Primary | ICD-10-CM

## 2020-04-21 ENCOUNTER — TELEPHONE (OUTPATIENT)
Dept: FAMILY MEDICINE | Facility: CLINIC | Age: 80
End: 2020-04-21

## 2020-04-21 NOTE — TELEPHONE ENCOUNTER
----- Message from Delaney Floyd sent at 4/21/2020 12:59 PM CDT -----  vm- kashina  with [facility] is calling and they are seeing if she can be seen before June. She is having trouble with dizziness, stomach issues   Please call patient with appt  [phone] kashina

## 2020-04-23 ENCOUNTER — DOCUMENT SCAN (OUTPATIENT)
Dept: HOME HEALTH SERVICES | Facility: HOSPITAL | Age: 80
End: 2020-04-23

## 2020-04-23 ENCOUNTER — OFFICE VISIT (OUTPATIENT)
Dept: ORTHOPEDICS | Facility: CLINIC | Age: 80
End: 2020-04-23
Payer: MEDICARE

## 2020-04-23 ENCOUNTER — HOSPITAL ENCOUNTER (OUTPATIENT)
Dept: RADIOLOGY | Facility: HOSPITAL | Age: 80
Discharge: HOME | End: 2020-04-23
Attending: ORTHOPAEDIC SURGERY
Payer: MEDICARE

## 2020-04-23 VITALS — RESPIRATION RATE: 18 BRPM | HEIGHT: 67 IN | BODY MASS INDEX: 21.97 KG/M2 | WEIGHT: 140 LBS

## 2020-04-23 DIAGNOSIS — S52.302A FRACTURE OF RADIAL SHAFT WITH ULNA, CLOSED, LEFT, INITIAL ENCOUNTER: Primary | ICD-10-CM

## 2020-04-23 DIAGNOSIS — S52.202A FRACTURE OF RADIAL SHAFT WITH ULNA, CLOSED, LEFT, INITIAL ENCOUNTER: ICD-10-CM

## 2020-04-23 DIAGNOSIS — S52.302A FRACTURE OF RADIAL SHAFT WITH ULNA, CLOSED, LEFT, INITIAL ENCOUNTER: ICD-10-CM

## 2020-04-23 DIAGNOSIS — S52.202A FRACTURE OF RADIAL SHAFT WITH ULNA, CLOSED, LEFT, INITIAL ENCOUNTER: Primary | ICD-10-CM

## 2020-04-23 PROCEDURE — 99999 PR PBB SHADOW E&M-EST. PATIENT-LVL II: CPT | Mod: PBBFAC,,, | Performed by: ORTHOPAEDIC SURGERY

## 2020-04-23 PROCEDURE — 73090 X-RAY EXAM OF FOREARM: CPT | Mod: TC,PN,LT

## 2020-04-23 PROCEDURE — 97760 ORTHOTIC MGMT&TRAING 1ST ENC: CPT | Mod: GP,S$GLB,, | Performed by: ORTHOPAEDIC SURGERY

## 2020-04-23 PROCEDURE — 99024 POSTOP FOLLOW-UP VISIT: CPT | Mod: S$GLB,,, | Performed by: ORTHOPAEDIC SURGERY

## 2020-04-23 PROCEDURE — 73090 X-RAY EXAM OF FOREARM: CPT | Mod: 26,LT,, | Performed by: RADIOLOGY

## 2020-04-23 PROCEDURE — 97760 PR ORTHOTIC MGMT&TRAINJ INITIAL ENC EA 15 MINS: ICD-10-PCS | Mod: GP,S$GLB,, | Performed by: ORTHOPAEDIC SURGERY

## 2020-04-23 PROCEDURE — 99024 PR POST-OP FOLLOW-UP VISIT: ICD-10-PCS | Mod: S$GLB,,, | Performed by: ORTHOPAEDIC SURGERY

## 2020-04-23 PROCEDURE — 73090 XR FOREARM LEFT: ICD-10-PCS | Mod: 26,LT,, | Performed by: RADIOLOGY

## 2020-04-23 PROCEDURE — 99999 PR PBB SHADOW E&M-EST. PATIENT-LVL II: ICD-10-PCS | Mod: PBBFAC,,, | Performed by: ORTHOPAEDIC SURGERY

## 2020-04-23 NOTE — PROGRESS NOTES
Past Medical History:   Diagnosis Date    Alcoholism     no drink since 1984    Anxiety     Asthma     Bipolar affect, depressed     Bipolar disorder     COPD (chronic obstructive pulmonary disease)     Degenerative disc disease, lumbar 11/22/2019    Dialysis patient     ESRD (end stage renal disease)     Full dentures     GERD (gastroesophageal reflux disease)     Hypertension     Pt states low b/p    Limb alert care status     NO BP/IV LEFT ARM    Pancreatitis     Stroke     Thyroid disease        Past Surgical History:   Procedure Laterality Date    APPENDECTOMY      CATARACT EXTRACTION Right     DIALYSIS FISTULA CREATION      left upper arm    EPIDURAL STEROID INJECTION INTO LUMBAR SPINE N/A 11/22/2019    Procedure: Injection-steroid-epidural-lumbar;  Surgeon: Rosanna Khan MD;  Location: [facility] OR;  Service: Pain Management;  Laterality: N/A;  L5-S1      EPIDURAL STEROID INJECTION INTO LUMBAR SPINE N/A 2/3/2020    Procedure: Injection-steroid-epidural-lumbar;  Surgeon: Rosanna Khan MD;  [facility];  Service: Pain Management;  Laterality: N/A;  L5-S1    EYE SURGERY      FIXATION KYPHOPLASTY N/A 10/18/2019    Procedure: Kyphoplasty;  Surgeon: Rosanna Khan MD;  Location: [facility] OR;  Service: Pain Management;  Laterality: N/A;  L2    HYSTERECTOMY      OPEN REDUCTION AND INTERNAL FIXATION (ORIF) OF INJURY OF FOREARM Left 3/6/2020    Procedure: ORIF, FOREARM;  Surgeon: Tab Mendez MD;  Location: [facility] OR;  Service: Orthopedics;  Laterality: Left;    THYROIDECTOMY      THYROIDECTOMY         Current Outpatient Medications   Medication Sig    albuterol-ipratropium  mcg (COMBIVENT)  mcg/actuation inhaler Inhale 2 puffs into the lungs every 6 (six) hours as needed for Wheezing.    ARIPiprazole (ABILIFY) 2 MG Tab Take 1 tablet (2 mg total) by mouth every evening.    bisacodyl (DULCOLAX) 5 mg EC tablet Take 1 tablet (5 mg total) by mouth daily as needed for  Constipation.    busPIRone (BUSPAR) 10 MG tablet Take 1 tablet (10 mg total) by mouth 3 (three) times daily.    calcitRIOL (ROCALTROL) 0.25 MCG Cap 0.25 mcg once daily.     COMBIVENT RESPIMAT  mcg/actuation inhaler INL 1 PUFF PO Q 6 H PRN    DAILY-YANDY tablet TK 1 T PO QD    dicyclomine (BENTYL) 10 MG capsule dicyclomine 10 mg capsule    epoetin jacob-epbx (RETACRIT) 10,000 unit/mL imjection Inject 0.34 mLs (3,400 Units total) into the skin every Tues, Thurs, Sat. With hemodialysis    famotidine (PEPCID) 20 MG tablet Take 20 mg by mouth every evening.     FLUoxetine 20 MG capsule Take 1 capsule (20 mg total) by mouth once daily.    fluticasone-umeclidin-vilanter (TRELEGY ELLIPTA) 100-62.5-25 mcg DsDv Inhale 1 puff into the lungs once daily.    HYDROcodone-acetaminophen (NORCO) 5-325 mg per tablet Take 1 tablet by mouth every 4 (four) hours as needed for Pain.    levothyroxine (SYNTHROID) 100 MCG tablet Take 1 tab every day by oral route.    meclizine (ANTIVERT) 12.5 mg tablet meclizine 12.5 mg tablet    midodrine (PROAMATINE) 5 MG Tab Take 1 tablet (5 mg total) by mouth 2 (two) times daily with meals.    montelukast (SINGULAIR) 10 mg tablet Take 10 mg by mouth every evening.    ondansetron (ZOFRAN) 8 MG tablet Take 1 tablet (8 mg total) by mouth every 8 (eight) hours as needed for Nausea.    ondansetron (ZOFRAN-ODT) 4 MG TbDL ondansetron 4 mg disintegrating tablet    polyethylene glycol (GLYCOLAX) 17 gram PwPk Take 17 g by mouth daily as needed.    polyvinyl alcohol, artificial tears, (LIQUIFILM TEARS) 1.4 % ophthalmic solution Place 1 drop into the left eye as needed.    PROLIA 60 mg/mL Syrg Inject 1 mL (60 mg total) into the skin every 6 (six) months.    rosuvastatin (CRESTOR) 20 MG tablet Take 1 tablet (20 mg total) by mouth once daily.    traMADol (ULTRAM) 50 mg tablet tramadol 50 mg tablet    traZODone (DESYREL) 100 MG tablet TK 1 T PO QHS    benztropine (COGENTIN) 1 MG tablet Take 1  "tablet (1 mg total) by mouth 2 (two) times daily.     No current facility-administered medications for this visit.      Facility-Administered Medications Ordered in Other Visits   Medication    0.9%  NaCl infusion    electrolyte-S (ISOLYTE)    lactated ringers infusion    lactated ringers infusion    lactated ringers infusion    lidocaine (PF) 10 mg/ml (1%) injection 10 mg       Review of patient's allergies indicates:   Allergen Reactions    Metoprolol Other (See Comments)     Grand-daughter/care giver reported Pt has over reaction to this drug, Bp bottoms out along with heart rate    Codeine Other (See Comments)     Other reaction(s): Unknown "shaky" "crazy" "dizzy" stated per patient         Family History   Problem Relation Age of Onset    No Known Problems Mother     Diabetes Father     Heart disease Father         blood clot in lung went to heart    Cancer Sister         breast    Stroke Paternal Aunt     Cancer Sister         breast    Cancer Cousin         colon    Diabetes Cousin     Cancer Cousin         colon    COPD Sister          of COPD       Social History     Socioeconomic History    Marital status:      Spouse name: Not on file    Number of children: Not on file    Years of education: Not on file    Highest education level: Not on file   Occupational History    Not on file   Social Needs    Financial resource strain: Not on file    Food insecurity:     Worry: Not on file     Inability: Not on file    Transportation needs:     Medical: Not on file     Non-medical: Not on file   Tobacco Use    Smoking status: Former Smoker     Packs/day: 1.00     Types: Cigarettes     Last attempt to quit: 10/1/2018     Years since quittin.5    Smokeless tobacco: Never Used   Substance and Sexual Activity    Alcohol use: No    Drug use: Never    Sexual activity: Not Currently   Lifestyle    Physical activity:     Days per week: Not on file     Minutes per session: Not on " file    Stress: Not on file   Relationships    Social connections:     Talks on phone: Not on file     Gets together: Not on file     Attends Jehovah's witness service: Not on file     Active member of club or organization: Not on file     Attends meetings of clubs or organizations: Not on file     Relationship status: Not on file   Other Topics Concern    Not on file   Social History Narrative    Not on file       Chief Complaint: No chief complaint on file.      Date of surgery:  March 6, 2020    History of present illness:  This is a 80-year-old female who suffered a periprosthetic fracture of the left radial shaft below a previous distal radius plate.  Patient had the distal radius plate removed and a long LCP radial shaft plate applied.  Has no pain.  Wound healed well      Review of Systems:    Musculoskeletal:  See HPI        Physical Examination:    Vital Signs:    Vitals:    04/23/20 1305   Resp: 18       Body mass index is 21.93 kg/m².    This a well-developed, well nourished patient in no acute distress.  They are alert and oriented and cooperative to examination.  Pt. walks without an antalgic gait.      Examination left wrist shows well-healed surgical incision.  No erythema or drainage.  Patient has a little stiffness in her wrist from the splint.  Neurovascularly intact.    X-rays:  X-rays of the left forearm shows plate in good position with abundant callus formation around the fracture.     Assessment::  Status post open reduction internal fixation of left radial shaft fracture    Plan:  I removed the splint today.  Placed her in a removable brace.  Follow-up in 6 weeks with another x-ray of the left forearm.    We performed a custom orthotic/brace fitting, adjusting and training with the patient. The patient demonstrated understanding and proper care. This was performed for 15 minutes.          This note was created using [employer] voice recognition software that occasionally misinterpreted phrases or  words.

## 2020-04-24 ENCOUNTER — DOCUMENT SCAN (OUTPATIENT)
Dept: HOME HEALTH SERVICES | Facility: HOSPITAL | Age: 80
End: 2020-04-24

## 2020-05-21 DIAGNOSIS — R11.0 NAUSEA: Primary | ICD-10-CM

## 2020-05-21 DIAGNOSIS — J44.9 COPD MIXED TYPE: ICD-10-CM

## 2020-05-21 RX ORDER — ONDANSETRON HYDROCHLORIDE 8 MG/1
8 TABLET, FILM COATED ORAL EVERY 8 HOURS PRN
Qty: 30 TABLET | Refills: 2 | Status: SHIPPED | OUTPATIENT
Start: 2020-05-21 | End: 2024-03-15 | Stop reason: CLARIF

## 2020-05-21 RX ORDER — MONTELUKAST SODIUM 10 MG/1
10 TABLET ORAL NIGHTLY
Qty: 90 TABLET | Refills: 1 | Status: SHIPPED | OUTPATIENT
Start: 2020-05-21 | End: 2024-03-15 | Stop reason: CLARIF

## 2020-05-21 NOTE — TELEPHONE ENCOUNTER
----- Message from Sonal Vera MA sent at 5/21/2020 11:17 AM CDT -----  Pt called in requesting refill:    Ondansetron 8 mg  Montelukast 10 mg    [facility] on [address]    Pt - [phone]

## 2020-06-05 DIAGNOSIS — S52.302A FRACTURE OF RADIAL SHAFT WITH ULNA, CLOSED, LEFT, INITIAL ENCOUNTER: Primary | ICD-10-CM

## 2020-06-05 DIAGNOSIS — S52.202A FRACTURE OF RADIAL SHAFT WITH ULNA, CLOSED, LEFT, INITIAL ENCOUNTER: Primary | ICD-10-CM

## 2020-06-06 ENCOUNTER — HOSPITAL ENCOUNTER (INPATIENT)
Facility: HOSPITAL | Age: 80
LOS: 9 days | Discharge: NURSING, REHAB OR LONG-TERM CARE | DRG: 385 | End: 2020-06-17
Attending: EMERGENCY MEDICINE | Admitting: FAMILY MEDICINE
Payer: MEDICARE

## 2020-06-06 DIAGNOSIS — N18.6 END STAGE RENAL DISEASE: ICD-10-CM

## 2020-06-06 DIAGNOSIS — K52.9 PROCTOCOLITIS: Primary | ICD-10-CM

## 2020-06-06 PROBLEM — S52.202A FRACTURE OF RADIAL SHAFT WITH ULNA, CLOSED, LEFT, INITIAL ENCOUNTER: Status: RESOLVED | Noted: 2020-02-27 | Resolved: 2020-06-06

## 2020-06-06 PROBLEM — S52.302A FRACTURE OF RADIAL SHAFT WITH ULNA, CLOSED, LEFT, INITIAL ENCOUNTER: Status: RESOLVED | Noted: 2020-02-27 | Resolved: 2020-06-06

## 2020-06-06 PROBLEM — S52.502D CLOSED FRACTURE OF LOWER END OF LEFT RADIUS WITH ROUTINE HEALING: Status: RESOLVED | Noted: 2019-07-15 | Resolved: 2020-06-06

## 2020-06-06 PROBLEM — E87.20 METABOLIC ACIDOSIS: Status: ACTIVE | Noted: 2020-06-06

## 2020-06-06 LAB
ALBUMIN SERPL BCP-MCNC: 4.1 G/DL (ref 3.5–5.2)
ALP SERPL-CCNC: 107 U/L (ref 55–135)
ALT SERPL W/O P-5'-P-CCNC: 20 U/L (ref 10–44)
ANION GAP SERPL CALC-SCNC: 15 MMOL/L (ref 8–16)
AST SERPL-CCNC: 25 U/L (ref 10–40)
BASOPHILS # BLD AUTO: 0.02 K/UL (ref 0–0.2)
BASOPHILS NFR BLD: 0.3 % (ref 0–1.9)
BILIRUB SERPL-MCNC: 0.8 MG/DL (ref 0.1–1)
BUN SERPL-MCNC: 44 MG/DL (ref 8–23)
CALCIUM SERPL-MCNC: 9.3 MG/DL (ref 8.7–10.5)
CHLORIDE SERPL-SCNC: 104 MMOL/L (ref 95–110)
CO2 SERPL-SCNC: 21 MMOL/L (ref 23–29)
CREAT SERPL-MCNC: 6.7 MG/DL (ref 0.5–1.4)
DIFFERENTIAL METHOD: ABNORMAL
EOSINOPHIL # BLD AUTO: 0.1 K/UL (ref 0–0.5)
EOSINOPHIL NFR BLD: 1.2 % (ref 0–8)
ERYTHROCYTE [DISTWIDTH] IN BLOOD BY AUTOMATED COUNT: 13.4 % (ref 11.5–14.5)
EST. GFR  (AFRICAN AMERICAN): 6.3 ML/MIN/1.73 M^2
EST. GFR  (NON AFRICAN AMERICAN): 5.4 ML/MIN/1.73 M^2
GLUCOSE SERPL-MCNC: 99 MG/DL (ref 70–110)
HCT VFR BLD AUTO: 37.3 % (ref 37–48.5)
HGB BLD-MCNC: 11.9 G/DL (ref 12–16)
IMM GRANULOCYTES # BLD AUTO: 0.03 K/UL (ref 0–0.04)
IMM GRANULOCYTES NFR BLD AUTO: 0.4 % (ref 0–0.5)
LIPASE SERPL-CCNC: 27 U/L (ref 4–60)
LYMPHOCYTES # BLD AUTO: 0.7 K/UL (ref 1–4.8)
LYMPHOCYTES NFR BLD: 10.3 % (ref 18–48)
MCH RBC QN AUTO: 33.1 PG (ref 27–31)
MCHC RBC AUTO-ENTMCNC: 31.9 G/DL (ref 32–36)
MCV RBC AUTO: 104 FL (ref 82–98)
MONOCYTES # BLD AUTO: 0.3 K/UL (ref 0.3–1)
MONOCYTES NFR BLD: 4.5 % (ref 4–15)
NEUTROPHILS # BLD AUTO: 5.8 K/UL (ref 1.8–7.7)
NEUTROPHILS NFR BLD: 83.3 % (ref 38–73)
NRBC BLD-RTO: 0 /100 WBC
PLATELET # BLD AUTO: 260 K/UL (ref 150–350)
PLATELET BLD QL SMEAR: ABNORMAL
PMV BLD AUTO: 11.6 FL (ref 9.2–12.9)
POTASSIUM SERPL-SCNC: 3.5 MMOL/L (ref 3.5–5.1)
PROT SERPL-MCNC: 7.7 G/DL (ref 6–8.4)
RBC # BLD AUTO: 3.59 M/UL (ref 4–5.4)
SARS-COV-2 RDRP RESP QL NAA+PROBE: NEGATIVE
SODIUM SERPL-SCNC: 140 MMOL/L (ref 136–145)
WBC # BLD AUTO: 6.91 K/UL (ref 3.9–12.7)

## 2020-06-06 PROCEDURE — 63600175 PHARM REV CODE 636 W HCPCS: Performed by: EMERGENCY MEDICINE

## 2020-06-06 PROCEDURE — 25000003 PHARM REV CODE 250: Performed by: FAMILY MEDICINE

## 2020-06-06 PROCEDURE — 25000003 PHARM REV CODE 250: Performed by: NURSE PRACTITIONER

## 2020-06-06 PROCEDURE — 80053 COMPREHEN METABOLIC PANEL: CPT

## 2020-06-06 PROCEDURE — U0002 COVID-19 LAB TEST NON-CDC: HCPCS

## 2020-06-06 PROCEDURE — 85025 COMPLETE CBC W/AUTO DIFF WBC: CPT

## 2020-06-06 PROCEDURE — 83690 ASSAY OF LIPASE: CPT

## 2020-06-06 PROCEDURE — G0378 HOSPITAL OBSERVATION PER HR: HCPCS

## 2020-06-06 PROCEDURE — 63600175 PHARM REV CODE 636 W HCPCS: Performed by: FAMILY MEDICINE

## 2020-06-06 PROCEDURE — S0030 INJECTION, METRONIDAZOLE: HCPCS | Performed by: NURSE PRACTITIONER

## 2020-06-06 PROCEDURE — S0030 INJECTION, METRONIDAZOLE: HCPCS | Performed by: EMERGENCY MEDICINE

## 2020-06-06 PROCEDURE — 99285 EMERGENCY DEPT VISIT HI MDM: CPT | Mod: 25

## 2020-06-06 PROCEDURE — 96375 TX/PRO/DX INJ NEW DRUG ADDON: CPT

## 2020-06-06 PROCEDURE — 96374 THER/PROPH/DIAG INJ IV PUSH: CPT

## 2020-06-06 PROCEDURE — 25000003 PHARM REV CODE 250: Performed by: EMERGENCY MEDICINE

## 2020-06-06 PROCEDURE — 63600175 PHARM REV CODE 636 W HCPCS: Performed by: NURSE PRACTITIONER

## 2020-06-06 RX ORDER — MORPHINE SULFATE 2 MG/ML
2 INJECTION, SOLUTION INTRAMUSCULAR; INTRAVENOUS EVERY 4 HOURS PRN
Status: DISCONTINUED | OUTPATIENT
Start: 2020-06-06 | End: 2020-06-13

## 2020-06-06 RX ORDER — SODIUM CHLORIDE 0.9 % (FLUSH) 0.9 %
10 SYRINGE (ML) INJECTION
Status: DISCONTINUED | OUTPATIENT
Start: 2020-06-06 | End: 2020-06-17 | Stop reason: HOSPADM

## 2020-06-06 RX ORDER — TRAZODONE HYDROCHLORIDE 50 MG/1
100 TABLET ORAL NIGHTLY PRN
Status: DISCONTINUED | OUTPATIENT
Start: 2020-06-06 | End: 2020-06-17 | Stop reason: HOSPADM

## 2020-06-06 RX ORDER — BENZTROPINE MESYLATE 1 MG/1
1 TABLET ORAL 2 TIMES DAILY
Status: DISCONTINUED | OUTPATIENT
Start: 2020-06-06 | End: 2020-06-17 | Stop reason: HOSPADM

## 2020-06-06 RX ORDER — CALCITRIOL 0.25 UG/1
0.25 CAPSULE ORAL DAILY
Status: DISCONTINUED | OUTPATIENT
Start: 2020-06-06 | End: 2020-06-17 | Stop reason: HOSPADM

## 2020-06-06 RX ORDER — ROSUVASTATIN CALCIUM 20 MG/1
20 TABLET, COATED ORAL DAILY
Status: DISCONTINUED | OUTPATIENT
Start: 2020-06-06 | End: 2020-06-17 | Stop reason: HOSPADM

## 2020-06-06 RX ORDER — ONDANSETRON 2 MG/ML
4 INJECTION INTRAMUSCULAR; INTRAVENOUS EVERY 8 HOURS PRN
Status: DISCONTINUED | OUTPATIENT
Start: 2020-06-06 | End: 2020-06-06

## 2020-06-06 RX ORDER — FLUOXETINE HYDROCHLORIDE 20 MG/1
20 CAPSULE ORAL DAILY
Status: DISCONTINUED | OUTPATIENT
Start: 2020-06-06 | End: 2020-06-17 | Stop reason: HOSPADM

## 2020-06-06 RX ORDER — MONTELUKAST SODIUM 10 MG/1
10 TABLET ORAL NIGHTLY
Status: DISCONTINUED | OUTPATIENT
Start: 2020-06-06 | End: 2020-06-17 | Stop reason: HOSPADM

## 2020-06-06 RX ORDER — FAMOTIDINE 20 MG/1
20 TABLET, FILM COATED ORAL NIGHTLY
Status: DISCONTINUED | OUTPATIENT
Start: 2020-06-06 | End: 2020-06-17 | Stop reason: HOSPADM

## 2020-06-06 RX ORDER — ONDANSETRON 2 MG/ML
4 INJECTION INTRAMUSCULAR; INTRAVENOUS
Status: COMPLETED | OUTPATIENT
Start: 2020-06-06 | End: 2020-06-06

## 2020-06-06 RX ORDER — LEVOTHYROXINE SODIUM 100 UG/1
100 TABLET ORAL
Status: DISCONTINUED | OUTPATIENT
Start: 2020-06-07 | End: 2020-06-17 | Stop reason: HOSPADM

## 2020-06-06 RX ORDER — ACETAMINOPHEN 325 MG/1
650 TABLET ORAL EVERY 4 HOURS PRN
Status: DISCONTINUED | OUTPATIENT
Start: 2020-06-06 | End: 2020-06-17 | Stop reason: HOSPADM

## 2020-06-06 RX ORDER — METRONIDAZOLE 500 MG/100ML
500 INJECTION, SOLUTION INTRAVENOUS
Status: COMPLETED | OUTPATIENT
Start: 2020-06-06 | End: 2020-06-06

## 2020-06-06 RX ORDER — METRONIDAZOLE 500 MG/100ML
500 INJECTION, SOLUTION INTRAVENOUS
Status: DISCONTINUED | OUTPATIENT
Start: 2020-06-06 | End: 2020-06-09

## 2020-06-06 RX ORDER — ONDANSETRON 2 MG/ML
8 INJECTION INTRAMUSCULAR; INTRAVENOUS EVERY 6 HOURS PRN
Status: DISCONTINUED | OUTPATIENT
Start: 2020-06-06 | End: 2020-06-17 | Stop reason: HOSPADM

## 2020-06-06 RX ORDER — BUSPIRONE HYDROCHLORIDE 5 MG/1
10 TABLET ORAL 3 TIMES DAILY
Status: DISCONTINUED | OUTPATIENT
Start: 2020-06-06 | End: 2020-06-17 | Stop reason: HOSPADM

## 2020-06-06 RX ORDER — MIDODRINE HYDROCHLORIDE 10 MG/1
20 TABLET ORAL
Status: ON HOLD | COMMUNITY
End: 2024-03-19 | Stop reason: HOSPADM

## 2020-06-06 RX ORDER — HYDROMORPHONE HYDROCHLORIDE 1 MG/ML
1 INJECTION, SOLUTION INTRAMUSCULAR; INTRAVENOUS; SUBCUTANEOUS
Status: COMPLETED | OUTPATIENT
Start: 2020-06-06 | End: 2020-06-06

## 2020-06-06 RX ORDER — ARIPIPRAZOLE 2 MG/1
2 TABLET ORAL NIGHTLY
Status: DISCONTINUED | OUTPATIENT
Start: 2020-06-06 | End: 2020-06-17 | Stop reason: HOSPADM

## 2020-06-06 RX ORDER — MIDODRINE HYDROCHLORIDE 2.5 MG/1
5 TABLET ORAL 2 TIMES DAILY WITH MEALS
Status: DISCONTINUED | OUTPATIENT
Start: 2020-06-06 | End: 2020-06-17 | Stop reason: HOSPADM

## 2020-06-06 RX ORDER — MIDODRINE HYDROCHLORIDE 2.5 MG/1
10 TABLET ORAL EVERY OTHER DAY
Status: DISCONTINUED | OUTPATIENT
Start: 2020-06-07 | End: 2020-06-17 | Stop reason: HOSPADM

## 2020-06-06 RX ADMIN — TRAZODONE HYDROCHLORIDE 100 MG: 50 TABLET ORAL at 11:06

## 2020-06-06 RX ADMIN — PIPERACILLIN AND TAZOBACTAM 3.38 G: 3; .375 INJECTION, POWDER, FOR SOLUTION INTRAVENOUS at 07:06

## 2020-06-06 RX ADMIN — BENZTROPINE MESYLATE 1 MG: 1 TABLET ORAL at 09:06

## 2020-06-06 RX ADMIN — MORPHINE SULFATE 2 MG: 2 INJECTION, SOLUTION INTRAMUSCULAR; INTRAVENOUS at 07:06

## 2020-06-06 RX ADMIN — FAMOTIDINE 20 MG: 20 TABLET, FILM COATED ORAL at 09:06

## 2020-06-06 RX ADMIN — BUSPIRONE HYDROCHLORIDE 10 MG: 5 TABLET ORAL at 09:06

## 2020-06-06 RX ADMIN — ONDANSETRON 4 MG: 2 INJECTION INTRAMUSCULAR; INTRAVENOUS at 01:06

## 2020-06-06 RX ADMIN — FLUOXETINE 20 MG: 20 CAPSULE ORAL at 06:06

## 2020-06-06 RX ADMIN — ARIPIPRAZOLE 2 MG: 2 TABLET ORAL at 09:06

## 2020-06-06 RX ADMIN — HYDROMORPHONE HYDROCHLORIDE 1 MG: 1 INJECTION, SOLUTION INTRAMUSCULAR; INTRAVENOUS; SUBCUTANEOUS at 01:06

## 2020-06-06 RX ADMIN — METRONIDAZOLE 500 MG: 500 INJECTION, SOLUTION INTRAVENOUS at 09:06

## 2020-06-06 RX ADMIN — CALCITRIOL CAPSULES 0.25 MCG 0.25 MCG: 0.25 CAPSULE ORAL at 06:06

## 2020-06-06 RX ADMIN — METRONIDAZOLE 500 MG: 500 INJECTION, SOLUTION INTRAVENOUS at 01:06

## 2020-06-06 RX ADMIN — ROSUVASTATIN CALCIUM 20 MG: 20 TABLET, FILM COATED ORAL at 09:06

## 2020-06-06 RX ADMIN — MONTELUKAST SODIUM 10 MG: 10 TABLET, COATED ORAL at 09:06

## 2020-06-06 RX ADMIN — MIDODRINE HYDROCHLORIDE 5 MG: 2.5 TABLET ORAL at 07:06

## 2020-06-06 RX ADMIN — PROMETHAZINE HYDROCHLORIDE 25 MG: 25 INJECTION INTRAMUSCULAR; INTRAVENOUS at 06:06

## 2020-06-06 NOTE — H&P
"[facility]  History & Physical    DOS: 06/06/2020  2:26 PM    Patient Name: Althea Gaona  MRN: [id number]  Admission Date: 6/6/2020  Attending Physician: Dr. Haynes  Primary Care Provider: Zachary Ramsey MD         Patient information was obtained from patient and ER records.     Subjective:     Principal Problem:Proctocolitis    Chief Complaint:   Chief Complaint   Patient presents with    Abdominal Pain     lower mid abdominal pain X3, + nausea, + diarrhea since yest.  Last dialysis was Tue.          HPI: Ms. Gaona presents today with complaints of diarrhea. It is moderate. It is associated with weakness, chills, bilat lower quadrant pain, and nausea. She denies measured fever, cough, SOB, chest pain, or LOC. She has a history of HTN, HLD, ESRD on HD T/Th/Sat, COPD, and CVA. She's had diarrhea, stating "it just pours out of me" 2-4 times a day since Wed. She missed her last 2 HD appts on Thursday and today d/t the abd pain and diarrhea. In the ED, a CT abd/pelvis was done showing acute proctocolitis without perforation or abscess. Dr. Rodrigues was consulted and will manage her HD. Discussed code status and patient states her living will is "lost", but she wishes to be a full code.    Past Medical History:   Diagnosis Date    Alcoholism     no drink since 1984    Anxiety     Asthma     Bipolar affect, depressed     Bipolar disorder     COPD (chronic obstructive pulmonary disease)     Degenerative disc disease, lumbar 11/22/2019    Dialysis patient     ESRD (end stage renal disease)     Full dentures     GERD (gastroesophageal reflux disease)     Hypertension     Pt states low b/p    Limb alert care status     NO BP/IV LEFT ARM    Pancreatitis     Stroke     Thyroid disease        Past Surgical History:   Procedure Laterality Date    APPENDECTOMY      CATARACT EXTRACTION Right     DIALYSIS FISTULA CREATION      left upper arm    EPIDURAL STEROID INJECTION " "INTO LUMBAR SPINE N/A 11/22/2019    Procedure: Injection-steroid-epidural-lumbar;  Surgeon: Rosanna Khan MD;  Location: [facility] OR;  Service: Pain Management;  Laterality: N/A;  L5-S1      EPIDURAL STEROID INJECTION INTO LUMBAR SPINE N/A 2/3/2020    Procedure: Injection-steroid-epidural-lumbar;  Surgeon: Rosanna Khan MD;  Location: [facility] OR;  Service: Pain Management;  Laterality: N/A;  L5-S1    EYE SURGERY      FIXATION KYPHOPLASTY N/A 10/18/2019    Procedure: Kyphoplasty;  Surgeon: Rosanna Khan MD;  Location: [facility] OR;  Service: Pain Management;  Laterality: N/A;  L2    HYSTERECTOMY      OPEN REDUCTION AND INTERNAL FIXATION (ORIF) OF INJURY OF FOREARM Left 3/6/2020    Procedure: ORIF, FOREARM;  Surgeon: Tab Mendez MD;  Location: [facility] OR;  Service: Orthopedics;  Laterality: Left;    THYROIDECTOMY      THYROIDECTOMY         Review of patient's allergies indicates:   Allergen Reactions    Metoprolol Other (See Comments)     Grand-daughter/care giver reported Pt has over reaction to this drug, Bp bottoms out along with heart rate    Codeine Other (See Comments)     Other reaction(s): Unknown "shaky" "crazy" "dizzy" stated per patient         Current Facility-Administered Medications on File Prior to Encounter   Medication    0.9%  NaCl infusion    electrolyte-S (ISOLYTE)    lactated ringers infusion    lactated ringers infusion    lactated ringers infusion    lidocaine (PF) 10 mg/ml (1%) injection 10 mg     Current Outpatient Medications on File Prior to Encounter   Medication Sig    ARIPiprazole (ABILIFY) 2 MG Tab Take 1 tablet (2 mg total) by mouth every evening.    benztropine (COGENTIN) 1 MG tablet Take 1 tablet (1 mg total) by mouth 2 (two) times daily.    busPIRone (BUSPAR) 10 MG tablet Take 1 tablet (10 mg total) by mouth 3 (three) times daily.    calcitRIOL (ROCALTROL) 0.25 MCG Cap 0.25 mcg once daily.     DAILY-YANDY tablet Take 1 tablet by mouth once daily.     " famotidine (PEPCID) 20 MG tablet Take 20 mg by mouth every evening.     FLUoxetine 20 MG capsule Take 1 capsule (20 mg total) by mouth once daily.    fluticasone-umeclidin-vilanter (TRELEGY ELLIPTA) 100-62.5-25 mcg DsDv Inhale 1 puff into the lungs once daily.    levothyroxine (SYNTHROID) 100 MCG tablet Take 100 mcg by mouth before breakfast.     midodrine (PROAMATINE) 10 MG tablet Take 10 mg by mouth every 7 days. Prior to dialysis treatment    montelukast (SINGULAIR) 10 mg tablet Take 1 tablet (10 mg total) by mouth every evening.    ondansetron (ZOFRAN) 8 MG tablet Take 1 tablet (8 mg total) by mouth every 8 (eight) hours as needed for Nausea. (Patient taking differently: Take 8 mg by mouth every 12 (twelve) hours as needed for Nausea. )    polyethylene glycol (GLYCOLAX) 17 gram PwPk Take 17 g by mouth daily as needed.    rosuvastatin (CRESTOR) 20 MG tablet Take 1 tablet (20 mg total) by mouth once daily.    traZODone (DESYREL) 100 MG tablet Take 100 mg by mouth nightly as needed.     albuterol-ipratropium  mcg (COMBIVENT)  mcg/actuation inhaler Inhale 2 puffs into the lungs every 6 (six) hours as needed for Wheezing.    bisacodyl (DULCOLAX) 5 mg EC tablet Take 1 tablet (5 mg total) by mouth daily as needed for Constipation.    dicyclomine (BENTYL) 10 MG capsule dicyclomine 10 mg capsule    epoetin jacob-epbx (RETACRIT) 10,000 unit/mL imjection Inject 0.34 mLs (3,400 Units total) into the skin every Tues, Thurs, Sat. With hemodialysis    midodrine (PROAMATINE) 5 MG Tab Take 1 tablet (5 mg total) by mouth 2 (two) times daily with meals.    polyvinyl alcohol, artificial tears, (LIQUIFILM TEARS) 1.4 % ophthalmic solution Place 1 drop into the left eye as needed.    PROLIA 60 mg/mL Syrg Inject 1 mL (60 mg total) into the skin every 6 (six) months.    [DISCONTINUED] COMBIVENT RESPIMAT  mcg/actuation inhaler INL 1 PUFF PO Q 6 H PRN    [DISCONTINUED] HYDROcodone-acetaminophen (NORCO)  5-325 mg per tablet Take 1 tablet by mouth every 4 (four) hours as needed for Pain.    [DISCONTINUED] meclizine (ANTIVERT) 12.5 mg tablet meclizine 12.5 mg tablet    [DISCONTINUED] ondansetron (ZOFRAN-ODT) 4 MG TbDL ondansetron 4 mg disintegrating tablet    [DISCONTINUED] traMADol (ULTRAM) 50 mg tablet tramadol 50 mg tablet     Family History     Problem Relation (Age of Onset)    COPD Sister    Cancer Sister, Sister, Cousin, Cousin    Diabetes Father, Cousin    Heart disease Father    No Known Problems Mother    Stroke Paternal Aunt        Tobacco Use    Smoking status: Former Smoker     Packs/day: 1.00     Types: Cigarettes     Last attempt to quit: 10/1/2018     Years since quittin.6    Smokeless tobacco: Never Used   Substance and Sexual Activity    Alcohol use: No    Drug use: Never    Sexual activity: Not Currently     Review of Systems   Constitutional: Positive for fatigue. Negative for activity change, appetite change, chills, diaphoresis, fever and unexpected weight change.   HENT: Negative for congestion, ear pain, facial swelling, hearing loss, sore throat and trouble swallowing.    Eyes: Negative for pain and discharge.   Respiratory: Negative for cough, chest tightness, shortness of breath and wheezing.    Cardiovascular: Negative for chest pain, palpitations and leg swelling.   Gastrointestinal: Positive for abdominal pain, diarrhea and nausea. Negative for blood in stool and vomiting.   Endocrine: Negative for polydipsia, polyphagia and polyuria.   Genitourinary: Negative for difficulty urinating, dysuria, flank pain, frequency and urgency.   Musculoskeletal: Negative for arthralgias, back pain, joint swelling, neck pain and neck stiffness.   Skin: Negative for rash and wound.   Allergic/Immunologic: Negative for environmental allergies and immunocompromised state.   Neurological: Positive for weakness. Negative for dizziness, seizures, syncope, speech difficulty, light-headedness,  numbness and headaches.   Hematological: Negative for adenopathy.   Psychiatric/Behavioral: Negative for sleep disturbance and suicidal ideas. The patient is not nervous/anxious.    All other systems reviewed and are negative.    Objective:     Vital Signs (Most Recent):  Temp: 98.9 °F (37.2 °C) (06/06/20 1108)  Pulse: 78 (06/06/20 1500)  Resp: (!) 22 (06/06/20 1500)  BP: 123/71 (06/06/20 1500)  SpO2: 98 % (06/06/20 1500) Vital Signs (24h Range):  Temp:  [98.9 °F (37.2 °C)] 98.9 °F (37.2 °C)  Pulse:  [75-82] 78  Resp:  [22-32] 22  SpO2:  [96 %-100 %] 98 %  BP: (117-126)/(69-97) 123/71     Weight: 62.6 kg (138 lb)  Body mass index is 21.61 kg/m².    Physical Exam   Constitutional: She is oriented to person, place, and time. She appears well-developed and well-nourished.   HENT:   Head: Normocephalic and atraumatic.   MM dry   Eyes: Pupils are equal, round, and reactive to light. EOM are normal.   Neck: Normal range of motion. Neck supple.   Cardiovascular: Normal rate, regular rhythm, normal heart sounds and intact distal pulses.   No murmur heard.  Pulmonary/Chest: Effort normal and breath sounds normal. No stridor. No respiratory distress. She has no wheezes.   Abdominal: Soft. Bowel sounds are normal. She exhibits no distension. There is tenderness.   Musculoskeletal: Normal range of motion.   Neurological: She is alert and oriented to person, place, and time.   Skin: Skin is warm and dry. Capillary refill takes less than 2 seconds.   Psychiatric:   Appears depressed   Nursing note and vitals reviewed.        CRANIAL NERVES     CN III, IV, VI   Pupils are equal, round, and reactive to light.  Extraocular motions are normal.        Significant Labs:   CBC:   Recent Labs   Lab 06/06/20  1129   WBC 6.91   HGB 11.9*   HCT 37.3        CMP:   Recent Labs   Lab 06/06/20  1129      K 3.5      CO2 21*   GLU 99   BUN 44*   CREATININE 6.7*   CALCIUM 9.3   PROT 7.7   ALBUMIN 4.1   BILITOT 0.8   ALKPHOS 107    AST 25   ALT 20   ANIONGAP 15   EGFRNONAA 5.4*       Significant Imaging: I have reviewed all pertinent imaging results/findings within the past 24 hours.     Ct Abdomen Pelvis  Without Contrast    Result Date: 6/6/2020  CMS MANDATED QUALITY DATA - CT RADIATION  436 All CT scans at this facility utilize dose modulation, iterative reconstruction, and/or weight based dosing when appropriate to reduce radiation dose to as low as reasonably achievable. CLINICAL HISTORY: 80 years (1940) Female Abdominal pain, unspecified TECHNIQUE: CT ABDOMEN PELVIS WITHOUT CONTRAST. 235 images obtained. Axial CT images of the abdomen and pelvis were obtained from the dome of the diaphragm to the proximal thigh. CONTRAST: No IV contrast was administered COMPARISON: None available. FINDINGS: Evaluation of the solid organs and vasculature is suboptimal without contrast. Lower Thorax: 10 x 6 mm pulmonary nodule in the right middle lobe (image 1), with a somewhat tubular appearance only partially assessed on this exam. There is emphysematous lung disease seen at the lung bases. The heart is normal in size with scattered coronary arterial calcifications. Linear bandlike areas of atelectasis versus scarring at the lung bases. CT Abdomen: Liver: The liver is normal in size and imaging appearance. Gallbladder: The gallbladder is within normal limits. Biliary Tree: No intra or extrahepatic ductal dilation. Spleen: Within normal limits. Pancreas: The pancreas is normal. There is a moderate-sized periampullary duodenal diverticulum. Adrenal Glands: The adrenal glands appear within normal limits. Kidneys: No hydronephrosis, hydroureter or radiopaque renal/collecting system stone. There is bilateral renal cortical atrophy and thinning. There is a 15 mm diameter simple fluid attenuation structure exophytic from the inferior pole of the left kidney, favored to represent a cyst. Vasculature: Atheromatous calcifications in the abdominal aorta with  no aneurysm. Lymph nodes: No abdominal lymphadenopathy is seen. Intraperitoneal structures: Trace free fluid is seen in the pelvic cul-de-sac. Bowel: Moderate circumferential anorectal colonic wall thickening with mesorectal fat stranding consistent with proctocolitis. Abdominal wall: The abdominal wall and musculature are normal. Musculoskeletal: Dextroscoliosis of the lumbar spine, possibly positional There is degenerative disc disease and facet arthropathy in the lumbar spine with no aggressive appearing lytic or blastic lesions There is diffusely decreased osseous mineralization (suggesting osteoporosis/osteopenia). Compression deformity of the L2 vertebral body with vertebroplasty cement and 25% height loss of the superior endplate. CT Pelvis: Bladder: The urinary bladder is within normal limits. Reproductive Organs: The uterus is not visualized/surgically absent. Pelvic Lymph nodes: No pelvic lymphadenopathy or pelvic mass is identified. IMPRESSION: 1. Findings of moderate proctocolitis without findings of obstruction, intra-abdominal free air or abscess. 2. Moderate volume of stool and gas seen in the remaining colon with a nonobstructive bowel gas pattern, consider correlation for constipation. 3. Tubular soft tissue attenuation pulmonary nodule in the right middle lobe measuring at least 10 x 6 mm, consider nonemergent outpatient CT follow-up. 4. Numerous additional, incidental findings as above. . Electronically Signed by MAE Monet on 6/6/2020 11:45 AM      Assessment/Plan:     * Proctocolitis  Admit to med/surg tele   Zosyn/flagyl renally dose  Stool studies: culture, WBCs, ova, cysts, parasites, and C. Diff  Pt is on HD and will hold off on IV hydration  Push oral hydration       Metabolic acidosis  Renal failure and diarrhea  Monitor, will have HD   Repeat BMP in AM       Borderline hypotension  Stable  Continue home midodrine       ESRD (end stage renal disease)  Consult Dr. Rodrigues for HD    Electrolytes remain stable   Repeat BMP, mag, phos in AM      Dehydration  Mild  Will hold off on IV hydration given pt is on HD and has missed the last 2   Push oral hydration         VTE Risk Mitigation (From admission, onward)         Ordered     IP VTE HIGH RISK PATIENT  Once      06/06/20 1352     Place sequential compression device  Until discontinued      06/06/20 1352                   Kyleigh Driscoll NP  Department of Hospital Medicine   [facility]

## 2020-06-06 NOTE — HPI
"Ms. Gaona presents today with complaints of diarrhea. It is moderate. It is associated with weakness, chills, bilat lower quadrant pain, and nausea. She denies measured fever, cough, SOB, chest pain, or LOC. She has a history of HTN, HLD, ESRD on HD T/Th/Sat, COPD, and CVA. She's had diarrhea, stating "it just pours out of me" 2-4 times a day since Wed. She missed her last 2 HD appts on Thursday and today d/t the abd pain and diarrhea. In the ED, a CT abd/pelvis was done showing acute proctocolitis without perforation or abscess. Dr. Rodrigues was consulted and will manage her HD. Discussed code status and patient states her living will is "lost", but she wishes to be a full code.  "

## 2020-06-06 NOTE — ED PROVIDER NOTES
"Encounter Date: 6/6/2020       History     Chief Complaint   Patient presents with    Abdominal Pain     lower mid abdominal pain X3, + nausea, + diarrhea since yest.  Last dialysis was Tue.       Patient here with lower abdominal pain over last 3 days with associated diarrhea and nausea no vomiting patient states that she missed dialysis on Thursday secondary to this illness and was unable to go this morning secondary to the pain she denies any blood in her stool she describes the diarrhea as moderate she denies any chest pain no shortness of breath she denies eating anything out of the ordinary prior to onset of the symptoms she has had chills but no fever        Review of patient's allergies indicates:   Allergen Reactions    Metoprolol Other (See Comments)     Grand-daughter/care giver reported Pt has over reaction to this drug, Bp bottoms out along with heart rate    Codeine Other (See Comments)     Other reaction(s): Unknown "shaky" "crazy" "dizzy" stated per patient       Past Medical History:   Diagnosis Date    Alcoholism     no drink since 1984    Anxiety     Asthma     Bipolar affect, depressed     Bipolar disorder     COPD (chronic obstructive pulmonary disease)     Degenerative disc disease, lumbar 11/22/2019    Dialysis patient     ESRD (end stage renal disease)     Full dentures     GERD (gastroesophageal reflux disease)     Hypertension     Pt states low b/p    Limb alert care status     NO BP/IV LEFT ARM    Pancreatitis     Stroke     Thyroid disease      Past Surgical History:   Procedure Laterality Date    APPENDECTOMY      CATARACT EXTRACTION Right     DIALYSIS FISTULA CREATION      left upper arm    EPIDURAL STEROID INJECTION INTO LUMBAR SPINE N/A 11/22/2019    Procedure: Injection-steroid-epidural-lumbar;  Surgeon: Rosanna Khan MD;  Location: [facility], [facility];  Service: Pain Management;  Laterality: N/A;  L5-S1      EPIDURAL STEROID INJECTION INTO LUMBAR SPINE N/A 2/3/2020    " Procedure: Injection-steroid-epidural-lumbar;  Surgeon: Rosanna Khan MD;  Location: [facility] OR;  Service: Pain Management;  Laterality: N/A;  L5-S1    EYE SURGERY      FIXATION KYPHOPLASTY N/A 10/18/2019    Procedure: Kyphoplasty;  Surgeon: Rosanna Khan MD;  Location: [facility] OR;  Service: Pain Management;  Laterality: N/A;  L2    HYSTERECTOMY      OPEN REDUCTION AND INTERNAL FIXATION (ORIF) OF INJURY OF FOREARM Left 3/6/2020    Procedure: ORIF, FOREARM;  Surgeon: Tab Mendez MD;  Location: [facility] OR;  Service: Orthopedics;  Laterality: Left;    THYROIDECTOMY      THYROIDECTOMY       Family History   Problem Relation Age of Onset    No Known Problems Mother     Diabetes Father     Heart disease Father         blood clot in lung went to heart    Cancer Sister         breast    Stroke Paternal Aunt     Cancer Sister         breast    Cancer Cousin         colon    Diabetes Cousin     Cancer Cousin         colon    COPD Sister          of COPD     Social History     Tobacco Use    Smoking status: Former Smoker     Packs/day: 1.00     Types: Cigarettes     Last attempt to quit: 10/1/2018     Years since quittin.6    Smokeless tobacco: Never Used   Substance Use Topics    Alcohol use: No    Drug use: Never     Review of Systems   Constitutional: Negative.    HENT: Negative.    Eyes: Negative.    Respiratory: Negative.    Cardiovascular: Negative.    Gastrointestinal: Positive for abdominal pain, diarrhea and nausea. Negative for abdominal distention, anal bleeding, blood in stool, constipation and vomiting.   Endocrine: Negative.    Genitourinary: Negative.    Musculoskeletal: Negative.    Skin: Negative.    Allergic/Immunologic: Negative.    Neurological: Negative.    Hematological: Negative.    Psychiatric/Behavioral: Negative.        Physical Exam     Initial Vitals [20 1108]   BP Pulse Resp Temp SpO2   (!) (P) 126/97 (P) 82 -- (P) 98.9 °F (37.2 °C) (P) 98 %      MAP        --         Physical Exam    Constitutional: She appears well-developed and well-nourished. No distress.   HENT:   Head: Normocephalic and atraumatic.   Right Ear: External ear normal.   Left Ear: External ear normal.   Mouth/Throat: Oropharynx is clear and moist.   Eyes: Conjunctivae and EOM are normal. Pupils are equal, round, and reactive to light.   Neck: Normal range of motion. Neck supple.   Cardiovascular: Normal rate, regular rhythm, normal heart sounds and intact distal pulses.   Pulmonary/Chest: Breath sounds normal. No respiratory distress.   Abdominal: Soft. She exhibits distension. There is tenderness.   Bowel sounds hyperactive   Musculoskeletal: Normal range of motion. She exhibits no edema.   Neurological: She is alert and oriented to person, place, and time. She has normal strength. GCS score is 15. GCS eye subscore is 4. GCS verbal subscore is 5. GCS motor subscore is 6.   Skin: Skin is warm and dry. Capillary refill takes less than 2 seconds.   Psychiatric: She has a normal mood and affect. Her behavior is normal.         ED Course   Procedures  Labs Reviewed   CBC W/ AUTO DIFFERENTIAL - Abnormal; Notable for the following components:       Result Value    RBC 3.59 (*)     Hemoglobin 11.9 (*)     Mean Corpuscular Volume 104 (*)     Mean Corpuscular Hemoglobin 33.1 (*)     Mean Corpuscular Hemoglobin Conc 31.9 (*)     Lymph # 0.7 (*)     Gran% 83.3 (*)     Lymph% 10.3 (*)     All other components within normal limits   COMPREHENSIVE METABOLIC PANEL - Abnormal; Notable for the following components:    CO2 21 (*)     BUN, Bld 44 (*)     Creatinine 6.7 (*)     eGFR if  6.3 (*)     eGFR if non  5.4 (*)     All other components within normal limits   LIPASE   URINALYSIS, REFLEX TO URINE CULTURE          Imaging Results          CT Abdomen Pelvis  Without Contrast (Final result)  Result time 06/06/20 11:42:02    Final result by Jacob Vergara MD (06/06/20 11:42:02)                  Narrative:    CMS MANDATED QUALITY DATA - CT RADIATION  436    All CT scans at this facility utilize dose modulation, iterative  reconstruction, and/or weight based dosing when appropriate to reduce  radiation dose to as low as reasonably achievable.    CLINICAL HISTORY:  80 years (1940) Female Abdominal pain, unspecified    TECHNIQUE:  CT ABDOMEN PELVIS WITHOUT CONTRAST. 235 images obtained. Axial CT  images of the abdomen and pelvis were obtained from the dome of the  diaphragm to the proximal thigh.    CONTRAST:  No IV contrast was administered    COMPARISON:  None available.    FINDINGS:  Evaluation of the solid organs and vasculature is suboptimal without  contrast.    Lower Thorax:  10 x 6 mm pulmonary nodule in the right middle lobe (image 1), with a  somewhat tubular appearance only partially assessed on this exam.  There is emphysematous lung disease seen at the lung bases. The heart  is normal in size with scattered coronary arterial calcifications.  Linear bandlike areas of atelectasis versus scarring at the lung  bases.    CT Abdomen:  Liver: The liver is normal in size and imaging appearance.  Gallbladder: The gallbladder is within normal limits.  Biliary Tree: No intra or extrahepatic ductal dilation.  Spleen: Within normal limits.  Pancreas: The pancreas is normal. There is a moderate-sized  periampullary duodenal diverticulum.  Adrenal Glands: The adrenal glands appear within normal limits.  Kidneys: No hydronephrosis, hydroureter or radiopaque renal/collecting  system stone. There is bilateral renal cortical atrophy and thinning.  There is a 15 mm diameter simple fluid attenuation structure exophytic  from the inferior pole of the left kidney, favored to represent a  cyst.  Vasculature: Atheromatous calcifications in the abdominal aorta with  no aneurysm.  Lymph nodes: No abdominal lymphadenopathy is seen.  Intraperitoneal structures: Trace free fluid is seen in the  pelvic  cul-de-sac.  Bowel: Moderate circumferential anorectal colonic wall thickening with  mesorectal fat stranding consistent with proctocolitis.  Abdominal wall: The abdominal wall and musculature are normal.  Musculoskeletal: Dextroscoliosis of the lumbar spine, possibly  positional There is degenerative disc disease and facet arthropathy in  the lumbar spine with no aggressive appearing lytic or blastic lesions  There is diffusely decreased osseous mineralization (suggesting  osteoporosis/osteopenia). Compression deformity of the L2 vertebral  body with vertebroplasty cement and 25% height loss of the superior  endplate.    CT Pelvis:  Bladder: The urinary bladder is within normal limits.  Reproductive Organs: The uterus is not visualized/surgically absent.  Pelvic Lymph nodes: No pelvic lymphadenopathy or pelvic mass is  identified.    IMPRESSION:  1. Findings of moderate proctocolitis without findings of obstruction,  intra-abdominal free air or abscess.  2. Moderate volume of stool and gas seen in the remaining colon with a  nonobstructive bowel gas pattern, consider correlation for  constipation.  3. Tubular soft tissue attenuation pulmonary nodule in the right  middle lobe measuring at least 10 x 6 mm, consider nonemergent  outpatient CT follow-up.  4. Numerous additional, incidental findings as above.                    .    Electronically Signed by MAE Monet on 6/6/2020 11:45 AM                               Medical Decision Making:   ED Management:  Patient here with reported abdominal pain with associated diarrhea nausea CT scan shows evidence of proctocolitis will admit for IV antibiotics I discussed patient's care with Dr. Rodrigues and I have consulted the hospitalist for admission I have ordered Zosyn and Flagyl in the emergency department                                 Clinical Impression:       ICD-10-CM ICD-9-CM   1. Proctocolitis K52.9 556.2   2. End stage renal disease N18.6 585.6              ED Disposition Condition    Admit                           Issa Gage MD  06/06/20 3154

## 2020-06-06 NOTE — ASSESSMENT & PLAN NOTE
Admit to med/surg tele   Zosyn/flagyl renally dose  Stool studies: culture, WBCs, ova, cysts, parasites, and C. Diff  Pt is on HD and will hold off on IV hydration  Push oral hydration

## 2020-06-06 NOTE — ED NOTES
Bed: [place] [address]  Expected date:   Expected time:   Means of arrival:   Comments:  [facility] abd pain dialysis pt

## 2020-06-06 NOTE — ASSESSMENT & PLAN NOTE
Mild  Will hold off on IV hydration given pt is on HD and has missed the last 2   Push oral hydration

## 2020-06-06 NOTE — SUBJECTIVE & OBJECTIVE
"Past Medical History:   Diagnosis Date    Alcoholism     no drink since 1984    Anxiety     Asthma     Bipolar affect, depressed     Bipolar disorder     COPD (chronic obstructive pulmonary disease)     Degenerative disc disease, lumbar 11/22/2019    Dialysis patient     ESRD (end stage renal disease)     Full dentures     GERD (gastroesophageal reflux disease)     Hypertension     Pt states low b/p    Limb alert care status     NO BP/IV LEFT ARM    Pancreatitis     Stroke     Thyroid disease        Past Surgical History:   Procedure Laterality Date    APPENDECTOMY      CATARACT EXTRACTION Right     DIALYSIS FISTULA CREATION      left upper arm    EPIDURAL STEROID INJECTION INTO LUMBAR SPINE N/A 11/22/2019    Procedure: Injection-steroid-epidural-lumbar;  Surgeon: Rosanna Khan MD;  Location: [facility] OR;  Service: Pain Management;  Laterality: N/A;  L5-S1      EPIDURAL STEROID INJECTION INTO LUMBAR SPINE N/A 2/3/2020    Procedure: Injection-steroid-epidural-lumbar;  Surgeon: Rosanna Khan MD;  Location: [facility] OR;  Service: Pain Management;  Laterality: N/A;  L5-S1    EYE SURGERY      FIXATION KYPHOPLASTY N/A 10/18/2019    Procedure: Kyphoplasty;  Surgeon: Rosanna Khan MD;  Location: [facility] OR;  Service: Pain Management;  Laterality: N/A;  L2    HYSTERECTOMY      OPEN REDUCTION AND INTERNAL FIXATION (ORIF) OF INJURY OF FOREARM Left 3/6/2020    Procedure: ORIF, FOREARM;  Surgeon: Tab Mendez MD;  Location: [facility] OR;  Service: Orthopedics;  Laterality: Left;    THYROIDECTOMY      THYROIDECTOMY         Review of patient's allergies indicates:   Allergen Reactions    Metoprolol Other (See Comments)     Grand-daughter/care giver reported Pt has over reaction to this drug, Bp bottoms out along with heart rate    Codeine Other (See Comments)     Other reaction(s): Unknown "shaky" "crazy" "dizzy" stated per patient         Current Facility-Administered Medications on File Prior to " Encounter   Medication    0.9%  NaCl infusion    electrolyte-S (ISOLYTE)    lactated ringers infusion    lactated ringers infusion    lactated ringers infusion    lidocaine (PF) 10 mg/ml (1%) injection 10 mg     Current Outpatient Medications on File Prior to Encounter   Medication Sig    ARIPiprazole (ABILIFY) 2 MG Tab Take 1 tablet (2 mg total) by mouth every evening.    benztropine (COGENTIN) 1 MG tablet Take 1 tablet (1 mg total) by mouth 2 (two) times daily.    busPIRone (BUSPAR) 10 MG tablet Take 1 tablet (10 mg total) by mouth 3 (three) times daily.    calcitRIOL (ROCALTROL) 0.25 MCG Cap 0.25 mcg once daily.     DAILY-YANDY tablet Take 1 tablet by mouth once daily.     famotidine (PEPCID) 20 MG tablet Take 20 mg by mouth every evening.     FLUoxetine 20 MG capsule Take 1 capsule (20 mg total) by mouth once daily.    fluticasone-umeclidin-vilanter (TRELEGY ELLIPTA) 100-62.5-25 mcg DsDv Inhale 1 puff into the lungs once daily.    levothyroxine (SYNTHROID) 100 MCG tablet Take 100 mcg by mouth before breakfast.     midodrine (PROAMATINE) 10 MG tablet Take 10 mg by mouth every 7 days. Prior to dialysis treatment    montelukast (SINGULAIR) 10 mg tablet Take 1 tablet (10 mg total) by mouth every evening.    ondansetron (ZOFRAN) 8 MG tablet Take 1 tablet (8 mg total) by mouth every 8 (eight) hours as needed for Nausea. (Patient taking differently: Take 8 mg by mouth every 12 (twelve) hours as needed for Nausea. )    polyethylene glycol (GLYCOLAX) 17 gram PwPk Take 17 g by mouth daily as needed.    rosuvastatin (CRESTOR) 20 MG tablet Take 1 tablet (20 mg total) by mouth once daily.    traZODone (DESYREL) 100 MG tablet Take 100 mg by mouth nightly as needed.     albuterol-ipratropium  mcg (COMBIVENT)  mcg/actuation inhaler Inhale 2 puffs into the lungs every 6 (six) hours as needed for Wheezing.    bisacodyl (DULCOLAX) 5 mg EC tablet Take 1 tablet (5 mg total) by mouth daily as needed  for Constipation.    dicyclomine (BENTYL) 10 MG capsule dicyclomine 10 mg capsule    epoetin jacob-epbx (RETACRIT) 10,000 unit/mL imjection Inject 0.34 mLs (3,400 Units total) into the skin every Tues, Thurs, Sat. With hemodialysis    midodrine (PROAMATINE) 5 MG Tab Take 1 tablet (5 mg total) by mouth 2 (two) times daily with meals.    polyvinyl alcohol, artificial tears, (LIQUIFILM TEARS) 1.4 % ophthalmic solution Place 1 drop into the left eye as needed.    PROLIA 60 mg/mL Syrg Inject 1 mL (60 mg total) into the skin every 6 (six) months.    [DISCONTINUED] COMBIVENT RESPIMAT  mcg/actuation inhaler INL 1 PUFF PO Q 6 H PRN    [DISCONTINUED] HYDROcodone-acetaminophen (NORCO) 5-325 mg per tablet Take 1 tablet by mouth every 4 (four) hours as needed for Pain.    [DISCONTINUED] meclizine (ANTIVERT) 12.5 mg tablet meclizine 12.5 mg tablet    [DISCONTINUED] ondansetron (ZOFRAN-ODT) 4 MG TbDL ondansetron 4 mg disintegrating tablet    [DISCONTINUED] traMADol (ULTRAM) 50 mg tablet tramadol 50 mg tablet     Family History     Problem Relation (Age of Onset)    COPD Sister    Cancer Sister, Sister, Cousin, Cousin    Diabetes Father, Cousin    Heart disease Father    No Known Problems Mother    Stroke Paternal Aunt        Tobacco Use    Smoking status: Former Smoker     Packs/day: 1.00     Types: Cigarettes     Last attempt to quit: 10/1/2018     Years since quittin.6    Smokeless tobacco: Never Used   Substance and Sexual Activity    Alcohol use: No    Drug use: Never    Sexual activity: Not Currently     Review of Systems   Constitutional: Positive for fatigue. Negative for activity change, appetite change, chills, diaphoresis, fever and unexpected weight change.   HENT: Negative for congestion, ear pain, facial swelling, hearing loss, sore throat and trouble swallowing.    Eyes: Negative for pain and discharge.   Respiratory: Negative for cough, chest tightness, shortness of breath and wheezing.     Cardiovascular: Negative for chest pain, palpitations and leg swelling.   Gastrointestinal: Positive for abdominal pain, diarrhea and nausea. Negative for blood in stool and vomiting.   Endocrine: Negative for polydipsia, polyphagia and polyuria.   Genitourinary: Negative for difficulty urinating, dysuria, flank pain, frequency and urgency.   Musculoskeletal: Negative for arthralgias, back pain, joint swelling, neck pain and neck stiffness.   Skin: Negative for rash and wound.   Allergic/Immunologic: Negative for environmental allergies and immunocompromised state.   Neurological: Positive for weakness. Negative for dizziness, seizures, syncope, speech difficulty, light-headedness, numbness and headaches.   Hematological: Negative for adenopathy.   Psychiatric/Behavioral: Negative for sleep disturbance and suicidal ideas. The patient is not nervous/anxious.    All other systems reviewed and are negative.    Objective:     Vital Signs (Most Recent):  Temp: 98.9 °F (37.2 °C) (06/06/20 1108)  Pulse: 78 (06/06/20 1500)  Resp: (!) 22 (06/06/20 1500)  BP: 123/71 (06/06/20 1500)  SpO2: 98 % (06/06/20 1500) Vital Signs (24h Range):  Temp:  [98.9 °F (37.2 °C)] 98.9 °F (37.2 °C)  Pulse:  [75-82] 78  Resp:  [22-32] 22  SpO2:  [96 %-100 %] 98 %  BP: (117-126)/(69-97) 123/71     Weight: 62.6 kg (138 lb)  Body mass index is 21.61 kg/m².    Physical Exam   Constitutional: She is oriented to person, place, and time. She appears well-developed and well-nourished.   HENT:   Head: Normocephalic and atraumatic.   MM dry   Eyes: Pupils are equal, round, and reactive to light. EOM are normal.   Neck: Normal range of motion. Neck supple.   Cardiovascular: Normal rate, regular rhythm, normal heart sounds and intact distal pulses.   No murmur heard.  Pulmonary/Chest: Effort normal and breath sounds normal. No stridor. No respiratory distress. She has no wheezes.   Abdominal: Soft. Bowel sounds are normal. She exhibits no distension. There  is tenderness.   Musculoskeletal: Normal range of motion.   Neurological: She is alert and oriented to person, place, and time.   Skin: Skin is warm and dry. Capillary refill takes less than 2 seconds.   Psychiatric:   Appears depressed   Nursing note and vitals reviewed.        CRANIAL NERVES     CN III, IV, VI   Pupils are equal, round, and reactive to light.  Extraocular motions are normal.        Significant Labs:   CBC:   Recent Labs   Lab 06/06/20  1129   WBC 6.91   HGB 11.9*   HCT 37.3        CMP:   Recent Labs   Lab 06/06/20  1129      K 3.5      CO2 21*   GLU 99   BUN 44*   CREATININE 6.7*   CALCIUM 9.3   PROT 7.7   ALBUMIN 4.1   BILITOT 0.8   ALKPHOS 107   AST 25   ALT 20   ANIONGAP 15   EGFRNONAA 5.4*       Significant Imaging: I have reviewed all pertinent imaging results/findings within the past 24 hours.     Ct Abdomen Pelvis  Without Contrast    Result Date: 6/6/2020  CMS MANDATED QUALITY DATA - CT RADIATION  436 All CT scans at this facility utilize dose modulation, iterative reconstruction, and/or weight based dosing when appropriate to reduce radiation dose to as low as reasonably achievable. CLINICAL HISTORY: 80 years (1940) Female Abdominal pain, unspecified TECHNIQUE: CT ABDOMEN PELVIS WITHOUT CONTRAST. 235 images obtained. Axial CT images of the abdomen and pelvis were obtained from the dome of the diaphragm to the proximal thigh. CONTRAST: No IV contrast was administered COMPARISON: None available. FINDINGS: Evaluation of the solid organs and vasculature is suboptimal without contrast. Lower Thorax: 10 x 6 mm pulmonary nodule in the right middle lobe (image 1), with a somewhat tubular appearance only partially assessed on this exam. There is emphysematous lung disease seen at the lung bases. The heart is normal in size with scattered coronary arterial calcifications. Linear bandlike areas of atelectasis versus scarring at the lung bases. CT Abdomen: Liver: The liver is  normal in size and imaging appearance. Gallbladder: The gallbladder is within normal limits. Biliary Tree: No intra or extrahepatic ductal dilation. Spleen: Within normal limits. Pancreas: The pancreas is normal. There is a moderate-sized periampullary duodenal diverticulum. Adrenal Glands: The adrenal glands appear within normal limits. Kidneys: No hydronephrosis, hydroureter or radiopaque renal/collecting system stone. There is bilateral renal cortical atrophy and thinning. There is a 15 mm diameter simple fluid attenuation structure exophytic from the inferior pole of the left kidney, favored to represent a cyst. Vasculature: Atheromatous calcifications in the abdominal aorta with no aneurysm. Lymph nodes: No abdominal lymphadenopathy is seen. Intraperitoneal structures: Trace free fluid is seen in the pelvic cul-de-sac. Bowel: Moderate circumferential anorectal colonic wall thickening with mesorectal fat stranding consistent with proctocolitis. Abdominal wall: The abdominal wall and musculature are normal. Musculoskeletal: Dextroscoliosis of the lumbar spine, possibly positional There is degenerative disc disease and facet arthropathy in the lumbar spine with no aggressive appearing lytic or blastic lesions There is diffusely decreased osseous mineralization (suggesting osteoporosis/osteopenia). Compression deformity of the L2 vertebral body with vertebroplasty cement and 25% height loss of the superior endplate. CT Pelvis: Bladder: The urinary bladder is within normal limits. Reproductive Organs: The uterus is not visualized/surgically absent. Pelvic Lymph nodes: No pelvic lymphadenopathy or pelvic mass is identified. IMPRESSION: 1. Findings of moderate proctocolitis without findings of obstruction, intra-abdominal free air or abscess. 2. Moderate volume of stool and gas seen in the remaining colon with a nonobstructive bowel gas pattern, consider correlation for constipation. 3. Tubular soft tissue attenuation  pulmonary nodule in the right middle lobe measuring at least 10 x 6 mm, consider nonemergent outpatient CT follow-up. 4. Numerous additional, incidental findings as above. . Electronically Signed by MAE Monet on 6/6/2020 11:45 AM

## 2020-06-07 LAB
ANION GAP SERPL CALC-SCNC: 12 MMOL/L (ref 8–16)
BASOPHILS # BLD AUTO: 0.02 K/UL (ref 0–0.2)
BASOPHILS NFR BLD: 0.4 % (ref 0–1.9)
BUN SERPL-MCNC: 44 MG/DL (ref 8–23)
CALCIUM SERPL-MCNC: 8.3 MG/DL (ref 8.7–10.5)
CHLORIDE SERPL-SCNC: 107 MMOL/L (ref 95–110)
CO2 SERPL-SCNC: 22 MMOL/L (ref 23–29)
CREAT SERPL-MCNC: 6.7 MG/DL (ref 0.5–1.4)
DIFFERENTIAL METHOD: ABNORMAL
EOSINOPHIL # BLD AUTO: 0.1 K/UL (ref 0–0.5)
EOSINOPHIL NFR BLD: 1.6 % (ref 0–8)
ERYTHROCYTE [DISTWIDTH] IN BLOOD BY AUTOMATED COUNT: 13.6 % (ref 11.5–14.5)
EST. GFR  (AFRICAN AMERICAN): 6.3 ML/MIN/1.73 M^2
EST. GFR  (NON AFRICAN AMERICAN): 5.4 ML/MIN/1.73 M^2
GLUCOSE SERPL-MCNC: 82 MG/DL (ref 70–110)
HCT VFR BLD AUTO: 31.2 % (ref 37–48.5)
HGB BLD-MCNC: 9.6 G/DL (ref 12–16)
IMM GRANULOCYTES # BLD AUTO: 0.06 K/UL (ref 0–0.04)
IMM GRANULOCYTES NFR BLD AUTO: 1.2 % (ref 0–0.5)
LYMPHOCYTES # BLD AUTO: 0.9 K/UL (ref 1–4.8)
LYMPHOCYTES NFR BLD: 17.7 % (ref 18–48)
MAGNESIUM SERPL-MCNC: 2.1 MG/DL (ref 1.6–2.6)
MCH RBC QN AUTO: 33.2 PG (ref 27–31)
MCHC RBC AUTO-ENTMCNC: 30.8 G/DL (ref 32–36)
MCV RBC AUTO: 108 FL (ref 82–98)
MONOCYTES # BLD AUTO: 0.4 K/UL (ref 0.3–1)
MONOCYTES NFR BLD: 8.1 % (ref 4–15)
NEUTROPHILS # BLD AUTO: 3.5 K/UL (ref 1.8–7.7)
NEUTROPHILS NFR BLD: 71 % (ref 38–73)
NRBC BLD-RTO: 0 /100 WBC
PHOSPHATE SERPL-MCNC: 4.4 MG/DL (ref 2.7–4.5)
PLATELET # BLD AUTO: 240 K/UL (ref 150–350)
PMV BLD AUTO: 11.1 FL (ref 9.2–12.9)
POTASSIUM SERPL-SCNC: 3.5 MMOL/L (ref 3.5–5.1)
RBC # BLD AUTO: 2.89 M/UL (ref 4–5.4)
SODIUM SERPL-SCNC: 141 MMOL/L (ref 136–145)
WBC # BLD AUTO: 4.92 K/UL (ref 3.9–12.7)

## 2020-06-07 PROCEDURE — 27000221 HC OXYGEN, UP TO 24 HOURS

## 2020-06-07 PROCEDURE — 85025 COMPLETE CBC W/AUTO DIFF WBC: CPT

## 2020-06-07 PROCEDURE — 25000003 PHARM REV CODE 250: Performed by: NURSE PRACTITIONER

## 2020-06-07 PROCEDURE — 36415 COLL VENOUS BLD VENIPUNCTURE: CPT

## 2020-06-07 PROCEDURE — 83735 ASSAY OF MAGNESIUM: CPT

## 2020-06-07 PROCEDURE — G0378 HOSPITAL OBSERVATION PER HR: HCPCS

## 2020-06-07 PROCEDURE — 63600175 PHARM REV CODE 636 W HCPCS: Performed by: NURSE PRACTITIONER

## 2020-06-07 PROCEDURE — 99900035 HC TECH TIME PER 15 MIN (STAT)

## 2020-06-07 PROCEDURE — 84100 ASSAY OF PHOSPHORUS: CPT

## 2020-06-07 PROCEDURE — S0030 INJECTION, METRONIDAZOLE: HCPCS | Performed by: NURSE PRACTITIONER

## 2020-06-07 PROCEDURE — 80048 BASIC METABOLIC PNL TOTAL CA: CPT

## 2020-06-07 PROCEDURE — 63600175 PHARM REV CODE 636 W HCPCS: Performed by: FAMILY MEDICINE

## 2020-06-07 PROCEDURE — 94761 N-INVAS EAR/PLS OXIMETRY MLT: CPT

## 2020-06-07 RX ADMIN — ARIPIPRAZOLE 2 MG: 2 TABLET ORAL at 09:06

## 2020-06-07 RX ADMIN — ROSUVASTATIN CALCIUM 20 MG: 20 TABLET, FILM COATED ORAL at 09:06

## 2020-06-07 RX ADMIN — FAMOTIDINE 20 MG: 20 TABLET, FILM COATED ORAL at 09:06

## 2020-06-07 RX ADMIN — BUSPIRONE HYDROCHLORIDE 10 MG: 5 TABLET ORAL at 11:06

## 2020-06-07 RX ADMIN — PIPERACILLIN AND TAZOBACTAM 3.38 G: 3; .375 INJECTION, POWDER, FOR SOLUTION INTRAVENOUS at 07:06

## 2020-06-07 RX ADMIN — CALCITRIOL CAPSULES 0.25 MCG 0.25 MCG: 0.25 CAPSULE ORAL at 11:06

## 2020-06-07 RX ADMIN — BENZTROPINE MESYLATE 1 MG: 1 TABLET ORAL at 11:06

## 2020-06-07 RX ADMIN — PIPERACILLIN AND TAZOBACTAM 3.38 G: 3; .375 INJECTION, POWDER, FOR SOLUTION INTRAVENOUS at 06:06

## 2020-06-07 RX ADMIN — METRONIDAZOLE 500 MG: 500 INJECTION, SOLUTION INTRAVENOUS at 09:06

## 2020-06-07 RX ADMIN — MIDODRINE HYDROCHLORIDE 5 MG: 2.5 TABLET ORAL at 05:06

## 2020-06-07 RX ADMIN — MORPHINE SULFATE 2 MG: 2 INJECTION, SOLUTION INTRAMUSCULAR; INTRAVENOUS at 03:06

## 2020-06-07 RX ADMIN — BUSPIRONE HYDROCHLORIDE 10 MG: 5 TABLET ORAL at 09:06

## 2020-06-07 RX ADMIN — MONTELUKAST SODIUM 10 MG: 10 TABLET, COATED ORAL at 09:06

## 2020-06-07 RX ADMIN — BENZTROPINE MESYLATE 1 MG: 1 TABLET ORAL at 09:06

## 2020-06-07 RX ADMIN — MIDODRINE HYDROCHLORIDE 10 MG: 2.5 TABLET ORAL at 11:06

## 2020-06-07 RX ADMIN — MIDODRINE HYDROCHLORIDE 5 MG: 2.5 TABLET ORAL at 07:06

## 2020-06-07 RX ADMIN — LEVOTHYROXINE SODIUM 100 MCG: 100 TABLET ORAL at 05:06

## 2020-06-07 RX ADMIN — METRONIDAZOLE 500 MG: 500 INJECTION, SOLUTION INTRAVENOUS at 01:06

## 2020-06-07 RX ADMIN — ONDANSETRON 8 MG: 2 INJECTION INTRAMUSCULAR; INTRAVENOUS at 01:06

## 2020-06-07 RX ADMIN — BUSPIRONE HYDROCHLORIDE 10 MG: 5 TABLET ORAL at 03:06

## 2020-06-07 RX ADMIN — METRONIDAZOLE 500 MG: 500 INJECTION, SOLUTION INTRAVENOUS at 05:06

## 2020-06-07 RX ADMIN — FLUOXETINE 20 MG: 20 CAPSULE ORAL at 11:06

## 2020-06-07 NOTE — SUBJECTIVE & OBJECTIVE
Interval History: af, bp improved following midodrine.  Satting well on 3 L nasal cannula.  Seen by Nephrology with plans for HD tomorrow.  Patient seen and examined reports improvement to diarrhea stating she has not had bowel movement since admission however 2 documented bowel movements over last 24 hr..  Denies cp, sob,  nausea, vomiting, fever, chills, abdominal pain or discomfort.    Review of Systems   As above  Objective:     Vital Signs (Most Recent):  Temp: 97.9 °F (36.6 °C) (06/07/20 0700)  Pulse: 72 (06/07/20 0745)  Resp: 18 (06/07/20 0700)  BP: (!) 86/55 (06/07/20 0700)  SpO2: 97 % (06/07/20 0745) Vital Signs (24h Range):  Temp:  [97.9 °F (36.6 °C)-99 °F (37.2 °C)] 97.9 °F (36.6 °C)  Pulse:  [60-84] 72  Resp:  [18-32] 18  SpO2:  [96 %-100 %] 97 %  BP: ()/(52-97) 86/55     Weight: 65.2 kg (143 lb 11.8 oz)  Body mass index is 22.51 kg/m².    Intake/Output Summary (Last 24 hours) at 6/7/2020 1029  Last data filed at 6/7/2020 0400  Gross per 24 hour   Intake 220 ml   Output 350 ml   Net -130 ml      Physical Exam   Constitutional: She is oriented to person, place, and time. She appears well-developed and well-nourished.   HENT:   Head: Normocephalic and atraumatic.   Eyes: Pupils are equal, round, and reactive to light. EOM are normal.   Neck: Normal range of motion. Neck supple.   Cardiovascular: Normal rate, regular rhythm, normal heart sounds and intact distal pulses.   No murmur heard.  Pulmonary/Chest: Effort normal and breath sounds normal. No stridor. No respiratory distress. She has no wheezes.   Abdominal: Soft. Bowel sounds are normal. She exhibits no distension. There is tenderness.   Musculoskeletal: Normal range of motion.   Neurological: She is alert and oriented to person, place, and time.   Skin: Skin is warm and dry. Capillary refill takes less than 2 seconds.   Psychiatric: She has a normal mood and affect. Her behavior is normal.   Nursing note and vitals reviewed.      Significant  Labs:   Blood Culture: No results for input(s): LABBLOO in the last 48 hours.  CBC:   Recent Labs   Lab 06/06/20  1129 06/07/20  0541   WBC 6.91 4.92   HGB 11.9* 9.6*   HCT 37.3 31.2*    240     CMP:   Recent Labs   Lab 06/06/20  1129 06/07/20  0541    141   K 3.5 3.5    107   CO2 21* 22*   GLU 99 82   BUN 44* 44*   CREATININE 6.7* 6.7*   CALCIUM 9.3 8.3*   PROT 7.7  --    ALBUMIN 4.1  --    BILITOT 0.8  --    ALKPHOS 107  --    AST 25  --    ALT 20  --    ANIONGAP 15 12   EGFRNONAA 5.4* 5.4*     Magnesium:   Recent Labs   Lab 06/07/20  0541   MG 2.1     Stool cx ordered  C.diff ordered      All pertinent labs within the past 24 hours have been reviewed.    Significant Imaging: I have reviewed all pertinent imaging results/findings within the past 24 hours.   CT ab/pelvis:  IMPRESSION:  1. Findings of moderate proctocolitis without findings of obstruction,  intra-abdominal free air or abscess.  2. Moderate volume of stool and gas seen in the remaining colon with a  nonobstructive bowel gas pattern, consider correlation for  constipation.  3. Tubular soft tissue attenuation pulmonary nodule in the right  middle lobe measuring at least 10 x 6 mm, consider nonemergent  outpatient CT follow-up.  4. Numerous additional, incidental findings as above.

## 2020-06-07 NOTE — PLAN OF CARE
06/07/20 0804   STONER Message   Medicare Outpatient and Observation Notification regarding financial responsibility Given to patient/caregiver;Explained to patient/caregiver;Signed/date by patient/caregiver   Date STONER was signed 06/07/20   Time STONER was signed 0800

## 2020-06-07 NOTE — PROGRESS NOTES
[facility] Medicine  Progress Note    Patient Name: Althea Gaona  MRN: [id number]  Patient Class: OP- Observation   Admission Date: 6/6/2020  Length of Stay: 0 days  Attending Physician: Berenice Manuel DO  Primary Care Provider: Zachary Ramsey MD    Subjective:     Principal Problem:Proctocolitis  Chief Complaint   Patient presents with    Abdominal Pain     lower mid abdominal pain X3, + nausea, + diarrhea since yest.  Last dialysis was Tue.         Interval History: af, bp improved following midodrine.  Satting well on 3 L nasal cannula.  Seen by Nephrology with plans for HD tomorrow.  Patient seen and examined reports improvement to diarrhea stating she has not had bowel movement since admission however 2 documented bowel movements over last 24 hr..  Denies cp, sob,  nausea, vomiting, fever, chills, abdominal pain or discomfort.    Review of Systems   As above  Objective:     Vital Signs (Most Recent):  Temp: 97.9 °F (36.6 °C) (06/07/20 0700)  Pulse: 72 (06/07/20 0745)  Resp: 18 (06/07/20 0700)  BP: (!) 86/55 (06/07/20 0700)  SpO2: 97 % (06/07/20 0745) Vital Signs (24h Range):  Temp:  [97.9 °F (36.6 °C)-99 °F (37.2 °C)] 97.9 °F (36.6 °C)  Pulse:  [60-84] 72  Resp:  [18-32] 18  SpO2:  [96 %-100 %] 97 %  BP: ()/(52-97) 86/55     Weight: 65.2 kg (143 lb 11.8 oz)  Body mass index is 22.51 kg/m².    Intake/Output Summary (Last 24 hours) at 6/7/2020 1029  Last data filed at 6/7/2020 0400  Gross per 24 hour   Intake 220 ml   Output 350 ml   Net -130 ml      Physical Exam   Constitutional: She is oriented to person, place, and time. She appears well-developed and well-nourished.   HENT:   Head: Normocephalic and atraumatic.   Eyes: Pupils are equal, round, and reactive to light. EOM are normal.   Neck: Normal range of motion. Neck supple.   Cardiovascular: Normal rate, regular rhythm, normal heart sounds and intact distal pulses.   No murmur heard.  Pulmonary/Chest: Effort normal and  breath sounds normal. No stridor. No respiratory distress. She has no wheezes.   Abdominal: Soft. Bowel sounds are normal. She exhibits no distension. There is tenderness.   Musculoskeletal: Normal range of motion.   Neurological: She is alert and oriented to person, place, and time.   Skin: Skin is warm and dry. Capillary refill takes less than 2 seconds.   Psychiatric: She has a normal mood and affect. Her behavior is normal.   Nursing note and vitals reviewed.      Significant Labs:   Blood Culture: No results for input(s): LABBLOO in the last 48 hours.  CBC:   Recent Labs   Lab 06/06/20  1129 06/07/20  0541   WBC 6.91 4.92   HGB 11.9* 9.6*   HCT 37.3 31.2*    240     CMP:   Recent Labs   Lab 06/06/20 1129 06/07/20  0541    141   K 3.5 3.5    107   CO2 21* 22*   GLU 99 82   BUN 44* 44*   CREATININE 6.7* 6.7*   CALCIUM 9.3 8.3*   PROT 7.7  --    ALBUMIN 4.1  --    BILITOT 0.8  --    ALKPHOS 107  --    AST 25  --    ALT 20  --    ANIONGAP 15 12   EGFRNONAA 5.4* 5.4*     Magnesium:   Recent Labs   Lab 06/07/20  0541   MG 2.1     Stool cx ordered  C.diff ordered      All pertinent labs within the past 24 hours have been reviewed.    Significant Imaging: I have reviewed all pertinent imaging results/findings within the past 24 hours.   CT ab/pelvis:  IMPRESSION:  1. Findings of moderate proctocolitis without findings of obstruction,  intra-abdominal free air or abscess.  2. Moderate volume of stool and gas seen in the remaining colon with a  nonobstructive bowel gas pattern, consider correlation for  constipation.  3. Tubular soft tissue attenuation pulmonary nodule in the right  middle lobe measuring at least 10 x 6 mm, consider nonemergent  outpatient CT follow-up.  4. Numerous additional, incidental findings as above.      Assessment/Plan:      * Colitis   Zosyn/flagyl renally dose  Stool studies: culture, WBCs, ova, cysts, parasites, and C. Diff  encourage oral hydration   bm x 2 since  admission      Metabolic acidosis  Renal failure and diarrhea  Monitor, will have HD on monday        Borderline hypotension  Stable  Continue home midodrine       ESRD (end stage renal disease)  TTS HD via left AVF  Consult Dr. Rodrigues, plans for HD on monday  Electrolytes remain stable   Repeat BMP, mag, phos daily      Dehydration - improving  Mild  Will hold off on IV hydration given pt is on HD and has missed the last 2   encourage oral hydration       VTE Risk Mitigation (From admission, onward)         Ordered     IP VTE HIGH RISK PATIENT  Once      06/06/20 1352     Place sequential compression device  Until discontinued      06/06/20 1352                Berenice Manuel DO  Department of Hospital Medicine   [facility]

## 2020-06-07 NOTE — CONSULTS
"INPATIENT NEPHROLOGY CONSULT   [facility] NEPHROLOGY    Patient Name: Althea Gaona  Date: 06/07/2020    Reason for consultation: ESRD    Chief Complaint:   Chief Complaint   Patient presents with    Abdominal Pain     lower mid abdominal pain X3, + nausea, + diarrhea since yest.  Last dialysis was Tue.         History of Present Illness:  Ms. Gaona presents today with complaints of diarrhea. It is moderate. It is associated with weakness, chills, bilat lower quadrant pain, and nausea. She denies measured fever, cough, SOB, chest pain, or LOC. She has a history of HTN, HLD, ESRD on HD T/Th/Sat, COPD, and CVA. She's had diarrhea, stating "it just pours out of me" 2-4 times a day since Wed. She missed her last 2 HD appts on Thursday and today d/t the abd pain and diarrhea. In the ED, a CT abd/pelvis was done showing acute proctocolitis without perforation or abscess. We are consulted for dialysis.    Plan of Care:    1. Colitis  - dose antbx for dialysis    2. ESRD  - missed HD Tues/Sat  - no acute HD needs today  - ordered HD for AM and then will resume TTS schedule    3. Chronic hypotension  - continue midorine    4. Hypokalemia  - will use 3-4K bath    5. SHPT  - continue calcitriol    6. Anemia of CKD  - ordered CLAIRE 50 units/kg with HD tomorrow and TTS    Thank you for allowing us to participate in this patient's care. We will continue to follow.    Vital Signs:  Temp Readings from Last 3 Encounters:   06/07/20 97.9 °F (36.6 °C) (Oral)   04/09/20 97.9 °F (36.6 °C) (Oral)   03/06/20 97.7 °F (36.5 °C) (Skin)       Pulse Readings from Last 3 Encounters:   06/07/20 72   04/09/20 65   03/06/20 89       BP Readings from Last 3 Encounters:   06/07/20 (!) 86/55   04/09/20 118/73   03/06/20 (!) 98/54       Weight:  Wt Readings from Last 3 Encounters:   06/06/20 65.2 kg (143 lb 11.8 oz)   04/23/20 63.5 kg (140 lb)   04/09/20 63.5 kg (140 lb)       Past Medical & Surgical History:  Past Medical History:   Diagnosis " Date    Alcoholism     no drink since 1984    Anxiety     Asthma     Bipolar affect, depressed     Bipolar disorder     COPD (chronic obstructive pulmonary disease)     Degenerative disc disease, lumbar 11/22/2019    Dialysis patient     ESRD (end stage renal disease)     Full dentures     GERD (gastroesophageal reflux disease)     Hypertension     Pt states low b/p    Limb alert care status     NO BP/IV LEFT ARM    Pancreatitis     Stroke     Thyroid disease        Past Surgical History:   Procedure Laterality Date    APPENDECTOMY      CATARACT EXTRACTION Right     DIALYSIS FISTULA CREATION      left upper arm    EPIDURAL STEROID INJECTION INTO LUMBAR SPINE N/A 11/22/2019    Procedure: Injection-steroid-epidural-lumbar;  Surgeon: Rosanna Khan MD;  Location: [facility] OR;  Service: Pain Management;  Laterality: N/A;  L5-S1      EPIDURAL STEROID INJECTION INTO LUMBAR SPINE N/A 2/3/2020    Procedure: Injection-steroid-epidural-lumbar;  Surgeon: Rosanna Khan MD;  [facility]  Service: Pain Management;  Laterality: N/A;  L5-S1    EYE SURGERY      FIXATION KYPHOPLASTY N/A 10/18/2019    Procedure: Kyphoplasty;  Surgeon: Rosanna Khan MD;  Location: [facility] OR;  Service: Pain Management;  Laterality: N/A;  L2    HYSTERECTOMY      OPEN REDUCTION AND INTERNAL FIXATION (ORIF) OF INJURY OF FOREARM Left 3/6/2020    Procedure: ORIF, FOREARM;  Surgeon: Tab Mendez MD;  Location: [facility] OR;  Service: Orthopedics;  Laterality: Left;    THYROIDECTOMY      THYROIDECTOMY         Past Social History:  Social History     Socioeconomic History    Marital status:      Spouse name: Not on file    Number of children: Not on file    Years of education: Not on file    Highest education level: Not on file   Occupational History    Not on file   Social Needs    Financial resource strain: Not on file    Food insecurity:     Worry: Not on file     Inability: Not on file     Transportation needs:     Medical: Not on file     Non-medical: Not on file   Tobacco Use    Smoking status: Former Smoker     Packs/day: 1.00     Types: Cigarettes     Last attempt to quit: 10/1/2018     Years since quittin.6    Smokeless tobacco: Never Used   Substance and Sexual Activity    Alcohol use: No    Drug use: Never    Sexual activity: Not Currently   Lifestyle    Physical activity:     Days per week: Not on file     Minutes per session: Not on file    Stress: Not on file   Relationships    Social connections:     Talks on phone: Not on file     Gets together: Not on file     Attends [facility] service: Not on file     Active member of club or organization: Not on file     Attends meetings of clubs or organizations: Not on file     Relationship status: Not on file   Other Topics Concern    Not on file   Social History Narrative    Not on file       Medications:  Scheduled Meds:   ARIPiprazole  2 mg Oral QHS    benztropine  1 mg Oral BID    busPIRone  10 mg Oral TID    calcitRIOL  0.25 mcg Oral Daily    famotidine  20 mg Oral QHS    FLUoxetine  20 mg Oral Daily    fluticasone-umeclidin-vilanter  1 puff Inhalation Daily    levothyroxine  100 mcg Oral Before breakfast    metronidazole  500 mg Intravenous Q8H    midodrine  10 mg Oral Every other day    midodrine  5 mg Oral BID WM    montelukast  10 mg Oral QHS    piperacillin-tazobactam (ZOSYN) IVPB  3.375 g Intravenous Q12H    rosuvastatin  20 mg Oral Daily     Continuous Infusions:  PRN Meds:.acetaminophen, morphine, ondansetron, promethazine (PHENERGAN) IVPB, sodium chloride 0.9%, traZODone  Current Facility-Administered Medications on File Prior to Encounter   Medication Dose Route Frequency Provider Last Rate Last Dose    0.9%  NaCl infusion   Intravenous Continuous Rosanna Khan MD 0 mL/hr at 20 1611      electrolyte-S (ISOLYTE)   Intravenous Continuous Philippe Greco MD 10 mL/hr at 20 0910      lactated  ringers infusion   Intravenous Once PRN Rosanna Khan MD        lactated ringers infusion   Intravenous Once PRN Rosanna Khan MD        lactated ringers infusion   Intravenous Continuous Philippe Greco MD        lidocaine (PF) 10 mg/ml (1%) injection 10 mg  1 mL Intradermal Once Philippe Greco MD         Current Outpatient Medications on File Prior to Encounter   Medication Sig Dispense Refill    ARIPiprazole (ABILIFY) 2 MG Tab Take 1 tablet (2 mg total) by mouth every evening. 90 tablet 1    benztropine (COGENTIN) 1 MG tablet Take 1 tablet (1 mg total) by mouth 2 (two) times daily. 60 tablet 2    busPIRone (BUSPAR) 10 MG tablet Take 1 tablet (10 mg total) by mouth 3 (three) times daily. 270 tablet 1    calcitRIOL (ROCALTROL) 0.25 MCG Cap 0.25 mcg once daily.   3    DAILY-YANDY tablet Take 1 tablet by mouth once daily.   11    famotidine (PEPCID) 20 MG tablet Take 20 mg by mouth every evening.   1    FLUoxetine 20 MG capsule Take 1 capsule (20 mg total) by mouth once daily. 90 capsule 1    fluticasone-umeclidin-vilanter (TRELEGY ELLIPTA) 100-62.5-25 mcg DsDv Inhale 1 puff into the lungs once daily. 1 each 11    levothyroxine (SYNTHROID) 100 MCG tablet Take 100 mcg by mouth before breakfast.       midodrine (PROAMATINE) 10 MG tablet Take 10 mg by mouth every 7 days. Prior to dialysis treatment      montelukast (SINGULAIR) 10 mg tablet Take 1 tablet (10 mg total) by mouth every evening. 90 tablet 1    ondansetron (ZOFRAN) 8 MG tablet Take 1 tablet (8 mg total) by mouth every 8 (eight) hours as needed for Nausea. (Patient taking differently: Take 8 mg by mouth every 12 (twelve) hours as needed for Nausea. ) 30 tablet 2    polyethylene glycol (GLYCOLAX) 17 gram PwPk Take 17 g by mouth daily as needed.  0    rosuvastatin (CRESTOR) 20 MG tablet Take 1 tablet (20 mg total) by mouth once daily. 90 tablet 1    traZODone (DESYREL) 100 MG tablet Take 100 mg by mouth nightly as needed.   2     albuterol-ipratropium  mcg (COMBIVENT)  mcg/actuation inhaler Inhale 2 puffs into the lungs every 6 (six) hours as needed for Wheezing.      bisacodyl (DULCOLAX) 5 mg EC tablet Take 1 tablet (5 mg total) by mouth daily as needed for Constipation.  0    dicyclomine (BENTYL) 10 MG capsule dicyclomine 10 mg capsule      epoetin jacob-epbx (RETACRIT) 10,000 unit/mL imjection Inject 0.34 mLs (3,400 Units total) into the skin every Tues, Thurs, Sat. With hemodialysis      midodrine (PROAMATINE) 5 MG Tab Take 1 tablet (5 mg total) by mouth 2 (two) times daily with meals. 60 tablet 11    polyvinyl alcohol, artificial tears, (LIQUIFILM TEARS) 1.4 % ophthalmic solution Place 1 drop into the left eye as needed. 15 mL 0    PROLIA 60 mg/mL Syrg Inject 1 mL (60 mg total) into the skin every 6 (six) months. 1 Syringe 1       Allergies:  Metoprolol and Codeine    Past Family History:  Reviewed; refer to Hospitalist Admission Note    Review of Systems:  Review of Systems - All 14 systems reviewed and negative, except as noted in HPI    Physical Exam:  General Appearance:    NAD, AAO x 3, cooperative, appears stated age   Head:    Normocephalic, atraumatic   Eyes:    PER, EOMI, and conjunctiva/sclera clear bilaterally       Mouth:   Moist mucus membranes, no thrush or oral lesions,       normal dentition   Back:     No CVA tenderness   Lungs:     Clear to auscultation bilaterally, no wheezes, crackles,           rales or rhonchi, symmetric air movement, respirations unlabored   Chest wall:    No tenderness or deformity   Heart:    Regular rate and rhythm, S1 and S2 normal, no murmur, rub   or gallop   Abdomen:     Soft, non-tender, non-distended, bowel sounds active all four   quadrants, no RT or guarding, no masses, no organomegaly   Extremities:   Warm and well perfused, distal pulses are intact, no             cyanosis or peripheral edema   MSK:   No joint or muscle swelling, tenderness or deformity   Skin:   Skin  color, texture, turgor normal, no rashes or lesions   Neurologic/Psychiatric:   CNII-XII intact, normal strength and sensation       throughout, no asterixis; normal affect, memory, judgement     and insight      Results:  Lab Results   Component Value Date     06/07/2020    K 3.5 06/07/2020     06/07/2020    CO2 22 (L) 06/07/2020    BUN 44 (H) 06/07/2020    CREATININE 6.7 (H) 06/07/2020    CALCIUM 8.3 (L) 06/07/2020    ANIONGAP 12 06/07/2020    ESTGFRAFRICA 6.3 (A) 06/07/2020    EGFRNONAA 5.4 (A) 06/07/2020       Lab Results   Component Value Date    CALCIUM 8.3 (L) 06/07/2020    PHOS 4.4 06/07/2020       Recent Labs   Lab 06/07/20  0541   WBC 4.92   RBC 2.89*   HGB 9.6*   HCT 31.2*      *   MCH 33.2*   MCHC 30.8*       I have personally reviewed pertinent radiological imaging and reports.    I have spent > 70 minutes providing care for this patient for the above diagnoses. These services have included chart/data/imaging review, evaluation, exam, formulation of plan, , note preparation, and discussions with staff involved in this patient's care.    Ruby Rodrigues MD MPH  [facility]  [address] [place] [id number]  [phone] (p)  [phone] (f)

## 2020-06-08 LAB
ANION GAP SERPL CALC-SCNC: 11 MMOL/L (ref 8–16)
BASOPHILS # BLD AUTO: 0.04 K/UL (ref 0–0.2)
BASOPHILS NFR BLD: 0.5 % (ref 0–1.9)
BUN SERPL-MCNC: 42 MG/DL (ref 8–23)
CALCIUM SERPL-MCNC: 8.5 MG/DL (ref 8.7–10.5)
CHLORIDE SERPL-SCNC: 109 MMOL/L (ref 95–110)
CO2 SERPL-SCNC: 22 MMOL/L (ref 23–29)
CREAT SERPL-MCNC: 6.1 MG/DL (ref 0.5–1.4)
DIFFERENTIAL METHOD: ABNORMAL
EOSINOPHIL # BLD AUTO: 0.1 K/UL (ref 0–0.5)
EOSINOPHIL NFR BLD: 1.4 % (ref 0–8)
ERYTHROCYTE [DISTWIDTH] IN BLOOD BY AUTOMATED COUNT: 13.6 % (ref 11.5–14.5)
EST. GFR  (AFRICAN AMERICAN): 7 ML/MIN/1.73 M^2
EST. GFR  (NON AFRICAN AMERICAN): 6.1 ML/MIN/1.73 M^2
GLUCOSE SERPL-MCNC: 82 MG/DL (ref 70–110)
HCT VFR BLD AUTO: 34.4 % (ref 37–48.5)
HGB BLD-MCNC: 10.6 G/DL (ref 12–16)
IMM GRANULOCYTES # BLD AUTO: 0.08 K/UL (ref 0–0.04)
IMM GRANULOCYTES NFR BLD AUTO: 1.1 % (ref 0–0.5)
LYMPHOCYTES # BLD AUTO: 0.9 K/UL (ref 1–4.8)
LYMPHOCYTES NFR BLD: 12.9 % (ref 18–48)
MAGNESIUM SERPL-MCNC: 2 MG/DL (ref 1.6–2.6)
MCH RBC QN AUTO: 33 PG (ref 27–31)
MCHC RBC AUTO-ENTMCNC: 30.8 G/DL (ref 32–36)
MCV RBC AUTO: 107 FL (ref 82–98)
MONOCYTES # BLD AUTO: 0.5 K/UL (ref 0.3–1)
MONOCYTES NFR BLD: 7.1 % (ref 4–15)
NEUTROPHILS # BLD AUTO: 5.6 K/UL (ref 1.8–7.7)
NEUTROPHILS NFR BLD: 77 % (ref 38–73)
NRBC BLD-RTO: 0 /100 WBC
PHOSPHATE SERPL-MCNC: 4.2 MG/DL (ref 2.7–4.5)
PLATELET # BLD AUTO: 260 K/UL (ref 150–350)
PMV BLD AUTO: 11.2 FL (ref 9.2–12.9)
POTASSIUM SERPL-SCNC: 3.9 MMOL/L (ref 3.5–5.1)
RBC # BLD AUTO: 3.21 M/UL (ref 4–5.4)
SODIUM SERPL-SCNC: 142 MMOL/L (ref 136–145)
WBC # BLD AUTO: 7.31 K/UL (ref 3.9–12.7)

## 2020-06-08 PROCEDURE — 80048 BASIC METABOLIC PNL TOTAL CA: CPT

## 2020-06-08 PROCEDURE — 12000002 HC ACUTE/MED SURGE SEMI-PRIVATE ROOM

## 2020-06-08 PROCEDURE — 84100 ASSAY OF PHOSPHORUS: CPT

## 2020-06-08 PROCEDURE — 99900035 HC TECH TIME PER 15 MIN (STAT)

## 2020-06-08 PROCEDURE — 63600175 PHARM REV CODE 636 W HCPCS: Performed by: STUDENT IN AN ORGANIZED HEALTH CARE EDUCATION/TRAINING PROGRAM

## 2020-06-08 PROCEDURE — S0030 INJECTION, METRONIDAZOLE: HCPCS | Performed by: NURSE PRACTITIONER

## 2020-06-08 PROCEDURE — 63600175 PHARM REV CODE 636 W HCPCS: Performed by: NURSE PRACTITIONER

## 2020-06-08 PROCEDURE — 25000003 PHARM REV CODE 250: Performed by: NURSE PRACTITIONER

## 2020-06-08 PROCEDURE — 63600175 PHARM REV CODE 636 W HCPCS: Performed by: FAMILY MEDICINE

## 2020-06-08 PROCEDURE — 27000221 HC OXYGEN, UP TO 24 HOURS

## 2020-06-08 PROCEDURE — 97162 PT EVAL MOD COMPLEX 30 MIN: CPT

## 2020-06-08 PROCEDURE — 97530 THERAPEUTIC ACTIVITIES: CPT

## 2020-06-08 PROCEDURE — 83735 ASSAY OF MAGNESIUM: CPT

## 2020-06-08 PROCEDURE — 36415 COLL VENOUS BLD VENIPUNCTURE: CPT

## 2020-06-08 PROCEDURE — 85025 COMPLETE CBC W/AUTO DIFF WBC: CPT

## 2020-06-08 RX ORDER — SODIUM CHLORIDE, SODIUM LACTATE, POTASSIUM CHLORIDE, CALCIUM CHLORIDE 600; 310; 30; 20 MG/100ML; MG/100ML; MG/100ML; MG/100ML
INJECTION, SOLUTION INTRAVENOUS CONTINUOUS
Status: ACTIVE | OUTPATIENT
Start: 2020-06-08 | End: 2020-06-09

## 2020-06-08 RX ADMIN — BUSPIRONE HYDROCHLORIDE 10 MG: 5 TABLET ORAL at 10:06

## 2020-06-08 RX ADMIN — BUSPIRONE HYDROCHLORIDE 10 MG: 5 TABLET ORAL at 03:06

## 2020-06-08 RX ADMIN — LEVOTHYROXINE SODIUM 100 MCG: 100 TABLET ORAL at 06:06

## 2020-06-08 RX ADMIN — METRONIDAZOLE 500 MG: 500 INJECTION, SOLUTION INTRAVENOUS at 12:06

## 2020-06-08 RX ADMIN — BENZTROPINE MESYLATE 1 MG: 1 TABLET ORAL at 10:06

## 2020-06-08 RX ADMIN — MORPHINE SULFATE 2 MG: 2 INJECTION, SOLUTION INTRAMUSCULAR; INTRAVENOUS at 12:06

## 2020-06-08 RX ADMIN — MIDODRINE HYDROCHLORIDE 5 MG: 2.5 TABLET ORAL at 06:06

## 2020-06-08 RX ADMIN — FLUOXETINE 20 MG: 20 CAPSULE ORAL at 09:06

## 2020-06-08 RX ADMIN — METRONIDAZOLE 500 MG: 500 INJECTION, SOLUTION INTRAVENOUS at 05:06

## 2020-06-08 RX ADMIN — PIPERACILLIN AND TAZOBACTAM 3.38 G: 3; .375 INJECTION, POWDER, FOR SOLUTION INTRAVENOUS at 06:06

## 2020-06-08 RX ADMIN — BENZTROPINE MESYLATE 1 MG: 1 TABLET ORAL at 09:06

## 2020-06-08 RX ADMIN — CALCITRIOL CAPSULES 0.25 MCG 0.25 MCG: 0.25 CAPSULE ORAL at 09:06

## 2020-06-08 RX ADMIN — ONDANSETRON 8 MG: 2 INJECTION INTRAMUSCULAR; INTRAVENOUS at 09:06

## 2020-06-08 RX ADMIN — FAMOTIDINE 20 MG: 20 TABLET, FILM COATED ORAL at 10:06

## 2020-06-08 RX ADMIN — METRONIDAZOLE 500 MG: 500 INJECTION, SOLUTION INTRAVENOUS at 10:06

## 2020-06-08 RX ADMIN — SODIUM CHLORIDE, SODIUM LACTATE, POTASSIUM CHLORIDE, AND CALCIUM CHLORIDE: .6; .31; .03; .02 INJECTION, SOLUTION INTRAVENOUS at 01:06

## 2020-06-08 RX ADMIN — ONDANSETRON 8 MG: 2 INJECTION INTRAMUSCULAR; INTRAVENOUS at 10:06

## 2020-06-08 RX ADMIN — MONTELUKAST SODIUM 10 MG: 10 TABLET, COATED ORAL at 10:06

## 2020-06-08 RX ADMIN — MIDODRINE HYDROCHLORIDE 5 MG: 2.5 TABLET ORAL at 09:06

## 2020-06-08 RX ADMIN — ARIPIPRAZOLE 2 MG: 2 TABLET ORAL at 10:06

## 2020-06-08 RX ADMIN — BUSPIRONE HYDROCHLORIDE 10 MG: 5 TABLET ORAL at 09:06

## 2020-06-08 RX ADMIN — ROSUVASTATIN CALCIUM 20 MG: 20 TABLET, FILM COATED ORAL at 10:06

## 2020-06-08 NOTE — PROGRESS NOTES
"[facility] Medicine  Progress Note    Patient Name: Althea Gaona  MRN: [id number]  Patient Class: IP- Inpatient   Admission Date: 2020  Length of Stay: 0 days  Attending Physician: Berenice Manuel DO  Primary Care Provider: Zachary Ramsey MD    Subjective:     Principal Problem:Proctocolitis  Chief Complaint   Patient presents with    Abdominal Pain     lower mid abdominal pain X3, + nausea, + diarrhea since yest.  Last dialysis was Tue.       Interval history: afvss, weaned to room air. No plans for HD today. No bm overnight or today.  Patient appears more confused than yesterday. Oriented to person and place but is guarded and repeating questions and statements. Spoke with patient's  over the phone who states that she had been having more confusion lately and has "good days and bad days" stating that some days she had trouble remembering how to get dressed. He is asked to come to hospital to see if patient is at baseline.   Denies f/c, n/v,d, chest pain, sob.    Objectives:     Vitals(Most Recent)      BP  Min: 94/52  Max: 117/65  Temp  Av.4 °F (36.9 °C)  Min: 98 °F (36.7 °C)  Max: 98.9 °F (37.2 °C)  Pulse  Av.4  Min: 62  Max: 79  Resp  Av.9  Min: 14  Max: 18  SpO2  Av.9 %  Min: 98 %  Max: 100 %             Vitals([id number])  Temp:  [98 °F (36.7 °C)-98.9 °F (37.2 °C)]   Pulse:  [62-79]   Resp:  [14-18]   BP: ()/(52-80)   SpO2:  [98 %-100 %]     I & O([id number])    Intake/Output Summary (Last 24 hours) at 2020 1302  Last data filed at 2020 0747  Gross per 24 hour   Intake 120 ml   Output --   Net 120 ml       Physical Exam:   General: AAOx3. NAD.  HEENT: NCAT. PERRLA. EOMI.     CV: RRR.No M/R/G.   Chest: NL effort. CTAB. No R/R/W.   Abd: +BS x 4. Soft. ND/NT. No rebound or guarding.   Ext: moving well. +distal pulses  Skin: Intact. No rash. No lesions.   Neuro: CN II-XII intact. No focal deficit.  Psych:  No A/V hallucinations. NL affect. No " abnormal behaviors noted    LABS  CBC  Recent Labs   Lab 06/06/20  1129 06/07/20  0541 06/08/20  0441   WBC 6.91 4.92 7.31   RBC 3.59* 2.89* 3.21*   HGB 11.9* 9.6* 10.6*   HCT 37.3 31.2* 34.4*    240 260   * 108* 107*   MCH 33.1* 33.2* 33.0*   MCHC 31.9* 30.8* 30.8*       CMP  Recent Labs   Lab 06/06/20  1129 06/07/20  0541 06/08/20  0441    141 142   K 3.5 3.5 3.9   CO2 21* 22* 22*    107 109   BUN 44* 44* 42*   CREATININE 6.7* 6.7* 6.1*   GLU 99 82 82     Recent Labs   Lab 06/06/20  1129 06/07/20  0541 06/08/20  0441   CALCIUM 9.3 8.3* 8.5*   MG  --  2.1 2.0   PHOS  --  4.4 4.2     Recent Labs   Lab 06/06/20  1129   PROT 7.7   ALBUMIN 4.1   BILITOT 0.8   AST 25   ALKPHOS 107   ALT 20         Assessment/Plan:      * Colitis - improved  Zosyn/flagyl renally dose  Stool studies: culture, WBCs, ova, cysts, parasites, and C. Diff  encourage oral hydration   bm x 2 since admission, and none overnight or today      Metabolic acidosis  Renal failure and diarrhea  Monitoring       Borderline hypotension  Stable  Continue home midodrine       ESRD (end stage renal disease)  TTS HD via left AVF  Consult nephro, no hd today, resume tts regimen.   Electrolytes remain stable   Repeat BMP, mag, phos daily      Dehydration - improving  Mild  encourage oral hydration  Will give gently fluids today       VTE Risk Mitigation (From admission, onward)         Ordered     IP VTE HIGH RISK PATIENT  Once      06/06/20 1352     Place sequential compression device  Until discontinued      06/06/20 1352            addendum: 5:30 PM  Patient admitted to inpatient status based on severity of disease (UR recommendations).   No change in HPI, 10-point ROS, FH, SH, and PH as found in the H&P dated 6/6/2020 located in the EMR of [facility].      Berenice Manuel DO  Department of Hospital Medicine   [facility]

## 2020-06-08 NOTE — PT/OT/SLP EVAL
"Physical Therapy Evaluation    Patient Name:  Althea Gaona   MRN:  [id number]    Recommendations:     Discharge Recommendations:  nursing facility, skilled   Discharge Equipment Recommendations: other (see comments)(TBD next level of care)   Barriers to discharge: None    Assessment:     Althea Gaona is a 80 y.o. female admitted with a medical diagnosis of Proctocolitis.  She presents with the following impairments/functional limitations:  weakness, impaired functional mobilty, impaired cognition, decreased safety awareness, impaired endurance, gait instability, decreased coordination, impaired balance, impaired self care skills. Pt supine with HOB elevated attempted to eat lunch but not close enough to tray; PT offered repositioning and sitting at EOB to eat; Pt agreeable; poor historian and very forgetful; SNF recommended at discharge.     Rehab Prognosis: Good and Fair; patient would benefit from acute skilled PT services to address these deficits and reach maximum level of function.    Recent Surgery: * No surgery found *      Plan:     During this hospitalization, patient to be seen 5 x/week to address the identified rehab impairments via gait training, therapeutic activities, therapeutic exercises and progress toward the following goals:    · Plan of Care Expires:  07/08/20    Subjective     Chief Complaint: "cold"  Patient/Family Comments/goals: home  Pain/Comfort:  · Pain Rating 1: (Not rated but states pain all over 2/2 FM)  · Pain Addressed 1: Distraction    Patients cultural, spiritual, Taoist conflicts given the current situation:      Living Environment:  Pt states she lives at home with  and mother in law; [facility]  Prior to admission, patients level of function was  helps her to w/c and bathes her.  Equipment used at home: wheelchair.  DME owned (not currently used): rolling walker and rollator and cane.  Upon discharge, patient will have assistance from " .    Objective:     Communicated with RN prior to session.  Patient found supine with bed alarm, oxygen, telemetry  upon PT entry to room.    General Precautions: Standard, fall   Orthopedic Precautions:    Braces:       Exams:  · Cognitive Exam:  Patient is oriented to Person and poor historian; max cues for attention to task  · RLE ROM: WFL  · RLE Strength: WFL  · LLE ROM: WFL  · LLE Strength: WFL    Functional Mobility:  · Bed Mobility:     · Supine to Sit: moderate assistance  · Sit to Supine: minimum assistance  · Transfers:     · Sit to Stand:  moderate assistance with hand-held assist  · Gait: side steps along EOB Min A; flexed posture and poor balance  · Balance: fair in sitting; poor in standing; sat at EOB for phone conversation and for feeding 10 mins then wants to lie back down 2/2 cold and tired      Therapeutic Activities and Exercises:   bed mobility; transfer training; PT educated pt/ family on importance of out of bed to chair and functional mobility to negate negative effects of prolonged bed rest. Will progress activity as tolerated by patient;     AM-PAC 6 CLICK MOBILITY  Total Score:11     Patient left supine with all lines intact, call button in reach, bed alarm on and RN notified. HOB elevated and tray table in front with food and drinks set up    GOALS:   Multidisciplinary Problems     Physical Therapy Goals        Problem: Physical Therapy Goal    Goal Priority Disciplines Outcome Goal Variances Interventions   Physical Therapy Goal     PT, PT/OT      Description:  Goals to be met by: discharge     Patient will increase functional independence with mobility by performin. Supine to sit with MInimal Assistance  2. Sit to supine with MInimal Assistance  3. Sit to stand transfer with Contact Guard Assistance  4. Bed to chair transfer with Contact Guard Assistance using Rolling Walker  5. Gait  x 15 feet with Contact Guard Assistance using Rolling Walker.                        History:     Past Medical History:   Diagnosis Date    Alcoholism     no drink since 1984    Anxiety     Asthma     Bipolar affect, depressed     Bipolar disorder     COPD (chronic obstructive pulmonary disease)     Degenerative disc disease, lumbar 11/22/2019    Dialysis patient     ESRD (end stage renal disease)     Full dentures     GERD (gastroesophageal reflux disease)     Hypertension     Pt states low b/p    Limb alert care status     NO BP/IV LEFT ARM    Pancreatitis     Stroke     Thyroid disease        Past Surgical History:   Procedure Laterality Date    APPENDECTOMY      CATARACT EXTRACTION Right     DIALYSIS FISTULA CREATION      left upper arm    EPIDURAL STEROID INJECTION INTO LUMBAR SPINE N/A 11/22/2019    Procedure: Injection-steroid-epidural-lumbar;  Surgeon: Rosanna Khan MD;  Location: [facility] OR;  Service: Pain Management;  Laterality: N/A;  L5-S1      EPIDURAL STEROID INJECTION INTO LUMBAR SPINE N/A 2/3/2020    Procedure: Injection-steroid-epidural-lumbar;  Surgeon: Rosanna Khan MD;  Location: [facility] OR;  Service: Pain Management;  Laterality: N/A;  L5-S1    EYE SURGERY      FIXATION KYPHOPLASTY N/A 10/18/2019    Procedure: Kyphoplasty;  Surgeon: Rosanna Khan MD;  Location: [facility] OR;  Service: Pain Management;  Laterality: N/A;  L2    HYSTERECTOMY      OPEN REDUCTION AND INTERNAL FIXATION (ORIF) OF INJURY OF FOREARM Left 3/6/2020    Procedure: ORIF, FOREARM;  Surgeon: Tab Mendez MD;  Location: [facility] OR;  Service: Orthopedics;  Laterality: Left;    THYROIDECTOMY      THYROIDECTOMY         Time Tracking:     PT Received On: 06/08/20  PT Start Time: 1457     PT Stop Time: 1517  PT Total Time (min): 20 min     Billable Minutes: Evaluation 10 and Therapeutic Activity 10      Rajwinder Vaca, PT  06/08/2020

## 2020-06-08 NOTE — PLAN OF CARE
06/08/20 1120   Discharge Assessment   Assessment Type Discharge Planning Assessment   Confirmed/corrected address and phone number on facesheet? Yes   Assessment information obtained from? Patient;Caregiver   Communicated expected length of stay with patient/caregiver no   Prior to hospitilization cognitive status: Alert/Oriented   Prior to hospitalization functional status: Completely Dependent   Current cognitive status: Alert/Oriented   Current Functional Status: Completely Dependent   Facility Arrived From: home   Lives With spouse   Able to Return to Prior Arrangements other (see comments)  ( thinks she needs to go to SNF)   Is patient able to care for self after discharge? No   Who are your caregiver(s) and their phone number(s)? Derek Armstrong [phone]   Patient's perception of discharge disposition other (comments)  (patient wants to go home  not abloe to cre for her he wants her to go to snf)   Readmission Within the Last 30 Days no previous admission in last 30 days   Patient currently being followed by outpatient case management? No   Patient currently receives any other outside agency services? No   Equipment Currently Used at Home wheelchair;rollator;oxygen;cane, straight;walker, rolling   Do you have any problems affording any of your prescribed medications? No   Is the patient taking medications as prescribed? yes   Does the patient have transportation home? Yes   Transportation Anticipated family or friend will provide   Dialysis Name and Scheduled days n/a   Does the patient receive services at the [facility]? No   Discharge Plan A Skilled Nursing Facility   Discharge Plan B Skilled Nursing Facility   DME Needed Upon Discharge  none   Patient/Family in Agreement with Plan yes   Introduced self to patients meghana and asked if ok to take a few min of their time for short interview for D/C planning and ok with patient

## 2020-06-08 NOTE — PLAN OF CARE
06/07/20 2030   Patient Assessment/Suction   Level of Consciousness (AVPU) alert   Respiratory Effort Unlabored   Expansion/Accessory Muscles/Retractions no use of accessory muscles   All Lung Fields Breath Sounds clear   Rhythm/Pattern, Respiratory unlabored   Cough Frequency no cough   PRE-TX-O2   O2 Device (Oxygen Therapy) nasal cannula   Flow (L/min) 2   SpO2 99 %   Pulse 77   Resp 18   Respiratory Evaluation   $ Care Plan Tech Time 15 min   Evaluation For   (CARE PLAN)

## 2020-06-08 NOTE — CONSULTS
INPATIENT NEPHROLOGY CONSULT   [facility] NEPHROLOGY    Patient Name: Althea Gaona  Date: 06/08/2020    Reason for consultation: ESRD    History of Present Illness:  80F with HTN, ESRD on HD T/Th/Sat, COPD and CVA presents on 6/6 with moderate diarrhea, associated with weakness, chills, bilat lower quadrant pain, and nausea. She missed her last 2 HD appts.  In the ED, a CT abd/pelvis was done showing acute proctocolitis without perforation or abscess. We are consulted for dialysis.    6/7 VSS, no new complains. Skip HD today, plan HD in AM per schedule.    Plan of Care:    1. Colitis  - dose antbx for dialysis    2. ESRD  - missed HD Tues/Sat  - no acute HD needs today  - continue HD TTS.    3. Chronic hypotension  - continue midorine    4. Hypokalemia  - will use 3-4K bath    5. SHPT  - continue calcitriol    6. Anemia of CKD  - ordered CLAIRE 50 units/kg with HD    Thank you for allowing us to participate in this patient's care. We will continue to follow.    Vital Signs:  Temp Readings from Last 3 Encounters:   06/08/20 98.9 °F (37.2 °C) (Oral)   04/09/20 97.9 °F (36.6 °C) (Oral)   03/06/20 97.7 °F (36.5 °C) (Skin)       Pulse Readings from Last 3 Encounters:   06/08/20 71   04/09/20 65   03/06/20 89       BP Readings from Last 3 Encounters:   06/08/20 117/65   04/09/20 118/73   03/06/20 (!) 98/54       Weight:  Wt Readings from Last 3 Encounters:   06/06/20 65.2 kg (143 lb 11.8 oz)   04/23/20 63.5 kg (140 lb)   04/09/20 63.5 kg (140 lb)       Past Medical & Surgical History:  Past Medical History:   Diagnosis Date    Alcoholism     no drink since 1984    Anxiety     Asthma     Bipolar affect, depressed     Bipolar disorder     COPD (chronic obstructive pulmonary disease)     Degenerative disc disease, lumbar 11/22/2019    Dialysis patient     ESRD (end stage renal disease)     Full dentures     GERD (gastroesophageal reflux disease)     Hypertension     Pt states low b/p    Limb alert care status      NO BP/IV LEFT ARM    Pancreatitis     Stroke     Thyroid disease        Past Surgical History:   Procedure Laterality Date    APPENDECTOMY      CATARACT EXTRACTION Right     DIALYSIS FISTULA CREATION      left upper arm    EPIDURAL STEROID INJECTION INTO LUMBAR SPINE N/A 2019    Procedure: Injection-steroid-epidural-lumbar;  Surgeon: Rosanna Khan MD;  Location: [facility] OR;  Service: Pain Management;  Laterality: N/A;  L5-S1      EPIDURAL STEROID INJECTION INTO LUMBAR SPINE N/A 2/3/2020    Procedure: Injection-steroid-epidural-lumbar;  Surgeon: Rosanna Khan MD;  Location: [facility] OR;  Service: Pain Management;  Laterality: N/A;  L5-S1    EYE SURGERY      FIXATION KYPHOPLASTY N/A 10/18/2019    Procedure: Kyphoplasty;  Surgeon: Rosanna Khan MD;  Location: [facility] OR;  Service: Pain Management;  Laterality: N/A;  L2    HYSTERECTOMY      OPEN REDUCTION AND INTERNAL FIXATION (ORIF) OF INJURY OF FOREARM Left 3/6/2020    Procedure: ORIF, FOREARM;  Surgeon: Tab Mendez MD;  Location: [facility] OR;  Service: Orthopedics;  Laterality: Left;    THYROIDECTOMY      THYROIDECTOMY         Past Social History:  Social History     Socioeconomic History    Marital status:      Spouse name: Not on file    Number of children: Not on file    Years of education: Not on file    Highest education level: Not on file   Occupational History    Not on file   Social Needs    Financial resource strain: Not on file    Food insecurity:     Worry: Not on file     Inability: Not on file    Transportation needs:     Medical: Not on file     Non-medical: Not on file   Tobacco Use    Smoking status: Former Smoker     Packs/day: 1.00     Types: Cigarettes     Last attempt to quit: 10/1/2018     Years since quittin.6    Smokeless tobacco: Never Used   Substance and Sexual Activity    Alcohol use: No    Drug use: Never    Sexual activity: Not Currently   Lifestyle    Physical activity:     Days per  week: Not on file     Minutes per session: Not on file    Stress: Not on file   Relationships    Social connections:     Talks on phone: Not on file     Gets together: Not on file     Attends Hinduism service: Not on file     Active member of club or organization: Not on file     Attends meetings of clubs or organizations: Not on file     Relationship status: Not on file   Other Topics Concern    Not on file   Social History Narrative    Not on file       Medications:  Scheduled Meds:   ARIPiprazole  2 mg Oral QHS    benztropine  1 mg Oral BID    busPIRone  10 mg Oral TID    calcitRIOL  0.25 mcg Oral Daily    epoetin jacob-ebpx (RETACRIT) injection  50 Units/kg Intravenous Once    [START ON 6/9/2020] epoetin jacob-ebpx (RETACRIT) injection  50 Units/kg Intravenous Every Tues, Thurs, Sat    famotidine  20 mg Oral QHS    FLUoxetine  20 mg Oral Daily    fluticasone-umeclidin-vilanter  1 puff Inhalation Daily    levothyroxine  100 mcg Oral Before breakfast    metronidazole  500 mg Intravenous Q8H    midodrine  10 mg Oral Every other day    midodrine  5 mg Oral BID WM    montelukast  10 mg Oral QHS    piperacillin-tazobactam (ZOSYN) IVPB  3.375 g Intravenous Q12H    rosuvastatin  20 mg Oral Daily     Continuous Infusions:  PRN Meds:.acetaminophen, morphine, ondansetron, promethazine (PHENERGAN) IVPB, sodium chloride 0.9%, traZODone  Current Facility-Administered Medications on File Prior to Encounter   Medication Dose Route Frequency Provider Last Rate Last Dose    0.9%  NaCl infusion   Intravenous Continuous Rosanna Khan MD 0 mL/hr at 02/03/20 1611      electrolyte-S (ISOLYTE)   Intravenous Continuous Philippe Greco MD 10 mL/hr at 03/06/20 0910      lactated ringers infusion   Intravenous Once PRN Rosanna Khan MD        lactated ringers infusion   Intravenous Once PRN Rosanna Khan MD        lactated ringers infusion   Intravenous Continuous Philippe Greco MD        lidocaine (PF)  10 mg/ml (1%) injection 10 mg  1 mL Intradermal Once Philippe Greco MD         Current Outpatient Medications on File Prior to Encounter   Medication Sig Dispense Refill    ARIPiprazole (ABILIFY) 2 MG Tab Take 1 tablet (2 mg total) by mouth every evening. 90 tablet 1    benztropine (COGENTIN) 1 MG tablet Take 1 tablet (1 mg total) by mouth 2 (two) times daily. 60 tablet 2    busPIRone (BUSPAR) 10 MG tablet Take 1 tablet (10 mg total) by mouth 3 (three) times daily. 270 tablet 1    calcitRIOL (ROCALTROL) 0.25 MCG Cap 0.25 mcg once daily.   3    DAILY-YANDY tablet Take 1 tablet by mouth once daily.   11    famotidine (PEPCID) 20 MG tablet Take 20 mg by mouth every evening.   1    FLUoxetine 20 MG capsule Take 1 capsule (20 mg total) by mouth once daily. 90 capsule 1    fluticasone-umeclidin-vilanter (TRELEGY ELLIPTA) 100-62.5-25 mcg DsDv Inhale 1 puff into the lungs once daily. 1 each 11    levothyroxine (SYNTHROID) 100 MCG tablet Take 100 mcg by mouth before breakfast.       midodrine (PROAMATINE) 10 MG tablet Take 10 mg by mouth every 7 days. Prior to dialysis treatment      montelukast (SINGULAIR) 10 mg tablet Take 1 tablet (10 mg total) by mouth every evening. 90 tablet 1    ondansetron (ZOFRAN) 8 MG tablet Take 1 tablet (8 mg total) by mouth every 8 (eight) hours as needed for Nausea. (Patient taking differently: Take 8 mg by mouth every 12 (twelve) hours as needed for Nausea. ) 30 tablet 2    polyethylene glycol (GLYCOLAX) 17 gram PwPk Take 17 g by mouth daily as needed.  0    rosuvastatin (CRESTOR) 20 MG tablet Take 1 tablet (20 mg total) by mouth once daily. 90 tablet 1    traZODone (DESYREL) 100 MG tablet Take 100 mg by mouth nightly as needed.   2    albuterol-ipratropium  mcg (COMBIVENT)  mcg/actuation inhaler Inhale 2 puffs into the lungs every 6 (six) hours as needed for Wheezing.      bisacodyl (DULCOLAX) 5 mg EC tablet Take 1 tablet (5 mg total) by mouth daily as needed for  Constipation.  0    dicyclomine (BENTYL) 10 MG capsule dicyclomine 10 mg capsule      epoetin jacob-epbx (RETACRIT) 10,000 unit/mL imjection Inject 0.34 mLs (3,400 Units total) into the skin every Tues, Thurs, Sat. With hemodialysis      midodrine (PROAMATINE) 5 MG Tab Take 1 tablet (5 mg total) by mouth 2 (two) times daily with meals. 60 tablet 11    polyvinyl alcohol, artificial tears, (LIQUIFILM TEARS) 1.4 % ophthalmic solution Place 1 drop into the left eye as needed. 15 mL 0    PROLIA 60 mg/mL Syrg Inject 1 mL (60 mg total) into the skin every 6 (six) months. 1 Syringe 1       Allergies:  Metoprolol and Codeine    Past Family History:  Reviewed; refer to Hospitalist Admission Note    Review of Systems:  Review of Systems - All 14 systems reviewed and negative, except as noted in HPI    Physical Exam:  General Appearance:    NAD, AAO x 3, cooperative, appears stated age   Head:    Normocephalic, atraumatic   Eyes:    PER, EOMI, and conjunctiva/sclera clear bilaterally       Mouth:   Moist mucus membranes, no thrush or oral lesions,       normal dentition   Back:     No CVA tenderness   Lungs:     Clear to auscultation bilaterally, no wheezes, crackles,           rales or rhonchi, symmetric air movement, respirations unlabored   Chest wall:    No tenderness or deformity   Heart:    Regular rate and rhythm, S1 and S2 normal, no murmur, rub   or gallop   Abdomen:     Soft, non-tender, non-distended, bowel sounds active all four   quadrants, no RT or guarding, no masses, no organomegaly   Extremities:   Warm and well perfused, distal pulses are intact, no             cyanosis or peripheral edema   MSK:   No joint or muscle swelling, tenderness or deformity   Skin:   Skin color, texture, turgor normal, no rashes or lesions   Neurologic/Psychiatric:   CNII-XII intact, normal strength and sensation       throughout, no asterixis; normal affect, memory, judgement     and insight      Results:  Lab Results    Component Value Date     06/08/2020    K 3.9 06/08/2020     06/08/2020    CO2 22 (L) 06/08/2020    BUN 42 (H) 06/08/2020    CREATININE 6.1 (H) 06/08/2020    CALCIUM 8.5 (L) 06/08/2020    ANIONGAP 11 06/08/2020    ESTGFRAFRICA 7.0 (A) 06/08/2020    EGFRNONAA 6.1 (A) 06/08/2020       Lab Results   Component Value Date    CALCIUM 8.5 (L) 06/08/2020    PHOS 4.2 06/08/2020       Recent Labs   Lab 06/08/20  0441   WBC 7.31   RBC 3.21*   HGB 10.6*   HCT 34.4*      *   MCH 33.0*   MCHC 30.8*       I have personally reviewed pertinent radiological imaging and reports.    Doc Odonnell MD  [facility] [address] [address]  [phone] (p)  [phone] (f)

## 2020-06-09 LAB
ANION GAP SERPL CALC-SCNC: 10 MMOL/L (ref 8–16)
BASOPHILS # BLD AUTO: 0.03 K/UL (ref 0–0.2)
BASOPHILS NFR BLD: 0.5 % (ref 0–1.9)
BUN SERPL-MCNC: 39 MG/DL (ref 8–23)
CALCIUM SERPL-MCNC: 8.3 MG/DL (ref 8.7–10.5)
CHLORIDE SERPL-SCNC: 111 MMOL/L (ref 95–110)
CO2 SERPL-SCNC: 21 MMOL/L (ref 23–29)
CREAT SERPL-MCNC: 5.6 MG/DL (ref 0.5–1.4)
DIFFERENTIAL METHOD: ABNORMAL
EOSINOPHIL # BLD AUTO: 0.1 K/UL (ref 0–0.5)
EOSINOPHIL NFR BLD: 1.5 % (ref 0–8)
ERYTHROCYTE [DISTWIDTH] IN BLOOD BY AUTOMATED COUNT: 13.7 % (ref 11.5–14.5)
EST. GFR  (AFRICAN AMERICAN): 7.8 ML/MIN/1.73 M^2
EST. GFR  (NON AFRICAN AMERICAN): 6.7 ML/MIN/1.73 M^2
GLUCOSE SERPL-MCNC: 72 MG/DL (ref 70–110)
HCT VFR BLD AUTO: 30.7 % (ref 37–48.5)
HGB BLD-MCNC: 9.5 G/DL (ref 12–16)
IMM GRANULOCYTES # BLD AUTO: 0.09 K/UL (ref 0–0.04)
IMM GRANULOCYTES NFR BLD AUTO: 1.5 % (ref 0–0.5)
LYMPHOCYTES # BLD AUTO: 1.2 K/UL (ref 1–4.8)
LYMPHOCYTES NFR BLD: 20.8 % (ref 18–48)
MAGNESIUM SERPL-MCNC: 1.9 MG/DL (ref 1.6–2.6)
MCH RBC QN AUTO: 32.4 PG (ref 27–31)
MCHC RBC AUTO-ENTMCNC: 30.9 G/DL (ref 32–36)
MCV RBC AUTO: 105 FL (ref 82–98)
MONOCYTES # BLD AUTO: 0.5 K/UL (ref 0.3–1)
MONOCYTES NFR BLD: 7.6 % (ref 4–15)
NEUTROPHILS # BLD AUTO: 4.1 K/UL (ref 1.8–7.7)
NEUTROPHILS NFR BLD: 68.1 % (ref 38–73)
NRBC BLD-RTO: 0 /100 WBC
PHOSPHATE SERPL-MCNC: 4 MG/DL (ref 2.7–4.5)
PLATELET # BLD AUTO: 269 K/UL (ref 150–350)
PMV BLD AUTO: 10.6 FL (ref 9.2–12.9)
POTASSIUM SERPL-SCNC: 3.6 MMOL/L (ref 3.5–5.1)
RBC # BLD AUTO: 2.93 M/UL (ref 4–5.4)
SODIUM SERPL-SCNC: 142 MMOL/L (ref 136–145)
WBC # BLD AUTO: 5.96 K/UL (ref 3.9–12.7)

## 2020-06-09 PROCEDURE — 97530 THERAPEUTIC ACTIVITIES: CPT

## 2020-06-09 PROCEDURE — S0030 INJECTION, METRONIDAZOLE: HCPCS | Performed by: NURSE PRACTITIONER

## 2020-06-09 PROCEDURE — 27000221 HC OXYGEN, UP TO 24 HOURS

## 2020-06-09 PROCEDURE — 90935 HEMODIALYSIS ONE EVALUATION: CPT

## 2020-06-09 PROCEDURE — 25000003 PHARM REV CODE 250: Performed by: NURSE PRACTITIONER

## 2020-06-09 PROCEDURE — 87340 HEPATITIS B SURFACE AG IA: CPT

## 2020-06-09 PROCEDURE — 85025 COMPLETE CBC W/AUTO DIFF WBC: CPT

## 2020-06-09 PROCEDURE — 63600175 PHARM REV CODE 636 W HCPCS: Performed by: FAMILY MEDICINE

## 2020-06-09 PROCEDURE — 12000002 HC ACUTE/MED SURGE SEMI-PRIVATE ROOM

## 2020-06-09 PROCEDURE — 99900035 HC TECH TIME PER 15 MIN (STAT)

## 2020-06-09 PROCEDURE — 94761 N-INVAS EAR/PLS OXIMETRY MLT: CPT

## 2020-06-09 PROCEDURE — 80048 BASIC METABOLIC PNL TOTAL CA: CPT

## 2020-06-09 PROCEDURE — 97167 OT EVAL HIGH COMPLEX 60 MIN: CPT

## 2020-06-09 PROCEDURE — 84100 ASSAY OF PHOSPHORUS: CPT

## 2020-06-09 PROCEDURE — 36415 COLL VENOUS BLD VENIPUNCTURE: CPT

## 2020-06-09 PROCEDURE — 86706 HEP B SURFACE ANTIBODY: CPT

## 2020-06-09 PROCEDURE — 83735 ASSAY OF MAGNESIUM: CPT

## 2020-06-09 PROCEDURE — 63600175 PHARM REV CODE 636 W HCPCS: Performed by: NURSE PRACTITIONER

## 2020-06-09 PROCEDURE — 63600175 PHARM REV CODE 636 W HCPCS: Mod: TB | Performed by: INTERNAL MEDICINE

## 2020-06-09 PROCEDURE — 86704 HEP B CORE ANTIBODY TOTAL: CPT

## 2020-06-09 RX ADMIN — PIPERACILLIN AND TAZOBACTAM 3.38 G: 3; .375 INJECTION, POWDER, FOR SOLUTION INTRAVENOUS at 07:06

## 2020-06-09 RX ADMIN — BUSPIRONE HYDROCHLORIDE 10 MG: 5 TABLET ORAL at 02:06

## 2020-06-09 RX ADMIN — ONDANSETRON 8 MG: 2 INJECTION INTRAMUSCULAR; INTRAVENOUS at 09:06

## 2020-06-09 RX ADMIN — MIDODRINE HYDROCHLORIDE 5 MG: 2.5 TABLET ORAL at 05:06

## 2020-06-09 RX ADMIN — FLUOXETINE 20 MG: 20 CAPSULE ORAL at 09:06

## 2020-06-09 RX ADMIN — ONDANSETRON 8 MG: 2 INJECTION INTRAMUSCULAR; INTRAVENOUS at 03:06

## 2020-06-09 RX ADMIN — BENZTROPINE MESYLATE 1 MG: 1 TABLET ORAL at 09:06

## 2020-06-09 RX ADMIN — CALCITRIOL CAPSULES 0.25 MCG 0.25 MCG: 0.25 CAPSULE ORAL at 09:06

## 2020-06-09 RX ADMIN — METRONIDAZOLE 500 MG: 500 INJECTION, SOLUTION INTRAVENOUS at 05:06

## 2020-06-09 RX ADMIN — FAMOTIDINE 20 MG: 20 TABLET, FILM COATED ORAL at 09:06

## 2020-06-09 RX ADMIN — ROSUVASTATIN CALCIUM 20 MG: 20 TABLET, FILM COATED ORAL at 09:06

## 2020-06-09 RX ADMIN — PIPERACILLIN AND TAZOBACTAM 3.38 G: 3; .375 INJECTION, POWDER, FOR SOLUTION INTRAVENOUS at 05:06

## 2020-06-09 RX ADMIN — MORPHINE SULFATE 2 MG: 2 INJECTION, SOLUTION INTRAMUSCULAR; INTRAVENOUS at 03:06

## 2020-06-09 RX ADMIN — EPOETIN ALFA-EPBX 3300 UNITS: 10000 INJECTION, SOLUTION INTRAVENOUS; SUBCUTANEOUS at 11:06

## 2020-06-09 RX ADMIN — BUSPIRONE HYDROCHLORIDE 10 MG: 5 TABLET ORAL at 09:06

## 2020-06-09 RX ADMIN — METRONIDAZOLE 500 MG: 500 INJECTION, SOLUTION INTRAVENOUS at 01:06

## 2020-06-09 RX ADMIN — ARIPIPRAZOLE 2 MG: 2 TABLET ORAL at 09:06

## 2020-06-09 RX ADMIN — MORPHINE SULFATE 2 MG: 2 INJECTION, SOLUTION INTRAMUSCULAR; INTRAVENOUS at 09:06

## 2020-06-09 RX ADMIN — MONTELUKAST SODIUM 10 MG: 10 TABLET, COATED ORAL at 09:06

## 2020-06-09 RX ADMIN — LEVOTHYROXINE SODIUM 100 MCG: 100 TABLET ORAL at 05:06

## 2020-06-09 RX ADMIN — MIDODRINE HYDROCHLORIDE 10 MG: 2.5 TABLET ORAL at 09:06

## 2020-06-09 NOTE — PROGRESS NOTES
HD: consent and hepatitis status confirmed prior to HD, pt stable, tx completed, lines flushed, needles removed, pressure and bandage applied. NET UF: 1L. Report called to Marguerite

## 2020-06-09 NOTE — PLAN OF CARE
Problem: Adult Inpatient Plan of Care  Goal: Plan of Care Review  Outcome: Ongoing, Progressing  Goal: Absence of Hospital-Acquired Illness or Injury  Outcome: Ongoing, Progressing  Goal: Optimal Comfort and Wellbeing  Outcome: Ongoing, Progressing     Problem: Infection  Goal: Infection Symptom Resolution  Outcome: Ongoing, Progressing  Intervention: Prevent or Manage Infection  Flowsheets (Taken 6/9/2020 0187)  Fever Reduction/Comfort Measures: medication administered

## 2020-06-09 NOTE — PT/OT/SLP PROGRESS
Physical Therapy      Patient Name:  Althea Gaona   MRN:  [id number]    Patient not seen today secondary to Dialysis. Will follow-up 06/10/20.    Frances Jara PTA

## 2020-06-09 NOTE — PT/OT/SLP EVAL
"Occupational Therapy   Evaluation    Name: Althea Gaona  MRN: [id number]  Admitting Diagnosis:  Proctocolitis      Recommendations:     Discharge Recommendations: nursing facility, skilled, home health PT, home health OT(SNF vs HH as pt reports she needed extensive assist PTA )  Discharge Equipment Recommendations:  bath bench, bedside commode  Barriers to discharge:  Decreased caregiver support    Assessment:     Althea Gaona is a 80 y.o. female with a medical diagnosis of Proctocolitis.  She presents with reluctance to participate however, pt agreed to OT evaluation after the OT educated the pt on role of OT. "Please help me" stated the patient. Performance deficits affecting function: weakness, impaired endurance, impaired self care skills, impaired cognition, impaired functional mobilty, decreased safety awareness, impaired cardiopulmonary response to activity, gait instability, impaired balance, decreased upper extremity function, decreased lower extremity function.      Rehab Prognosis: Fair; patient would benefit from acute skilled OT services to address these deficits and reach maximum level of function.       Plan:     Patient to be seen 5 x/week to address the above listed problems via self-care/home management, therapeutic activities, therapeutic exercises  · Plan of Care Expires: 06/09/21  · Plan of Care Reviewed with: patient    Subjective     Chief Complaint: "I'm dizzy"   Patient/Family Comments/goals: home with     Occupational Profile:  Living Environment: 2 story house with no steps to enter; BR and BR downstairs  Previous level of function: per pt report, she needed assist with transfers, ADL's; independent with feeding   Roles and Routines: lives with  and MIL  Equipment Used at Home:  wheelchair, rollator, walker, rolling  Assistance upon Discharge: unclear if  will assist the pt and if the pt is at her baseline    Pain/Comfort:  · Pain Rating 1: 0/10  · Pain Rating " "Post-Intervention 1: 0/10    Patients cultural, spiritual, Restoration conflicts given the current situation: no    Objective:     Communicated with: Nursing prior to session.  Patient found HOB elevated with bed alarm, oxygen, telemetry upon OT entry to room.    General Precautions: Standard, fall   Orthopedic Precautions:N/A   Braces: N/A     Occupational Performance:    Bed Mobility:    · Patient completed Rolling/Turning to Right with reluctant to roll and begins to sit up, "I'm dizzy"   · Patient completed Scooting/Bridging with minimum assistance  · Patient completed Supine to Sit with maximal assistance  · Patient completed Sit to Supine with moderate assistance    Functional Mobility/Transfers: NT due to c/o dizziness with sup to sit, and sitting tolerance limited to ~ 3 min with c/o dizziness at the end time    Activities of Daily Living:  · Feeding:  modified independence dentures   · Grooming: pt refused due to feeling nauseated and dizzy    · Lower Body Dressing: refused due to not feeling well    · Toileting: dependence and in bed wiht nursing; pt not feeling well; after dialysis       Cognitive/Visual Perceptual:  Cognitive/Psychosocial Skills:     -       Oriented to: Person, Place and to year not the month, not to situation    -       Follows Commands/attention:Follows one-step commands  -       Communication: clear/fluent  -       Memory: Impaired STM  -       Safety awareness/insight to disability: appears WFL as pt is aware that she doesn't feel well and needs assist however, pt somewhat confused   -       Mood/Affect/Coping skills/emotional control: Appropriate to situation, Cooperative, Blunted, Pleasant and Guarded    Physical Exam:  Balance: -       static sitting balance fair -  Dominant hand: -       right  Upper Extremity Range of Motion:     -       Right Upper Extremity: WFL  -       Left Upper Extremity: AROM shoulder scaption ~ 45*, elbow to hand WFL  Upper Extremity Strength:    -       " Right Upper Extremity: NT due to pt refusing, not feeling well  -       Left Upper Extremity:  due to pt refusing, not feeling well   Strength:    -       Right Upper Extremity: WFL  -       Left Upper Extremity: WFL  Fine Motor Coordination:    -       Intact  Gross motor coordination:   WFL    AMPAC 6 Click ADL:  AMPAC Total Score: 14    Treatment & Education: role of OT; call don't fall; sitting endurance training on the side of the bed for overall conditioning x ~ 3 minutes; self expression by the pt with good social interaction with pt stating liking the OT's name; pt very pleasant, c/o dizziness and nausea; pt was found with O2 valve turned off on the wall with pt's O2 sats 85%; OT donned Ox and turned to 2 LNC and contacted nursing; nursing verified 2 LNC; pt with 97% O2 sats   Education:    Patient left HOB elevated with all lines intact, call button in reach, bed alarm on and nurse notified    GOALS:   Multidisciplinary Problems     Occupational Therapy Goals        Problem: Occupational Therapy Goal    Goal Priority Disciplines Outcome Interventions   Occupational Therapy Goal     OT, PT/OT     Description:  Goals to be met by: discharge    Patient will increase functional independence with ADLs by performing:  Grooming at the sink with Min A seated.   Toileting from toilet with Moderate Assistance for hygiene and clothing management.   Toilet transfer to toilet with Moderate Assistance.                      History:     Past Medical History:   Diagnosis Date    Alcoholism     no drink since 1984    Anxiety     Asthma     Bipolar affect, depressed     Bipolar disorder     COPD (chronic obstructive pulmonary disease)     Degenerative disc disease, lumbar 11/22/2019    Dialysis patient     ESRD (end stage renal disease)     Full dentures     GERD (gastroesophageal reflux disease)     Hypertension     Pt states low b/p    Limb alert care status     NO BP/IV LEFT ARM    Pancreatitis      Stroke     Thyroid disease        Past Surgical History:   Procedure Laterality Date    APPENDECTOMY      CATARACT EXTRACTION Right     DIALYSIS FISTULA CREATION      left upper arm    EPIDURAL STEROID INJECTION INTO LUMBAR SPINE N/A 11/22/2019    Procedure: Injection-steroid-epidural-lumbar;  Surgeon: Rosanna Khan MD;  Location: [facility] OR;  Service: Pain Management;  Laterality: N/A;  L5-S1      EPIDURAL STEROID INJECTION INTO LUMBAR SPINE N/A 2/3/2020    Procedure: Injection-steroid-epidural-lumbar;  Surgeon: Rosanna Khan MD;  Location: [facility] OR;  Service: Pain Management;  Laterality: N/A;  L5-S1    EYE SURGERY      FIXATION KYPHOPLASTY N/A 10/18/2019    Procedure: Kyphoplasty;  Surgeon: Rosanna Khan MD;  Location: [facility] OR;  Service: Pain Management;  Laterality: N/A;  L2    HYSTERECTOMY      OPEN REDUCTION AND INTERNAL FIXATION (ORIF) OF INJURY OF FOREARM Left 3/6/2020    Procedure: ORIF, FOREARM;  Surgeon: Tab Mendez MD;  Location: [facility] OR;  Service: Orthopedics;  Laterality: Left;    THYROIDECTOMY      THYROIDECTOMY         Time Tracking:     OT Date of Treatment: 06/09/20  OT Start Time: 1515  OT Stop Time: 1535  OT Total Time (min): 20 min    Billable Minutes:Evaluation 10  Therapeutic Activity 10    Sachi Whitten OT  6/9/20204:03 PM

## 2020-06-09 NOTE — PLAN OF CARE
Problem: Adult Inpatient Plan of Care  Goal: Plan of Care Review  Outcome: Ongoing, Not Progressing  Goal: Patient-Specific Goal (Individualization)  Outcome: Ongoing, Not Progressing  Goal: Absence of Hospital-Acquired Illness or Injury  Outcome: Ongoing, Not Progressing  Goal: Optimal Comfort and Wellbeing  Outcome: Ongoing, Not Progressing  Goal: Readiness for Transition of Care  Outcome: Ongoing, Not Progressing  Goal: Rounds/Family Conference  Outcome: Ongoing, Not Progressing     Problem: Infection  Goal: Infection Symptom Resolution  Outcome: Ongoing, Not Progressing     Problem: Skin Injury Risk Increased  Goal: Skin Health and Integrity  Outcome: Ongoing, Not Progressing     Problem: Fall Injury Risk  Goal: Absence of Fall and Fall-Related Injury  Outcome: Ongoing, Not Progressing     Problem: Device-Related Complication Risk (Hemodialysis)  Goal: Safe, Effective Therapy Delivery  Outcome: Ongoing, Not Progressing     Problem: Hemodynamic Instability (Hemodialysis)  Goal: Vital Signs Remain in Desired Range  Outcome: Ongoing, Not Progressing     Problem: Infection (Hemodialysis)  Goal: Absence of Infection Signs/Symptoms  Outcome: Ongoing, Not Progressing

## 2020-06-09 NOTE — PLAN OF CARE
06/08/20 1940   PRE-TX-O2   O2 Device (Oxygen Therapy) nasal cannula   $ Is the patient on Low Flow Oxygen? Yes   Flow (L/min) 2   SpO2 98 %   Pulse (!) 56   Resp 16   Respiratory Evaluation   $ Care Plan Tech Time 15 min

## 2020-06-09 NOTE — PLAN OF CARE
06/09/20 0716   Patient Assessment/Suction   Level of Consciousness (AVPU) alert   Respiratory Effort Normal;Unlabored   Expansion/Accessory Muscles/Retractions no use of accessory muscles;no retractions   All Lung Fields Breath Sounds clear   Rhythm/Pattern, Respiratory unlabored;pattern regular;depth regular   PRE-TX-O2   O2 Device (Oxygen Therapy) nasal cannula   $ Is the patient on Low Flow Oxygen? Yes   Flow (L/min) 2   SpO2 99 %   Pulse Oximetry Type Intermittent   $ Pulse Oximetry - Multiple Charge Pulse Oximetry - Multiple   Pulse 63   Resp 18   Respiratory Evaluation   $ Care Plan Tech Time 15 min   Evaluation For   (CARE PLAN)

## 2020-06-09 NOTE — PROGRESS NOTES
[facility] Medicine Progress Note    Subjective:     Got HD today. Borderline low BP during HD but o/w no issues with it. Stools slowing down. Worked with PT.      Objective:   Last 24 Hour Vital Signs:  BP  Min: 85/52  Max: 123/60  Temp  Av.4 °F (36.9 °C)  Min: 98 °F (36.7 °C)  Max: 98.7 °F (37.1 °C)  Pulse  Av.6  Min: 49  Max: 73  Resp  Av.4  Min: 16  Max: 18  SpO2  Av.3 %  Min: 98 %  Max: 99 %  Weight  Av.9 kg (143 lb 1.3 oz)  Min: 64.9 kg (143 lb 1.3 oz)  Max: 64.9 kg (143 lb 1.3 oz)  I/O last 3 completed shifts:  In: 120 [P.O.:120]  Out: -     Physical Examination:  General: AAOx3. NAD.  HEENT: NCAT. PERRLA. EOMI.     CV: RRR.No M/R/G.   Chest: NL effort. CTAB. No R/R/W.   Abd: +BS x 4. Soft. ND/NT. No rebound or guarding.   Ext: moving well. +distal pulses  Skin: Intact. No rash. No lesions.   Neuro: CN II-XII intact. No focal deficit.  Psych:  No A/V hallucinations. NL affect. No abnormal behaviors noted     Laboratory:  Laboratory Data Reviewed: yes    Most Recent Data:  CBC:   Lab Results   Component Value Date    WBC 5.96 2020    HGB 9.5 (L) 2020    HCT 30.7 (L) 2020     2020     (H) 2020    RDW 13.7 2020     BMP:   Lab Results   Component Value Date     2020    K 3.6 2020     (H) 2020    CO2 21 (L) 2020    BUN 39 (H) 2020    GLU 72 2020    CALCIUM 8.3 (L) 2020    MG 1.9 2020    PHOS 4.0 2020     LFTs:   Lab Results   Component Value Date    PROT 7.7 2020    ALBUMIN 4.1 2020    BILITOT 0.8 2020    AST 25 2020    ALKPHOS 107 2020    ALT 20 2020     Coags:   Lab Results   Component Value Date    INR 1.0 2015     FLP:   Lab Results   Component Value Date    CHOL 154 2020    HDL 56 2020    LDLCALC 71 2020    TRIG 205 (H) 2020    CHOLHDL 2.8 2020     DM:   Lab Results   Component Value Date     LDLCALC 71 02/17/2020    CREATININE 5.6 (H) 06/09/2020     Thyroid:   Lab Results   Component Value Date    TSH 1.538 12/29/2015    FREET4 0.87 09/06/2015     Anemia:   Lab Results   Component Value Date    IRON 47 12/29/2015    TIBC 318 12/29/2015    FERRITIN 31 08/28/2015    TPOGGZPC85 683 12/29/2015    FOLATE 5.8 12/29/2015     Cardiac:   Lab Results   Component Value Date    TROPONINI <0.030 08/29/2019     (H) 08/29/2019     Urinalysis:   Lab Results   Component Value Date    COLORU Yellow 12/30/2015    SPECGRAV <=1.005 (A) 12/30/2015    NITRITE Negative 12/30/2015    KETONESU Negative 12/30/2015    UROBILINOGEN Negative 12/30/2015    WBCUA 10 (H) 06/25/2015        Radiology:  Data Reviewed: yes  CT ABDOMEN PELVIS WITHOUT CONTRAST      Current Medications:     Infusions:       Scheduled:   ARIPiprazole  2 mg Oral QHS    benztropine  1 mg Oral BID    busPIRone  10 mg Oral TID    calcitRIOL  0.25 mcg Oral Daily    epoetin jacob-ebpx (RETACRIT) injection  50 Units/kg Intravenous Once    epoetin jacob-ebpx (RETACRIT) injection  50 Units/kg Intravenous Every Tues, Thurs, Sat    famotidine  20 mg Oral QHS    FLUoxetine  20 mg Oral Daily    fluticasone-umeclidin-vilanter  1 puff Inhalation Daily    levothyroxine  100 mcg Oral Before breakfast    midodrine  10 mg Oral Every other day    midodrine  5 mg Oral BID WM    montelukast  10 mg Oral QHS    piperacillin-tazobactam (ZOSYN) IVPB  3.375 g Intravenous Q12H    rosuvastatin  20 mg Oral Daily        PRN:  acetaminophen, morphine, ondansetron, promethazine (PHENERGAN) IVPB, sodium chloride 0.9%, traZODone    Lines and Day Number of Therapy:      Microbiology Data:  Reviewed: yes  Microbiology Results (last 7 days)     Procedure Component Value Units Date/Time    Clostridium difficile EIA [[id number]]     Order Status:  Canceled Specimen:  Stool     Stool culture [[id number]]     Order Status:  No result Specimen:  Stool            Antibiotics and Day  Number of Therapy:  Antibiotics (From admission, onward)    Start     Stop Route Frequency Ordered    06/06/20 1500  piperacillin-tazobactam 3.375 g in dextrose 5 % 50 mL IVPB (ready to mix system)      -- IV Every 12 hours (non-standard times) 06/06/20 1352    06/06/20 1300  piperacillin-tazobactam 3.375 g in dextrose 5 % 50 mL IVPB (ready to mix system)      06/07 0059 IV ED 1 Time 06/06/20 1257                Assessment/Plan:     Althea Gaona is a 80 y.o.female with     Proctocolitis  Admit to med/surg tele   Continue zosyn, d/c flagyl   Stool studies pending: culture, WBCs, ova, cysts, parasites, and C. Diff  Pt is on HD and will hold off on IV hydration  Push oral hydration       Borderline hypotension  Stable  Continue home midodrine       ESRD (end stage renal disease)  Consulted Dr. Rodrigues for HD   Electrolytes remain stable       Metabolic acidosis  Renal failure and diarrhea  Monitor, will have HD              Otf Ramos  [facility] Medicine

## 2020-06-09 NOTE — CONSULTS
INPATIENT NEPHROLOGY CONSULT   [facility] NEPHROLOGY    Patient Name: Althea Gaona  Date: 06/09/2020    Reason for consultation: ESRD    History of Present Illness:  80F with HTN, ESRD on HD T/Th/Sat, COPD and CVA presents on 6/6 with moderate diarrhea, associated with weakness, chills, bilat lower quadrant pain, and nausea. She missed her last 2 HD appts.  In the ED, a CT abd/pelvis was done showing acute proctocolitis without perforation or abscess. We are consulted for dialysis.    6/8 VSS, no new complains. Skip HD today, plan HD in AM per schedule.  6/9 VSS, seen and examined on HD today, tolerating well, no new complains.    Plan of Care:    1. Colitis  - dose antbx for dialysis    2. ESRD  - missed HD Tues/Sat  - no acute HD needs today  - continue HD TTS.    3. Chronic hypotension  - continue midorine    4. Hypokalemia  - will use 3-4K bath    5. SHPT  - continue calcitriol    6. Anemia of CKD  - ordered CLAIRE 50 units/kg with HD    Thank you for allowing us to participate in this patient's care. We will continue to follow.    Vital Signs:  Temp Readings from Last 3 Encounters:   06/09/20 98.6 °F (37 °C) (Oral)   04/09/20 97.9 °F (36.6 °C) (Oral)   03/06/20 97.7 °F (36.5 °C) (Skin)       Pulse Readings from Last 3 Encounters:   06/09/20 73   04/09/20 65   03/06/20 89       BP Readings from Last 3 Encounters:   06/09/20 (!) 98/45   04/09/20 118/73   03/06/20 (!) 98/54       Weight:  Wt Readings from Last 3 Encounters:   06/09/20 64.9 kg (143 lb 1.3 oz)   04/23/20 63.5 kg (140 lb)   04/09/20 63.5 kg (140 lb)       Past Medical & Surgical History:  Past Medical History:   Diagnosis Date    Alcoholism     no drink since 1984    Anxiety     Asthma     Bipolar affect, depressed     Bipolar disorder     COPD (chronic obstructive pulmonary disease)     Degenerative disc disease, lumbar 11/22/2019    Dialysis patient     ESRD (end stage renal disease)     Full dentures     GERD (gastroesophageal reflux  disease)     Hypertension     Pt states low b/p    Limb alert care status     NO BP/IV LEFT ARM    Pancreatitis     Stroke     Thyroid disease        Past Surgical History:   Procedure Laterality Date    APPENDECTOMY      CATARACT EXTRACTION Right     DIALYSIS FISTULA CREATION      left upper arm    EPIDURAL STEROID INJECTION INTO LUMBAR SPINE N/A 2019    Procedure: Injection-steroid-epidural-lumbar;  Surgeon: Rosanna Khan MD;  Location: [facility] OR;  Service: Pain Management;  Laterality: N/A;  L5-S1      EPIDURAL STEROID INJECTION INTO LUMBAR SPINE N/A 2/3/2020    Procedure: Injection-steroid-epidural-lumbar;  Surgeon: Rosanna Khan MD;  Location: [facility] OR;  Service: Pain Management;  Laterality: N/A;  L5-S1    EYE SURGERY      FIXATION KYPHOPLASTY N/A 10/18/2019    Procedure: Kyphoplasty;  Surgeon: Rosanna Khan MD;  Location: [facility] OR;  Service: Pain Management;  Laterality: N/A;  L2    HYSTERECTOMY      OPEN REDUCTION AND INTERNAL FIXATION (ORIF) OF INJURY OF FOREARM Left 3/6/2020    Procedure: ORIF, FOREARM;  Surgeon: Tab Mendez MD;  Location: [facility];  Service: Orthopedics;  Laterality: Left;    THYROIDECTOMY      THYROIDECTOMY         Past Social History:  Social History     Socioeconomic History    Marital status:      Spouse name: Not on file    Number of children: Not on file    Years of education: Not on file    Highest education level: Not on file   Occupational History    Not on file   Social Needs    Financial resource strain: Not on file    Food insecurity:     Worry: Not on file     Inability: Not on file    Transportation needs:     Medical: Not on file     Non-medical: Not on file   Tobacco Use    Smoking status: Former Smoker     Packs/day: 1.00     Types: Cigarettes     Last attempt to quit: 10/1/2018     Years since quittin.6    Smokeless tobacco: Never Used   Substance and Sexual Activity    Alcohol use: No    Drug use: Never     Sexual activity: Not Currently   Lifestyle    Physical activity:     Days per week: Not on file     Minutes per session: Not on file    Stress: Not on file   Relationships    Social connections:     Talks on phone: Not on file     Gets together: Not on file     Attends Denominational service: Not on file     Active member of club or organization: Not on file     Attends meetings of clubs or organizations: Not on file     Relationship status: Not on file   Other Topics Concern    Not on file   Social History Narrative    Not on file       Medications:  Scheduled Meds:   ARIPiprazole  2 mg Oral QHS    benztropine  1 mg Oral BID    busPIRone  10 mg Oral TID    calcitRIOL  0.25 mcg Oral Daily    epoetin jacob-ebpx (RETACRIT) injection  50 Units/kg Intravenous Once    epoetin jacob-ebpx (RETACRIT) injection  50 Units/kg Intravenous Every Tues, Thurs, Sat    famotidine  20 mg Oral QHS    FLUoxetine  20 mg Oral Daily    fluticasone-umeclidin-vilanter  1 puff Inhalation Daily    levothyroxine  100 mcg Oral Before breakfast    metronidazole  500 mg Intravenous Q8H    midodrine  10 mg Oral Every other day    midodrine  5 mg Oral BID WM    montelukast  10 mg Oral QHS    piperacillin-tazobactam (ZOSYN) IVPB  3.375 g Intravenous Q12H    rosuvastatin  20 mg Oral Daily     Continuous Infusions:   lactated ringers 50 mL/hr at 06/08/20 1356     PRN Meds:.acetaminophen, morphine, ondansetron, promethazine (PHENERGAN) IVPB, sodium chloride 0.9%, traZODone  Current Facility-Administered Medications on File Prior to Encounter   Medication Dose Route Frequency Provider Last Rate Last Dose    0.9%  NaCl infusion   Intravenous Continuous Rosanna Khan MD 0 mL/hr at 02/03/20 1611      electrolyte-S (ISOLYTE)   Intravenous Continuous Philippe Greco MD 10 mL/hr at 03/06/20 0910      lactated ringers infusion   Intravenous Once PRN Rosanna Khan MD        lactated ringers infusion   Intravenous Once PRN Rosanna LEACH  MD Erin        lactated ringers infusion   Intravenous Continuous Philippe Greco MD        lidocaine (PF) 10 mg/ml (1%) injection 10 mg  1 mL Intradermal Once Philippe Greco MD         Current Outpatient Medications on File Prior to Encounter   Medication Sig Dispense Refill    ARIPiprazole (ABILIFY) 2 MG Tab Take 1 tablet (2 mg total) by mouth every evening. 90 tablet 1    benztropine (COGENTIN) 1 MG tablet Take 1 tablet (1 mg total) by mouth 2 (two) times daily. 60 tablet 2    busPIRone (BUSPAR) 10 MG tablet Take 1 tablet (10 mg total) by mouth 3 (three) times daily. 270 tablet 1    calcitRIOL (ROCALTROL) 0.25 MCG Cap 0.25 mcg once daily.   3    DAILY-YANDY tablet Take 1 tablet by mouth once daily.   11    famotidine (PEPCID) 20 MG tablet Take 20 mg by mouth every evening.   1    FLUoxetine 20 MG capsule Take 1 capsule (20 mg total) by mouth once daily. 90 capsule 1    fluticasone-umeclidin-vilanter (TRELEGY ELLIPTA) 100-62.5-25 mcg DsDv Inhale 1 puff into the lungs once daily. 1 each 11    levothyroxine (SYNTHROID) 100 MCG tablet Take 100 mcg by mouth before breakfast.       midodrine (PROAMATINE) 10 MG tablet Take 10 mg by mouth every 7 days. Prior to dialysis treatment      montelukast (SINGULAIR) 10 mg tablet Take 1 tablet (10 mg total) by mouth every evening. 90 tablet 1    ondansetron (ZOFRAN) 8 MG tablet Take 1 tablet (8 mg total) by mouth every 8 (eight) hours as needed for Nausea. (Patient taking differently: Take 8 mg by mouth every 12 (twelve) hours as needed for Nausea. ) 30 tablet 2    polyethylene glycol (GLYCOLAX) 17 gram PwPk Take 17 g by mouth daily as needed.  0    rosuvastatin (CRESTOR) 20 MG tablet Take 1 tablet (20 mg total) by mouth once daily. 90 tablet 1    traZODone (DESYREL) 100 MG tablet Take 100 mg by mouth nightly as needed.   2    albuterol-ipratropium  mcg (COMBIVENT)  mcg/actuation inhaler Inhale 2 puffs into the lungs every 6 (six) hours as needed  for Wheezing.      bisacodyl (DULCOLAX) 5 mg EC tablet Take 1 tablet (5 mg total) by mouth daily as needed for Constipation.  0    dicyclomine (BENTYL) 10 MG capsule dicyclomine 10 mg capsule      epoetin jacob-epbx (RETACRIT) 10,000 unit/mL imjection Inject 0.34 mLs (3,400 Units total) into the skin every Tues, Thurs, Sat. With hemodialysis      midodrine (PROAMATINE) 5 MG Tab Take 1 tablet (5 mg total) by mouth 2 (two) times daily with meals. 60 tablet 11    polyvinyl alcohol, artificial tears, (LIQUIFILM TEARS) 1.4 % ophthalmic solution Place 1 drop into the left eye as needed. 15 mL 0    PROLIA 60 mg/mL Syrg Inject 1 mL (60 mg total) into the skin every 6 (six) months. 1 Syringe 1       Allergies:  Metoprolol and Codeine    Past Family History:  Reviewed; refer to Hospitalist Admission Note    Review of Systems:  Review of Systems - All 14 systems reviewed and negative, except as noted in HPI    Physical Exam:  General Appearance:    NAD, AAO x 3, cooperative, appears stated age   Head:    Normocephalic, atraumatic   Eyes:    PER, EOMI, and conjunctiva/sclera clear bilaterally       Mouth:   Moist mucus membranes, no thrush or oral lesions,       normal dentition   Back:     No CVA tenderness   Lungs:     Clear to auscultation bilaterally, no wheezes, crackles,           rales or rhonchi, symmetric air movement, respirations unlabored   Chest wall:    No tenderness or deformity   Heart:    Regular rate and rhythm, S1 and S2 normal, no murmur, rub   or gallop   Abdomen:     Soft, non-tender, non-distended, bowel sounds active all four   quadrants, no RT or guarding, no masses, no organomegaly   Extremities:   Warm and well perfused, distal pulses are intact, no             cyanosis or peripheral edema   MSK:   No joint or muscle swelling, tenderness or deformity   Skin:   Skin color, texture, turgor normal, no rashes or lesions   Neurologic/Psychiatric:   CNII-XII intact, normal strength and sensation        throughout, no asterixis; normal affect, memory, judgement     and insight      Results:  Lab Results   Component Value Date     06/09/2020    K 3.6 06/09/2020     (H) 06/09/2020    CO2 21 (L) 06/09/2020    BUN 39 (H) 06/09/2020    CREATININE 5.6 (H) 06/09/2020    CALCIUM 8.3 (L) 06/09/2020    ANIONGAP 10 06/09/2020    ESTGFRAFRICA 7.8 (A) 06/09/2020    EGFRNONAA 6.7 (A) 06/09/2020       Lab Results   Component Value Date    CALCIUM 8.3 (L) 06/09/2020    PHOS 4.0 06/09/2020       Recent Labs   Lab 06/09/20  0442   WBC 5.96   RBC 2.93*   HGB 9.5*   HCT 30.7*      *   MCH 32.4*   MCHC 30.9*       I have personally reviewed pertinent radiological imaging and reports.    Doc Odonnell MD  [facility] [address] [address]  [phone] (p)  [phone] (f)

## 2020-06-10 LAB
ANION GAP SERPL CALC-SCNC: 10 MMOL/L (ref 8–16)
BASOPHILS # BLD AUTO: 0.02 K/UL (ref 0–0.2)
BASOPHILS NFR BLD: 0.4 % (ref 0–1.9)
BUN SERPL-MCNC: 16 MG/DL (ref 8–23)
CALCIUM SERPL-MCNC: 8 MG/DL (ref 8.7–10.5)
CHLORIDE SERPL-SCNC: 105 MMOL/L (ref 95–110)
CO2 SERPL-SCNC: 25 MMOL/L (ref 23–29)
CREAT SERPL-MCNC: 3.5 MG/DL (ref 0.5–1.4)
DIFFERENTIAL METHOD: ABNORMAL
EOSINOPHIL # BLD AUTO: 0.1 K/UL (ref 0–0.5)
EOSINOPHIL NFR BLD: 1.7 % (ref 0–8)
ERYTHROCYTE [DISTWIDTH] IN BLOOD BY AUTOMATED COUNT: 13.8 % (ref 11.5–14.5)
EST. GFR  (AFRICAN AMERICAN): 13.7 ML/MIN/1.73 M^2
EST. GFR  (NON AFRICAN AMERICAN): 11.9 ML/MIN/1.73 M^2
GLUCOSE SERPL-MCNC: 101 MG/DL (ref 70–110)
GLUCOSE SERPL-MCNC: 79 MG/DL (ref 70–110)
HCT VFR BLD AUTO: 31 % (ref 37–48.5)
HGB BLD-MCNC: 9.6 G/DL (ref 12–16)
IMM GRANULOCYTES # BLD AUTO: 0.09 K/UL (ref 0–0.04)
IMM GRANULOCYTES NFR BLD AUTO: 1.7 % (ref 0–0.5)
LYMPHOCYTES # BLD AUTO: 1.3 K/UL (ref 1–4.8)
LYMPHOCYTES NFR BLD: 24.6 % (ref 18–48)
MAGNESIUM SERPL-MCNC: 1.8 MG/DL (ref 1.6–2.6)
MCH RBC QN AUTO: 32.2 PG (ref 27–31)
MCHC RBC AUTO-ENTMCNC: 31 G/DL (ref 32–36)
MCV RBC AUTO: 104 FL (ref 82–98)
MONOCYTES # BLD AUTO: 0.5 K/UL (ref 0.3–1)
MONOCYTES NFR BLD: 9.3 % (ref 4–15)
NEUTROPHILS # BLD AUTO: 3.3 K/UL (ref 1.8–7.7)
NEUTROPHILS NFR BLD: 62.3 % (ref 38–73)
NRBC BLD-RTO: 0 /100 WBC
PHOSPHATE SERPL-MCNC: 3 MG/DL (ref 2.7–4.5)
PLATELET # BLD AUTO: 257 K/UL (ref 150–350)
PMV BLD AUTO: 10.3 FL (ref 9.2–12.9)
POTASSIUM SERPL-SCNC: 3.6 MMOL/L (ref 3.5–5.1)
RBC # BLD AUTO: 2.98 M/UL (ref 4–5.4)
SODIUM SERPL-SCNC: 140 MMOL/L (ref 136–145)
WBC # BLD AUTO: 5.28 K/UL (ref 3.9–12.7)

## 2020-06-10 PROCEDURE — 36415 COLL VENOUS BLD VENIPUNCTURE: CPT

## 2020-06-10 PROCEDURE — 84100 ASSAY OF PHOSPHORUS: CPT

## 2020-06-10 PROCEDURE — 80048 BASIC METABOLIC PNL TOTAL CA: CPT

## 2020-06-10 PROCEDURE — 94761 N-INVAS EAR/PLS OXIMETRY MLT: CPT

## 2020-06-10 PROCEDURE — 63600175 PHARM REV CODE 636 W HCPCS: Performed by: NURSE PRACTITIONER

## 2020-06-10 PROCEDURE — 83735 ASSAY OF MAGNESIUM: CPT

## 2020-06-10 PROCEDURE — 97535 SELF CARE MNGMENT TRAINING: CPT

## 2020-06-10 PROCEDURE — 97530 THERAPEUTIC ACTIVITIES: CPT | Mod: CQ

## 2020-06-10 PROCEDURE — 97530 THERAPEUTIC ACTIVITIES: CPT

## 2020-06-10 PROCEDURE — 25000003 PHARM REV CODE 250: Performed by: NURSE PRACTITIONER

## 2020-06-10 PROCEDURE — 63600175 PHARM REV CODE 636 W HCPCS: Performed by: FAMILY MEDICINE

## 2020-06-10 PROCEDURE — 85025 COMPLETE CBC W/AUTO DIFF WBC: CPT

## 2020-06-10 PROCEDURE — 97116 GAIT TRAINING THERAPY: CPT | Mod: CQ

## 2020-06-10 PROCEDURE — 12000002 HC ACUTE/MED SURGE SEMI-PRIVATE ROOM

## 2020-06-10 PROCEDURE — 27000221 HC OXYGEN, UP TO 24 HOURS

## 2020-06-10 PROCEDURE — 99900035 HC TECH TIME PER 15 MIN (STAT)

## 2020-06-10 RX ORDER — FLUTICASONE FUROATE AND VILANTEROL 100; 25 UG/1; UG/1
1 POWDER RESPIRATORY (INHALATION) DAILY
Status: DISCONTINUED | OUTPATIENT
Start: 2020-06-11 | End: 2020-06-17 | Stop reason: HOSPADM

## 2020-06-10 RX ORDER — TIOTROPIUM BROMIDE 18 UG/1
1 CAPSULE ORAL; RESPIRATORY (INHALATION) DAILY
Status: DISCONTINUED | OUTPATIENT
Start: 2020-06-11 | End: 2020-06-17 | Stop reason: HOSPADM

## 2020-06-10 RX ORDER — ALBUTEROL SULFATE 0.83 MG/ML
2.5 SOLUTION RESPIRATORY (INHALATION) EVERY 4 HOURS PRN
Status: DISCONTINUED | OUTPATIENT
Start: 2020-06-10 | End: 2020-06-17 | Stop reason: HOSPADM

## 2020-06-10 RX ADMIN — MIDODRINE HYDROCHLORIDE 5 MG: 2.5 TABLET ORAL at 05:06

## 2020-06-10 RX ADMIN — CALCITRIOL CAPSULES 0.25 MCG 0.25 MCG: 0.25 CAPSULE ORAL at 08:06

## 2020-06-10 RX ADMIN — FAMOTIDINE 20 MG: 20 TABLET, FILM COATED ORAL at 09:06

## 2020-06-10 RX ADMIN — LEVOTHYROXINE SODIUM 100 MCG: 100 TABLET ORAL at 05:06

## 2020-06-10 RX ADMIN — MORPHINE SULFATE 2 MG: 2 INJECTION, SOLUTION INTRAMUSCULAR; INTRAVENOUS at 05:06

## 2020-06-10 RX ADMIN — BUSPIRONE HYDROCHLORIDE 10 MG: 5 TABLET ORAL at 09:06

## 2020-06-10 RX ADMIN — PIPERACILLIN AND TAZOBACTAM 3.38 G: 3; .375 INJECTION, POWDER, FOR SOLUTION INTRAVENOUS at 05:06

## 2020-06-10 RX ADMIN — ROSUVASTATIN CALCIUM 20 MG: 20 TABLET, FILM COATED ORAL at 09:06

## 2020-06-10 RX ADMIN — MORPHINE SULFATE 2 MG: 2 INJECTION, SOLUTION INTRAMUSCULAR; INTRAVENOUS at 10:06

## 2020-06-10 RX ADMIN — ARIPIPRAZOLE 2 MG: 2 TABLET ORAL at 09:06

## 2020-06-10 RX ADMIN — FLUOXETINE 20 MG: 20 CAPSULE ORAL at 08:06

## 2020-06-10 RX ADMIN — MONTELUKAST SODIUM 10 MG: 10 TABLET, COATED ORAL at 09:06

## 2020-06-10 RX ADMIN — BENZTROPINE MESYLATE 1 MG: 1 TABLET ORAL at 08:06

## 2020-06-10 RX ADMIN — MORPHINE SULFATE 2 MG: 2 INJECTION, SOLUTION INTRAMUSCULAR; INTRAVENOUS at 06:06

## 2020-06-10 RX ADMIN — MIDODRINE HYDROCHLORIDE 5 MG: 2.5 TABLET ORAL at 08:06

## 2020-06-10 RX ADMIN — ONDANSETRON 8 MG: 2 INJECTION INTRAMUSCULAR; INTRAVENOUS at 05:06

## 2020-06-10 RX ADMIN — BUSPIRONE HYDROCHLORIDE 10 MG: 5 TABLET ORAL at 02:06

## 2020-06-10 RX ADMIN — BENZTROPINE MESYLATE 1 MG: 1 TABLET ORAL at 09:06

## 2020-06-10 RX ADMIN — BUSPIRONE HYDROCHLORIDE 10 MG: 5 TABLET ORAL at 08:06

## 2020-06-10 NOTE — PLAN OF CARE
Problem: Adult Inpatient Plan of Care  Goal: Plan of Care Review  Outcome: Ongoing, Progressing  Goal: Patient-Specific Goal (Individualization)  Outcome: Ongoing, Progressing  Goal: Absence of Hospital-Acquired Illness or Injury  Outcome: Ongoing, Progressing  Intervention: Identify and Manage Fall Risk  Flowsheets (Taken 6/10/2020 1758)  Safety Promotion/Fall Prevention: assistive device/personal item within reach; Fall Risk reviewed with patient/family; nonskid shoes/socks when out of bed; side rails raised x 2; instructed to call staff for mobility; bed alarm set  Intervention: Prevent VTE (venous thromboembolism)  Flowsheets (Taken 6/10/2020 1758)  VTE Prevention/Management: ROM (passive) performed; ambulation promoted  Goal: Optimal Comfort and Wellbeing  Outcome: Ongoing, Progressing  Intervention: Provide Person-Centered Care  Flowsheets (Taken 6/10/2020 1758)  Trust Relationship/Rapport: care explained  Goal: Readiness for Transition of Care  Outcome: Ongoing, Progressing  Goal: Rounds/Family Conference  Outcome: Ongoing, Progressing     Problem: Infection  Goal: Infection Symptom Resolution  Outcome: Ongoing, Progressing  Intervention: Prevent or Manage Infection  Flowsheets (Taken 6/10/2020 1758)  Infection Management: aseptic technique maintained  Isolation Precautions: contact precautions maintained     Problem: Skin Injury Risk Increased  Goal: Skin Health and Integrity  Outcome: Ongoing, Progressing  Intervention: Promote and Optimize Oral Intake  Flowsheets (Taken 6/10/2020 1758)  Oral Nutrition Promotion: calorie dense foods provided     Problem: Fall Injury Risk  Goal: Absence of Fall and Fall-Related Injury  Outcome: Ongoing, Progressing  Intervention: Promote Injury-Free Environment  Flowsheets (Taken 6/10/2020 1758)  Safety Promotion/Fall Prevention: assistive device/personal item within reach; Fall Risk reviewed with patient/family; nonskid shoes/socks when out of bed; side rails raised x 2;  instructed to call staff for mobility; bed alarm set  Environmental Safety Modification: assistive device/personal items within reach; room organization consistent; clutter free environment maintained; lighting adjusted     Problem: Device-Related Complication Risk (Hemodialysis)  Goal: Safe, Effective Therapy Delivery  Outcome: Ongoing, Progressing     Problem: Hemodynamic Instability (Hemodialysis)  Goal: Vital Signs Remain in Desired Range  Outcome: Ongoing, Progressing     Problem: Infection (Hemodialysis)  Goal: Absence of Infection Signs/Symptoms  Outcome: Ongoing, Progressing  Intervention: Prevent or Manage Infection  Flowsheets (Taken 6/10/2020 4379)  Infection Management: aseptic technique maintained

## 2020-06-10 NOTE — PLAN OF CARE
Problem: Adult Inpatient Plan of Care  Goal: Plan of Care Review  Outcome: Ongoing, Progressing  Goal: Absence of Hospital-Acquired Illness or Injury  Outcome: Ongoing, Progressing  Goal: Optimal Comfort and Wellbeing  Outcome: Ongoing, Progressing  Goal: Readiness for Transition of Care  Outcome: Ongoing, Progressing     Problem: Infection  Goal: Infection Symptom Resolution  Outcome: Ongoing, Progressing     Problem: Skin Injury Risk Increased  Goal: Skin Health and Integrity  Outcome: Ongoing, Progressing

## 2020-06-10 NOTE — CARE UPDATE
06/10/20 1256   Patient Assessment/Suction   Level of Consciousness (AVPU) alert   Respiratory Effort Normal;Unlabored   Expansion/Accessory Muscles/Retractions no use of accessory muscles   All Lung Fields Breath Sounds clear   PRE-TX-O2   O2 Device (Oxygen Therapy) nasal cannula   $ Is the patient on Low Flow Oxygen? Yes   Flow (L/min) 2   SpO2 98 %   Pulse Oximetry Type Intermittent   $ Pulse Oximetry - Multiple Charge Pulse Oximetry - Multiple   Pulse 89   Resp 18   Respiratory Evaluation   $ Care Plan Tech Time 15 min   Asked patient to bring meds from home

## 2020-06-10 NOTE — CONSULTS
INPATIENT NEPHROLOGY CONSULT   [facility] NEPHROLOGY    Patient Name: Althea Gaona  Date: 06/10/2020    Reason for consultation: ESRD    History of Present Illness:  80F with HTN, ESRD on HD T/Th/Sat, COPD and CVA presents on 6/6 with moderate diarrhea, associated with weakness, chills, bilat lower quadrant pain, and nausea. She missed her last 2 HD appts.  In the ED, a CT abd/pelvis was done showing acute proctocolitis without perforation or abscess. We are consulted for dialysis.    6/8 VSS, no new complains. Skip HD today, plan HD in AM per schedule.  6/9 VSS, seen and examined on HD today, tolerating well, no new complains.  6/10 VSS, no new complains. Plan HD in AM per schedule.    Plan of Care:    1. Colitis  - dose antbx for dialysis    2. ESRD  - no acute HD needs today  - continue HD TTS.    3. Chronic hypotension  - continue midorine    4. Hypokalemia  - will use 3-4K bath    5. SHPT  - continue calcitriol    6. Anemia of CKD  - ordered CLAIRE 50 units/kg with HD    Thank you for allowing us to participate in this patient's care. We will continue to follow.    Vital Signs:  Temp Readings from Last 3 Encounters:   06/10/20 98.1 °F (36.7 °C)   04/09/20 97.9 °F (36.6 °C) (Oral)   03/06/20 97.7 °F (36.5 °C) (Skin)       Pulse Readings from Last 3 Encounters:   06/10/20 62   04/09/20 65   03/06/20 89       BP Readings from Last 3 Encounters:   06/10/20 (!) 114/57   04/09/20 118/73   03/06/20 (!) 98/54       Weight:  Wt Readings from Last 3 Encounters:   06/10/20 62.1 kg (136 lb 14.5 oz)   04/23/20 63.5 kg (140 lb)   04/09/20 63.5 kg (140 lb)       Past Medical & Surgical History:  Past Medical History:   Diagnosis Date    Alcoholism     no drink since 1984    Anxiety     Asthma     Bipolar affect, depressed     Bipolar disorder     COPD (chronic obstructive pulmonary disease)     Degenerative disc disease, lumbar 11/22/2019    Dialysis patient     ESRD (end stage renal disease)     Full dentures      GERD (gastroesophageal reflux disease)     Hypertension     Pt states low b/p    Limb alert care status     NO BP/IV LEFT ARM    Pancreatitis     Stroke     Thyroid disease        Past Surgical History:   Procedure Laterality Date    APPENDECTOMY      CATARACT EXTRACTION Right     DIALYSIS FISTULA CREATION      left upper arm    EPIDURAL STEROID INJECTION INTO LUMBAR SPINE N/A 2019    Procedure: Injection-steroid-epidural-lumbar;  Surgeon: Rosanna Khan MD;  Location: [facility] OR;  Service: Pain Management;  Laterality: N/A;  L5-S1      EPIDURAL STEROID INJECTION INTO LUMBAR SPINE N/A 2/3/2020    Procedure: Injection-steroid-epidural-lumbar;  Surgeon: Rosanna Khan MD;  Location: [facility] OR;  Service: Pain Management;  Laterality: N/A;  L5-S1    EYE SURGERY      FIXATION KYPHOPLASTY N/A 10/18/2019    Procedure: Kyphoplasty;  Surgeon: Rosanna Khan MD;  Location: [facility] OR;  Service: Pain Management;  Laterality: N/A;  L2    HYSTERECTOMY      OPEN REDUCTION AND INTERNAL FIXATION (ORIF) OF INJURY OF FOREARM Left 3/6/2020    Procedure: ORIF, FOREARM;  Surgeon: Tab Mendez MD;  Location: [facility] OR;  Service: Orthopedics;  Laterality: Left;    THYROIDECTOMY      THYROIDECTOMY         Past Social History:  Social History     Socioeconomic History    Marital status:      Spouse name: Not on file    Number of children: Not on file    Years of education: Not on file    Highest education level: Not on file   Occupational History    Not on file   Social Needs    Financial resource strain: Not on file    Food insecurity:     Worry: Not on file     Inability: Not on file    Transportation needs:     Medical: Not on file     Non-medical: Not on file   Tobacco Use    Smoking status: Former Smoker     Packs/day: 1.00     Types: Cigarettes     Last attempt to quit: 10/1/2018     Years since quittin.6    Smokeless tobacco: Never Used   Substance and Sexual Activity    Alcohol  use: No    Drug use: Never    Sexual activity: Not Currently   Lifestyle    Physical activity:     Days per week: Not on file     Minutes per session: Not on file    Stress: Not on file   Relationships    Social connections:     Talks on phone: Not on file     Gets together: Not on file     Attends Yazidi service: Not on file     Active member of club or organization: Not on file     Attends meetings of clubs or organizations: Not on file     Relationship status: Not on file   Other Topics Concern    Not on file   Social History Narrative    Not on file       Medications:  Scheduled Meds:   ARIPiprazole  2 mg Oral QHS    benztropine  1 mg Oral BID    busPIRone  10 mg Oral TID    calcitRIOL  0.25 mcg Oral Daily    epoetin jacob-ebpx (RETACRIT) injection  50 Units/kg Intravenous Once    epoetin jacob-ebpx (RETACRIT) injection  50 Units/kg Intravenous Every Tues, Thurs, Sat    famotidine  20 mg Oral QHS    FLUoxetine  20 mg Oral Daily    fluticasone-umeclidin-vilanter  1 puff Inhalation Daily    levothyroxine  100 mcg Oral Before breakfast    midodrine  10 mg Oral Every other day    midodrine  5 mg Oral BID WM    montelukast  10 mg Oral QHS    piperacillin-tazobactam (ZOSYN) IVPB  3.375 g Intravenous Q12H    rosuvastatin  20 mg Oral Daily     Continuous Infusions:    PRN Meds:.acetaminophen, morphine, ondansetron, promethazine (PHENERGAN) IVPB, sodium chloride 0.9%, traZODone  Current Facility-Administered Medications on File Prior to Encounter   Medication Dose Route Frequency Provider Last Rate Last Dose    0.9%  NaCl infusion   Intravenous Continuous Rosanna Khan MD 0 mL/hr at 02/03/20 1611      electrolyte-S (ISOLYTE)   Intravenous Continuous Philippe Greco MD 10 mL/hr at 03/06/20 0910      lactated ringers infusion   Intravenous Once PRN Rosanna Khan MD        lactated ringers infusion   Intravenous Once PRN Rosanna Khan MD        lactated ringers infusion   Intravenous  Continuous Philippe Greco MD        lidocaine (PF) 10 mg/ml (1%) injection 10 mg  1 mL Intradermal Once Philippe Greco MD         Current Outpatient Medications on File Prior to Encounter   Medication Sig Dispense Refill    ARIPiprazole (ABILIFY) 2 MG Tab Take 1 tablet (2 mg total) by mouth every evening. 90 tablet 1    benztropine (COGENTIN) 1 MG tablet Take 1 tablet (1 mg total) by mouth 2 (two) times daily. 60 tablet 2    busPIRone (BUSPAR) 10 MG tablet Take 1 tablet (10 mg total) by mouth 3 (three) times daily. 270 tablet 1    calcitRIOL (ROCALTROL) 0.25 MCG Cap 0.25 mcg once daily.   3    DAILY-YANDY tablet Take 1 tablet by mouth once daily.   11    famotidine (PEPCID) 20 MG tablet Take 20 mg by mouth every evening.   1    FLUoxetine 20 MG capsule Take 1 capsule (20 mg total) by mouth once daily. 90 capsule 1    fluticasone-umeclidin-vilanter (TRELEGY ELLIPTA) 100-62.5-25 mcg DsDv Inhale 1 puff into the lungs once daily. 1 each 11    levothyroxine (SYNTHROID) 100 MCG tablet Take 100 mcg by mouth before breakfast.       midodrine (PROAMATINE) 10 MG tablet Take 10 mg by mouth every 7 days. Prior to dialysis treatment      montelukast (SINGULAIR) 10 mg tablet Take 1 tablet (10 mg total) by mouth every evening. 90 tablet 1    ondansetron (ZOFRAN) 8 MG tablet Take 1 tablet (8 mg total) by mouth every 8 (eight) hours as needed for Nausea. (Patient taking differently: Take 8 mg by mouth every 12 (twelve) hours as needed for Nausea. ) 30 tablet 2    polyethylene glycol (GLYCOLAX) 17 gram PwPk Take 17 g by mouth daily as needed.  0    rosuvastatin (CRESTOR) 20 MG tablet Take 1 tablet (20 mg total) by mouth once daily. 90 tablet 1    traZODone (DESYREL) 100 MG tablet Take 100 mg by mouth nightly as needed.   2    albuterol-ipratropium  mcg (COMBIVENT)  mcg/actuation inhaler Inhale 2 puffs into the lungs every 6 (six) hours as needed for Wheezing.      bisacodyl (DULCOLAX) 5 mg EC tablet  Take 1 tablet (5 mg total) by mouth daily as needed for Constipation.  0    dicyclomine (BENTYL) 10 MG capsule dicyclomine 10 mg capsule      epoetin jacob-epbx (RETACRIT) 10,000 unit/mL imjection Inject 0.34 mLs (3,400 Units total) into the skin every Tues, Thurs, Sat. With hemodialysis      midodrine (PROAMATINE) 5 MG Tab Take 1 tablet (5 mg total) by mouth 2 (two) times daily with meals. 60 tablet 11    polyvinyl alcohol, artificial tears, (LIQUIFILM TEARS) 1.4 % ophthalmic solution Place 1 drop into the left eye as needed. 15 mL 0    PROLIA 60 mg/mL Syrg Inject 1 mL (60 mg total) into the skin every 6 (six) months. 1 Syringe 1       Allergies:  Metoprolol and Codeine    Past Family History:  Reviewed; refer to Hospitalist Admission Note    Review of Systems:  Review of Systems - All 14 systems reviewed and negative, except as noted in HPI    Physical Exam:  General Appearance:    NAD, AAO x 3, cooperative, appears stated age   Head:    Normocephalic, atraumatic   Eyes:    PER, EOMI, and conjunctiva/sclera clear bilaterally       Mouth:   Moist mucus membranes, no thrush or oral lesions,       normal dentition   Back:     No CVA tenderness   Lungs:     Clear to auscultation bilaterally, no wheezes, crackles,           rales or rhonchi, symmetric air movement, respirations unlabored   Chest wall:    No tenderness or deformity   Heart:    Regular rate and rhythm, S1 and S2 normal, no murmur, rub   or gallop   Abdomen:     Soft, non-tender, non-distended, bowel sounds active all four   quadrants, no RT or guarding, no masses, no organomegaly   Extremities:   Warm and well perfused, distal pulses are intact, no             cyanosis or peripheral edema   MSK:   No joint or muscle swelling, tenderness or deformity   Skin:   Skin color, texture, turgor normal, no rashes or lesions   Neurologic/Psychiatric:   CNII-XII intact, normal strength and sensation       throughout, no asterixis; normal affect, memory,  judgement     and insight      Results:  Lab Results   Component Value Date     06/10/2020    K 3.6 06/10/2020     06/10/2020    CO2 25 06/10/2020    BUN 16 06/10/2020    CREATININE 3.5 (H) 06/10/2020    CALCIUM 8.0 (L) 06/10/2020    ANIONGAP 10 06/10/2020    ESTGFRAFRICA 13.7 (A) 06/10/2020    EGFRNONAA 11.9 (A) 06/10/2020       Lab Results   Component Value Date    CALCIUM 8.0 (L) 06/10/2020    PHOS 3.0 06/10/2020       Recent Labs   Lab 06/10/20  0525   WBC 5.28   RBC 2.98*   HGB 9.6*   HCT 31.0*      *   MCH 32.2*   MCHC 31.0*       I have personally reviewed pertinent radiological imaging and reports.    Doc Odonnell MD  [facility]  [address] [place] [id number]  [phone] (p)  [phone] (f)

## 2020-06-10 NOTE — PROGRESS NOTES
"[facility]  Adult Nutrition  Progress Note    SUMMARY       Recommendations/Interventions  1. Continue current diet as tolerated, encourage intake.   2. RN to assist with meals.   Goals: 1. Pt to meet >75% of estimated needs via oral intake  Nutrition Goal Status: new    Dietitian Rounds Brief  Pt seen 2' LOS x4 days. Interviewed pt while eating breakfast. Pt needed help opening applesauce and utensil packet. Reported she has trouble eating and opening things because of her Parkinsons? Did not see Parkinsons in medical hx. Recommend RN to assist pt with meals to aid in PO intake. Pt reports bad appetite, nausea and diarrhea. Reports chronic nausea since starting HD. Pt reports only eating 30% of meals, DI offered ONS- pt declined.     Reason for Assessment    Reason For Assessment: length of stay  Diagnosis: other (see comments)(proctocolitis)  Relevant Medical History: COPD, bipolar disorder, dialysis, ESRD, HTN, Stroke, Thyroid disease, pancreatitis, anxiety, GERD, full dentures  Interdisciplinary Rounds: attended    Nutrition Risk Screen    Nutrition Risk Screen: no indicators present    Nutrition/Diet History    Spiritual, Cultural Beliefs, Sikhism Practices, Values that Affect Care: no  Food Allergies: NKFA  Factors Affecting Nutritional Intake: decreased appetite, nausea/vomiting, diarrhea    Anthropometrics    Temp: 97.6 °F (36.4 °C)  Height Method: Stated  Height: 5' 7" (170.2 cm)  Height (inches): 67 in  Weight Method: Bed Scale  Weight: 62.1 kg (136 lb 14.5 oz)  Weight (lb): 136.91 lb  Ideal Body Weight (IBW), Female: 135 lb  % Ideal Body Weight, Female (lb): 106.47 %  BMI (Calculated): 21.4  BMI Grade: 18.5-24.9 - normal       Lab/Procedures/Meds    Pertinent Labs Reviewed: reviewed  Clinical Chemistry:  Recent Labs   Lab 06/06/20  1129 06/08/20  0441 06/09/20  0442 06/10/20  0525    142 142 140   K 3.5 3.9 3.6 3.6    109 111* 105   CO2 21* 22* 21* 25   GLU 99 82 72 79   BUN " 44* 42* 39* 16   CREATININE 6.7* 6.1* 5.6* 3.5*   CALCIUM 9.3 8.5* 8.3* 8.0*   PROT 7.7  --   --   --    ALBUMIN 4.1  --   --   --    BILITOT 0.8  --   --   --    ALKPHOS 107  --   --   --    AST 25  --   --   --    ALT 20  --   --   --    ANIONGAP 15 11 10 10   ESTGFRAFRICA 6.3* 7.0* 7.8* 13.7*   EGFRNONAA 5.4* 6.1* 6.7* 11.9*   MG  --  2.0 1.9 1.8   PHOS  --  4.2 4.0 3.0   LIPASE 27  --   --   --     < > = values in this interval not displayed.     CBC:   Recent Labs   Lab 06/10/20  0525   WBC 5.28   RBC 2.98*   HGB 9.6*   HCT 31.0*      *   MCH 32.2*   MCHC 31.0*     Pertinent Medications Reviewed: reviewed    Physical Findings/Assessment         Estimated/Assessed Needs    Weight Used For Calorie Calculations: 62.1 kg (136 lb 14.5 oz)  Energy Calorie Requirements (kcal): 5241-1185 kcal (25-30 kcal/kg)  Energy Need Method: Kcal/kg  Protein Requirements: 75-93 g (1.2-1.5g/kg)  Weight Used For Protein Calculations: 62.1 kg (136 lb 14.5 oz)  Fluid Requirements (mL): (1000ml +UOP)  Estimated Fluid Requirement Method: other (see comments)  RDA Method (mL): 1552     Nutrition Prescription Ordered    Current Diet Order: renal    Evaluation of Received Nutrient/Fluid Intake    Energy Calories Required: not meeting needs  Protein Required: not meeting needs  Fluid Required: not meeting needs  Tolerance: tolerating  % Intake of Estimated Energy Needs: Other: 30%  % Meal Intake: Other: 30%    Nutrition Risk    Level of Risk/Frequency of Follow-up: moderate - high     Monitor and Evaluation    Food and Nutrient Intake: energy intake, food and beverage intake  Food and Nutrient Adminstration: diet order  Physical Activity and Function: nutrition-related ADLs and IADLs  Anthropometric Measurements: weight, weight change  Biochemical Data, Medical Tests and Procedures: electrolyte and renal panel, gastrointestinal profile, glucose/endocrine profile, inflammatory profile, lipid profile  Nutrition-Focused Physical  Findings: overall appearance     Nutrition Follow-Up    RD Follow-up?: Yes   Debbie Mccloud

## 2020-06-10 NOTE — PT/OT/SLP PROGRESS
"Physical Therapy Treatment    Patient Name:  Althea Gaona   MRN:  [id number]    Recommendations:     Discharge Recommendations:  nursing facility, skilled   Discharge Equipment Recommendations: other (see comments)(TBD next level of care)   Barriers to discharge: Decreased caregiver support    Assessment:     Althea Gaona is a 80 y.o. female admitted with a medical diagnosis of Proctocolitis.  She presents with the following impairments/functional limitations:  weakness, impaired endurance, impaired self care skills, impaired cognition, gait instability, impaired balance, decreased lower extremity function, pain, decreased upper extremity function. Upon arrival Pt pleasant, but concerned stating "I think I am going to die." Pt unable to state reasons why she feels this way. Pt requires increased time durign tx 2/2 difficulty staying on task. Pt requires encouragement and frequent verbal cuing to complete transfers. Pt reports decreased pain while sitting up and agreeable to bed> chair t/f. Pt  Requires step by step verbal cuing to ensure safety with OOB mobility. Pt with decreased safety awareness noting Pt with attempts to sit in chair mid transfer. RN present during tx to administer medications.  Continue with PT and POC.      Rehab Prognosis: Good; patient would benefit from acute skilled PT services to address these deficits and reach maximum level of function.    Recent Surgery: * No surgery found *      Plan:     During this hospitalization, patient to be seen 5 x/week to address the identified rehab impairments via gait training, therapeutic activities, therapeutic exercises and progress toward the following goals:    · Plan of Care Expires:  07/08/20    Subjective     Chief Complaint: Pt reports "I am glad I got up."  Patient/Family Comments/goals: return home   Pain/Comfort:  · Pain Rating 1: 8/10  · Location 1: abdomen  · Pain Addressed 1: Reposition, Distraction, Nurse notified      Objective: "     Communicated with RN prior to session.  Patient found HOB elevated with bed alarm, oxygen, telemetry, peripheral IV upon PT entry to room.     General Precautions: Standard, fall   Orthopedic Precautions:    Braces:       Functional Mobility:  · Bed Mobility:     · Rolling Right: stand by assistance  · Supine to Sit: stand by assistance  · Transfers:     · Sit to Stand:  minimum assistance with rolling walker and CGA with RW  · Bed to Chair: minimum assistance with  rolling walker  using  Step Transfer  · Gait: 3 side steps; 5 feet to chair with RW and min A       AM-PAC 6 CLICK MOBILITY  Turning over in bed (including adjusting bedclothes, sheets and blankets)?: 3  Sitting down on and standing up from a chair with arms (e.g., wheelchair, bedside commode, etc.): 3  Moving from lying on back to sitting on the side of the bed?: 3  Moving to and from a bed to a chair (including a wheelchair)?: 3  Need to walk in hospital room?: 3  Climbing 3-5 steps with a railing?: 1  Basic Mobility Total Score: 16       Therapeutic Activities and Exercises:   bed mobility; sitting EOB for trunk control and midline orientation; sit<> stands; transfer training; gait training     Patient left up in chair with all lines intact, call button in reach and chair alarm on..    GOALS:   Multidisciplinary Problems     Physical Therapy Goals        Problem: Physical Therapy Goal    Goal Priority Disciplines Outcome Goal Variances Interventions   Physical Therapy Goal     PT, PT/OT Ongoing, Progressing     Description:  Goals to be met by: discharge     Patient will increase functional independence with mobility by performin. Supine to sit with MInimal Assistance  2. Sit to supine with MInimal Assistance  3. Sit to stand transfer with Contact Guard Assistance  4. Bed to chair transfer with Contact Guard Assistance using Rolling Walker  5. Gait  x 15 feet with Contact Guard Assistance using Rolling Walker.                       Time  Tracking:     PT Received On: 06/10/20  PT Start Time: 1010     PT Stop Time: 1048  PT Total Time (min): 38 min     Billable Minutes: Gait Training 8 and Therapeutic Activity 30    Treatment Type: Treatment  PT/PTA: PTA     PTA Visit Number: 1     Hillary Hammant, PTA  06/10/2020

## 2020-06-10 NOTE — PLAN OF CARE
Problem: Physical Therapy Goal  Goal: Physical Therapy Goal  Description  Goals to be met by: discharge     Patient will increase functional independence with mobility by performin. Supine to sit with MInimal Assistance  2. Sit to supine with MInimal Assistance  3. Sit to stand transfer with Contact Guard Assistance  4. Bed to chair transfer with Contact Guard Assistance using Rolling Walker  5. Gait  x 15 feet with Contact Guard Assistance using Rolling Walker.      Outcome: Ongoing, Progressing   Pt progressing toward meeting goals

## 2020-06-10 NOTE — PLAN OF CARE
06/09/20 2030   Patient Assessment/Suction   Level of Consciousness (AVPU) alert   Respiratory Effort Unlabored   Expansion/Accessory Muscles/Retractions no use of accessory muscles   All Lung Fields Breath Sounds clear   Rhythm/Pattern, Respiratory unlabored   Cough Frequency no cough   PRE-TX-O2   O2 Device (Oxygen Therapy) nasal cannula   Flow (L/min) 2   SpO2 99 %   Pulse 70   Resp 18   Respiratory Evaluation   $ Care Plan Tech Time 15 min   Evaluation For   (CARE PLAN)

## 2020-06-10 NOTE — PT/OT/SLP PROGRESS
Occupational Therapy   Treatment    Name: Althea Gaona  MRN: [id number]  Admitting Diagnosis:  Proctocolitis       Recommendations:     Discharge Recommendations: nursing facility, skilled  Discharge Equipment Recommendations:  bath bench, bedside commode  Barriers to discharge:  Decreased caregiver support    Assessment:     Althea Gaona is a 80 y.o. female with a medical diagnosis of Proctocolitis.  She presents with readiness to participate with getting out of the chair. Pt began to express wanting to go to the bathroom. Performance deficits affecting function are weakness, impaired endurance, impaired self care skills, impaired cognition, impaired functional mobilty, decreased safety awareness, impaired cardiopulmonary response to activity, gait instability, impaired balance, decreased upper extremity function, decreased lower extremity function.     Rehab Prognosis:  Fair; patient would benefit from acute skilled OT services to address these deficits and reach maximum level of function.       Plan:     Patient to be seen 5 x/week to address the above listed problems via self-care/home management, therapeutic activities, therapeutic exercises  · Plan of Care Expires: 06/09/21  · Plan of Care Reviewed with: patient    Subjective     Pain/Comfort:  · Pain Rating 1: 8/10  · Location - Side 1: Bilateral  · Location - Orientation 1: lower  · Location 1: abdomen    Objective:     Communicated with: Nursing prior to session.  Patient found up in chair with chair alarm activated with telemetry, peripheral IV(chair alarm ) upon OT entry to room.    General Precautions: Standard, fall   Orthopedic Precautions:N/A     Occupational Performance:     Bed Mobility:    · Patient completed Sit to Supine with stand by assistance and cues needed to remain attentive to instructions to scoot along the EOB to the HOB before lying down due to pt's impulsivity      Functional Mobility/Transfers:  · Patient completed Sit <>  "Stand Transfer with minimum assistance  with  rolling walker   · Patient completed Bed <> Chair Transfer using Step Transfer technique with minimum assistance with rolling walker  Functional Mobility: initial plan discussed with pt was to ambulate to the bathroom for toileting as the pt had requested however, once the pt began to step away from the chair with Min A from the OT with use of the gait belt and RW, the pt began to express fear and stated, "I'm going to fall" and pt began turning towards the bed and began to sit pre-maturely on the side of the bed needing the OT to assist the pt with additional step to reach the bed. Pt changed the original plan for toileting to returning to bed and no additional therapy due to abdominal pain.  Activities of Daily Living: refusing due to lower abdominal pain     [facility] 6 Click ADL: 19    Treatment & Education: role of OT; call don't fall; mobility training and education on use of RW to remain within the RW due to pt's impulsivity; pt with kind social interaction with pt giving complements to staff and stating people are nice.    Patient left HOB elevated with all lines intact, call button in reach, bed alarm on and nurse regarding pt requesting pain medication  notifiedEducation:      GOALS:   Multidisciplinary Problems     Occupational Therapy Goals        Problem: Occupational Therapy Goal    Goal Priority Disciplines Outcome Interventions   Occupational Therapy Goal     OT, PT/OT     Description:  Goals to be met by: discharge    Patient will increase functional independence with ADLs by performing:  Grooming at the sink with Min A seated.   Toileting from toilet with Moderate Assistance for hygiene and clothing management.   Toilet transfer to toilet with Moderate Assistance.                      Time Tracking:     OT Date of Treatment: 06/10/20  OT Start Time: 1343  OT Stop Time: 1400  OT Total Time (min): 17 min    Billable Minutes:Therapeutic Activity 17    Sachi " FRANCISCO Whitten  6/10/12264:15 PM

## 2020-06-11 LAB
ANION GAP SERPL CALC-SCNC: 12 MMOL/L (ref 8–16)
BASOPHILS # BLD AUTO: 0.04 K/UL (ref 0–0.2)
BASOPHILS NFR BLD: 0.8 % (ref 0–1.9)
BUN SERPL-MCNC: 20 MG/DL (ref 8–23)
CALCIUM SERPL-MCNC: 8.2 MG/DL (ref 8.7–10.5)
CHLORIDE SERPL-SCNC: 105 MMOL/L (ref 95–110)
CO2 SERPL-SCNC: 24 MMOL/L (ref 23–29)
CREAT SERPL-MCNC: 4.6 MG/DL (ref 0.5–1.4)
DIFFERENTIAL METHOD: ABNORMAL
EOSINOPHIL # BLD AUTO: 0.1 K/UL (ref 0–0.5)
EOSINOPHIL NFR BLD: 1.7 % (ref 0–8)
ERYTHROCYTE [DISTWIDTH] IN BLOOD BY AUTOMATED COUNT: 14 % (ref 11.5–14.5)
EST. GFR  (AFRICAN AMERICAN): 9.9 ML/MIN/1.73 M^2
EST. GFR  (NON AFRICAN AMERICAN): 8.5 ML/MIN/1.73 M^2
GLUCOSE SERPL-MCNC: 78 MG/DL (ref 70–110)
HBV CORE AB SERPL QL IA: NEGATIVE
HBV SURFACE AB SER QL: NON REACTIVE
HBV SURFACE AG SERPL QL IA: NEGATIVE
HCT VFR BLD AUTO: 31.5 % (ref 37–48.5)
HGB BLD-MCNC: 9.8 G/DL (ref 12–16)
IMM GRANULOCYTES # BLD AUTO: 0.07 K/UL (ref 0–0.04)
IMM GRANULOCYTES NFR BLD AUTO: 1.3 % (ref 0–0.5)
LYMPHOCYTES # BLD AUTO: 1.4 K/UL (ref 1–4.8)
LYMPHOCYTES NFR BLD: 26.6 % (ref 18–48)
MAGNESIUM SERPL-MCNC: 1.9 MG/DL (ref 1.6–2.6)
MCH RBC QN AUTO: 33 PG (ref 27–31)
MCHC RBC AUTO-ENTMCNC: 31.1 G/DL (ref 32–36)
MCV RBC AUTO: 106 FL (ref 82–98)
MONOCYTES # BLD AUTO: 0.6 K/UL (ref 0.3–1)
MONOCYTES NFR BLD: 10.7 % (ref 4–15)
NEUTROPHILS # BLD AUTO: 3.1 K/UL (ref 1.8–7.7)
NEUTROPHILS NFR BLD: 58.9 % (ref 38–73)
NRBC BLD-RTO: 0 /100 WBC
PHOSPHATE SERPL-MCNC: 4.4 MG/DL (ref 2.7–4.5)
PLATELET # BLD AUTO: 281 K/UL (ref 150–350)
PMV BLD AUTO: 10.4 FL (ref 9.2–12.9)
POTASSIUM SERPL-SCNC: 4 MMOL/L (ref 3.5–5.1)
RBC # BLD AUTO: 2.97 M/UL (ref 4–5.4)
SODIUM SERPL-SCNC: 141 MMOL/L (ref 136–145)
WBC # BLD AUTO: 5.31 K/UL (ref 3.9–12.7)

## 2020-06-11 PROCEDURE — 12000002 HC ACUTE/MED SURGE SEMI-PRIVATE ROOM

## 2020-06-11 PROCEDURE — 63600175 PHARM REV CODE 636 W HCPCS: Performed by: NURSE PRACTITIONER

## 2020-06-11 PROCEDURE — 83735 ASSAY OF MAGNESIUM: CPT

## 2020-06-11 PROCEDURE — 84100 ASSAY OF PHOSPHORUS: CPT

## 2020-06-11 PROCEDURE — 90935 HEMODIALYSIS ONE EVALUATION: CPT

## 2020-06-11 PROCEDURE — 25000003 PHARM REV CODE 250: Performed by: NURSE PRACTITIONER

## 2020-06-11 PROCEDURE — 99900035 HC TECH TIME PER 15 MIN (STAT)

## 2020-06-11 PROCEDURE — 80048 BASIC METABOLIC PNL TOTAL CA: CPT

## 2020-06-11 PROCEDURE — 94640 AIRWAY INHALATION TREATMENT: CPT

## 2020-06-11 PROCEDURE — 97530 THERAPEUTIC ACTIVITIES: CPT | Mod: CQ

## 2020-06-11 PROCEDURE — 63600175 PHARM REV CODE 636 W HCPCS: Performed by: FAMILY MEDICINE

## 2020-06-11 PROCEDURE — 27000221 HC OXYGEN, UP TO 24 HOURS

## 2020-06-11 PROCEDURE — 97535 SELF CARE MNGMENT TRAINING: CPT

## 2020-06-11 PROCEDURE — 94761 N-INVAS EAR/PLS OXIMETRY MLT: CPT

## 2020-06-11 PROCEDURE — 25000242 PHARM REV CODE 250 ALT 637 W/ HCPCS: Performed by: INTERNAL MEDICINE

## 2020-06-11 PROCEDURE — 85025 COMPLETE CBC W/AUTO DIFF WBC: CPT

## 2020-06-11 PROCEDURE — 63600175 PHARM REV CODE 636 W HCPCS: Mod: TB | Performed by: INTERNAL MEDICINE

## 2020-06-11 PROCEDURE — 36415 COLL VENOUS BLD VENIPUNCTURE: CPT

## 2020-06-11 RX ADMIN — MORPHINE SULFATE 2 MG: 2 INJECTION, SOLUTION INTRAMUSCULAR; INTRAVENOUS at 10:06

## 2020-06-11 RX ADMIN — ARIPIPRAZOLE 2 MG: 2 TABLET ORAL at 08:06

## 2020-06-11 RX ADMIN — MIDODRINE HYDROCHLORIDE 5 MG: 2.5 TABLET ORAL at 07:06

## 2020-06-11 RX ADMIN — CALCITRIOL CAPSULES 0.25 MCG 0.25 MCG: 0.25 CAPSULE ORAL at 09:06

## 2020-06-11 RX ADMIN — ONDANSETRON 8 MG: 2 INJECTION INTRAMUSCULAR; INTRAVENOUS at 11:06

## 2020-06-11 RX ADMIN — BENZTROPINE MESYLATE 1 MG: 1 TABLET ORAL at 08:06

## 2020-06-11 RX ADMIN — FAMOTIDINE 20 MG: 20 TABLET, FILM COATED ORAL at 08:06

## 2020-06-11 RX ADMIN — TIOTROPIUM BROMIDE 18 MCG: 18 CAPSULE ORAL; RESPIRATORY (INHALATION) at 07:06

## 2020-06-11 RX ADMIN — FLUTICASONE FUROATE AND VILANTEROL TRIFENATATE 1 PUFF: 100; 25 POWDER RESPIRATORY (INHALATION) at 07:06

## 2020-06-11 RX ADMIN — PIPERACILLIN AND TAZOBACTAM 3.38 G: 3; .375 INJECTION, POWDER, FOR SOLUTION INTRAVENOUS at 07:06

## 2020-06-11 RX ADMIN — MONTELUKAST SODIUM 10 MG: 10 TABLET, COATED ORAL at 08:06

## 2020-06-11 RX ADMIN — MIDODRINE HYDROCHLORIDE 5 MG: 2.5 TABLET ORAL at 08:06

## 2020-06-11 RX ADMIN — PIPERACILLIN AND TAZOBACTAM 3.38 G: 3; .375 INJECTION, POWDER, FOR SOLUTION INTRAVENOUS at 05:06

## 2020-06-11 RX ADMIN — FLUOXETINE 20 MG: 20 CAPSULE ORAL at 08:06

## 2020-06-11 RX ADMIN — MORPHINE SULFATE 2 MG: 2 INJECTION, SOLUTION INTRAMUSCULAR; INTRAVENOUS at 06:06

## 2020-06-11 RX ADMIN — LEVOTHYROXINE SODIUM 100 MCG: 100 TABLET ORAL at 05:06

## 2020-06-11 RX ADMIN — BUSPIRONE HYDROCHLORIDE 10 MG: 5 TABLET ORAL at 08:06

## 2020-06-11 RX ADMIN — ROSUVASTATIN CALCIUM 20 MG: 20 TABLET, FILM COATED ORAL at 08:06

## 2020-06-11 RX ADMIN — EPOETIN ALFA-EPBX 3300 UNITS: 10000 INJECTION, SOLUTION INTRAVENOUS; SUBCUTANEOUS at 06:06

## 2020-06-11 NOTE — PROGRESS NOTES
[facility] Medicine Progress Note    Subjective:     Resting comfortably in bed, getting a new IV started. No major issues overnight.      Objective:   Last 24 Hour Vital Signs:  BP  Min: 101/63  Max: 128/80  Temp  Av.3 °F (36.8 °C)  Min: 97.6 °F (36.4 °C)  Max: 99.1 °F (37.3 °C)  Pulse  Av.9  Min: 52  Max: 89  Resp  Av.4  Min: 18  Max: 20  SpO2  Av.5 %  Min: 94 %  Max: 99 %  Weight  Av.1 kg (136 lb 14.5 oz)  Min: 62.1 kg (136 lb 14.5 oz)  Max: 62.1 kg (136 lb 14.5 oz)  I/O last 3 completed shifts:  In: 790 [P.O.:240; Other:500; IV Piggyback:50]  Out: 1500 [Other:1500]    Physical Examination:  General: AAOx3. NAD.  HEENT: NCAT. PERRLA. EOMI.     CV: RRR.No M/R/G.   Chest: NL effort. CTAB. No R/R/W.   Abd: +BS x 4. Soft. ND/NT. No rebound or guarding.   Ext: moving well. +distal pulses  Skin: Intact. No rash. No lesions.   Neuro: CN II-XII intact. No focal deficit.  Psych:  No A/V hallucinations. NL affect. No abnormal behaviors noted     Laboratory:  Laboratory Data Reviewed: yes    Most Recent Data:  CBC:   Lab Results   Component Value Date    WBC 5.28 06/10/2020    HGB 9.6 (L) 06/10/2020    HCT 31.0 (L) 06/10/2020     06/10/2020     (H) 06/10/2020    RDW 13.8 06/10/2020     BMP:   Lab Results   Component Value Date     06/10/2020    K 3.6 06/10/2020     06/10/2020    CO2 25 06/10/2020    BUN 16 06/10/2020    GLU 79 06/10/2020    CALCIUM 8.0 (L) 06/10/2020    MG 1.8 06/10/2020    PHOS 3.0 06/10/2020     LFTs:   Lab Results   Component Value Date    PROT 7.7 2020    ALBUMIN 4.1 2020    BILITOT 0.8 2020    AST 25 2020    ALKPHOS 107 2020    ALT 20 2020     Coags:   Lab Results   Component Value Date    INR 1.0 2015     FLP:   Lab Results   Component Value Date    CHOL 154 2020    HDL 56 2020    LDLCALC 71 2020    TRIG 205 (H) 2020    CHOLHDL 2.8 2020     DM:   Lab Results   Component Value  Date    LDLCALC 71 02/17/2020    CREATININE 3.5 (H) 06/10/2020     Thyroid:   Lab Results   Component Value Date    TSH 1.538 12/29/2015    FREET4 0.87 09/06/2015     Anemia:   Lab Results   Component Value Date    IRON 47 12/29/2015    TIBC 318 12/29/2015    FERRITIN 31 08/28/2015    ZDXPHXWQ66 683 12/29/2015    FOLATE 5.8 12/29/2015     Cardiac:   Lab Results   Component Value Date    TROPONINI <0.030 08/29/2019     (H) 08/29/2019     Urinalysis:   Lab Results   Component Value Date    COLORU Yellow 12/30/2015    SPECGRAV <=1.005 (A) 12/30/2015    NITRITE Negative 12/30/2015    KETONESU Negative 12/30/2015    UROBILINOGEN Negative 12/30/2015    WBCUA 10 (H) 06/25/2015        Radiology:  Data Reviewed: yes  CT ABDOMEN PELVIS WITHOUT CONTRAST      Current Medications:     Infusions:       Scheduled:   ARIPiprazole  2 mg Oral QHS    benztropine  1 mg Oral BID    busPIRone  10 mg Oral TID    calcitRIOL  0.25 mcg Oral Daily    epoetin jacob-ebpx (RETACRIT) injection  50 Units/kg Intravenous Once    epoetin jacob-ebpx (RETACRIT) injection  50 Units/kg Intravenous Every Tues, Thurs, Sat    famotidine  20 mg Oral QHS    FLUoxetine  20 mg Oral Daily    [START ON 6/11/2020] fluticasone furoate-vilanteroL  1 puff Inhalation Daily    levothyroxine  100 mcg Oral Before breakfast    midodrine  10 mg Oral Every other day    midodrine  5 mg Oral BID WM    montelukast  10 mg Oral QHS    piperacillin-tazobactam (ZOSYN) IVPB  3.375 g Intravenous Q12H    rosuvastatin  20 mg Oral Daily    [START ON 6/11/2020] tiotropium  1 capsule Inhalation Daily        PRN:  acetaminophen, albuterol sulfate, morphine, ondansetron, promethazine (PHENERGAN) IVPB, sodium chloride 0.9%, traZODone    Lines and Day Number of Therapy:      Microbiology Data:  Reviewed: yes  Microbiology Results (last 7 days)     Procedure Component Value Units Date/Time    Clostridium difficile EIA [[id number]]     Order Status:  Canceled Specimen:   Stool     Stool culture [[id number]]     Order Status:  No result Specimen:  Stool            Antibiotics and Day Number of Therapy:  Antibiotics (From admission, onward)    Start     Stop Route Frequency Ordered    06/06/20 1500  piperacillin-tazobactam 3.375 g in dextrose 5 % 50 mL IVPB (ready to mix system)      -- IV Every 12 hours (non-standard times) 06/06/20 1352    06/06/20 1300  piperacillin-tazobactam 3.375 g in dextrose 5 % 50 mL IVPB (ready to mix system)      06/07 0059 IV ED 1 Time 06/06/20 1257                Assessment/Plan:     Althea Gaona is a 80 y.o.female with     Proctocolitis  Admit to med/surg tele   Continue zosyn, d/c flagyl   Stool studies pending: culture, WBCs, ova, cysts, parasites, and C. Diff  Pt is on HD and will hold off on IV hydration  Push oral hydration       Borderline hypotension  Stable  Continue home midodrine       ESRD (end stage renal disease)  Consulted Dr. Rodrigues for HD   Electrolytes remain stable       Metabolic acidosis  Renal failure and diarrhea  Monitor, will have HD              [facility] Medicine

## 2020-06-11 NOTE — PLAN OF CARE
06/11/20 0724   Patient Assessment/Suction   Level of Consciousness (AVPU) alert   Respiratory Effort Normal;Unlabored   Expansion/Accessory Muscles/Retractions expansion symmetric;no retractions;no use of accessory muscles   All Lung Fields Breath Sounds clear;equal bilaterally   PRE-TX-O2   O2 Device (Oxygen Therapy) nasal cannula   $ Is the patient on Low Flow Oxygen? Yes   Flow (L/min) 2   SpO2 96 %   Pulse Oximetry Type Intermittent   $ Pulse Oximetry - Multiple Charge Pulse Oximetry - Multiple   Pulse 84   Resp 18   Inhaler   $ Inhaler Charges MDI (Metered Dose Inahler) Treatment   Daily Review of Necessity (Inhaler) completed   Respiratory Treatment Status (Inhaler) given;mouth rinsed post treatment   Treatment Route (Inhaler) mouthpiece   Patient Position (Inhaler) HOB elevated   Post Treatment Assessment (Inhaler) breath sounds unchanged   Signs of Intolerance (Inhaler) none   Respiratory Evaluation   $ Care Plan Tech Time 15 min

## 2020-06-11 NOTE — PLAN OF CARE
06/10/20 2100   Patient Assessment/Suction   Level of Consciousness (AVPU) alert   Respiratory Effort Unlabored   Expansion/Accessory Muscles/Retractions no use of accessory muscles   All Lung Fields Breath Sounds clear   Rhythm/Pattern, Respiratory unlabored   Cough Frequency no cough   PRE-TX-O2   O2 Device (Oxygen Therapy) room air   SpO2 95 %   Pulse 74   Resp 18   Aerosol Therapy   $ Aerosol Therapy Charges PRN treatment not required   Respiratory Treatment Status (SVN) PRN treatment not required   Respiratory Evaluation   $ Care Plan Tech Time 15 min   Evaluation For   (CARE PLAN)

## 2020-06-11 NOTE — PLAN OF CARE
Problem: Occupational Therapy Goal  Goal: Occupational Therapy Goal  Description  Goals to be met by: discharge    Patient will increase functional independence with ADLs by performing:  Grooming at the sink with Min A seated.   Toileting from toilet with Moderate Assistance for hygiene and clothing management.   Toilet transfer to toilet with Moderate Assistance.     Outcome: Ongoing, Progressing

## 2020-06-11 NOTE — PLAN OF CARE
06/11/20 1101   Post-Acute Status   Post-Acute Authorization Placement   Post-Acute Placement Status Pending Payor Review       PT ACCEPTED AT [facility] PENDING COVID TEST( FACILITY ASKING FOR ONE LESS THAN 72 HRS OLD) AND PHN AUTH.    DR RODRIGES AND HOUSE SUPERVISOR NOTIFIED

## 2020-06-11 NOTE — PT/OT/SLP PROGRESS
Occupational Therapy   Treatment    Name: Althea Gaona  MRN: [id number]  Admitting Diagnosis:  Proctocolitis       Recommendations:     Discharge Recommendations: nursing facility, skilled  Discharge Equipment Recommendations:  bath bench, bedside commode  Barriers to discharge:  Decreased caregiver support    Assessment:     Althea Gaona is a 80 y.o. female with a medical diagnosis of Proctocolitis.  She presents sitting up in bedside chair requesting assistance back to bed.  Agreeable to EOB grooming prior to resting in bed.  Pt demonstrates impaired safety awareness during functional mobility.  She requires frequent cues for attention/safety during functional tasks.  Able to sit EOB with SBA for denture care.  Performance deficits affecting function are impaired endurance, weakness, impaired self care skills, impaired functional mobilty, gait instability, impaired balance, decreased safety awareness, impaired cognition.     Rehab Prognosis:  Good; patient would benefit from acute skilled OT services to address these deficits and reach maximum level of function.       Plan:     Patient to be seen 5 x/week to address the above listed problems via self-care/home management, therapeutic activities, therapeutic exercises  · Plan of Care Expires: 06/09/20  · Plan of Care Reviewed with: patient    Subjective     Pain/Comfort:  · Pain Rating 1: (did not quantify)  · Location - Side 1: Bilateral  · Location - Orientation 1: lower  · Location 1: abdomen  · Pain Addressed 1: Reposition, Distraction, Nurse notified  · Pain Rating Post-Intervention 1: (not rated on scale)    Objective:     Communicated with: RN prior to session.  Patient found up in chair with telemetry, peripheral IV upon OT entry to room.    General Precautions: Standard, fall   Orthopedic Precautions:N/A   Braces: N/A     Occupational Performance:     Bed Mobility:    · Patient completed Sit to Supine with stand by assistance     Functional  Mobility/Transfers:  · Patient completed Sit <> Stand Transfer with contact guard assistance  with  rolling walker   · Patient completed Bed <> Chair Transfer using Stand Pivot technique with minimum assistance with rolling walker; max cues for safety with pt attempting to sit prior to completing transfer    Activities of Daily Living:  · Grooming: stand by assistance seated EOB for denture care and hand/face washing    Patient left supine with all lines intact, call button in reach and bed alarm onEducation:      GOALS:   Multidisciplinary Problems     Occupational Therapy Goals        Problem: Occupational Therapy Goal    Goal Priority Disciplines Outcome Interventions   Occupational Therapy Goal     OT, PT/OT Ongoing, Progressing    Description:  Goals to be met by: discharge    Patient will increase functional independence with ADLs by performing:  Grooming at the sink with Min A seated.   Toileting from toilet with Moderate Assistance for hygiene and clothing management.   Toilet transfer to toilet with Moderate Assistance.                      Time Tracking:     OT Date of Treatment: 06/11/20  OT Start Time: 1020  OT Stop Time: 1043  OT Total Time (min): 23 min    Billable Minutes:Self Care/Home Management 23    [place] OT  6/11/2020

## 2020-06-11 NOTE — CONSULTS
INPATIENT NEPHROLOGY CONSULT   [facility] NEPHROLOGY    Patient Name: Althea Gaona  Date: 06/11/2020    Reason for consultation: ESRD    History of Present Illness:  80F with HTN, ESRD on HD T/Th/Sat, COPD and CVA presents on 6/6 with moderate diarrhea, associated with weakness, chills, bilat lower quadrant pain, and nausea. She missed her last 2 HD appts.  In the ED, a CT abd/pelvis was done showing acute proctocolitis without perforation or abscess. We are consulted for dialysis.    6/8 VSS, no new complains. Skip HD today, plan HD in AM per schedule.  6/9 VSS, seen and examined on HD today, tolerating well, no new complains.  6/10 VSS, no new complains. Plan HD in AM per schedule.  6/11 VSS, seen and examined on HD today, tolerating well, no new complains. OK to dc if she has a facility offer.    Plan of Care:    1. Colitis  - dose antbx for dialysis    2. ESRD  - no acute HD needs today  - continue HD TTS.    3. Chronic hypotension  - continue midorine    4. Hypokalemia  - will use 3-4K bath    5. SHPT  - continue calcitriol    6. Anemia of CKD  - ordered CLAIRE 50 units/kg with HD    Thank you for allowing us to participate in this patient's care. We will continue to follow.    Vital Signs:  Temp Readings from Last 3 Encounters:   06/11/20 98.6 °F (37 °C) (Oral)   04/09/20 97.9 °F (36.6 °C) (Oral)   03/06/20 97.7 °F (36.5 °C) (Skin)       Pulse Readings from Last 3 Encounters:   06/11/20 72   04/09/20 65   03/06/20 89       BP Readings from Last 3 Encounters:   06/11/20 113/72   04/09/20 118/73   03/06/20 (!) 98/54       Weight:  Wt Readings from Last 3 Encounters:   06/10/20 62.1 kg (136 lb 14.5 oz)   04/23/20 63.5 kg (140 lb)   04/09/20 63.5 kg (140 lb)       Past Medical & Surgical History:  Past Medical History:   Diagnosis Date    Alcoholism     no drink since 1984    Anxiety     Asthma     Bipolar affect, depressed     Bipolar disorder     COPD (chronic obstructive pulmonary disease)      Degenerative disc disease, lumbar 11/22/2019    Dialysis patient     ESRD (end stage renal disease)     Full dentures     GERD (gastroesophageal reflux disease)     Hypertension     Pt states low b/p    Limb alert care status     NO BP/IV LEFT ARM    Pancreatitis     Stroke     Thyroid disease        Past Surgical History:   Procedure Laterality Date    APPENDECTOMY      CATARACT EXTRACTION Right     DIALYSIS FISTULA CREATION      left upper arm    EPIDURAL STEROID INJECTION INTO LUMBAR SPINE N/A 11/22/2019    Procedure: Injection-steroid-epidural-lumbar;  Surgeon: Rosanna Khan MD;  Location: [facility] OR;  Service: Pain Management;  Laterality: N/A;  L5-S1      EPIDURAL STEROID INJECTION INTO LUMBAR SPINE N/A 2/3/2020    Procedure: Injection-steroid-epidural-lumbar;  Surgeon: Rosanna Khan MD;  Location: [facility] OR;  Service: Pain Management;  Laterality: N/A;  L5-S1    EYE SURGERY      FIXATION KYPHOPLASTY N/A 10/18/2019    Procedure: Kyphoplasty;  Surgeon: Rosanna Khan MD;  Location: [facility] OR;  Service: Pain Management;  Laterality: N/A;  L2    HYSTERECTOMY      OPEN REDUCTION AND INTERNAL FIXATION (ORIF) OF INJURY OF FOREARM Left 3/6/2020    Procedure: ORIF, FOREARM;  Surgeon: Tab Mendez MD;  Location: [facility] OR;  Service: Orthopedics;  Laterality: Left;    THYROIDECTOMY      THYROIDECTOMY         Past Social History:  Social History     Socioeconomic History    Marital status:      Spouse name: Not on file    Number of children: Not on file    Years of education: Not on file    Highest education level: Not on file   Occupational History    Not on file   Social Needs    Financial resource strain: Not on file    Food insecurity:     Worry: Not on file     Inability: Not on file    Transportation needs:     Medical: Not on file     Non-medical: Not on file   Tobacco Use    Smoking status: Former Smoker     Packs/day: 1.00     Types: Cigarettes     Last attempt to  quit: 10/1/2018     Years since quittin.6    Smokeless tobacco: Never Used   Substance and Sexual Activity    Alcohol use: No    Drug use: Never    Sexual activity: Not Currently   Lifestyle    Physical activity:     Days per week: Not on file     Minutes per session: Not on file    Stress: Not on file   Relationships    Social connections:     Talks on phone: Not on file     Gets together: Not on file     Attends Buddhism service: Not on file     Active member of club or organization: Not on file     Attends meetings of clubs or organizations: Not on file     Relationship status: Not on file   Other Topics Concern    Not on file   Social History Narrative    Not on file       Medications:  Scheduled Meds:   ARIPiprazole  2 mg Oral QHS    benztropine  1 mg Oral BID    busPIRone  10 mg Oral TID    calcitRIOL  0.25 mcg Oral Daily    epoetin jacob-ebpx (RETACRIT) injection  50 Units/kg Intravenous Once    epoetin jacob-ebpx (RETACRIT) injection  50 Units/kg Intravenous Every Tues, Th, Sat    famotidine  20 mg Oral QHS    FLUoxetine  20 mg Oral Daily    fluticasone furoate-vilanteroL  1 puff Inhalation Daily    levothyroxine  100 mcg Oral Before breakfast    midodrine  10 mg Oral Every other day    midodrine  5 mg Oral BID WM    montelukast  10 mg Oral QHS    piperacillin-tazobactam (ZOSYN) IVPB  3.375 g Intravenous Q12H    rosuvastatin  20 mg Oral Daily    tiotropium  1 capsule Inhalation Daily     Continuous Infusions:    PRN Meds:.acetaminophen, albuterol sulfate, morphine, ondansetron, promethazine (PHENERGAN) IVPB, sodium chloride 0.9%, traZODone  Current Facility-Administered Medications on File Prior to Encounter   Medication Dose Route Frequency Provider Last Rate Last Dose    0.9%  NaCl infusion   Intravenous Continuous Rosanna Khan MD 0 mL/hr at 20 1611      electrolyte-S (ISOLYTE)   Intravenous Continuous Philippe Greco MD 10 mL/hr at 20 0910      lactated  ringers infusion   Intravenous Once PRN Rosanna Khan MD        lactated ringers infusion   Intravenous Once PRN Rosanna Khan MD        lactated ringers infusion   Intravenous Continuous Philippe Greco MD        lidocaine (PF) 10 mg/ml (1%) injection 10 mg  1 mL Intradermal Once Philippe Greco MD         Current Outpatient Medications on File Prior to Encounter   Medication Sig Dispense Refill    ARIPiprazole (ABILIFY) 2 MG Tab Take 1 tablet (2 mg total) by mouth every evening. 90 tablet 1    benztropine (COGENTIN) 1 MG tablet Take 1 tablet (1 mg total) by mouth 2 (two) times daily. 60 tablet 2    busPIRone (BUSPAR) 10 MG tablet Take 1 tablet (10 mg total) by mouth 3 (three) times daily. 270 tablet 1    calcitRIOL (ROCALTROL) 0.25 MCG Cap 0.25 mcg once daily.   3    DAILY-YANDY tablet Take 1 tablet by mouth once daily.   11    famotidine (PEPCID) 20 MG tablet Take 20 mg by mouth every evening.   1    FLUoxetine 20 MG capsule Take 1 capsule (20 mg total) by mouth once daily. 90 capsule 1    fluticasone-umeclidin-vilanter (TRELEGY ELLIPTA) 100-62.5-25 mcg DsDv Inhale 1 puff into the lungs once daily. 1 each 11    levothyroxine (SYNTHROID) 100 MCG tablet Take 100 mcg by mouth before breakfast.       midodrine (PROAMATINE) 10 MG tablet Take 10 mg by mouth every 7 days. Prior to dialysis treatment      montelukast (SINGULAIR) 10 mg tablet Take 1 tablet (10 mg total) by mouth every evening. 90 tablet 1    ondansetron (ZOFRAN) 8 MG tablet Take 1 tablet (8 mg total) by mouth every 8 (eight) hours as needed for Nausea. (Patient taking differently: Take 8 mg by mouth every 12 (twelve) hours as needed for Nausea. ) 30 tablet 2    polyethylene glycol (GLYCOLAX) 17 gram PwPk Take 17 g by mouth daily as needed.  0    rosuvastatin (CRESTOR) 20 MG tablet Take 1 tablet (20 mg total) by mouth once daily. 90 tablet 1    traZODone (DESYREL) 100 MG tablet Take 100 mg by mouth nightly as needed.   2     albuterol-ipratropium  mcg (COMBIVENT)  mcg/actuation inhaler Inhale 2 puffs into the lungs every 6 (six) hours as needed for Wheezing.      bisacodyl (DULCOLAX) 5 mg EC tablet Take 1 tablet (5 mg total) by mouth daily as needed for Constipation.  0    dicyclomine (BENTYL) 10 MG capsule dicyclomine 10 mg capsule      epoetin jacob-epbx (RETACRIT) 10,000 unit/mL imjection Inject 0.34 mLs (3,400 Units total) into the skin every Tues, Thurs, Sat. With hemodialysis      midodrine (PROAMATINE) 5 MG Tab Take 1 tablet (5 mg total) by mouth 2 (two) times daily with meals. 60 tablet 11    polyvinyl alcohol, artificial tears, (LIQUIFILM TEARS) 1.4 % ophthalmic solution Place 1 drop into the left eye as needed. 15 mL 0    PROLIA 60 mg/mL Syrg Inject 1 mL (60 mg total) into the skin every 6 (six) months. 1 Syringe 1       Allergies:  Metoprolol and Codeine    Past Family History:  Reviewed; refer to Hospitalist Admission Note    Review of Systems:  Review of Systems - All 14 systems reviewed and negative, except as noted in HPI    Physical Exam:  General Appearance:    NAD, AAO x 3, cooperative, appears stated age   Head:    Normocephalic, atraumatic   Eyes:    PER, EOMI, and conjunctiva/sclera clear bilaterally       Mouth:   Moist mucus membranes, no thrush or oral lesions,       normal dentition   Back:     No CVA tenderness   Lungs:     Clear to auscultation bilaterally, no wheezes, crackles,           rales or rhonchi, symmetric air movement, respirations unlabored   Chest wall:    No tenderness or deformity   Heart:    Regular rate and rhythm, S1 and S2 normal, no murmur, rub   or gallop   Abdomen:     Soft, non-tender, non-distended, bowel sounds active all four   quadrants, no RT or guarding, no masses, no organomegaly   Extremities:   Warm and well perfused, distal pulses are intact, no             cyanosis or peripheral edema   MSK:   No joint or muscle swelling, tenderness or deformity   Skin:   Skin  color, texture, turgor normal, no rashes or lesions   Neurologic/Psychiatric:   CNII-XII intact, normal strength and sensation       throughout, no asterixis; normal affect, memory, judgement     and insight      Results:  Lab Results   Component Value Date     06/11/2020    K 4.0 06/11/2020     06/11/2020    CO2 24 06/11/2020    BUN 20 06/11/2020    CREATININE 4.6 (H) 06/11/2020    CALCIUM 8.2 (L) 06/11/2020    ANIONGAP 12 06/11/2020    ESTGFRAFRICA 9.9 (A) 06/11/2020    EGFRNONAA 8.5 (A) 06/11/2020       Lab Results   Component Value Date    CALCIUM 8.2 (L) 06/11/2020    PHOS 4.4 06/11/2020       Recent Labs   Lab 06/11/20  0443   WBC 5.31   RBC 2.97*   HGB 9.8*   HCT 31.5*      *   MCH 33.0*   MCHC 31.1*       I have personally reviewed pertinent radiological imaging and reports.    Doc Odonnell MD  [facility] [place]  [address] [place] [id number]  [phone] (p)  [phone] (f)

## 2020-06-11 NOTE — PT/OT/SLP PROGRESS
"Physical Therapy Treatment    Patient Name:  Althea Gaona   MRN:  [id number]    Recommendations:     Discharge Recommendations:  nursing facility, skilled   Discharge Equipment Recommendations: other (see comments)(TBD next level of care)   Barriers to discharge: Decreased caregiver support    Assessment:     Althea Gaona is a 80 y.o. female admitted with a medical diagnosis of Proctocolitis.  She presents with the following impairments/functional limitations:  weakness, impaired endurance, impaired self care skills, impaired balance, gait instability, impaired functional mobilty, decreased safety awareness, impaired coordination, pain. Pt educated in safety with functional morbility. Pt completed t/f to chair noting Pt continues to have decreased safety awareness during transfers noting Pt with attempts to sit mid transfer. Pt was reeducated in safety with transfers noting Pt with decreased awareness during  Bedside commode t/f. Pt requires heavy verbal cuing throughout tx for safety and staying on task. Pt required total A to clean self after bedside commode t/f.  Continue with PT and POC.      Rehab Prognosis: Fair; patient would benefit from acute skilled PT services to address these deficits and reach maximum level of function.    Recent Surgery: * No surgery found *      Plan:     During this hospitalization, patient to be seen 5 x/week to address the identified rehab impairments via gait training, therapeutic activities, therapeutic exercises and progress toward the following goals:    · Plan of Care Expires:  07/08/20    Subjective     Chief Complaint: Pt reports "i'm weak."  Patient/Family Comments/goals: return home   Pain/Comfort:  · Pain Rating 1: (Pt did not quantify )  · Location - Side 1: Bilateral  · Location 1: abdomen      Objective:     Communicated with RN prior to session.  Patient found HOB elevated with telemetry, peripheral IV upon PT entry to room.     General Precautions: Standard, " fall   Orthopedic Precautions:    Braces:       Functional Mobility:  · Bed Mobility:     · Rolling Right: stand by assistance  · Supine to Sit: minimum assistance  · Transfers:     · Sit to Stand:  moderate assistance with rolling walker and 3 trials   · Bed to Chair: mod assistance with  rolling walker  using  Step Transfer  · BSC Transfer: mod assistance with  rolling walker  using  Step Transfer   · BSC to chair: mod A with RW and step transfer      AM-PAC 6 CLICK MOBILITY          Therapeutic Activities and Exercises:   bed mobility; sitting EOB for trunk control and midline orientation; sit<> stands; transfer training    Patient left up in chair with all lines intact, call button in reach and chair alarm on..    GOALS:   Multidisciplinary Problems     Physical Therapy Goals        Problem: Physical Therapy Goal    Goal Priority Disciplines Outcome Goal Variances Interventions   Physical Therapy Goal     PT, PT/OT Ongoing, Progressing     Description:  Goals to be met by: discharge     Patient will increase functional independence with mobility by performin. Supine to sit with MInimal Assistance  2. Sit to supine with MInimal Assistance  3. Sit to stand transfer with Contact Guard Assistance  4. Bed to chair transfer with Contact Guard Assistance using Rolling Walker  5. Gait  x 15 feet with Contact Guard Assistance using Rolling Walker.                       Time Tracking:     PT Received On: 20  PT Start Time: 907     PT Stop Time: 945  PT Total Time (min): 38 min     Billable Minutes: Therapeutic Activity 38    Treatment Type: Treatment  PT/PTA: PTA     PTA Visit Number: 2     Hillary Hammant, PTA  2020

## 2020-06-11 NOTE — PROGRESS NOTES
[facility] Medicine Progress Note    Subjective:     Resting comfortably in the chair. No diarrhea overnight or this morning.      Objective:   Last 24 Hour Vital Signs:  BP  Min: 106/63  Max: 125/73  Temp  Av.5 °F (36.9 °C)  Min: 98 °F (36.7 °C)  Max: 99.1 °F (37.3 °C)  Pulse  Av.3  Min: 56  Max: 86  Resp  Av.1  Min: 17  Max: 19  SpO2  Av.9 %  Min: 95 %  Max: 98 %  I/O last 3 completed shifts:  In: 50 [IV Piggyback:50]  Out: -       Physical Examination:  General: AAOx3. NAD.  HEENT: NCAT. PERRLA. EOMI.     CV: RRR.No M/R/G.   Chest: NL effort. CTAB. No R/R/W.   Abd: +BS x 4. Soft. ND/NT. No rebound or guarding.   Ext: moving well. +distal pulses  Skin: Intact. No rash. No lesions.   Neuro: CN III-XII intact. No focal deficit.  Psych:  No A/V hallucinations. NL affect. No abnormal behaviors noted       Laboratory:  Laboratory Data Reviewed: yes    Most Recent Data:  CBC:   Lab Results   Component Value Date    WBC 5.31 2020    HGB 9.8 (L) 2020    HCT 31.5 (L) 2020     2020     (H) 2020    RDW 14.0 2020     BMP:   Lab Results   Component Value Date     2020    K 4.0 2020     2020    CO2 24 2020    BUN 20 2020    GLU 78 2020    CALCIUM 8.2 (L) 2020    MG 1.9 2020    PHOS 4.4 2020     LFTs:   Lab Results   Component Value Date    PROT 7.7 2020    ALBUMIN 4.1 2020    BILITOT 0.8 2020    AST 25 2020    ALKPHOS 107 2020    ALT 20 2020     Coags:   Lab Results   Component Value Date    INR 1.0 2015     FLP:   Lab Results   Component Value Date    CHOL 154 2020    HDL 56 2020    LDLCALC 71 2020    TRIG 205 (H) 2020    CHOLHDL 2.8 2020     DM:   Lab Results   Component Value Date    LDLCALC 71 2020    CREATININE 4.6 (H) 2020     Thyroid:   Lab Results   Component Value Date    TSH 1.538 2015     FREET4 0.87 09/06/2015     Anemia:   Lab Results   Component Value Date    IRON 47 12/29/2015    TIBC 318 12/29/2015    FERRITIN 31 08/28/2015    [id number] 683 12/29/2015    FOLATE 5.8 12/29/2015     Cardiac:   Lab Results   Component Value Date    TROPONINI <0.030 08/29/2019     (H) 08/29/2019     Urinalysis:   Lab Results   Component Value Date    COLORU Yellow 12/30/2015    SPECGRAV <=1.005 (A) 12/30/2015    NITRITE Negative 12/30/2015    KETONESU Negative 12/30/2015    UROBILINOGEN Negative 12/30/2015    WBCUA 10 (H) 06/25/2015      Radiology:  Data Reviewed: yes  CT ABDOMEN PELVIS WITHOUT CONTRAST    Current Medications:     Infusions:       Scheduled:   ARIPiprazole  2 mg Oral QHS    benztropine  1 mg Oral BID    busPIRone  10 mg Oral TID    calcitRIOL  0.25 mcg Oral Daily    epoetin jacob-ebpx (RETACRIT) injection  50 Units/kg Intravenous Once    epoetin jacob-ebpx (RETACRIT) injection  50 Units/kg Intravenous Every Tues, Thurs, Sat    famotidine  20 mg Oral QHS    FLUoxetine  20 mg Oral Daily    fluticasone furoate-vilanteroL  1 puff Inhalation Daily    levothyroxine  100 mcg Oral Before breakfast    midodrine  10 mg Oral Every other day    midodrine  5 mg Oral BID WM    montelukast  10 mg Oral QHS    piperacillin-tazobactam (ZOSYN) IVPB  3.375 g Intravenous Q12H    rosuvastatin  20 mg Oral Daily    tiotropium  1 capsule Inhalation Daily        PRN:  acetaminophen, albuterol sulfate, morphine, ondansetron, promethazine (PHENERGAN) IVPB, sodium chloride 0.9%, traZODone      Microbiology Data:  Reviewed: yes  Microbiology Results (last 7 days)     Procedure Component Value Units Date/Time    Clostridium difficile EIA [[id number]]     Order Status:  Canceled Specimen:  Stool     Stool culture [[id number]]     Order Status:  No result Specimen:  Stool            Antibiotics and Day Number of Therapy:  Antibiotics (From admission, onward)    Start     Stop Route Frequency Ordered    06/06/20  1500  piperacillin-tazobactam 3.375 g in dextrose 5 % 50 mL IVPB (ready to mix system)      -- IV Every 12 hours (non-standard times) 06/06/20 1352    06/06/20 1300  piperacillin-tazobactam 3.375 g in dextrose 5 % 50 mL IVPB (ready to mix system)      06/07 0059 IV ED 1 Time 06/06/20 1257             Assessment/Plan:     Althea Gaona is a 80 y.o.female with:      Proctocolitis  Admit to med/surg tele   Continue zosyn, d/c flagyl   Seems to be resolving  Pt is on HD and will hold off on IV hydration  Push oral hydration     Borderline hypotension  Stable  Continue home midodrine     ESRD (end stage renal disease)  Consulted Dr. Rodrigues for HD   Electrolytes remain stable     Metabolic acidosis  Renal failure and diarrhea  Monitor, will have HD          Otf Ramos  [facility] Medicine

## 2020-06-12 PROCEDURE — U0003 INFECTIOUS AGENT DETECTION BY NUCLEIC ACID (DNA OR RNA); SEVERE ACUTE RESPIRATORY SYNDROME CORONAVIRUS 2 (SARS-COV-2) (CORONAVIRUS DISEASE [COVID-19]), AMPLIFIED PROBE TECHNIQUE, MAKING USE OF HIGH THROUGHPUT TECHNOLOGIES AS DESCRIBED BY CMS-2020-01-R: HCPCS

## 2020-06-12 PROCEDURE — 97530 THERAPEUTIC ACTIVITIES: CPT | Mod: CQ

## 2020-06-12 PROCEDURE — 30200315 PPD INTRADERMAL TEST REV CODE 302: Performed by: INTERNAL MEDICINE

## 2020-06-12 PROCEDURE — 94761 N-INVAS EAR/PLS OXIMETRY MLT: CPT

## 2020-06-12 PROCEDURE — 63600175 PHARM REV CODE 636 W HCPCS: Performed by: NURSE PRACTITIONER

## 2020-06-12 PROCEDURE — 99900035 HC TECH TIME PER 15 MIN (STAT)

## 2020-06-12 PROCEDURE — 86580 TB INTRADERMAL TEST: CPT | Performed by: INTERNAL MEDICINE

## 2020-06-12 PROCEDURE — 12000002 HC ACUTE/MED SURGE SEMI-PRIVATE ROOM

## 2020-06-12 PROCEDURE — 25000003 PHARM REV CODE 250: Performed by: NURSE PRACTITIONER

## 2020-06-12 PROCEDURE — 94640 AIRWAY INHALATION TREATMENT: CPT

## 2020-06-12 PROCEDURE — 25000242 PHARM REV CODE 250 ALT 637 W/ HCPCS: Performed by: INTERNAL MEDICINE

## 2020-06-12 PROCEDURE — 27000221 HC OXYGEN, UP TO 24 HOURS

## 2020-06-12 PROCEDURE — 63600175 PHARM REV CODE 636 W HCPCS: Performed by: FAMILY MEDICINE

## 2020-06-12 RX ADMIN — FLUTICASONE FUROATE AND VILANTEROL TRIFENATATE 1 PUFF: 100; 25 POWDER RESPIRATORY (INHALATION) at 07:06

## 2020-06-12 RX ADMIN — CALCITRIOL CAPSULES 0.25 MCG 0.25 MCG: 0.25 CAPSULE ORAL at 09:06

## 2020-06-12 RX ADMIN — MIDODRINE HYDROCHLORIDE 5 MG: 2.5 TABLET ORAL at 07:06

## 2020-06-12 RX ADMIN — FAMOTIDINE 20 MG: 20 TABLET, FILM COATED ORAL at 09:06

## 2020-06-12 RX ADMIN — ROSUVASTATIN CALCIUM 20 MG: 20 TABLET, FILM COATED ORAL at 09:06

## 2020-06-12 RX ADMIN — BUSPIRONE HYDROCHLORIDE 10 MG: 5 TABLET ORAL at 09:06

## 2020-06-12 RX ADMIN — TIOTROPIUM BROMIDE 18 MCG: 18 CAPSULE ORAL; RESPIRATORY (INHALATION) at 07:06

## 2020-06-12 RX ADMIN — BUSPIRONE HYDROCHLORIDE 10 MG: 5 TABLET ORAL at 04:06

## 2020-06-12 RX ADMIN — PIPERACILLIN AND TAZOBACTAM 3.38 G: 3; .375 INJECTION, POWDER, FOR SOLUTION INTRAVENOUS at 06:06

## 2020-06-12 RX ADMIN — MORPHINE SULFATE 2 MG: 2 INJECTION, SOLUTION INTRAMUSCULAR; INTRAVENOUS at 01:06

## 2020-06-12 RX ADMIN — ARIPIPRAZOLE 2 MG: 2 TABLET ORAL at 09:06

## 2020-06-12 RX ADMIN — MORPHINE SULFATE 2 MG: 2 INJECTION, SOLUTION INTRAMUSCULAR; INTRAVENOUS at 06:06

## 2020-06-12 RX ADMIN — MIDODRINE HYDROCHLORIDE 5 MG: 2.5 TABLET ORAL at 04:06

## 2020-06-12 RX ADMIN — BENZTROPINE MESYLATE 1 MG: 1 TABLET ORAL at 09:06

## 2020-06-12 RX ADMIN — FLUOXETINE 20 MG: 20 CAPSULE ORAL at 09:06

## 2020-06-12 RX ADMIN — TUBERCULIN PURIFIED PROTEIN DERIVATIVE 5 UNITS: 5 INJECTION, SOLUTION INTRADERMAL at 09:06

## 2020-06-12 RX ADMIN — LEVOTHYROXINE SODIUM 100 MCG: 100 TABLET ORAL at 06:06

## 2020-06-12 RX ADMIN — ONDANSETRON 8 MG: 2 INJECTION INTRAMUSCULAR; INTRAVENOUS at 04:06

## 2020-06-12 RX ADMIN — MORPHINE SULFATE 2 MG: 2 INJECTION, SOLUTION INTRAMUSCULAR; INTRAVENOUS at 09:06

## 2020-06-12 RX ADMIN — MONTELUKAST SODIUM 10 MG: 10 TABLET, COATED ORAL at 09:06

## 2020-06-12 NOTE — PT/OT/SLP PROGRESS
"Physical Therapy Treatment    Patient Name:  Althea Gaona   MRN:  [id number]    Recommendations:     Discharge Recommendations:  other (see comments)(SNF vs home with HHPT and 24/7 supervision)   Discharge Equipment Recommendations: other (see comments)(TBD next level of care)   Barriers to discharge: Decreased caregiver support    Assessment:     Althea Gaona is a 80 y.o. female admitted with a medical diagnosis of Proctocolitis.  She presents with the following impairments/functional limitations:  weakness, impaired endurance, impaired self care skills, gait instability, impaired cognition, impaired functional mobilty, decreased safety awareness, impaired balance, decreased lower extremity function. Pt unable to complete step t/f to chair without mod A 2/2 BLE weakness and decreased safety awareness. Pt completed therex while sitting in chair noting Pt lacking full extension of BLE during LAQs. Pt requires verbal cuing throughout tx to complete tasks safely.  Continue with PT and POC.     Rehab Prognosis: Good; patient would benefit from acute skilled PT services to address these deficits and reach maximum level of function.    Recent Surgery: * No surgery found *      Plan:     During this hospitalization, patient to be seen 5 x/week to address the identified rehab impairments via gait training, therapeutic activities, therapeutic exercises and progress toward the following goals:    · Plan of Care Expires:  07/08/20    Subjective     Chief Complaint: Pt reports "I am not feeling good today."  Patient/Family Comments/goals: return home   Pain/Comfort:  ·        Objective:     Communicated with RN prior to session.  Patient found HOB elevated with telemetry, peripheral IV upon PT entry to room.     General Precautions: Standard, fall   Orthopedic Precautions:    Braces:       Functional Mobility:  · Bed Mobility:     · Rolling Right: minimum assistance  · Supine to Sit: minimum assistance  · Transfers:   "   · Sit to Stand:  minimum assistance with rolling walker  · Bed to Chair: moderate assistance with  rolling walker  using  Step Transfer      AM-PAC 6 CLICK MOBILITY          Therapeutic Activities and Exercises:   bed mobility; sitting EOB for trunk control and midline orientation; sit<> stands; transfer training  Pt performed there ex in seated position: LAQ, AP, marches 2 x 10 reps on BLE      Patient left up in chair with all lines intact, call button in reach, chair alarm on and RN notified..    GOALS:   Multidisciplinary Problems     Physical Therapy Goals        Problem: Physical Therapy Goal    Goal Priority Disciplines Outcome Goal Variances Interventions   Physical Therapy Goal     PT, PT/OT Ongoing, Progressing     Description:  Goals to be met by: discharge     Patient will increase functional independence with mobility by performin. Supine to sit with MInimal Assistance  2. Sit to supine with MInimal Assistance  3. Sit to stand transfer with Contact Guard Assistance  4. Bed to chair transfer with Contact Guard Assistance using Rolling Walker  5. Gait  x 15 feet with Contact Guard Assistance using Rolling Walker.                       Time Tracking:     PT Received On: 20  PT Start Time: 1017     PT Stop Time: 1034  PT Total Time (min): 17 min     Billable Minutes: Therapeutic Activity 17    Treatment Type: Treatment  PT/PTA: PTA     PTA Visit Number: 3     Hillary Hammant, PTA  2020

## 2020-06-12 NOTE — CARE UPDATE
06/12/20 0722   Patient Assessment/Suction   Level of Consciousness (AVPU) alert   Respiratory Effort Normal;Unlabored   Expansion/Accessory Muscles/Retractions no use of accessory muscles   All Lung Fields Breath Sounds diminished   Rhythm/Pattern, Respiratory unlabored;pattern regular;depth regular   Cough Frequency no cough   PRE-TX-O2   O2 Device (Oxygen Therapy) nasal cannula   $ Is the patient on Low Flow Oxygen? Yes   Flow (L/min) 2   SpO2 96 %   Pulse Oximetry Type Intermittent   $ Pulse Oximetry - Multiple Charge Pulse Oximetry - Multiple   Pulse 83   Resp 16   Aerosol Therapy   $ Aerosol Therapy Charges PRN treatment not required   Inhaler   $ Inhaler Charges MDI (Metered Dose Inahler) Treatment;Independent   Daily Review of Necessity (Inhaler) completed   Respiratory Treatment Status (Inhaler) given;mouth rinsed post treatment   Treatment Route (Inhaler) mouthpiece   Patient Position (Inhaler) HOB elevated   Post Treatment Assessment (Inhaler) breath sounds unchanged   Signs of Intolerance (Inhaler) none   Respiratory Evaluation   $ Care Plan Tech Time 15 min   Home Oxygen   Has Home Oxygen? Yes   Liter Flow 3

## 2020-06-12 NOTE — NURSING
Pt recently arrived from dialysis. Dinner served per request. 1700 & 1800 meds given. Trudy well. AAOx3 but kept asking where is his son, and to tell her  to come over. Pt reminded that neither her son nor  is in the facilty.

## 2020-06-12 NOTE — CONSULTS
INPATIENT NEPHROLOGY CONSULT   [facility] NEPHROLOGY    Patient Name: Althea Gaona  Date: 06/12/2020    Reason for consultation: ESRD    History of Present Illness:  80F with HTN, ESRD on HD T/Th/Sat, COPD and CVA presents on 6/6 with moderate diarrhea, associated with weakness, chills, bilat lower quadrant pain, and nausea. She missed her last 2 HD appts.  In the ED, a CT abd/pelvis was done showing acute proctocolitis without perforation or abscess. We are consulted for dialysis.    6/8 VSS, no new complains. Skip HD today, plan HD in AM per schedule.  6/9 VSS, seen and examined on HD today, tolerating well, no new complains.  6/10 VSS, no new complains. Plan HD in AM per schedule.  6/11 VSS, seen and examined on HD today, tolerating well, no new complains. OK to dc if she has a facility offer.  6/12 VSS, no new complains. Plan HD in AM per schedule.    Plan of Care:    1. Colitis  - dose antbx for dialysis    2. ESRD  - no acute HD needs today  - continue HD TTS.    3. Chronic hypotension  - continue midorine    4. Hypokalemia  - will use 3-4K bath    5. SHPT  - continue calcitriol    6. Anemia of CKD  - ordered CLAIRE 50 units/kg with HD    Thank you for allowing us to participate in this patient's care. We will continue to follow.    Vital Signs:  Temp Readings from Last 3 Encounters:   06/12/20 98.6 °F (37 °C) (Oral)   04/09/20 97.9 °F (36.6 °C) (Oral)   03/06/20 97.7 °F (36.5 °C) (Skin)       Pulse Readings from Last 3 Encounters:   06/12/20 83   04/09/20 65   03/06/20 89       BP Readings from Last 3 Encounters:   06/12/20 101/67   04/09/20 118/73   03/06/20 (!) 98/54       Weight:  Wt Readings from Last 3 Encounters:   06/10/20 62.1 kg (136 lb 14.5 oz)   04/23/20 63.5 kg (140 lb)   04/09/20 63.5 kg (140 lb)       Past Medical & Surgical History:  Past Medical History:   Diagnosis Date    Alcoholism     no drink since 1984    Anxiety     Asthma     Bipolar affect, depressed     Bipolar disorder      COPD (chronic obstructive pulmonary disease)     Degenerative disc disease, lumbar 11/22/2019    Dialysis patient     ESRD (end stage renal disease)     Full dentures     GERD (gastroesophageal reflux disease)     Hypertension     Pt states low b/p    Limb alert care status     NO BP/IV LEFT ARM    Pancreatitis     Stroke     Thyroid disease        Past Surgical History:   Procedure Laterality Date    APPENDECTOMY      CATARACT EXTRACTION Right     DIALYSIS FISTULA CREATION      left upper arm    EPIDURAL STEROID INJECTION INTO LUMBAR SPINE N/A 11/22/2019    Procedure: Injection-steroid-epidural-lumbar;  Surgeon: Rosanna Khan MD;  Location: [facility] OR;  Service: Pain Management;  Laterality: N/A;  L5-S1      EPIDURAL STEROID INJECTION INTO LUMBAR SPINE N/A 2/3/2020    Procedure: Injection-steroid-epidural-lumbar;  Surgeon: Rosanna Khan MD;  Location: [facility] OR;  Service: Pain Management;  Laterality: N/A;  L5-S1    EYE SURGERY      FIXATION KYPHOPLASTY N/A 10/18/2019    Procedure: Kyphoplasty;  Surgeon: Rosanna Khan MD;  Location: [facility] OR;  Service: Pain Management;  Laterality: N/A;  L2    HYSTERECTOMY      OPEN REDUCTION AND INTERNAL FIXATION (ORIF) OF INJURY OF FOREARM Left 3/6/2020    Procedure: ORIF, FOREARM;  Surgeon: Tab Mendez MD;  Location: [facility] OR;  Service: Orthopedics;  Laterality: Left;    THYROIDECTOMY      THYROIDECTOMY         Past Social History:  Social History     Socioeconomic History    Marital status:      Spouse name: Not on file    Number of children: Not on file    Years of education: Not on file    Highest education level: Not on file   Occupational History    Not on file   Social Needs    Financial resource strain: Not on file    Food insecurity:     Worry: Not on file     Inability: Not on file    Transportation needs:     Medical: Not on file     Non-medical: Not on file   Tobacco Use    Smoking status: Former Smoker     Packs/day:  1.00     Types: Cigarettes     Last attempt to quit: 10/1/2018     Years since quittin.6    Smokeless tobacco: Never Used   Substance and Sexual Activity    Alcohol use: No    Drug use: Never    Sexual activity: Not Currently   Lifestyle    Physical activity:     Days per week: Not on file     Minutes per session: Not on file    Stress: Not on file   Relationships    Social connections:     Talks on phone: Not on file     Gets together: Not on file     Attends [employer] service: Not on file     Active member of club or organization: Not on file     Attends meetings of clubs or organizations: Not on file     Relationship status: Not on file   Other Topics Concern    Not on file   Social History Narrative    Not on file       Medications:  Scheduled Meds:   ARIPiprazole  2 mg Oral QHS    benztropine  1 mg Oral BID    busPIRone  10 mg Oral TID    calcitRIOL  0.25 mcg Oral Daily    epoetin jacob-ebpx (RETACRIT) injection  50 Units/kg Intravenous Once    epoetin jacob-ebpx (RETACRIT) injection  50 Units/kg Intravenous Every Tues, Thurs, Sat    famotidine  20 mg Oral QHS    FLUoxetine  20 mg Oral Daily    fluticasone furoate-vilanteroL  1 puff Inhalation Daily    levothyroxine  100 mcg Oral Before breakfast    midodrine  10 mg Oral Every other day    midodrine  5 mg Oral BID WM    montelukast  10 mg Oral QHS    piperacillin-tazobactam (ZOSYN) IVPB  3.375 g Intravenous Q12H    rosuvastatin  20 mg Oral Daily    tiotropium  1 capsule Inhalation Daily    tuberculin  5 Units Intradermal Once     Continuous Infusions:    PRN Meds:.acetaminophen, albuterol sulfate, morphine, ondansetron, promethazine (PHENERGAN) IVPB, sodium chloride 0.9%, traZODone  Current Facility-Administered Medications on File Prior to Encounter   Medication Dose Route Frequency Provider Last Rate Last Dose    0.9%  NaCl infusion   Intravenous Continuous Rosanna Khan MD 0 mL/hr at 20 1611      electrolyte-S (ISOLYTE)    Intravenous Continuous Philippe Greco MD 10 mL/hr at 03/06/20 0910      lactated ringers infusion   Intravenous Once PRN Rosanna Khan MD        lactated ringers infusion   Intravenous Once PRN Rosanna Khan MD        lactated ringers infusion   Intravenous Continuous Philippe Greco MD        lidocaine (PF) 10 mg/ml (1%) injection 10 mg  1 mL Intradermal Once Philippe Greco MD         Current Outpatient Medications on File Prior to Encounter   Medication Sig Dispense Refill    ARIPiprazole (ABILIFY) 2 MG Tab Take 1 tablet (2 mg total) by mouth every evening. 90 tablet 1    benztropine (COGENTIN) 1 MG tablet Take 1 tablet (1 mg total) by mouth 2 (two) times daily. 60 tablet 2    busPIRone (BUSPAR) 10 MG tablet Take 1 tablet (10 mg total) by mouth 3 (three) times daily. 270 tablet 1    calcitRIOL (ROCALTROL) 0.25 MCG Cap 0.25 mcg once daily.   3    DAILY-YANDY tablet Take 1 tablet by mouth once daily.   11    famotidine (PEPCID) 20 MG tablet Take 20 mg by mouth every evening.   1    FLUoxetine 20 MG capsule Take 1 capsule (20 mg total) by mouth once daily. 90 capsule 1    fluticasone-umeclidin-vilanter (TRELEGY ELLIPTA) 100-62.5-25 mcg DsDv Inhale 1 puff into the lungs once daily. 1 each 11    levothyroxine (SYNTHROID) 100 MCG tablet Take 100 mcg by mouth before breakfast.       midodrine (PROAMATINE) 10 MG tablet Take 10 mg by mouth every 7 days. Prior to dialysis treatment      montelukast (SINGULAIR) 10 mg tablet Take 1 tablet (10 mg total) by mouth every evening. 90 tablet 1    ondansetron (ZOFRAN) 8 MG tablet Take 1 tablet (8 mg total) by mouth every 8 (eight) hours as needed for Nausea. (Patient taking differently: Take 8 mg by mouth every 12 (twelve) hours as needed for Nausea. ) 30 tablet 2    polyethylene glycol (GLYCOLAX) 17 gram PwPk Take 17 g by mouth daily as needed.  0    rosuvastatin (CRESTOR) 20 MG tablet Take 1 tablet (20 mg total) by mouth once daily. 90 tablet 1     traZODone (DESYREL) 100 MG tablet Take 100 mg by mouth nightly as needed.   2    albuterol-ipratropium  mcg (COMBIVENT)  mcg/actuation inhaler Inhale 2 puffs into the lungs every 6 (six) hours as needed for Wheezing.      bisacodyl (DULCOLAX) 5 mg EC tablet Take 1 tablet (5 mg total) by mouth daily as needed for Constipation.  0    dicyclomine (BENTYL) 10 MG capsule dicyclomine 10 mg capsule      epoetin jacob-epbx (RETACRIT) 10,000 unit/mL imjection Inject 0.34 mLs (3,400 Units total) into the skin every Tues, Thurs, Sat. With hemodialysis      midodrine (PROAMATINE) 5 MG Tab Take 1 tablet (5 mg total) by mouth 2 (two) times daily with meals. 60 tablet 11    polyvinyl alcohol, artificial tears, (LIQUIFILM TEARS) 1.4 % ophthalmic solution Place 1 drop into the left eye as needed. 15 mL 0    PROLIA 60 mg/mL Syrg Inject 1 mL (60 mg total) into the skin every 6 (six) months. 1 Syringe 1       Allergies:  Metoprolol and Codeine    Past Family History:  Reviewed; refer to Hospitalist Admission Note    Review of Systems:  Review of Systems - All 14 systems reviewed and negative, except as noted in HPI    Physical Exam:  General Appearance:    NAD, AAO x 3, cooperative, appears stated age   Head:    Normocephalic, atraumatic   Eyes:    PER, EOMI, and conjunctiva/sclera clear bilaterally       Mouth:   Moist mucus membranes, no thrush or oral lesions,       normal dentition   Back:     No CVA tenderness   Lungs:     Clear to auscultation bilaterally, no wheezes, crackles,           rales or rhonchi, symmetric air movement, respirations unlabored   Chest wall:    No tenderness or deformity   Heart:    Regular rate and rhythm, S1 and S2 normal, no murmur, rub   or gallop   Abdomen:     Soft, non-tender, non-distended, bowel sounds active all four   quadrants, no RT or guarding, no masses, no organomegaly   Extremities:   Warm and well perfused, distal pulses are intact, no             cyanosis or peripheral  edema   MSK:   No joint or muscle swelling, tenderness or deformity   Skin:   Skin color, texture, turgor normal, no rashes or lesions   Neurologic/Psychiatric:   CNII-XII intact, normal strength and sensation       throughout, no asterixis; normal affect, memory, judgement     and insight      Results:  Lab Results   Component Value Date     06/11/2020    K 4.0 06/11/2020     06/11/2020    CO2 24 06/11/2020    BUN 20 06/11/2020    CREATININE 4.6 (H) 06/11/2020    CALCIUM 8.2 (L) 06/11/2020    ANIONGAP 12 06/11/2020    ESTGFRAFRICA 9.9 (A) 06/11/2020    EGFRNONAA 8.5 (A) 06/11/2020       Lab Results   Component Value Date    CALCIUM 8.2 (L) 06/11/2020    PHOS 4.4 06/11/2020       No results for input(s): WBC, RBC, HGB, HCT, PLT, MCV, MCH, MCHC in the last 24 hours.    I have personally reviewed pertinent radiological imaging and reports.    Doc Odonnell MD  [facility]  [address] [place] [id number]  [phone] (p)  [phone] (f)

## 2020-06-12 NOTE — PT/OT/SLP PROGRESS
Occupational Therapy      Patient Name:  Althea Gaona   MRN:  [id number]    Patient not seen today secondary to refusal in PM.  Will follow-up next service date.      [place] [place] OT  6/12/2020

## 2020-06-12 NOTE — PLAN OF CARE
06/12/20 1524   Post-Acute Status   Post-Acute Authorization Placement   Post-Acute Placement Status Authorization Obtained       AUTH #[id number] obtained still waiting on negative covid test.

## 2020-06-12 NOTE — PROGRESS NOTES
[facility] Medicine Progress Note    Subjective:     Resting comfortably in bed. Denies loose stool.  Pleasantly confused c/w baseline.      Objective:   Last 24 Hour Vital Signs:  BP  Min: 101/67  Max: 131/76  Temp  Av.5 °F (36.9 °C)  Min: 97.8 °F (36.6 °C)  Max: 99.2 °F (37.3 °C)  Pulse  Av.4  Min: 83  Max: 105  Resp  Av.6  Min: 14  Max: 20  SpO2  Av.6 %  Min: 94 %  Max: 100 %  I/O last 3 completed shifts:  In: 500 [Other:500]  Out: 1500 [Other:1500]      Physical Examination:  General: AAOx3. NAD.  HEENT: NCAT. PERRLA. EOMI.     CV: RRR.No M/R/G.   Chest: NL effort. CTAB. No R/R/W.   Abd: +BS x 4. Soft. ND/NT. No rebound or guarding.   Ext: moving well. +distal pulses  Skin: Intact. No rash. No lesions.   Neuro: CN III-XII intact. No focal deficit.  Psych:  No A/V hallucinations. NL affect. No abnormal behaviors noted       Laboratory:  Laboratory Data Reviewed: yes    Most Recent Data:  CBC:   Lab Results   Component Value Date    WBC 5.31 2020    HGB 9.8 (L) 2020    HCT 31.5 (L) 2020     2020     (H) 2020    RDW 14.0 2020     BMP:   Lab Results   Component Value Date     2020    K 4.0 2020     2020    CO2 24 2020    BUN 20 2020    GLU 78 2020    CALCIUM 8.2 (L) 2020    MG 1.9 2020    PHOS 4.4 2020     LFTs:   Lab Results   Component Value Date    PROT 7.7 2020    ALBUMIN 4.1 2020    BILITOT 0.8 2020    AST 25 2020    ALKPHOS 107 2020    ALT 20 2020     Coags:   Lab Results   Component Value Date    INR 1.0 2015     FLP:   Lab Results   Component Value Date    CHOL 154 2020    HDL 56 2020    LDLCALC 71 2020    TRIG 205 (H) 2020    CHOLHDL 2.8 2020     DM:   Lab Results   Component Value Date    LDLCALC 71 2020    CREATININE 4.6 (H) 2020     Thyroid:   Lab Results   Component Value Date    TSH  1.538 12/29/2015    FREET4 0.87 09/06/2015     Anemia:   Lab Results   Component Value Date    IRON 47 12/29/2015    TIBC 318 12/29/2015    FERRITIN 31 08/28/2015    [id number] 683 12/29/2015    FOLATE 5.8 12/29/2015     Cardiac:   Lab Results   Component Value Date    TROPONINI <0.030 08/29/2019     (H) 08/29/2019     Urinalysis:   Lab Results   Component Value Date    COLORU Yellow 12/30/2015    SPECGRAV <=1.005 (A) 12/30/2015    NITRITE Negative 12/30/2015    KETONESU Negative 12/30/2015    UROBILINOGEN Negative 12/30/2015    WBCUA 10 (H) 06/25/2015      Radiology:  Data Reviewed: yes  CT ABDOMEN PELVIS WITHOUT CONTRAST    Current Medications:     Infusions:       Scheduled:   ARIPiprazole  2 mg Oral QHS    benztropine  1 mg Oral BID    busPIRone  10 mg Oral TID    calcitRIOL  0.25 mcg Oral Daily    epoetin jacob-ebpx (RETACRIT) injection  50 Units/kg Intravenous Once    epoetin jacob-ebpx (RETACRIT) injection  50 Units/kg Intravenous Every Tues, Thurs, Sat    famotidine  20 mg Oral QHS    FLUoxetine  20 mg Oral Daily    fluticasone furoate-vilanteroL  1 puff Inhalation Daily    levothyroxine  100 mcg Oral Before breakfast    midodrine  10 mg Oral Every other day    midodrine  5 mg Oral BID WM    montelukast  10 mg Oral QHS    piperacillin-tazobactam (ZOSYN) IVPB  3.375 g Intravenous Q12H    rosuvastatin  20 mg Oral Daily    tiotropium  1 capsule Inhalation Daily        PRN:  acetaminophen, albuterol sulfate, morphine, ondansetron, promethazine (PHENERGAN) IVPB, sodium chloride 0.9%, traZODone      Microbiology Data:  Reviewed: yes  Microbiology Results (last 7 days)     Procedure Component Value Units Date/Time    Clostridium difficile EIA [[id number]]     Order Status:  Canceled Specimen:  Stool     Stool culture [[id number]]     Order Status:  No result Specimen:  Stool            Antibiotics and Day Number of Therapy:  Antibiotics (From admission, onward)    Start     Stop Route Frequency  Ordered    06/06/20 1500  piperacillin-tazobactam 3.375 g in dextrose 5 % 50 mL IVPB (ready to mix system)      -- IV Every 12 hours (non-standard times) 06/06/20 1352    06/06/20 1300  piperacillin-tazobactam 3.375 g in dextrose 5 % 50 mL IVPB (ready to mix system)      06/07 0059 IV ED 1 Time 06/06/20 1257             Assessment/Plan:     Althea Gaona is a 80 y.o.female with:      Proctocolitis  Admit to med/surg tele   Continue zosyn, d/c flagyl   Seems to be resolving  Pt is on HD and will hold off on IV hydration  Push oral hydration     Borderline hypotension  Stable  Continue home midodrine     ESRD (end stage renal disease)  Consulted Dr. Rodrigues for HD   Electrolytes remain stable     Metabolic acidosis  Renal failure and diarrhea  Monitor, will have HD          Otf Ramos  [facility] Medicine

## 2020-06-12 NOTE — RESPIRATORY THERAPY
06/11/20 2232   Patient Assessment/Suction   Level of Consciousness (AVPU) alert   Respiratory Effort Unlabored   Expansion/Accessory Muscles/Retractions expansion symmetric;no retractions;no use of accessory muscles   All Lung Fields Breath Sounds diminished   Rhythm/Pattern, Respiratory no shortness of breath reported;depth regular;pattern regular   PRE-TX-O2   O2 Device (Oxygen Therapy) nasal cannula   Flow (L/min) 2   SpO2 (!) 94 %   Pulse Oximetry Type Intermittent   $ Pulse Oximetry - Multiple Charge Pulse Oximetry - Multiple   Pulse 84   Resp 14   Aerosol Therapy   $ Aerosol Therapy Charges PRN treatment not required   Respiratory Interventions   Cough And Deep Breathing done with encouragement   Breathing Techniques/Airway Clearance deep/controlled cough encouraged;diaphragmatic breathing promoted   Respiratory Evaluation   $ Care Plan Tech Time 15 min   Evaluation For New Orders   Admitting Diagnosis Protocolitis   Pulmonary Diagnosis COPD   Home Oxygen   Has Home Oxygen? No   Home Aerosol, MDI, DPI, and Other Treatments/Therapies   Home Respiratory Therapy Per Patient/Review of Chart No   Oxygen Care Plan   Oxygen Care Plan Per Protocol   SPO2 Goal (%) 92% non-cardiac   Rationale SpO2 is <92% (Non-cardiac Pt.)   Bronchodilator Care Plan   Bronchodilator Care Plan Aerosol;DPI   Aerosol Meds w/ frequency Ventolin/Proventil(Albuterol Sulfate) 2.5mg PRN   DPI Meds w/ frequency Spiriva 18 mcg 1 Puff - Q DAY;Breo- fluticasone-vilanterol diskus inhaler (200) ONCE DAILY

## 2020-06-13 LAB
ALBUMIN SERPL BCP-MCNC: 3.1 G/DL (ref 3.5–5.2)
ANION GAP SERPL CALC-SCNC: 14 MMOL/L (ref 8–16)
BUN SERPL-MCNC: 11 MG/DL (ref 8–23)
CALCIUM SERPL-MCNC: 8.1 MG/DL (ref 8.7–10.5)
CHLORIDE SERPL-SCNC: 100 MMOL/L (ref 95–110)
CO2 SERPL-SCNC: 24 MMOL/L (ref 23–29)
CREAT SERPL-MCNC: 2.7 MG/DL (ref 0.5–1.4)
ERYTHROCYTE [DISTWIDTH] IN BLOOD BY AUTOMATED COUNT: 13.8 % (ref 11.5–14.5)
EST. GFR  (AFRICAN AMERICAN): 18.8 ML/MIN/1.73 M^2
EST. GFR  (NON AFRICAN AMERICAN): 16.3 ML/MIN/1.73 M^2
GLUCOSE SERPL-MCNC: 117 MG/DL (ref 70–110)
HCT VFR BLD AUTO: 29.6 % (ref 37–48.5)
HGB BLD-MCNC: 9.5 G/DL (ref 12–16)
MCH RBC QN AUTO: 32.4 PG (ref 27–31)
MCHC RBC AUTO-ENTMCNC: 32.1 G/DL (ref 32–36)
MCV RBC AUTO: 101 FL (ref 82–98)
PHOSPHATE SERPL-MCNC: 2.5 MG/DL (ref 2.7–4.5)
PLATELET # BLD AUTO: 271 K/UL (ref 150–350)
PMV BLD AUTO: 10.2 FL (ref 9.2–12.9)
POTASSIUM SERPL-SCNC: 2.9 MMOL/L (ref 3.5–5.1)
RBC # BLD AUTO: 2.93 M/UL (ref 4–5.4)
SODIUM SERPL-SCNC: 138 MMOL/L (ref 136–145)
WBC # BLD AUTO: 3.98 K/UL (ref 3.9–12.7)

## 2020-06-13 PROCEDURE — 12000002 HC ACUTE/MED SURGE SEMI-PRIVATE ROOM

## 2020-06-13 PROCEDURE — 94640 AIRWAY INHALATION TREATMENT: CPT

## 2020-06-13 PROCEDURE — 80069 RENAL FUNCTION PANEL: CPT

## 2020-06-13 PROCEDURE — 36415 COLL VENOUS BLD VENIPUNCTURE: CPT

## 2020-06-13 PROCEDURE — 63600175 PHARM REV CODE 636 W HCPCS: Performed by: NURSE PRACTITIONER

## 2020-06-13 PROCEDURE — 99900035 HC TECH TIME PER 15 MIN (STAT)

## 2020-06-13 PROCEDURE — 63600175 PHARM REV CODE 636 W HCPCS: Performed by: FAMILY MEDICINE

## 2020-06-13 PROCEDURE — 85027 COMPLETE CBC AUTOMATED: CPT

## 2020-06-13 PROCEDURE — 27000221 HC OXYGEN, UP TO 24 HOURS

## 2020-06-13 PROCEDURE — 63600175 PHARM REV CODE 636 W HCPCS: Mod: TB | Performed by: INTERNAL MEDICINE

## 2020-06-13 PROCEDURE — 25000242 PHARM REV CODE 250 ALT 637 W/ HCPCS: Performed by: INTERNAL MEDICINE

## 2020-06-13 PROCEDURE — 94761 N-INVAS EAR/PLS OXIMETRY MLT: CPT

## 2020-06-13 PROCEDURE — 25000003 PHARM REV CODE 250: Performed by: NURSE PRACTITIONER

## 2020-06-13 PROCEDURE — 90935 HEMODIALYSIS ONE EVALUATION: CPT

## 2020-06-13 RX ADMIN — ONDANSETRON 8 MG: 2 INJECTION INTRAMUSCULAR; INTRAVENOUS at 12:06

## 2020-06-13 RX ADMIN — ARIPIPRAZOLE 2 MG: 2 TABLET ORAL at 09:06

## 2020-06-13 RX ADMIN — MORPHINE SULFATE 2 MG: 2 INJECTION, SOLUTION INTRAMUSCULAR; INTRAVENOUS at 05:06

## 2020-06-13 RX ADMIN — ONDANSETRON 8 MG: 2 INJECTION INTRAMUSCULAR; INTRAVENOUS at 05:06

## 2020-06-13 RX ADMIN — FAMOTIDINE 20 MG: 20 TABLET, FILM COATED ORAL at 07:06

## 2020-06-13 RX ADMIN — MIDODRINE HYDROCHLORIDE 5 MG: 2.5 TABLET ORAL at 04:06

## 2020-06-13 RX ADMIN — MIDODRINE HYDROCHLORIDE 10 MG: 2.5 TABLET ORAL at 08:06

## 2020-06-13 RX ADMIN — ONDANSETRON 8 MG: 2 INJECTION INTRAMUSCULAR; INTRAVENOUS at 07:06

## 2020-06-13 RX ADMIN — FLUTICASONE FUROATE AND VILANTEROL TRIFENATATE 1 PUFF: 100; 25 POWDER RESPIRATORY (INHALATION) at 08:06

## 2020-06-13 RX ADMIN — ACETAMINOPHEN 650 MG: 325 TABLET ORAL at 07:06

## 2020-06-13 RX ADMIN — BUSPIRONE HYDROCHLORIDE 10 MG: 5 TABLET ORAL at 03:06

## 2020-06-13 RX ADMIN — TIOTROPIUM BROMIDE 18 MCG: 18 CAPSULE ORAL; RESPIRATORY (INHALATION) at 08:06

## 2020-06-13 RX ADMIN — BUSPIRONE HYDROCHLORIDE 10 MG: 5 TABLET ORAL at 07:06

## 2020-06-13 RX ADMIN — EPOETIN ALFA-EPBX 3300 UNITS: 10000 INJECTION, SOLUTION INTRAVENOUS; SUBCUTANEOUS at 09:06

## 2020-06-13 RX ADMIN — MORPHINE SULFATE 2 MG: 2 INJECTION, SOLUTION INTRAMUSCULAR; INTRAVENOUS at 12:06

## 2020-06-13 RX ADMIN — LEVOTHYROXINE SODIUM 100 MCG: 100 TABLET ORAL at 05:06

## 2020-06-13 RX ADMIN — PIPERACILLIN AND TAZOBACTAM 3.38 G: 3; .375 INJECTION, POWDER, FOR SOLUTION INTRAVENOUS at 05:06

## 2020-06-13 RX ADMIN — MONTELUKAST SODIUM 10 MG: 10 TABLET, COATED ORAL at 07:06

## 2020-06-13 RX ADMIN — ROSUVASTATIN CALCIUM 20 MG: 20 TABLET, FILM COATED ORAL at 07:06

## 2020-06-13 RX ADMIN — ONDANSETRON 8 MG: 2 INJECTION INTRAMUSCULAR; INTRAVENOUS at 11:06

## 2020-06-13 RX ADMIN — BENZTROPINE MESYLATE 1 MG: 1 TABLET ORAL at 07:06

## 2020-06-13 NOTE — PROGRESS NOTES
[facility] Medicine Progress Note    Subjective:     Seen just after returning to the floor from HD earlier this afternoon. C/o a headache but then says no nevermind. Pleasantly confused c/w baseline.      Objective:   Last 24 Hour Vital Signs:  BP  Min: 91/62  Max: 121/77  Temp  Av °F (36.7 °C)  Min: 97 °F (36.1 °C)  Max: 98.6 °F (37 °C)  Pulse  Av.5  Min: 62  Max: 98  Resp  Av.4  Min: 18  Max: 20  SpO2  Av.7 %  Min: 92 %  Max: 97 %  No intake/output data recorded.      Physical Examination:  General: AAOx3. NAD.  HEENT: NCAT. PERRLA. EOMI.     CV: RRR.No M/R/G.   Chest: NL effort. CTAB. No R/R/W.   Abd: +BS x 4. Soft. ND/NT. No rebound or guarding.   Ext: moving well. +distal pulses  Skin: Intact. No rash. No lesions.   Neuro: CN III-XII intact. No focal deficit.  Psych:  No A/V hallucinations. NL affect. No abnormal behaviors noted       Laboratory:  Laboratory Data Reviewed: yes    Most Recent Data:  CBC:   Lab Results   Component Value Date    WBC 3.98 2020    HGB 9.5 (L) 2020    HCT 29.6 (L) 2020     2020     (H) 2020    RDW 13.8 2020     BMP:   Lab Results   Component Value Date     2020    K 2.9 (LL) 2020     2020    CO2 24 2020    BUN 11 2020     (H) 2020    CALCIUM 8.1 (L) 2020    MG 1.9 2020    PHOS 2.5 (L) 2020     LFTs:   Lab Results   Component Value Date    PROT 7.7 2020    ALBUMIN 3.1 (L) 2020    BILITOT 0.8 2020    AST 25 2020    ALKPHOS 107 2020    ALT 20 2020     Coags:   Lab Results   Component Value Date    INR 1.0 2015     FLP:   Lab Results   Component Value Date    CHOL 154 2020    HDL 56 2020    LDLCALC 71 2020    TRIG 205 (H) 2020    CHOLHDL 2.8 2020     DM:   Lab Results   Component Value Date    LDLCALC 71 2020    CREATININE 2.7 (H) 2020     Thyroid:   Lab Results    Component Value Date    TSH 1.538 12/29/2015    FREET4 0.87 09/06/2015     Anemia:   Lab Results   Component Value Date    IRON 47 12/29/2015    TIBC 318 12/29/2015    FERRITIN 31 08/28/2015    [id number] 683 12/29/2015    FOLATE 5.8 12/29/2015     Cardiac:   Lab Results   Component Value Date    TROPONINI <0.030 08/29/2019     (H) 08/29/2019     Urinalysis:   Lab Results   Component Value Date    COLORU Yellow 12/30/2015    SPECGRAV <=1.005 (A) 12/30/2015    NITRITE Negative 12/30/2015    KETONESU Negative 12/30/2015    UROBILINOGEN Negative 12/30/2015    WBCUA 10 (H) 06/25/2015      Radiology:  Data Reviewed: yes  CT ABDOMEN PELVIS WITHOUT CONTRAST    Current Medications:     Infusions:       Scheduled:   ARIPiprazole  2 mg Oral QHS    benztropine  1 mg Oral BID    busPIRone  10 mg Oral TID    calcitRIOL  0.25 mcg Oral Daily    epoetin jacob-ebpx (RETACRIT) injection  50 Units/kg Intravenous Once    epoetin jacob-ebpx (RETACRIT) injection  50 Units/kg Intravenous Every Tues, Thurs, Sat    famotidine  20 mg Oral QHS    FLUoxetine  20 mg Oral Daily    fluticasone furoate-vilanteroL  1 puff Inhalation Daily    levothyroxine  100 mcg Oral Before breakfast    midodrine  10 mg Oral Every other day    midodrine  5 mg Oral BID WM    montelukast  10 mg Oral QHS    rosuvastatin  20 mg Oral Daily    tiotropium  1 capsule Inhalation Daily        PRN:  acetaminophen, albuterol sulfate, ondansetron, sodium chloride 0.9%, traZODone      Microbiology Data:  Reviewed: yes  Microbiology Results (last 7 days)     ** No results found for the last 168 hours. **           Antibiotics and Day Number of Therapy:  Antibiotics (From admission, onward)    Start     Stop Route Frequency Ordered    06/06/20 1300  piperacillin-tazobactam 3.375 g in dextrose 5 % 50 mL IVPB (ready to mix system)      06/07 0059 IV ED 1 Time 06/06/20 1257             Assessment/Plan:     Althea Gaona is a 80 y.o.female  with:      Proctocolitis  Admit to med/surg tele   Seems to be resolving, d/c abx  Pt is on HD and will hold off on IV hydration  Push oral hydration     Borderline hypotension  Stable  Continue home midodrine     ESRD (end stage renal disease)  Consulted Dr. Rodrigues for HD   Electrolytes remain stable     Metabolic acidosis  Monitor, will have HD          Otf OSEI Richard  [facility]

## 2020-06-13 NOTE — NURSING
HD tx complete, pt tolerated fair  Pt kept even d/t decreased BP entire tx, MD aware  Julio pt's labs at start of HD, critical K of 2.9 received, K bath changed to 4.0 per MD  Pt currently awaiting transport back to unit

## 2020-06-13 NOTE — CONSULTS
INPATIENT NEPHROLOGY CONSULT   [facility] NEPHROLOGY    Patient Name: Althea Gaona  Date: 06/13/2020    Reason for consultation: ESRD    History of Present Illness:  80F with HTN, ESRD on HD T/Th/Sat, COPD and CVA presents on 6/6 with moderate diarrhea, associated with weakness, chills, bilat lower quadrant pain, and nausea. She missed her last 2 HD appts.  In the ED, a CT abd/pelvis was done showing acute proctocolitis without perforation or abscess. We are consulted for dialysis.    6/8 VSS, no new complains. Skip HD today, plan HD in AM per schedule.  6/9 VSS, seen and examined on HD today, tolerating well, no new complains.  6/10 VSS, no new complains. Plan HD in AM per schedule.  6/11 VSS, seen and examined on HD today, tolerating well, no new complains. OK to dc if she has a facility offer.  6/12 VSS, no new complains. Plan HD in AM per schedule.  6/13 VSS, seen and examined on HD today, tolerating well, no new complains.    Plan of Care:    1. Colitis  - dose antbx for dialysis    2. ESRD  - no acute HD needs today  - continue HD TTS.    3. Chronic hypotension  - continue midorine    4. Hypokalemia  - will use 3-4K bath    5. SHPT  - continue calcitriol    6. Anemia of CKD  - ordered CLAIRE 50 units/kg with HD    Thank you for allowing us to participate in this patient's care. We will continue to follow.    Vital Signs:  Temp Readings from Last 3 Encounters:   06/13/20 97 °F (36.1 °C) (Tympanic)   04/09/20 97.9 °F (36.6 °C) (Oral)   03/06/20 97.7 °F (36.5 °C) (Skin)       Pulse Readings from Last 3 Encounters:   06/13/20 79   04/09/20 65   03/06/20 89       BP Readings from Last 3 Encounters:   06/13/20 101/66   04/09/20 118/73   03/06/20 (!) 98/54       Weight:  Wt Readings from Last 3 Encounters:   06/10/20 62.1 kg (136 lb 14.5 oz)   04/23/20 63.5 kg (140 lb)   04/09/20 63.5 kg (140 lb)       Past Medical & Surgical History:  Past Medical History:   Diagnosis Date    Alcoholism     no drink since 1984     Anxiety     Asthma     Bipolar affect, depressed     Bipolar disorder     COPD (chronic obstructive pulmonary disease)     Degenerative disc disease, lumbar 11/22/2019    Dialysis patient     ESRD (end stage renal disease)     Full dentures     GERD (gastroesophageal reflux disease)     Hypertension     Pt states low b/p    Limb alert care status     NO BP/IV LEFT ARM    Pancreatitis     Stroke     Thyroid disease        Past Surgical History:   Procedure Laterality Date    APPENDECTOMY      CATARACT EXTRACTION Right     DIALYSIS FISTULA CREATION      left upper arm    EPIDURAL STEROID INJECTION INTO LUMBAR SPINE N/A 11/22/2019    Procedure: Injection-steroid-epidural-lumbar;  Surgeon: Rosanna Khan MD;  Location: [facility] OR;  Service: Pain Management;  Laterality: N/A;  L5-S1      EPIDURAL STEROID INJECTION INTO LUMBAR SPINE N/A 2/3/2020    Procedure: Injection-steroid-epidural-lumbar;  Surgeon: Rosanna Khan MD;  Location: [facility] OR;  Service: Pain Management;  Laterality: N/A;  L5-S1    EYE SURGERY      FIXATION KYPHOPLASTY N/A 10/18/2019    Procedure: Kyphoplasty;  Surgeon: Rosanna Khan MD;  Location: [facility] OR;  Service: Pain Management;  Laterality: N/A;  L2    HYSTERECTOMY      OPEN REDUCTION AND INTERNAL FIXATION (ORIF) OF INJURY OF FOREARM Left 3/6/2020    Procedure: ORIF, FOREARM;  Surgeon: Tab Mendez MD;  Location: [facility] OR;  Service: Orthopedics;  Laterality: Left;    THYROIDECTOMY      THYROIDECTOMY         Past Social History:  Social History     Socioeconomic History    Marital status:      Spouse name: Not on file    Number of children: Not on file    Years of education: Not on file    Highest education level: Not on file   Occupational History    Not on file   Social Needs    Financial resource strain: Not on file    Food insecurity     Worry: Not on file     Inability: Not on file    Transportation needs     Medical: Not on file      Non-medical: Not on file   Tobacco Use    Smoking status: Former Smoker     Packs/day: 1.00     Types: Cigarettes     Quit date: 10/1/2018     Years since quittin.7    Smokeless tobacco: Never Used   Substance and Sexual Activity    Alcohol use: No    Drug use: Never    Sexual activity: Not Currently   Lifestyle    Physical activity     Days per week: Not on file     Minutes per session: Not on file    Stress: Not on file   Relationships    Social connections     Talks on phone: Not on file     Gets together: Not on file     Attends Sikhism service: Not on file     Active member of club or organization: Not on file     Attends meetings of clubs or organizations: Not on file     Relationship status: Not on file   Other Topics Concern    Not on file   Social History Narrative    Not on file       Medications:  Scheduled Meds:   ARIPiprazole  2 mg Oral QHS    benztropine  1 mg Oral BID    busPIRone  10 mg Oral TID    calcitRIOL  0.25 mcg Oral Daily    epoetin jacob-ebpx (RETACRIT) injection  50 Units/kg Intravenous Once    epoetin jacob-ebpx (RETACRIT) injection  50 Units/kg Intravenous Every Tues, Thurs, Sat    famotidine  20 mg Oral QHS    FLUoxetine  20 mg Oral Daily    fluticasone furoate-vilanteroL  1 puff Inhalation Daily    levothyroxine  100 mcg Oral Before breakfast    midodrine  10 mg Oral Every other day    midodrine  5 mg Oral BID WM    montelukast  10 mg Oral QHS    piperacillin-tazobactam (ZOSYN) IVPB  3.375 g Intravenous Q12H    rosuvastatin  20 mg Oral Daily    tiotropium  1 capsule Inhalation Daily     Continuous Infusions:    PRN Meds:.acetaminophen, albuterol sulfate, morphine, ondansetron, promethazine (PHENERGAN) IVPB, sodium chloride 0.9%, traZODone  Current Facility-Administered Medications on File Prior to Encounter   Medication Dose Route Frequency Provider Last Rate Last Dose    0.9%  NaCl infusion   Intravenous Continuous Rosanna Khan MD 0 mL/hr at 20  1611      electrolyte-S (ISOLYTE)   Intravenous Continuous Philippe Greco MD 10 mL/hr at 03/06/20 0910      lactated ringers infusion   Intravenous Once PRN Rosanna Khan MD        lactated ringers infusion   Intravenous Once PRN Rosanna Khan MD        lactated ringers infusion   Intravenous Continuous Philippe Greco MD        lidocaine (PF) 10 mg/ml (1%) injection 10 mg  1 mL Intradermal Once Philippe Greco MD         Current Outpatient Medications on File Prior to Encounter   Medication Sig Dispense Refill    ARIPiprazole (ABILIFY) 2 MG Tab Take 1 tablet (2 mg total) by mouth every evening. 90 tablet 1    benztropine (COGENTIN) 1 MG tablet Take 1 tablet (1 mg total) by mouth 2 (two) times daily. 60 tablet 2    busPIRone (BUSPAR) 10 MG tablet Take 1 tablet (10 mg total) by mouth 3 (three) times daily. 270 tablet 1    calcitRIOL (ROCALTROL) 0.25 MCG Cap 0.25 mcg once daily.   3    DAILY-YANDY tablet Take 1 tablet by mouth once daily.   11    famotidine (PEPCID) 20 MG tablet Take 20 mg by mouth every evening.   1    FLUoxetine 20 MG capsule Take 1 capsule (20 mg total) by mouth once daily. 90 capsule 1    fluticasone-umeclidin-vilanter (TRELEGY ELLIPTA) 100-62.5-25 mcg DsDv Inhale 1 puff into the lungs once daily. 1 each 11    levothyroxine (SYNTHROID) 100 MCG tablet Take 100 mcg by mouth before breakfast.       midodrine (PROAMATINE) 10 MG tablet Take 10 mg by mouth every 7 days. Prior to dialysis treatment      montelukast (SINGULAIR) 10 mg tablet Take 1 tablet (10 mg total) by mouth every evening. 90 tablet 1    ondansetron (ZOFRAN) 8 MG tablet Take 1 tablet (8 mg total) by mouth every 8 (eight) hours as needed for Nausea. (Patient taking differently: Take 8 mg by mouth every 12 (twelve) hours as needed for Nausea. ) 30 tablet 2    polyethylene glycol (GLYCOLAX) 17 gram PwPk Take 17 g by mouth daily as needed.  0    rosuvastatin (CRESTOR) 20 MG tablet Take 1 tablet (20 mg total) by  mouth once daily. 90 tablet 1    traZODone (DESYREL) 100 MG tablet Take 100 mg by mouth nightly as needed.   2    albuterol-ipratropium  mcg (COMBIVENT)  mcg/actuation inhaler Inhale 2 puffs into the lungs every 6 (six) hours as needed for Wheezing.      bisacodyl (DULCOLAX) 5 mg EC tablet Take 1 tablet (5 mg total) by mouth daily as needed for Constipation.  0    dicyclomine (BENTYL) 10 MG capsule dicyclomine 10 mg capsule      epoetin jacob-epbx (RETACRIT) 10,000 unit/mL imjection Inject 0.34 mLs (3,400 Units total) into the skin every Tues, Thurs, Sat. With hemodialysis      midodrine (PROAMATINE) 5 MG Tab Take 1 tablet (5 mg total) by mouth 2 (two) times daily with meals. 60 tablet 11    polyvinyl alcohol, artificial tears, (LIQUIFILM TEARS) 1.4 % ophthalmic solution Place 1 drop into the left eye as needed. 15 mL 0    PROLIA 60 mg/mL Syrg Inject 1 mL (60 mg total) into the skin every 6 (six) months. 1 Syringe 1       Allergies:  Metoprolol and Codeine    Past Family History:  Reviewed; refer to Hospitalist Admission Note    Review of Systems:  Review of Systems - All 14 systems reviewed and negative, except as noted in HPI    Physical Exam:  General Appearance:    NAD, AAO x 3, cooperative, appears stated age   Head:    Normocephalic, atraumatic   Eyes:    PER, EOMI, and conjunctiva/sclera clear bilaterally       Mouth:   Moist mucus membranes, no thrush or oral lesions,       normal dentition   Back:     No CVA tenderness   Lungs:     Clear to auscultation bilaterally, no wheezes, crackles,           rales or rhonchi, symmetric air movement, respirations unlabored   Chest wall:    No tenderness or deformity   Heart:    Regular rate and rhythm, S1 and S2 normal, no murmur, rub   or gallop   Abdomen:     Soft, non-tender, non-distended, bowel sounds active all four   quadrants, no RT or guarding, no masses, no organomegaly   Extremities:   Warm and well perfused, distal pulses are intact, no              cyanosis or peripheral edema   MSK:   No joint or muscle swelling, tenderness or deformity   Skin:   Skin color, texture, turgor normal, no rashes or lesions   Neurologic/Psychiatric:   CNII-XII intact, normal strength and sensation       throughout, no asterixis; normal affect, memory, judgement     and insight      Results:  Lab Results   Component Value Date     06/13/2020    K 2.9 (LL) 06/13/2020     06/13/2020    CO2 24 06/13/2020    BUN 11 06/13/2020    CREATININE 2.7 (H) 06/13/2020    CALCIUM 8.1 (L) 06/13/2020    ANIONGAP 14 06/13/2020    ESTGFRAFRICA 18.8 (A) 06/13/2020    EGFRNONAA 16.3 (A) 06/13/2020       Lab Results   Component Value Date    CALCIUM 8.1 (L) 06/13/2020    PHOS 2.5 (L) 06/13/2020       Recent Labs   Lab 06/13/20  0959   WBC 3.98   RBC 2.93*   HGB 9.5*   HCT 29.6*      *   MCH 32.4*   MCHC 32.1       I have personally reviewed pertinent radiological imaging and reports.    Doc Odonnell MD  [facility] [address] [address]  [phone] (p)  [phone] (f)

## 2020-06-13 NOTE — CARE UPDATE
06/13/20 0854   Patient Assessment/Suction   Level of Consciousness (AVPU) alert   Respiratory Effort Normal;Unlabored   All Lung Fields Breath Sounds diminished   LLL Breath Sounds crackles   RLL Breath Sounds crackles   Rhythm/Pattern, Respiratory unlabored;depth regular;pattern regular   PRE-TX-O2   O2 Device (Oxygen Therapy) nasal cannula   $ Is the patient on Low Flow Oxygen? Yes   Flow (L/min) 2   SpO2 97 %   $ Pulse Oximetry - Multiple Charge Pulse Oximetry - Multiple   Pulse 88   Resp 20   Aerosol Therapy   $ Aerosol Therapy Charges PRN treatment not required   Inhaler   $ Inhaler Charges MDI (Metered Dose Inahler) Treatment;Independent   Daily Review of Necessity (Inhaler) completed   Respiratory Treatment Status (Inhaler) given;mouth rinsed post treatment   Treatment Route (Inhaler) mouthpiece   Patient Position (Inhaler) HOB elevated   Post Treatment Assessment (Inhaler) breath sounds unchanged;vital signs unchanged   Signs of Intolerance (Inhaler) none   Respiratory Evaluation   $ Care Plan Tech Time 15 min

## 2020-06-14 LAB
ANION GAP SERPL CALC-SCNC: 13 MMOL/L (ref 8–16)
BASOPHILS # BLD AUTO: 0.02 K/UL (ref 0–0.2)
BASOPHILS NFR BLD: 0.4 % (ref 0–1.9)
BUN SERPL-MCNC: 10 MG/DL (ref 8–23)
CALCIUM SERPL-MCNC: 7.9 MG/DL (ref 8.7–10.5)
CHLORIDE SERPL-SCNC: 101 MMOL/L (ref 95–110)
CO2 SERPL-SCNC: 25 MMOL/L (ref 23–29)
CREAT SERPL-MCNC: 3.1 MG/DL (ref 0.5–1.4)
DIFFERENTIAL METHOD: ABNORMAL
EOSINOPHIL # BLD AUTO: 0 K/UL (ref 0–0.5)
EOSINOPHIL NFR BLD: 0.7 % (ref 0–8)
ERYTHROCYTE [DISTWIDTH] IN BLOOD BY AUTOMATED COUNT: 13.7 % (ref 11.5–14.5)
EST. GFR  (AFRICAN AMERICAN): 15.9 ML/MIN/1.73 M^2
EST. GFR  (NON AFRICAN AMERICAN): 13.8 ML/MIN/1.73 M^2
GLUCOSE SERPL-MCNC: 89 MG/DL (ref 70–110)
HCT VFR BLD AUTO: 27.5 % (ref 37–48.5)
HGB BLD-MCNC: 8.7 G/DL (ref 12–16)
IMM GRANULOCYTES # BLD AUTO: 0.04 K/UL (ref 0–0.04)
IMM GRANULOCYTES NFR BLD AUTO: 0.7 % (ref 0–0.5)
LYMPHOCYTES # BLD AUTO: 0.9 K/UL (ref 1–4.8)
LYMPHOCYTES NFR BLD: 15.1 % (ref 18–48)
MAGNESIUM SERPL-MCNC: 1.8 MG/DL (ref 1.6–2.6)
MCH RBC QN AUTO: 32.7 PG (ref 27–31)
MCHC RBC AUTO-ENTMCNC: 31.6 G/DL (ref 32–36)
MCV RBC AUTO: 103 FL (ref 82–98)
MONOCYTES # BLD AUTO: 0.5 K/UL (ref 0.3–1)
MONOCYTES NFR BLD: 8 % (ref 4–15)
NEUTROPHILS # BLD AUTO: 4.2 K/UL (ref 1.8–7.7)
NEUTROPHILS NFR BLD: 75.1 % (ref 38–73)
NRBC BLD-RTO: 0 /100 WBC
PLATELET # BLD AUTO: 270 K/UL (ref 150–350)
PMV BLD AUTO: 10.2 FL (ref 9.2–12.9)
POTASSIUM SERPL-SCNC: 3.3 MMOL/L (ref 3.5–5.1)
RBC # BLD AUTO: 2.66 M/UL (ref 4–5.4)
SARS-COV-2 RNA RESP QL NAA+PROBE: NOT DETECTED
SODIUM SERPL-SCNC: 139 MMOL/L (ref 136–145)
WBC # BLD AUTO: 5.62 K/UL (ref 3.9–12.7)

## 2020-06-14 PROCEDURE — 36415 COLL VENOUS BLD VENIPUNCTURE: CPT

## 2020-06-14 PROCEDURE — 27000221 HC OXYGEN, UP TO 24 HOURS

## 2020-06-14 PROCEDURE — 94761 N-INVAS EAR/PLS OXIMETRY MLT: CPT

## 2020-06-14 PROCEDURE — 80048 BASIC METABOLIC PNL TOTAL CA: CPT

## 2020-06-14 PROCEDURE — 83735 ASSAY OF MAGNESIUM: CPT

## 2020-06-14 PROCEDURE — 25000003 PHARM REV CODE 250: Performed by: NURSE PRACTITIONER

## 2020-06-14 PROCEDURE — 99900035 HC TECH TIME PER 15 MIN (STAT)

## 2020-06-14 PROCEDURE — 85025 COMPLETE CBC W/AUTO DIFF WBC: CPT

## 2020-06-14 PROCEDURE — 63600175 PHARM REV CODE 636 W HCPCS: Performed by: FAMILY MEDICINE

## 2020-06-14 PROCEDURE — 25000242 PHARM REV CODE 250 ALT 637 W/ HCPCS: Performed by: INTERNAL MEDICINE

## 2020-06-14 PROCEDURE — 12000002 HC ACUTE/MED SURGE SEMI-PRIVATE ROOM

## 2020-06-14 RX ADMIN — BENZTROPINE MESYLATE 1 MG: 1 TABLET ORAL at 08:06

## 2020-06-14 RX ADMIN — MONTELUKAST SODIUM 10 MG: 10 TABLET, COATED ORAL at 08:06

## 2020-06-14 RX ADMIN — TIOTROPIUM BROMIDE 18 MCG: 18 CAPSULE ORAL; RESPIRATORY (INHALATION) at 08:06

## 2020-06-14 RX ADMIN — FAMOTIDINE 20 MG: 20 TABLET, FILM COATED ORAL at 08:06

## 2020-06-14 RX ADMIN — MIDODRINE HYDROCHLORIDE 5 MG: 2.5 TABLET ORAL at 04:06

## 2020-06-14 RX ADMIN — ONDANSETRON 8 MG: 2 INJECTION INTRAMUSCULAR; INTRAVENOUS at 01:06

## 2020-06-14 RX ADMIN — CALCITRIOL CAPSULES 0.25 MCG 0.25 MCG: 0.25 CAPSULE ORAL at 08:06

## 2020-06-14 RX ADMIN — ROSUVASTATIN CALCIUM 20 MG: 20 TABLET, FILM COATED ORAL at 08:06

## 2020-06-14 RX ADMIN — BUSPIRONE HYDROCHLORIDE 10 MG: 5 TABLET ORAL at 08:06

## 2020-06-14 RX ADMIN — FLUTICASONE FUROATE AND VILANTEROL TRIFENATATE 1 PUFF: 100; 25 POWDER RESPIRATORY (INHALATION) at 08:06

## 2020-06-14 RX ADMIN — TRAZODONE HYDROCHLORIDE 100 MG: 50 TABLET ORAL at 08:06

## 2020-06-14 RX ADMIN — FLUOXETINE 20 MG: 20 CAPSULE ORAL at 08:06

## 2020-06-14 RX ADMIN — MIDODRINE HYDROCHLORIDE 5 MG: 2.5 TABLET ORAL at 08:06

## 2020-06-14 RX ADMIN — ARIPIPRAZOLE 2 MG: 2 TABLET ORAL at 08:06

## 2020-06-14 RX ADMIN — LEVOTHYROXINE SODIUM 100 MCG: 100 TABLET ORAL at 05:06

## 2020-06-14 RX ADMIN — BUSPIRONE HYDROCHLORIDE 10 MG: 5 TABLET ORAL at 04:06

## 2020-06-14 RX ADMIN — TRAZODONE HYDROCHLORIDE 100 MG: 50 TABLET ORAL at 01:06

## 2020-06-14 NOTE — PROGRESS NOTES
[facility] Medicine Progress Note    Subjective:     No major issues overnight. Denies pain, sob, abd pain, loose stoos, n/v.       Objective:   Last 24 Hour Vital Signs:  BP  Min: 89/56  Max: 112/57  Temp  Av.6 °F (37 °C)  Min: 97.8 °F (36.6 °C)  Max: 99.1 °F (37.3 °C)  Pulse  Av.7  Min: 79  Max: 90  Resp  Av.7  Min: 17  Max: 22  SpO2  Av.7 %  Min: 90 %  Max: 97 %  I/O last 3 completed shifts:  In: 740 [P.O.:240; Other:500]  Out: 500 [Other:500]      Physical Examination:  General: AAOx3. NAD.  HEENT: NCAT. PERRLA. EOMI.     CV: RRR.No M/R/G.   Chest: NL effort. CTAB. No R/R/W.   Abd: +BS x 4. Soft. ND/NT. No rebound or guarding.   Ext: moving well. +distal pulses  Skin: Intact. No rash. No lesions.   Neuro: CN III-XII intact. No focal deficit. Pleasantly confused, at baseline per family   Psych:  No A/V hallucinations. NL affect. No abnormal behaviors noted       Laboratory:  Laboratory Data Reviewed: yes    Most Recent Data:  CBC:   Lab Results   Component Value Date    WBC 5.62 2020    HGB 8.7 (L) 2020    HCT 27.5 (L) 2020     2020     (H) 2020    RDW 13.7 2020     BMP:   Lab Results   Component Value Date     2020    K 3.3 (L) 2020     2020    CO2 25 2020    BUN 10 2020    GLU 89 2020    CALCIUM 7.9 (L) 2020    MG 1.8 2020    PHOS 2.5 (L) 2020     LFTs:   Lab Results   Component Value Date    PROT 7.7 2020    ALBUMIN 3.1 (L) 2020    BILITOT 0.8 2020    AST 25 2020    ALKPHOS 107 2020    ALT 20 2020     Coags:   Lab Results   Component Value Date    INR 1.0 2015     FLP:   Lab Results   Component Value Date    CHOL 154 2020    HDL 56 2020    LDLCALC 71 2020    TRIG 205 (H) 2020    CHOLHDL 2.8 2020     DM:   Lab Results   Component Value Date    LDLCALC 71 2020    CREATININE 3.1 (H) 2020      Thyroid:   Lab Results   Component Value Date    TSH 1.538 12/29/2015    FREET4 0.87 09/06/2015     Anemia:   Lab Results   Component Value Date    IRON 47 12/29/2015    TIBC 318 12/29/2015    FERRITIN 31 08/28/2015    OIEJDGLJ49 683 12/29/2015    FOLATE 5.8 12/29/2015     Cardiac:   Lab Results   Component Value Date    TROPONINI <0.030 08/29/2019     (H) 08/29/2019     Urinalysis:   Lab Results   Component Value Date    COLORU Yellow 12/30/2015    SPECGRAV <=1.005 (A) 12/30/2015    NITRITE Negative 12/30/2015    KETONESU Negative 12/30/2015    UROBILINOGEN Negative 12/30/2015    WBCUA 10 (H) 06/25/2015      Radiology:  Data Reviewed: yes  CT ABDOMEN PELVIS WITHOUT CONTRAST    Current Medications:     Infusions:       Scheduled:   ARIPiprazole  2 mg Oral QHS    benztropine  1 mg Oral BID    busPIRone  10 mg Oral TID    calcitRIOL  0.25 mcg Oral Daily    epoetin jacob-ebpx (RETACRIT) injection  50 Units/kg Intravenous Once    epoetin jacob-ebpx (RETACRIT) injection  50 Units/kg Intravenous Every Tues, Thurs, Sat    famotidine  20 mg Oral QHS    FLUoxetine  20 mg Oral Daily    fluticasone furoate-vilanteroL  1 puff Inhalation Daily    levothyroxine  100 mcg Oral Before breakfast    midodrine  10 mg Oral Every other day    midodrine  5 mg Oral BID WM    montelukast  10 mg Oral QHS    rosuvastatin  20 mg Oral Daily    tiotropium  1 capsule Inhalation Daily        PRN:  acetaminophen, albuterol sulfate, ondansetron, sodium chloride 0.9%, traZODone      Microbiology Data:  Reviewed: yes  Microbiology Results (last 7 days)     ** No results found for the last 168 hours. **           Antibiotics and Day Number of Therapy:  Antibiotics (From admission, onward)    Start     Stop Route Frequency Ordered    06/06/20 1300  piperacillin-tazobactam 3.375 g in dextrose 5 % 50 mL IVPB (ready to mix system)      06/07 0059 IV ED 1 Time 06/06/20 1257             Assessment/Plan:     Althea Gaona is a 80  y.o.female with:      Proctocolitis  Admit to med/surg tele   Seems to be resolving, d/c abx  Pt is on HD and will hold off on IV hydration  Push oral hydration     Borderline hypotension  Stable  Continue home midodrine     ESRD (end stage renal disease)  Consulted Dr. Rodrigues for HD   Electrolytes remain stable     Metabolic acidosis  Monitor, will have HD          Otf OSEI [facility] Medicine

## 2020-06-14 NOTE — PLAN OF CARE
Alert. Forgetful. Request staff to do a function repeatedly after staff has already taken care of request. Loses phone, call light often. Moderate hard stool with mucous observed. Pt repeats that she wants her Narcotics back. Complains of nausea while eating srini crackers. Requested Trazadone for sleep and coffee.

## 2020-06-14 NOTE — CONSULTS
INPATIENT NEPHROLOGY CONSULT   [facility] NEPHROLOGY    Patient Name: Althea Gaona  Date: 06/14/2020    Reason for consultation: ESRD    History of Present Illness:  80F with HTN, ESRD on HD T/Th/Sat, COPD and CVA presents on 6/6 with moderate diarrhea, associated with weakness, chills, bilat lower quadrant pain, and nausea. She missed her last 2 HD appts.  In the ED, a CT abd/pelvis was done showing acute proctocolitis without perforation or abscess. We are consulted for dialysis.    6/8 VSS, no new complains. Skip HD today, plan HD in AM per schedule.  6/9 VSS, seen and examined on HD today, tolerating well, no new complains.  6/10 VSS, no new complains. Plan HD in AM per schedule.  6/11 VSS, seen and examined on HD today, tolerating well, no new complains. OK to dc if she has a facility offer.  6/12 VSS, no new complains. Plan HD in AM per schedule.  6/13 VSS, seen and examined on HD today, tolerating well, no new complains.  6/12 VSS, no new complains. Plan HD on Tue per schedule.    Plan of Care:    1. Colitis  - dose antbx for dialysis    2. ESRD  - no acute HD needs today  - continue HD TTS.    3. Chronic hypotension  - continue midorine    4. Hypokalemia  - will use 3-4K bath    5. SHPT  - continue calcitriol    6. Anemia of CKD  - ordered CLAIRE 50 units/kg with HD    Thank you for allowing us to participate in this patient's care. We will continue to follow.    Vital Signs:  Temp Readings from Last 3 Encounters:   06/14/20 99.1 °F (37.3 °C) (Oral)   04/09/20 97.9 °F (36.6 °C) (Oral)   03/06/20 97.7 °F (36.5 °C) (Skin)       Pulse Readings from Last 3 Encounters:   06/14/20 88   04/09/20 65   03/06/20 89       BP Readings from Last 3 Encounters:   06/14/20 99/62   04/09/20 118/73   03/06/20 (!) 98/54       Weight:  Wt Readings from Last 3 Encounters:   06/10/20 62.1 kg (136 lb 14.5 oz)   04/23/20 63.5 kg (140 lb)   04/09/20 63.5 kg (140 lb)       Past Medical & Surgical History:  Past Medical History:    Diagnosis Date    Alcoholism     no drink since 1984    Anxiety     Asthma     Bipolar affect, depressed     Bipolar disorder     COPD (chronic obstructive pulmonary disease)     Degenerative disc disease, lumbar 11/22/2019    Dialysis patient     ESRD (end stage renal disease)     Full dentures     GERD (gastroesophageal reflux disease)     Hypertension     Pt states low b/p    Limb alert care status     NO BP/IV LEFT ARM    Pancreatitis     Stroke     Thyroid disease        Past Surgical History:   Procedure Laterality Date    APPENDECTOMY      CATARACT EXTRACTION Right     DIALYSIS FISTULA CREATION      left upper arm    EPIDURAL STEROID INJECTION INTO LUMBAR SPINE N/A 11/22/2019    Procedure: Injection-steroid-epidural-lumbar;  Surgeon: Rosanna Khan MD;  Location: [facility] OR;  Service: Pain Management;  Laterality: N/A;  L5-S1      EPIDURAL STEROID INJECTION INTO LUMBAR SPINE N/A 2/3/2020    Procedure: Injection-steroid-epidural-lumbar;  Surgeon: Rosanna Khan MD;  Location: [facility] OR;  Service: Pain Management;  Laterality: N/A;  L5-S1    EYE SURGERY      FIXATION KYPHOPLASTY N/A 10/18/2019    Procedure: Kyphoplasty;  Surgeon: Rosanna Khan MD;  Location: [facility] OR;  Service: Pain Management;  Laterality: N/A;  L2    HYSTERECTOMY      OPEN REDUCTION AND INTERNAL FIXATION (ORIF) OF INJURY OF FOREARM Left 3/6/2020    Procedure: ORIF, FOREARM;  Surgeon: Tab Mendez MD;  Location: [facility] OR;  Service: Orthopedics;  Laterality: Left;    THYROIDECTOMY      THYROIDECTOMY         Past Social History:  Social History     Socioeconomic History    Marital status:      Spouse name: Not on file    Number of children: Not on file    Years of education: Not on file    Highest education level: Not on file   Occupational History    Not on file   Social Needs    Financial resource strain: Not on file    Food insecurity     Worry: Not on file     Inability: Not on file     Transportation needs     Medical: Not on file     Non-medical: Not on file   Tobacco Use    Smoking status: Former Smoker     Packs/day: 1.00     Types: Cigarettes     Quit date: 10/1/2018     Years since quittin.7    Smokeless tobacco: Never Used   Substance and Sexual Activity    Alcohol use: No    Drug use: Never    Sexual activity: Not Currently   Lifestyle    Physical activity     Days per week: Not on file     Minutes per session: Not on file    Stress: Not on file   Relationships    Social connections     Talks on phone: Not on file     Gets together: Not on file     Attends Quaker service: Not on file     Active member of club or organization: Not on file     Attends meetings of clubs or organizations: Not on file     Relationship status: Not on file   Other Topics Concern    Not on file   Social History Narrative    Not on file       Medications:  Scheduled Meds:   ARIPiprazole  2 mg Oral QHS    benztropine  1 mg Oral BID    busPIRone  10 mg Oral TID    calcitRIOL  0.25 mcg Oral Daily    epoetin jacob-ebpx (RETACRIT) injection  50 Units/kg Intravenous Once    epoetin jacob-ebpx (RETACRIT) injection  50 Units/kg Intravenous Every Tues, Thurs, Sat    famotidine  20 mg Oral QHS    FLUoxetine  20 mg Oral Daily    fluticasone furoate-vilanteroL  1 puff Inhalation Daily    levothyroxine  100 mcg Oral Before breakfast    midodrine  10 mg Oral Every other day    midodrine  5 mg Oral BID WM    montelukast  10 mg Oral QHS    rosuvastatin  20 mg Oral Daily    tiotropium  1 capsule Inhalation Daily     Continuous Infusions:    PRN Meds:.acetaminophen, albuterol sulfate, ondansetron, sodium chloride 0.9%, traZODone  Current Facility-Administered Medications on File Prior to Encounter   Medication Dose Route Frequency Provider Last Rate Last Dose    0.9%  NaCl infusion   Intravenous Continuous Rosanna Khan MD 0 mL/hr at 20 1611      electrolyte-S (ISOLYTE)   Intravenous  Continuous Philippe Greco MD 10 mL/hr at 03/06/20 0910      lactated ringers infusion   Intravenous Once PRN Rosanna Khan MD        lactated ringers infusion   Intravenous Once PRN Rosanna Kahn MD        lactated ringers infusion   Intravenous Continuous Philippe Greco MD        lidocaine (PF) 10 mg/ml (1%) injection 10 mg  1 mL Intradermal Once Philippe Greco MD         Current Outpatient Medications on File Prior to Encounter   Medication Sig Dispense Refill    ARIPiprazole (ABILIFY) 2 MG Tab Take 1 tablet (2 mg total) by mouth every evening. 90 tablet 1    benztropine (COGENTIN) 1 MG tablet Take 1 tablet (1 mg total) by mouth 2 (two) times daily. 60 tablet 2    busPIRone (BUSPAR) 10 MG tablet Take 1 tablet (10 mg total) by mouth 3 (three) times daily. 270 tablet 1    calcitRIOL (ROCALTROL) 0.25 MCG Cap 0.25 mcg once daily.   3    DAILY-YANDY tablet Take 1 tablet by mouth once daily.   11    famotidine (PEPCID) 20 MG tablet Take 20 mg by mouth every evening.   1    FLUoxetine 20 MG capsule Take 1 capsule (20 mg total) by mouth once daily. 90 capsule 1    fluticasone-umeclidin-vilanter (TRELEGY ELLIPTA) 100-62.5-25 mcg DsDv Inhale 1 puff into the lungs once daily. 1 each 11    levothyroxine (SYNTHROID) 100 MCG tablet Take 100 mcg by mouth before breakfast.       midodrine (PROAMATINE) 10 MG tablet Take 10 mg by mouth every 7 days. Prior to dialysis treatment      montelukast (SINGULAIR) 10 mg tablet Take 1 tablet (10 mg total) by mouth every evening. 90 tablet 1    ondansetron (ZOFRAN) 8 MG tablet Take 1 tablet (8 mg total) by mouth every 8 (eight) hours as needed for Nausea. (Patient taking differently: Take 8 mg by mouth every 12 (twelve) hours as needed for Nausea. ) 30 tablet 2    polyethylene glycol (GLYCOLAX) 17 gram PwPk Take 17 g by mouth daily as needed.  0    rosuvastatin (CRESTOR) 20 MG tablet Take 1 tablet (20 mg total) by mouth once daily. 90 tablet 1    traZODone (DESYREL)  100 MG tablet Take 100 mg by mouth nightly as needed.   2    albuterol-ipratropium  mcg (COMBIVENT)  mcg/actuation inhaler Inhale 2 puffs into the lungs every 6 (six) hours as needed for Wheezing.      bisacodyl (DULCOLAX) 5 mg EC tablet Take 1 tablet (5 mg total) by mouth daily as needed for Constipation.  0    dicyclomine (BENTYL) 10 MG capsule dicyclomine 10 mg capsule      epoetin jacob-epbx (RETACRIT) 10,000 unit/mL imjection Inject 0.34 mLs (3,400 Units total) into the skin every Tues, Thurs, Sat. With hemodialysis      midodrine (PROAMATINE) 5 MG Tab Take 1 tablet (5 mg total) by mouth 2 (two) times daily with meals. 60 tablet 11    polyvinyl alcohol, artificial tears, (LIQUIFILM TEARS) 1.4 % ophthalmic solution Place 1 drop into the left eye as needed. 15 mL 0    PROLIA 60 mg/mL Syrg Inject 1 mL (60 mg total) into the skin every 6 (six) months. 1 Syringe 1       Allergies:  Metoprolol and Codeine    Past Family History:  Reviewed; refer to Hospitalist Admission Note    Review of Systems:  Review of Systems - All 14 systems reviewed and negative, except as noted in HPI    Physical Exam:  General Appearance:    NAD, AAO x 3, cooperative, appears stated age   Head:    Normocephalic, atraumatic   Eyes:    PER, EOMI, and conjunctiva/sclera clear bilaterally       Mouth:   Moist mucus membranes, no thrush or oral lesions,       normal dentition   Back:     No CVA tenderness   Lungs:     Clear to auscultation bilaterally, no wheezes, crackles,           rales or rhonchi, symmetric air movement, respirations unlabored   Chest wall:    No tenderness or deformity   Heart:    Regular rate and rhythm, S1 and S2 normal, no murmur, rub   or gallop   Abdomen:     Soft, non-tender, non-distended, bowel sounds active all four   quadrants, no RT or guarding, no masses, no organomegaly   Extremities:   Warm and well perfused, distal pulses are intact, no             cyanosis or peripheral edema   MSK:   No  joint or muscle swelling, tenderness or deformity   Skin:   Skin color, texture, turgor normal, no rashes or lesions   Neurologic/Psychiatric:   CNII-XII intact, normal strength and sensation       throughout, no asterixis; normal affect, memory, judgement     and insight      Results:  Lab Results   Component Value Date     06/14/2020    K 3.3 (L) 06/14/2020     06/14/2020    CO2 25 06/14/2020    BUN 10 06/14/2020    CREATININE 3.1 (H) 06/14/2020    CALCIUM 7.9 (L) 06/14/2020    ANIONGAP 13 06/14/2020    ESTGFRAFRICA 15.9 (A) 06/14/2020    EGFRNONAA 13.8 (A) 06/14/2020       Lab Results   Component Value Date    CALCIUM 7.9 (L) 06/14/2020    PHOS 2.5 (L) 06/13/2020       Recent Labs   Lab 06/14/20  0519   WBC 5.62   RBC 2.66*   HGB 8.7*   HCT 27.5*      *   MCH 32.7*   MCHC 31.6*       I have personally reviewed pertinent radiological imaging and reports.    Doc Odonnell MD  [facility]  [address] [place] [id number]  [phone] (p)  [phone] (f)

## 2020-06-14 NOTE — PLAN OF CARE
06/14/20 0846   Patient Assessment/Suction   Level of Consciousness (AVPU) alert   Respiratory Effort Normal;Unlabored   Expansion/Accessory Muscles/Retractions no use of accessory muscles   All Lung Fields Breath Sounds coarse;clear   Rhythm/Pattern, Respiratory pattern regular;depth regular   Cough Frequency no cough   PRE-TX-O2   O2 Device (Oxygen Therapy) nasal cannula   $ Is the patient on Low Flow Oxygen? Yes   Flow (L/min) 2   SpO2 (!) 91 %   Pulse Oximetry Type Continuous   $ Pulse Oximetry - Multiple Charge Pulse Oximetry - Multiple   Pulse 89   Resp (!) 22

## 2020-06-15 LAB — GLUCOSE SERPL-MCNC: 136 MG/DL (ref 70–110)

## 2020-06-15 PROCEDURE — 97530 THERAPEUTIC ACTIVITIES: CPT | Mod: CQ

## 2020-06-15 PROCEDURE — 27000221 HC OXYGEN, UP TO 24 HOURS

## 2020-06-15 PROCEDURE — 97535 SELF CARE MNGMENT TRAINING: CPT

## 2020-06-15 PROCEDURE — 94640 AIRWAY INHALATION TREATMENT: CPT

## 2020-06-15 PROCEDURE — 94761 N-INVAS EAR/PLS OXIMETRY MLT: CPT

## 2020-06-15 PROCEDURE — 12000002 HC ACUTE/MED SURGE SEMI-PRIVATE ROOM

## 2020-06-15 PROCEDURE — 99900035 HC TECH TIME PER 15 MIN (STAT)

## 2020-06-15 PROCEDURE — 25000242 PHARM REV CODE 250 ALT 637 W/ HCPCS: Performed by: INTERNAL MEDICINE

## 2020-06-15 PROCEDURE — 97530 THERAPEUTIC ACTIVITIES: CPT

## 2020-06-15 PROCEDURE — 97110 THERAPEUTIC EXERCISES: CPT | Mod: CQ

## 2020-06-15 PROCEDURE — 25000003 PHARM REV CODE 250: Performed by: NURSE PRACTITIONER

## 2020-06-15 RX ADMIN — FAMOTIDINE 20 MG: 20 TABLET, FILM COATED ORAL at 08:06

## 2020-06-15 RX ADMIN — LEVOTHYROXINE SODIUM 100 MCG: 100 TABLET ORAL at 05:06

## 2020-06-15 RX ADMIN — BENZTROPINE MESYLATE 1 MG: 1 TABLET ORAL at 08:06

## 2020-06-15 RX ADMIN — FLUTICASONE FUROATE AND VILANTEROL TRIFENATATE 1 PUFF: 100; 25 POWDER RESPIRATORY (INHALATION) at 07:06

## 2020-06-15 RX ADMIN — TIOTROPIUM BROMIDE 18 MCG: 18 CAPSULE ORAL; RESPIRATORY (INHALATION) at 07:06

## 2020-06-15 RX ADMIN — BUSPIRONE HYDROCHLORIDE 10 MG: 5 TABLET ORAL at 05:06

## 2020-06-15 RX ADMIN — CALCITRIOL CAPSULES 0.25 MCG 0.25 MCG: 0.25 CAPSULE ORAL at 08:06

## 2020-06-15 RX ADMIN — ROSUVASTATIN CALCIUM 20 MG: 20 TABLET, FILM COATED ORAL at 08:06

## 2020-06-15 RX ADMIN — MONTELUKAST SODIUM 10 MG: 10 TABLET, COATED ORAL at 08:06

## 2020-06-15 RX ADMIN — FLUOXETINE 20 MG: 20 CAPSULE ORAL at 08:06

## 2020-06-15 RX ADMIN — MIDODRINE HYDROCHLORIDE 10 MG: 2.5 TABLET ORAL at 08:06

## 2020-06-15 RX ADMIN — BUSPIRONE HYDROCHLORIDE 10 MG: 5 TABLET ORAL at 08:06

## 2020-06-15 RX ADMIN — ARIPIPRAZOLE 2 MG: 2 TABLET ORAL at 08:06

## 2020-06-15 RX ADMIN — MIDODRINE HYDROCHLORIDE 5 MG: 2.5 TABLET ORAL at 05:06

## 2020-06-15 NOTE — PROGRESS NOTES
[facility] Medicine Progress Note    Subjective:     Afebrile. No major issues per nursing staff. She has no complaints.      Objective:   Last 24 Hour Vital Signs:  BP  Min: 101/68  Max: 116/72  Temp  Av.7 °F (37.1 °C)  Min: 98.4 °F (36.9 °C)  Max: 99 °F (37.2 °C)  Pulse  Av.9  Min: 68  Max: 88  Resp  Av.3  Min: 16  Max: 18  SpO2  Av %  Min: 95 %  Max: 100 %  No intake/output data recorded.      Physical Examination:  General: AAOx3. NAD.  HEENT: NCAT. PERRLA. EOMI.     CV: RRR.No M/R/G.   Chest: NL effort. CTAB. No R/R/W.   Abd: +BS x 4. Soft. ND/NT. No rebound or guarding.   Ext: moving well. +distal pulses  Skin: Intact. No rash. No lesions.   Neuro: CN III-XII intact. No focal deficit. Pleasantly confused, at baseline per family   Psych:  No A/V hallucinations. NL affect. No abnormal behaviors noted       Laboratory:  Laboratory Data Reviewed: yes    Most Recent Data:  CBC:   Lab Results   Component Value Date    WBC 5.62 2020    HGB 8.7 (L) 2020    HCT 27.5 (L) 2020     2020     (H) 2020    RDW 13.7 2020     BMP:   Lab Results   Component Value Date     2020    K 3.3 (L) 2020     2020    CO2 25 2020    BUN 10 2020    GLU 89 2020    CALCIUM 7.9 (L) 2020    MG 1.8 2020    PHOS 2.5 (L) 2020     LFTs:   Lab Results   Component Value Date    PROT 7.7 2020    ALBUMIN 3.1 (L) 2020    BILITOT 0.8 2020    AST 25 2020    ALKPHOS 107 2020    ALT 20 2020     Coags:   Lab Results   Component Value Date    INR 1.0 2015     FLP:   Lab Results   Component Value Date    CHOL 154 2020    HDL 56 2020    LDLCALC 71 2020    TRIG 205 (H) 2020    CHOLHDL 2.8 2020     DM:   Lab Results   Component Value Date    LDLCALC 71 2020    CREATININE 3.1 (H) 2020     Thyroid:   Lab Results   Component Value Date    TSH 1.538  12/29/2015    FREET4 0.87 09/06/2015     Anemia:   Lab Results   Component Value Date    IRON 47 12/29/2015    TIBC 318 12/29/2015    FERRITIN 31 08/28/2015    PFNICYVO80 683 12/29/2015    FOLATE 5.8 12/29/2015     Cardiac:   Lab Results   Component Value Date    TROPONINI <0.030 08/29/2019     (H) 08/29/2019     Urinalysis:   Lab Results   Component Value Date    COLORU Yellow 12/30/2015    SPECGRAV <=1.005 (A) 12/30/2015    NITRITE Negative 12/30/2015    KETONESU Negative 12/30/2015    UROBILINOGEN Negative 12/30/2015    WBCUA 10 (H) 06/25/2015      Radiology:  Data Reviewed: yes  CT ABDOMEN PELVIS WITHOUT CONTRAST    Current Medications:     Infusions:       Scheduled:   ARIPiprazole  2 mg Oral QHS    benztropine  1 mg Oral BID    busPIRone  10 mg Oral TID    calcitRIOL  0.25 mcg Oral Daily    epoetin jacob-ebpx (RETACRIT) injection  50 Units/kg Intravenous Once    epoetin jacob-ebpx (RETACRIT) injection  50 Units/kg Intravenous Every Tues, Thurs, Sat    famotidine  20 mg Oral QHS    FLUoxetine  20 mg Oral Daily    fluticasone furoate-vilanteroL  1 puff Inhalation Daily    levothyroxine  100 mcg Oral Before breakfast    midodrine  10 mg Oral Every other day    midodrine  5 mg Oral BID WM    montelukast  10 mg Oral QHS    rosuvastatin  20 mg Oral Daily    tiotropium  1 capsule Inhalation Daily        PRN:  acetaminophen, albuterol sulfate, ondansetron, sodium chloride 0.9%, traZODone      Microbiology Data:  Reviewed: yes  Microbiology Results (last 7 days)     ** No results found for the last 168 hours. **           Antibiotics and Day Number of Therapy:  Antibiotics (From admission, onward)    Start     Stop Route Frequency Ordered    06/06/20 1300  piperacillin-tazobactam 3.375 g in dextrose 5 % 50 mL IVPB (ready to mix system)      06/07 0059 IV ED 1 Time 06/06/20 1257             Assessment/Plan:     Althea Gaona is a 80 y.o.female with:      Proctocolitis  Seems to be resolving, d/c  abx  Pt is on HD and will hold off on IV hydration  Push oral hydration     Borderline hypotension  Stable  Continue home midodrine     ESRD (end stage renal disease)  Consulted Dr. Rodrigues for HD   Electrolytes remain stable     Metabolic acidosis  Monitor, will have HD        SNF placement pending.     Otf Ramos  [facility] Medicine

## 2020-06-15 NOTE — RESPIRATORY THERAPY
06/14/20 2045   Patient Assessment/Suction   Level of Consciousness (AVPU) alert   Respiratory Effort Normal;Unlabored   Expansion/Accessory Muscles/Retractions no use of accessory muscles;no retractions;expansion symmetric   All Lung Fields Breath Sounds clear;coarse   Rhythm/Pattern, Respiratory unlabored;pattern regular;depth regular   Cough Frequency no cough   PRE-TX-O2   O2 Device (Oxygen Therapy) room air   SpO2 97 %   Pulse Oximetry Type Intermittent   $ Pulse Oximetry - Multiple Charge Pulse Oximetry - Multiple   Pulse 72   Resp 16   Aerosol Therapy   $ Aerosol Therapy Charges PRN treatment not required   Respiratory Treatment Status (SVN) PRN treatment not required

## 2020-06-15 NOTE — PLAN OF CARE
Problem: Physical Therapy Goal  Goal: Physical Therapy Goal  Description: Goals to be met by: discharge     Patient will increase functional independence with mobility by performin. Supine to sit with MInimal Assistance  2. Sit to supine with MInimal Assistance  3. Sit to stand transfer with Contact Guard Assistance  4. Bed to chair transfer with Contact Guard Assistance using Rolling Walker  5. Gait  x 15 feet with Contact Guard Assistance using Rolling Walker.      Outcome: Ongoing, Not Progressing

## 2020-06-15 NOTE — PLAN OF CARE
06/15/20 0722   Patient Assessment/Suction   Level of Consciousness (AVPU) alert   Respiratory Effort Normal;Unlabored   Expansion/Accessory Muscles/Retractions no use of accessory muscles;no retractions;expansion symmetric   All Lung Fields Breath Sounds clear   Rhythm/Pattern, Respiratory unlabored;pattern regular;depth regular;chest wiggle adequate;no shortness of breath reported   Cough Frequency infrequent   Cough Type good   PRE-TX-O2   O2 Device (Oxygen Therapy) room air   $ Is the patient on Low Flow Oxygen? Yes   SpO2 97 %   Pulse Oximetry Type Intermittent   $ Pulse Oximetry - Multiple Charge Pulse Oximetry - Multiple   Pulse 78   Resp 18   Aerosol Therapy   $ Aerosol Therapy Charges PRN treatment not required   Inhaler   $ Inhaler Charges MDI (Metered Dose Inahler) Treatment;Mouth rinsed post treatment   Daily Review of Necessity (Inhaler) completed   Respiratory Treatment Status (Inhaler) given   Treatment Route (Inhaler) mouthpiece   Patient Position (Inhaler) sitting on edge of bed   Post Treatment Assessment (Inhaler) increased aeration   Signs of Intolerance (Inhaler) none   Breath Sounds Post-Respiratory Treatment   Throughout All Fields Post-Treatment All Fields   Throughout All Fields Post-Treatment aeration increased   Post-treatment Heart Rate (beats/min) 79   Post-treatment Resp Rate (breaths/min) 18   Respiratory Evaluation   $ Care Plan Tech Time 15 min

## 2020-06-15 NOTE — PLAN OF CARE
Problem: Physical Therapy Goal  Goal: Physical Therapy Goal  Description: Goals to be met by: discharge     Patient will increase functional independence with mobility by performin. Supine to sit with MInimal Assistance  2. Sit to supine with MInimal Assistance  3. Sit to stand transfer with Contact Guard Assistance  4. Bed to chair transfer with Contact Guard Assistance using Rolling Walker  5. Gait  x 15 feet with Contact Guard Assistance using Rolling Walker.      6/15/2020 1427 by Delaney Baird, PTA  Outcome: Ongoing, Progressing  6/15/2020 1418 by Delaney Baird, PTA  Outcome: Ongoing, Not Progressing

## 2020-06-15 NOTE — CARE UPDATE
8:30am Sent Covid test and vital signs.     11:00am talked to Lashawn at [facility] and they will not be able to take patient until her temp is lower than 99 for 72 hours.

## 2020-06-15 NOTE — NURSING
"Bed alarm triggered. Found pt sitting up on bed. States, "I've been calling for help for a while now." Pt demonstrated knowledge of using call light, but pt has not been calling for help as verified with other staff including MAXWELL Montoya who was sitting beside the call light  in the nurse station. Call bell verified to be working. Pt reoriented to time & place. MAXWELL Montoya changed pt brief and reported pt was still confused which time of day it was. Bed alarm set to second level. Pt instructed to call when needing to get up. Call bell in reach. SR up x 2. Breaks engaged.  "

## 2020-06-15 NOTE — PROGRESS NOTES
"[facility]  Adult Nutrition  Progress Note    SUMMARY       Recommendations    Recommendation/Intervention: 1). RD to order nepro TID (1275kcals, 57.3g pro) to aid in meeting estimated needs 2). Continue current diet as medically appropriate.  Goals: 1) Pt to receive nepro TID. 2) Pt to meet >75% of estimated needs via po intake of meals and supplements.  Nutrition Goal Status: progressing towards goal    Reason for Assessment    Reason For Assessment: RD follow-up  Diagnosis: other (see comments)(proctocolitis)  Relevant Medical History: COPD, bipolar disorder, dialysis, ESRD, HTN, Stroke, Thyroid disease, pancreatitis, anxiety, GERD, full dentures  Interdisciplinary Rounds: did not attend    Nutrition Risk Screen    Nutrition Risk Screen: no indicators present    Nutrition/Diet History    Spiritual, Cultural Beliefs, Congregation Practices, Values that Affect Care: no  Food Allergies: NKFA  Factors Affecting Nutritional Intake: decreased appetite, nausea/vomiting, diarrhea    Anthropometrics    Temp: 99 °F (37.2 °C)  Height Method: Stated  Height: 5' 7" (170.2 cm)  Height (inches): 67 in  Weight Method: Bed Scale  Weight: 62.1 kg (136 lb 14.5 oz)  Weight (lb): 136.91 lb  Ideal Body Weight (IBW), Female: 135 lb  % Ideal Body Weight, Female (lb): 106.47 %  BMI (Calculated): 21.4  BMI Grade: 18.5-24.9 - normal     Wt Readings from Last 5 Encounters:   06/10/20 62.1 kg (136 lb 14.5 oz)   04/23/20 63.5 kg (140 lb)   04/09/20 63.5 kg (140 lb)   03/06/20 63.5 kg (140 lb)   02/27/20 63.5 kg (140 lb)       Lab/Procedures/Meds    Pertinent Labs Reviewed: reviewed  Clinical Chemistry:  Recent Labs   Lab 06/10/20  0525 06/11/20  0443 06/13/20  0959 06/14/20  0519    141 138 139   K 3.6 4.0 2.9* 3.3*    105 100 101   CO2 25 24 24 25   GLU 79 78 117* 89   BUN 16 20 11 10   CREATININE 3.5* 4.6* 2.7* 3.1*   CALCIUM 8.0* 8.2* 8.1* 7.9*   ALBUMIN  --   --  3.1*  --    ANIONGAP 10 12 14 13   ESTGFRAFRICA " 13.7* 9.9* 18.8* 15.9*   EGFRNONAA 11.9* 8.5* 16.3* 13.8*   MG 1.8 1.9  --  1.8   PHOS 3.0 4.4 2.5*  --      CBC:   Recent Labs   Lab 06/14/20  0519   WBC 5.62   RBC 2.66*   HGB 8.7*   HCT 27.5*      *   MCH 32.7*   MCHC 31.6*     Pertinent Medications Reviewed: reviewed  Scheduled Meds:   ARIPiprazole  2 mg Oral QHS    benztropine  1 mg Oral BID    busPIRone  10 mg Oral TID    calcitRIOL  0.25 mcg Oral Daily    epoetin jacob-ebpx (RETACRIT) injection  50 Units/kg Intravenous Once    epoetin jacob-ebpx (RETACRIT) injection  50 Units/kg Intravenous Every Tues, Thurs, Sat    famotidine  20 mg Oral QHS    FLUoxetine  20 mg Oral Daily    fluticasone furoate-vilanteroL  1 puff Inhalation Daily    levothyroxine  100 mcg Oral Before breakfast    midodrine  10 mg Oral Every other day    midodrine  5 mg Oral BID WM    montelukast  10 mg Oral QHS    rosuvastatin  20 mg Oral Daily    tiotropium  1 capsule Inhalation Daily     PRN Meds:.acetaminophen, albuterol sulfate, ondansetron, sodium chloride 0.9%, traZODone    Estimated/Assessed Needs    Weight Used For Calorie Calculations: 62.1 kg (136 lb 14.5 oz)  Energy Calorie Requirements (kcal): 1509-9980 kcal (25-30 kcal/kg)  Energy Need Method: Kcal/kg  Protein Requirements: 75-93 g (1.2-1.5g/kg)  Weight Used For Protein Calculations: 62.1 kg (136 lb 14.5 oz)  Fluid Requirements (mL): (1000ml +UOP)  Estimated Fluid Requirement Method: other (see comments)  RDA Method (mL): 1552       Nutrition Prescription Ordered    Current Diet Order: renal    Evaluation of Received Nutrient/Fluid Intake    Energy Calories Required: not meeting needs  Protein Required: not meeting needs  Fluid Required: not meeting needs  Tolerance: tolerating  % Intake of Estimated Energy Needs: <25%  % Meal Intake: 30%    Nutrition Risk    Level of Risk/Frequency of Follow-up: high     Assessment and Plan    Pt appeared to be slightly confused when I spoke with her. Pt reported  decrease appetite/intake due to nausea. Denies v/d/c. Offered a supplement and pt reported willing to try. RD will order and check on intake on follow-up.    Monitor and Evaluation    Food and Nutrient Intake: energy intake  Food and Nutrient Adminstration: diet order  Knowledge/Beliefs/Attitudes: food and nutrition knowledge/skill  Physical Activity and Function: nutrition-related ADLs and IADLs  Anthropometric Measurements: weight, weight change, body mass index  Biochemical Data, Medical Tests and Procedures: electrolyte and renal panel, gastrointestinal profile, glucose/endocrine profile, inflammatory profile, lipid profile  Nutrition-Focused Physical Findings: overall appearance       Nutrition Follow-Up    RD Follow-up?: Yes    Jagdeep Tse, Student Dietitian 06/15/2020 11:15 AM

## 2020-06-15 NOTE — PT/OT/SLP PROGRESS
Occupational Therapy   Treatment    Name: Althea Gaona  MRN: [id number]  Admitting Diagnosis:  Proctocolitis       Recommendations:     Discharge Recommendations: nursing facility, skilled  Discharge Equipment Recommendations:  bath bench, bedside commode  Barriers to discharge:       Assessment:     Althea Gaona is a 80 y.o. female with a medical diagnosis of Proctocolitis.  She presents with impaired cognition, safety awareness and ADL independence. Patient participated in bed mobility, sitting unsupported EOB and feeding sitting upright in bed. Performance deficits affecting function are impaired endurance, impaired sensation, impaired self care skills, impaired functional mobilty, impaired balance, impaired cognition, decreased safety awareness.     Rehab Prognosis:  Fair; patient would benefit from acute skilled OT services to address these deficits and reach maximum level of function.       Plan:     Patient to be seen 5 x/week to address the above listed problems via self-care/home management, therapeutic activities, therapeutic exercises, cognitive retraining  · Plan of Care Expires: 06/09/20  · Plan of Care Reviewed with: patient    Subjective     Pain/Comfort:  · Pain Rating 1: 0/10  · Pain Rating Post-Intervention 1: 0/10    Objective:     Communicated with: nurse prior to session.  Patient found HOB elevated with peripheral IV upon OT entry to room.    General Precautions: Standard, fall   Orthopedic Precautions:N/A   Braces: N/A     Occupational Performance:     Bed Mobility:    · Patient completed Scooting/Bridging with minimum assistance  · Patient completed Supine to Sit with minimum assistance  · Patient completed Sit to Supine with minimum assistance   · Performed unsupported sitting EOB with contact guard assistance    Activities of Daily Living:  · Feeding:  minimum assistance to open packages putting sugar in tea, cutting meat and verball cues to use appropriate utensil to feed  self.    [facility] 6 Click ADL:      Treatment & Education:  Patient reports increased dizziness while sitting EOB. Patient required verbal/tactile cues to use utensils appropriately to feed self.     Patient left HOB elevated with all lines intact, call button in reach and bed alarm onEducation:      GOALS:   Multidisciplinary Problems     Occupational Therapy Goals        Problem: Occupational Therapy Goal    Goal Priority Disciplines Outcome Interventions   Occupational Therapy Goal     OT, PT/OT Ongoing, Progressing    Description: Goals to be met by: discharge    Patient will increase functional independence with ADLs by performing:  Grooming at the sink with Min A seated.   Toileting from toilet with Moderate Assistance for hygiene and clothing management.   Toilet transfer to toilet with Moderate Assistance.                      Time Tracking:     OT Date of Treatment: 06/15/20  OT Start Time: 1327  OT Stop Time: 1351  OT Total Time (min): 24 min    Billable Minutes:Self Care/Home Management 12  Therapeutic Activity 12    Samy Allred OT  6/15/2020

## 2020-06-15 NOTE — CONSULTS
INPATIENT NEPHROLOGY CONSULT   [facility] NEPHROLOGY    Patient Name: Althea Gaona  Date: 06/15/2020    Reason for consultation: ESRD    History of Present Illness:  80F with HTN, ESRD on HD T/Th/Sat, COPD and CVA presents on 6/6 with moderate diarrhea, associated with weakness, chills, bilat lower quadrant pain, and nausea. She missed her last 2 HD appts.  In the ED, a CT abd/pelvis was done showing acute proctocolitis without perforation or abscess. We are consulted for dialysis.    6/8 VSS, no new complains. Skip HD today, plan HD in AM per schedule.  6/9 VSS, seen and examined on HD today, tolerating well, no new complains.  6/10 VSS, no new complains. Plan HD in AM per schedule.  6/11 VSS, seen and examined on HD today, tolerating well, no new complains. OK to dc if she has a facility offer.  6/12 VSS, no new complains. Plan HD in AM per schedule.  6/13 VSS, seen and examined on HD today, tolerating well, no new complains.  6/15 sleeping.      Plan of Care:    1. Colitis  - dose antbx for dialysis    2. ESRD  - no acute HD needs today  - continue HD TTS.    3. Chronic hypotension  - continue midorine    4. Hypokalemia  - will use 3-4K bath    5. SHPT  - continue calcitriol    6. Anemia of CKD  - ordered CLAIRE 50 units/kg with HD    Thank you for allowing us to participate in this patient's care. We will continue to follow.    Vital Signs:  Temp Readings from Last 3 Encounters:   06/15/20 99 °F (37.2 °C)   04/09/20 97.9 °F (36.6 °C) (Oral)   03/06/20 97.7 °F (36.5 °C) (Skin)       Pulse Readings from Last 3 Encounters:   06/15/20 88   04/09/20 65   03/06/20 89       BP Readings from Last 3 Encounters:   06/15/20 108/64   04/09/20 118/73   03/06/20 (!) 98/54       Weight:  Wt Readings from Last 3 Encounters:   06/10/20 62.1 kg (136 lb 14.5 oz)   04/23/20 63.5 kg (140 lb)   04/09/20 63.5 kg (140 lb)       Past Medical & Surgical History:  Past Medical History:   Diagnosis Date    Alcoholism     no drink since  1984    Anxiety     Asthma     Bipolar affect, depressed     Bipolar disorder     COPD (chronic obstructive pulmonary disease)     Degenerative disc disease, lumbar 11/22/2019    Dialysis patient     ESRD (end stage renal disease)     Full dentures     GERD (gastroesophageal reflux disease)     Hypertension     Pt states low b/p    Limb alert care status     NO BP/IV LEFT ARM    Pancreatitis     Stroke     Thyroid disease        Past Surgical History:   Procedure Laterality Date    APPENDECTOMY      CATARACT EXTRACTION Right     DIALYSIS FISTULA CREATION      left upper arm    EPIDURAL STEROID INJECTION INTO LUMBAR SPINE N/A 11/22/2019    Procedure: Injection-steroid-epidural-lumbar;  Surgeon: Rosanna Khan MD;  Location: [facility] OR;  Service: Pain Management;  Laterality: N/A;  L5-S1      EPIDURAL STEROID INJECTION INTO LUMBAR SPINE N/A 2/3/2020    Procedure: Injection-steroid-epidural-lumbar;  Surgeon: Rosanna Khan MD;  Location: [facility] OR;  Service: Pain Management;  Laterality: N/A;  L5-S1    EYE SURGERY      FIXATION KYPHOPLASTY N/A 10/18/2019    Procedure: Kyphoplasty;  Surgeon: Rosanna Khan MD;  Location: [facility] OR;  Service: Pain Management;  Laterality: N/A;  L2    HYSTERECTOMY      OPEN REDUCTION AND INTERNAL FIXATION (ORIF) OF INJURY OF FOREARM Left 3/6/2020    Procedure: ORIF, FOREARM;  Surgeon: Tab Mendez MD;  Location: [facility] OR;  Service: Orthopedics;  Laterality: Left;    THYROIDECTOMY      THYROIDECTOMY         Past Social History:  Social History     Socioeconomic History    Marital status:      Spouse name: Not on file    Number of children: Not on file    Years of education: Not on file    Highest education level: Not on file   Occupational History    Not on file   Social Needs    Financial resource strain: Not on file    Food insecurity     Worry: Not on file     Inability: Not on file    Transportation needs     Medical: Not on file      Non-medical: Not on file   Tobacco Use    Smoking status: Former Smoker     Packs/day: 1.00     Types: Cigarettes     Quit date: 10/1/2018     Years since quittin.7    Smokeless tobacco: Never Used   Substance and Sexual Activity    Alcohol use: No    Drug use: Never    Sexual activity: Not Currently   Lifestyle    Physical activity     Days per week: Not on file     Minutes per session: Not on file    Stress: Not on file   Relationships    Social connections     Talks on phone: Not on file     Gets together: Not on file     Attends Hinduism service: Not on file     Active member of club or organization: Not on file     Attends meetings of clubs or organizations: Not on file     Relationship status: Not on file   Other Topics Concern    Not on file   Social History Narrative    Not on file       Medications:  Scheduled Meds:   ARIPiprazole  2 mg Oral QHS    benztropine  1 mg Oral BID    busPIRone  10 mg Oral TID    calcitRIOL  0.25 mcg Oral Daily    epoetin jacob-ebpx (RETACRIT) injection  50 Units/kg Intravenous Once    epoetin jacob-ebpx (RETACRIT) injection  50 Units/kg Intravenous Every Tues, Thurs, Sat    famotidine  20 mg Oral QHS    FLUoxetine  20 mg Oral Daily    fluticasone furoate-vilanteroL  1 puff Inhalation Daily    levothyroxine  100 mcg Oral Before breakfast    midodrine  10 mg Oral Every other day    midodrine  5 mg Oral BID WM    montelukast  10 mg Oral QHS    rosuvastatin  20 mg Oral Daily    tiotropium  1 capsule Inhalation Daily     Continuous Infusions:    PRN Meds:.acetaminophen, albuterol sulfate, ondansetron, sodium chloride 0.9%, traZODone  Current Facility-Administered Medications on File Prior to Encounter   Medication Dose Route Frequency Provider Last Rate Last Dose    0.9%  NaCl infusion   Intravenous Continuous Rosanna Khan MD 0 mL/hr at 20 1611      electrolyte-S (ISOLYTE)   Intravenous Continuous Philippe Greco MD 10 mL/hr at 20 0910       lactated ringers infusion   Intravenous Once PRN Rosanna Khan MD        lactated ringers infusion   Intravenous Once PRN Rosanna Khan MD        lactated ringers infusion   Intravenous Continuous Philippe Greco MD        lidocaine (PF) 10 mg/ml (1%) injection 10 mg  1 mL Intradermal Once Philippe Greco MD         Current Outpatient Medications on File Prior to Encounter   Medication Sig Dispense Refill    ARIPiprazole (ABILIFY) 2 MG Tab Take 1 tablet (2 mg total) by mouth every evening. 90 tablet 1    benztropine (COGENTIN) 1 MG tablet Take 1 tablet (1 mg total) by mouth 2 (two) times daily. 60 tablet 2    busPIRone (BUSPAR) 10 MG tablet Take 1 tablet (10 mg total) by mouth 3 (three) times daily. 270 tablet 1    calcitRIOL (ROCALTROL) 0.25 MCG Cap 0.25 mcg once daily.   3    DAILY-YANDY tablet Take 1 tablet by mouth once daily.   11    famotidine (PEPCID) 20 MG tablet Take 20 mg by mouth every evening.   1    FLUoxetine 20 MG capsule Take 1 capsule (20 mg total) by mouth once daily. 90 capsule 1    fluticasone-umeclidin-vilanter (TRELEGY ELLIPTA) 100-62.5-25 mcg DsDv Inhale 1 puff into the lungs once daily. 1 each 11    levothyroxine (SYNTHROID) 100 MCG tablet Take 100 mcg by mouth before breakfast.       midodrine (PROAMATINE) 10 MG tablet Take 10 mg by mouth every 7 days. Prior to dialysis treatment      montelukast (SINGULAIR) 10 mg tablet Take 1 tablet (10 mg total) by mouth every evening. 90 tablet 1    ondansetron (ZOFRAN) 8 MG tablet Take 1 tablet (8 mg total) by mouth every 8 (eight) hours as needed for Nausea. (Patient taking differently: Take 8 mg by mouth every 12 (twelve) hours as needed for Nausea. ) 30 tablet 2    polyethylene glycol (GLYCOLAX) 17 gram PwPk Take 17 g by mouth daily as needed.  0    rosuvastatin (CRESTOR) 20 MG tablet Take 1 tablet (20 mg total) by mouth once daily. 90 tablet 1    traZODone (DESYREL) 100 MG tablet Take 100 mg by mouth nightly as needed.   2     albuterol-ipratropium  mcg (COMBIVENT)  mcg/actuation inhaler Inhale 2 puffs into the lungs every 6 (six) hours as needed for Wheezing.      bisacodyl (DULCOLAX) 5 mg EC tablet Take 1 tablet (5 mg total) by mouth daily as needed for Constipation.  0    dicyclomine (BENTYL) 10 MG capsule dicyclomine 10 mg capsule      epoetin jacob-epbx (RETACRIT) 10,000 unit/mL imjection Inject 0.34 mLs (3,400 Units total) into the skin every Tues, Thurs, Sat. With hemodialysis      midodrine (PROAMATINE) 5 MG Tab Take 1 tablet (5 mg total) by mouth 2 (two) times daily with meals. 60 tablet 11    polyvinyl alcohol, artificial tears, (LIQUIFILM TEARS) 1.4 % ophthalmic solution Place 1 drop into the left eye as needed. 15 mL 0    PROLIA 60 mg/mL Syrg Inject 1 mL (60 mg total) into the skin every 6 (six) months. 1 Syringe 1       Allergies:  Metoprolol and Codeine    Past Family History:  Reviewed; refer to Hospitalist Admission Note    Review of Systems:  Review of Systems - All 14 systems reviewed and negative, except as noted in HPI    Physical Exam:  General Appearance:    NAD, AAO x 3, cooperative, appears stated age   Head:    Normocephalic, atraumatic   Eyes:    PER, EOMI, and conjunctiva/sclera clear bilaterally       Mouth:   Moist mucus membranes, no thrush or oral lesions,       normal dentition   Back:     No CVA tenderness   Lungs:     Clear to auscultation bilaterally, no wheezes, crackles,           rales or rhonchi, symmetric air movement, respirations unlabored   Chest wall:    No tenderness or deformity   Heart:    Regular rate and rhythm, S1 and S2 normal, no murmur, rub   or gallop   Abdomen:     Soft, non-tender, non-distended, bowel sounds active all four   quadrants, no RT or guarding, no masses, no organomegaly   Extremities:   Warm and well perfused, distal pulses are intact, no             cyanosis or peripheral edema   MSK:   No joint or muscle swelling, tenderness or deformity   Skin:    Skin color, texture, turgor normal, no rashes or lesions   Neurologic/Psychiatric:   CNII-XII intact, normal strength and sensation       throughout, no asterixis; normal affect, memory, judgement     and insight      Results:  Lab Results   Component Value Date     06/14/2020    K 3.3 (L) 06/14/2020     06/14/2020    CO2 25 06/14/2020    BUN 10 06/14/2020    CREATININE 3.1 (H) 06/14/2020    CALCIUM 7.9 (L) 06/14/2020    ANIONGAP 13 06/14/2020    ESTGFRAFRICA 15.9 (A) 06/14/2020    EGFRNONAA 13.8 (A) 06/14/2020       Lab Results   Component Value Date    CALCIUM 7.9 (L) 06/14/2020    PHOS 2.5 (L) 06/13/2020       No results for input(s): WBC, RBC, HGB, HCT, PLT, MCV, MCH, MCHC in the last 24 hours.    I have personally reviewed pertinent radiological imaging and reports.    Eran Solis MD  [facility]  [address] [place] [id number]  [phone] (p)  [phone] (f)

## 2020-06-15 NOTE — PLAN OF CARE
Problem: Occupational Therapy Goal  Goal: Occupational Therapy Goal  Description: Goals to be met by: discharge    Patient will increase functional independence with ADLs by performing:  Grooming at the sink with Min A seated.   Toileting from toilet with Moderate Assistance for hygiene and clothing management.   Toilet transfer to toilet with Moderate Assistance.     Outcome: Ongoing, Progressing

## 2020-06-15 NOTE — PT/OT/SLP PROGRESS
Physical Therapy Treatment    Patient Name:  Althea Gaona   MRN:  [id number]    Recommendations:     Discharge Recommendations:  other (see comments)(vs home with HHPT and 24/7 supervision)   Discharge Equipment Recommendations: other (see comments)(TBD next level of care)   Barriers to discharge: None    Assessment:     Althea Gaona is a 80 y.o. female admitted with a medical diagnosis of Proctocolitis.  She presents with the following impairments/functional limitations:  weakness, impaired endurance, impaired self care skills, impaired cognition, impaired balance, decreased safety awareness, impaired coordination . Pt ambulated in room with RW and min-mod A x 2 due to pt is unpredictable and lacks safety awareness. Patient did well with exercise and sitting up in chair but would require restraints if she was to be left up in chair due to cognitive issues and safety. Patient is confused and may be to quick to get up and could fall with chair alarm. Continue with PT and POC.    Rehab Prognosis: Fair; patient would benefit from acute skilled PT services to address these deficits and reach maximum level of function.    Recent Surgery: * No surgery found *      Plan:     During this hospitalization, patient to be seen 5 x/week to address the identified rehab impairments via gait training, therapeutic activities, therapeutic exercises and progress toward the following goals:    · Plan of Care Expires:  07/08/20    Subjective     Chief Complaint: wants to get up and walk  Patient/Family Comments/goals: to walk  Pain/Comfort:  · Pain Rating 1: 0/10  · Pain Rating Post-Intervention 1: 0/10      Objective:     Communicated with GRUPO Barnes prior to session.  Patient found HOB elevated with peripheral IV upon PT entry to room.     General Precautions: Standard, fall   Orthopedic Precautions:    Braces:       Functional Mobility:  · Bed Mobility:     · Scooting: minimum assistance and of 2 persons  · Supine to Sit:  minimum assistance  · Sit to Supine: minimum assistance and moderate assistance  · Transfers:     · Sit to Stand:  minimum assistance and of 2 persons with rolling walker  · Bed to Chair and chair to Bed: minimum assistance, moderate assistance and of 2 persons with  rolling walker  using  Step Transfer  · Gait: 10' with RW and min-mod A x 2 - pt unsteady and lacks safety awareness.      AM-PAC 6 CLICK MOBILITY          Therapeutic Activities and Exercises:   Supine exercises to increase lower extremity strength to decrease fall risk for return to PLOF: Ankle pumps, Heel slides, Hip Ab/ADD, Qs, Gs, SLR, Bridges x 10. Patient sat on EOB for spinal orientation and to increase proprioception in sitting. Sitting exercises in chair for lower extremity strengthening to increase standing tolerance and decrease fall risk: Ankle pumps, LAQ, Heel slides, Marching x 10. Transfer pt  From bed to chair and BTB due to safety issues.    Patient left HOB elevated with all lines intact, call button in reach, bed alarm on and RN Cameron notified..    GOALS:   Multidisciplinary Problems     Physical Therapy Goals        Problem: Physical Therapy Goal    Goal Priority Disciplines Outcome Goal Variances Interventions   Physical Therapy Goal     PT, PT/OT Ongoing, Progressing     Description: Goals to be met by: discharge     Patient will increase functional independence with mobility by performin. Supine to sit with MInimal Assistance  2. Sit to supine with MInimal Assistance  3. Sit to stand transfer with Contact Guard Assistance  4. Bed to chair transfer with Contact Guard Assistance using Rolling Walker  5. Gait  x 15 feet with Contact Guard Assistance using Rolling Walker.                       Time Tracking:     PT Received On: 06/15/20  PT Start Time: 1016     PT Stop Time: 1049  PT Total Time (min): 33 min     Billable Minutes: Therapeutic Activity 18 and Therapeutic Exercise 15             PTA Visit Number: 4     Delaney  Oli, PTA  06/15/2020

## 2020-06-16 PROCEDURE — 12000002 HC ACUTE/MED SURGE SEMI-PRIVATE ROOM

## 2020-06-16 PROCEDURE — 94761 N-INVAS EAR/PLS OXIMETRY MLT: CPT

## 2020-06-16 PROCEDURE — 90935 HEMODIALYSIS ONE EVALUATION: CPT

## 2020-06-16 PROCEDURE — 25000242 PHARM REV CODE 250 ALT 637 W/ HCPCS: Performed by: INTERNAL MEDICINE

## 2020-06-16 PROCEDURE — 99900035 HC TECH TIME PER 15 MIN (STAT)

## 2020-06-16 PROCEDURE — 63600175 PHARM REV CODE 636 W HCPCS: Mod: TB | Performed by: INTERNAL MEDICINE

## 2020-06-16 PROCEDURE — 94640 AIRWAY INHALATION TREATMENT: CPT

## 2020-06-16 PROCEDURE — 63600175 PHARM REV CODE 636 W HCPCS: Performed by: FAMILY MEDICINE

## 2020-06-16 PROCEDURE — 27000221 HC OXYGEN, UP TO 24 HOURS

## 2020-06-16 PROCEDURE — 25000003 PHARM REV CODE 250: Performed by: NURSE PRACTITIONER

## 2020-06-16 RX ADMIN — ACETAMINOPHEN 650 MG: 325 TABLET ORAL at 03:06

## 2020-06-16 RX ADMIN — ROSUVASTATIN CALCIUM 20 MG: 20 TABLET, FILM COATED ORAL at 09:06

## 2020-06-16 RX ADMIN — LEVOTHYROXINE SODIUM 100 MCG: 100 TABLET ORAL at 05:06

## 2020-06-16 RX ADMIN — FAMOTIDINE 20 MG: 20 TABLET, FILM COATED ORAL at 09:06

## 2020-06-16 RX ADMIN — EPOETIN ALFA-EPBX 3300 UNITS: 10000 INJECTION, SOLUTION INTRAVENOUS; SUBCUTANEOUS at 02:06

## 2020-06-16 RX ADMIN — ONDANSETRON 8 MG: 2 INJECTION INTRAMUSCULAR; INTRAVENOUS at 05:06

## 2020-06-16 RX ADMIN — MIDODRINE HYDROCHLORIDE 5 MG: 2.5 TABLET ORAL at 05:06

## 2020-06-16 RX ADMIN — BUSPIRONE HYDROCHLORIDE 10 MG: 5 TABLET ORAL at 03:06

## 2020-06-16 RX ADMIN — BUSPIRONE HYDROCHLORIDE 10 MG: 5 TABLET ORAL at 08:06

## 2020-06-16 RX ADMIN — MONTELUKAST SODIUM 10 MG: 10 TABLET, COATED ORAL at 09:06

## 2020-06-16 RX ADMIN — BENZTROPINE MESYLATE 1 MG: 1 TABLET ORAL at 09:06

## 2020-06-16 RX ADMIN — MIDODRINE HYDROCHLORIDE 5 MG: 2.5 TABLET ORAL at 08:06

## 2020-06-16 RX ADMIN — FLUOXETINE 20 MG: 20 CAPSULE ORAL at 08:06

## 2020-06-16 RX ADMIN — BENZTROPINE MESYLATE 1 MG: 1 TABLET ORAL at 08:06

## 2020-06-16 RX ADMIN — TIOTROPIUM BROMIDE 18 MCG: 18 CAPSULE ORAL; RESPIRATORY (INHALATION) at 08:06

## 2020-06-16 RX ADMIN — BUSPIRONE HYDROCHLORIDE 10 MG: 5 TABLET ORAL at 09:06

## 2020-06-16 RX ADMIN — CALCITRIOL CAPSULES 0.25 MCG 0.25 MCG: 0.25 CAPSULE ORAL at 08:06

## 2020-06-16 RX ADMIN — FLUTICASONE FUROATE AND VILANTEROL TRIFENATATE 1 PUFF: 100; 25 POWDER RESPIRATORY (INHALATION) at 08:06

## 2020-06-16 RX ADMIN — ARIPIPRAZOLE 2 MG: 2 TABLET ORAL at 09:06

## 2020-06-16 NOTE — NURSING
Pt c/o needing to urinate, unable to urinate.  Bladder scan showed >999ml.  Dr Soto notified.  Order received for urinary catheter insertion.  Post insertion catheter drained 1800 ml.

## 2020-06-16 NOTE — PROGRESS NOTES
[facility] Progress Note  DOS: 2020    Subjective:     Patient was seen and examined bedside.  She received dialysis today.  Today upon my interview she is complaining of lower abdominal discomfort.  Bedside bladder scan showed more than 900 cc.  Otherwise no major events as per nursing staff.  She denies any other symptoms     Objective:   Last 24 Hour Vital Signs:  BP  Min: 90/48  Max: 128/69  Temp  Av.2 °F (36.8 °C)  Min: 97.8 °F (36.6 °C)  Max: 98.9 °F (37.2 °C)  Pulse  Av.9  Min: 55  Max: 91  Resp  Av.8  Min: 17  Max: 18  SpO2  Av %  Min: 95 %  Max: 99 %  I/O last 3 completed shifts:  In: 120 [P.O.:120]  Out: -       Physical Examination:  General: AAOx3. NAD.  HEENT: NCAT. PERRLA. EOMI.     CV: RRR.No M/R/G.   Chest: NL effort. CTAB. No R/R/W.   Abd: +BS x 4. Soft. ND/NT. No rebound or guarding.  Lower abdominal fullness noted  Ext: moving well. +distal pulses  Skin: Intact. No rash. No lesions.   Neuro: CN III-XII intact. No focal deficit. Pleasantly confused, at baseline per family   Psych:  No A/V hallucinations. NL affect. No abnormal behaviors noted     Laboratory:  Laboratory Data Reviewed: yes    Most Recent Data:  CBC:   Lab Results   Component Value Date    WBC 5.62 2020    HGB 8.7 (L) 2020    HCT 27.5 (L) 2020     2020     (H) 2020    RDW 13.7 2020     BMP:   Lab Results   Component Value Date     2020    K 3.3 (L) 2020     2020    CO2 25 2020    BUN 10 2020    GLU 89 2020    CALCIUM 7.9 (L) 2020    MG 1.8 2020    PHOS 2.5 (L) 2020     LFTs:   Lab Results   Component Value Date    PROT 7.7 2020    ALBUMIN 3.1 (L) 2020    BILITOT 0.8 2020    AST 25 2020    ALKPHOS 107 2020    ALT 20 2020     Coags:   Lab Results   Component Value Date    INR 1.0 2015     FLP:   Lab Results   Component Value Date    CHOL 154  02/17/2020    HDL 56 02/17/2020    LDLCALC 71 02/17/2020    TRIG 205 (H) 02/17/2020    CHOLHDL 2.8 02/17/2020     DM:   Lab Results   Component Value Date    LDLCALC 71 02/17/2020    CREATININE 3.1 (H) 06/14/2020     Thyroid:   Lab Results   Component Value Date    TSH 1.538 12/29/2015    FREET4 0.87 09/06/2015     Anemia:   Lab Results   Component Value Date    IRON 47 12/29/2015    TIBC 318 12/29/2015    FERRITIN 31 08/28/2015    QWSVTQUZ45 683 12/29/2015    FOLATE 5.8 12/29/2015     Cardiac:   Lab Results   Component Value Date    TROPONINI <0.030 08/29/2019     (H) 08/29/2019     Urinalysis:   Lab Results   Component Value Date    COLORU Yellow 12/30/2015    SPECGRAV <=1.005 (A) 12/30/2015    NITRITE Negative 12/30/2015    KETONESU Negative 12/30/2015    UROBILINOGEN Negative 12/30/2015    WBCUA 10 (H) 06/25/2015      Radiology:  Data Reviewed: yes  CT ABDOMEN PELVIS WITHOUT CONTRAST  US RETROPERITONEAL COMPLETE    Current Medications:     Infusions:       Scheduled:   ARIPiprazole  2 mg Oral QHS    benztropine  1 mg Oral BID    busPIRone  10 mg Oral TID    calcitRIOL  0.25 mcg Oral Daily    epoetin jacob-ebpx (RETACRIT) injection  50 Units/kg Intravenous Once    epoetin jacob-ebpx (RETACRIT) injection  50 Units/kg Intravenous Every Tues, Thurs, Sat    famotidine  20 mg Oral QHS    FLUoxetine  20 mg Oral Daily    fluticasone furoate-vilanteroL  1 puff Inhalation Daily    levothyroxine  100 mcg Oral Before breakfast    midodrine  10 mg Oral Every other day    midodrine  5 mg Oral BID WM    montelukast  10 mg Oral QHS    rosuvastatin  20 mg Oral Daily    tiotropium  1 capsule Inhalation Daily        PRN:  acetaminophen, albuterol sulfate, ondansetron, sodium chloride 0.9%, traZODone      Microbiology Data:  Reviewed: yes  Microbiology Results (last 7 days)     ** No results found for the last 168 hours. **           Antibiotics and Day Number of Therapy:  Antibiotics (From admission, onward)     Start     Stop Route Frequency Ordered    06/06/20 1300  piperacillin-tazobactam 3.375 g in dextrose 5 % 50 mL IVPB (ready to mix system)      06/07 0059 IV ED 1 Time 06/06/20 1257             Assessment/Plan:     Althea Gaona is a 80 y.o.female with:      Proctocolitis  Seems to be resolving, antibiotics were discontinued by Dr. Ramos  Pt is on HD and will hold off on IV hydration  Push oral hydration     Borderline hypotension  Stable  Continue home midodrine     ESRD (end stage renal disease)  Consulted Dr. Rodrigues for HD   Electrolytes remain stable     Metabolic acidosis  Monitor, will have HD     Urinary retention:  Today patient was complaining of lower abdominal fullness. Bedside bladder scan showed more than 900 cc and patient could not void in bedpan.   Renal ultrasound  Medina's catheter  Consult urology      SNF placement pending.     Seema Soto MD  [facility]

## 2020-06-16 NOTE — PT/OT/SLP PROGRESS
Physical Therapy      Patient Name:  Althea Gaona   MRN:  [id number]    Patient not seen today secondary to Other (Comment)(Pt in pain and unwilling to participate on first attempt. Pt unavailable during second attempt due to RN present and changing Pt's IV.). Will follow-up tomorrow.    Hillary Hammant, PTA

## 2020-06-16 NOTE — PLAN OF CARE
Problem: Adult Inpatient Plan of Care  Goal: Plan of Care Review  Outcome: Ongoing, Progressing  Goal: Patient-Specific Goal (Individualization)  Outcome: Ongoing, Progressing  Goal: Absence of Hospital-Acquired Illness or Injury  Outcome: Ongoing, Progressing  Goal: Optimal Comfort and Wellbeing  Outcome: Ongoing, Progressing  Goal: Readiness for Transition of Care  Outcome: Ongoing, Progressing  Goal: Rounds/Family Conference  Outcome: Ongoing, Progressing     Problem: Infection  Goal: Infection Symptom Resolution  Outcome: Ongoing, Progressing     Problem: Skin Injury Risk Increased  Goal: Skin Health and Integrity  Outcome: Ongoing, Progressing     Problem: Fall Injury Risk  Goal: Absence of Fall and Fall-Related Injury  Outcome: Ongoing, Progressing     Problem: Device-Related Complication Risk (Hemodialysis)  Goal: Safe, Effective Therapy Delivery  Outcome: Ongoing, Progressing     Problem: Hemodynamic Instability (Hemodialysis)  Goal: Vital Signs Remain in Desired Range  Outcome: Ongoing, Progressing     Problem: Infection (Hemodialysis)  Goal: Absence of Infection Signs/Symptoms  Outcome: Ongoing, Progressing     Problem: Oral Intake Inadequate  Goal: Improved Oral Intake  Outcome: Ongoing, Progressing

## 2020-06-16 NOTE — PT/OT/SLP PROGRESS
Occupational Therapy      Patient Name:  Althea Gaona   MRN:  [id number]    Patient not seen today secondary to Other (Comment)(Refused). Will follow-up tomorrow.    Samy Allred OT  6/16/2020

## 2020-06-16 NOTE — RESPIRATORY THERAPY
06/15/20 4686   Patient Assessment/Suction   Level of Consciousness (AVPU) alert   Respiratory Effort Normal;Unlabored   Expansion/Accessory Muscles/Retractions no use of accessory muscles;no retractions;expansion symmetric   All Lung Fields Breath Sounds clear;diminished   Rhythm/Pattern, Respiratory unlabored;depth regular;pattern regular;snoring   Cough Frequency infrequent   PRE-TX-O2   O2 Device (Oxygen Therapy) nasal cannula   $ Is the patient on Low Flow Oxygen? Yes   Flow (L/min) 1   Oxygen Concentration (%) 24   SpO2 99 %   Pulse Oximetry Type Intermittent   $ Pulse Oximetry - Multiple Charge Pulse Oximetry - Multiple   Pulse 79   Resp 18   Aerosol Therapy   $ Aerosol Therapy Charges PRN treatment not required   Respiratory Treatment Status (SVN) PRN treatment not required   Ready to Wean/Extubation Screen   FIO2<=50 (chart decimal) 0.24   Respiratory Evaluation   $ Care Plan Tech Time 15 min

## 2020-06-16 NOTE — PLAN OF CARE
06/16/20 0843   Inhaler   $ Inhaler Charges MDI (Metered Dose Inahler) Treatment   Daily Review of Necessity (Inhaler) completed   Respiratory Treatment Status (Inhaler) given   Treatment Route (Inhaler) mouthpiece   Patient Position (Inhaler) HOB elevated   Post Treatment Assessment (Inhaler) breath sounds unchanged   Signs of Intolerance (Inhaler) none

## 2020-06-16 NOTE — CONSULTS
INPATIENT NEPHROLOGY CONSULT   [facility] NEPHROLOGY    Patient Name: Althea Gaona  Date: 06/16/2020    Reason for consultation: ESRD    History of Present Illness:  80F with HTN, ESRD on HD T/Th/Sat, COPD and CVA presents on 6/6 with moderate diarrhea, associated with weakness, chills, bilat lower quadrant pain, and nausea. She missed her last 2 HD appts.  In the ED, a CT abd/pelvis was done showing acute proctocolitis without perforation or abscess. We are consulted for dialysis.    6/8 VSS, no new complains. Skip HD today, plan HD in AM per schedule.  6/9 VSS, seen and examined on HD today, tolerating well, no new complains.  6/10 VSS, no new complains. Plan HD in AM per schedule.  6/11 VSS, seen and examined on HD today, tolerating well, no new complains. OK to dc if she has a facility offer.  6/12 VSS, no new complains. Plan HD in AM per schedule.  6/13 VSS, seen and examined on HD today, tolerating well, no new complains.  6/15 sleeping.    6/16 seen on dialysis.  C/o wheezing.   No cp.      Plan of Care:    1. Colitis  - dose antbx for dialysis    2. ESRD  - no acute HD needs today  - continue HD TTS.    3. Chronic hypotension  - continue midorine    4. Hypokalemia  - will use 3-4K bath    5. SHPT  - continue calcitriol    6. Anemia of CKD  - ordered CLAIRE 50 units/kg with HD    Thank you for allowing us to participate in this patient's care. We will continue to follow.    Vital Signs:  Temp Readings from Last 3 Encounters:   06/16/20 97.9 °F (36.6 °C) (Oral)   04/09/20 97.9 °F (36.6 °C) (Oral)   03/06/20 97.7 °F (36.5 °C) (Skin)       Pulse Readings from Last 3 Encounters:   06/16/20 77   04/09/20 65   03/06/20 89       BP Readings from Last 3 Encounters:   06/16/20 124/80   04/09/20 118/73   03/06/20 (!) 98/54       Weight:  Wt Readings from Last 3 Encounters:   06/10/20 62.1 kg (136 lb 14.5 oz)   04/23/20 63.5 kg (140 lb)   04/09/20 63.5 kg (140 lb)       Past Medical & Surgical History:  Past Medical  History:   Diagnosis Date    Alcoholism     no drink since 1984    Anxiety     Asthma     Bipolar affect, depressed     Bipolar disorder     COPD (chronic obstructive pulmonary disease)     Degenerative disc disease, lumbar 11/22/2019    Dialysis patient     ESRD (end stage renal disease)     Full dentures     GERD (gastroesophageal reflux disease)     Hypertension     Pt states low b/p    Limb alert care status     NO BP/IV LEFT ARM    Pancreatitis     Stroke     Thyroid disease        Past Surgical History:   Procedure Laterality Date    APPENDECTOMY      CATARACT EXTRACTION Right     DIALYSIS FISTULA CREATION      left upper arm    EPIDURAL STEROID INJECTION INTO LUMBAR SPINE N/A 11/22/2019    Procedure: Injection-steroid-epidural-lumbar;  Surgeon: Rosanna Khan MD;  Location: [facility] OR;  Service: Pain Management;  Laterality: N/A;  L5-S1      EPIDURAL STEROID INJECTION INTO LUMBAR SPINE N/A 2/3/2020    Procedure: Injection-steroid-epidural-lumbar;  Surgeon: Rosanna Khan MD;  Location: [facility] OR;  Service: Pain Management;  Laterality: N/A;  L5-S1    EYE SURGERY      FIXATION KYPHOPLASTY N/A 10/18/2019    Procedure: Kyphoplasty;  Surgeon: Rosanna Khan MD;  Location: [facility] OR;  Service: Pain Management;  Laterality: N/A;  L2    HYSTERECTOMY      OPEN REDUCTION AND INTERNAL FIXATION (ORIF) OF INJURY OF FOREARM Left 3/6/2020    Procedure: ORIF, FOREARM;  Surgeon: Tab Mendez MD;  Location: [facility] OR;  Service: Orthopedics;  Laterality: Left;    THYROIDECTOMY      THYROIDECTOMY         Past Social History:  Social History     Socioeconomic History    Marital status:      Spouse name: Not on file    Number of children: Not on file    Years of education: Not on file    Highest education level: Not on file   Occupational History    Not on file   Social Needs    Financial resource strain: Not on file    Food insecurity     Worry: Not on file     Inability: Not on  file    Transportation needs     Medical: Not on file     Non-medical: Not on file   Tobacco Use    Smoking status: Former Smoker     Packs/day: 1.00     Types: Cigarettes     Quit date: 10/1/2018     Years since quittin.7    Smokeless tobacco: Never Used   Substance and Sexual Activity    Alcohol use: No    Drug use: Never    Sexual activity: Not Currently   Lifestyle    Physical activity     Days per week: Not on file     Minutes per session: Not on file    Stress: Not on file   Relationships    Social connections     Talks on phone: Not on file     Gets together: Not on file     Attends Yazidism service: Not on file     Active member of club or organization: Not on file     Attends meetings of clubs or organizations: Not on file     Relationship status: Not on file   Other Topics Concern    Not on file   Social History Narrative    Not on file       Medications:  Scheduled Meds:   ARIPiprazole  2 mg Oral QHS    benztropine  1 mg Oral BID    busPIRone  10 mg Oral TID    calcitRIOL  0.25 mcg Oral Daily    epoetin jacob-ebpx (RETACRIT) injection  50 Units/kg Intravenous Once    epoetin jacob-ebpx (RETACRIT) injection  50 Units/kg Intravenous Every Tues, Thurs, Sat    famotidine  20 mg Oral QHS    FLUoxetine  20 mg Oral Daily    fluticasone furoate-vilanteroL  1 puff Inhalation Daily    levothyroxine  100 mcg Oral Before breakfast    midodrine  10 mg Oral Every other day    midodrine  5 mg Oral BID WM    montelukast  10 mg Oral QHS    rosuvastatin  20 mg Oral Daily    tiotropium  1 capsule Inhalation Daily     Continuous Infusions:    PRN Meds:.acetaminophen, albuterol sulfate, ondansetron, sodium chloride 0.9%, traZODone  Current Facility-Administered Medications on File Prior to Encounter   Medication Dose Route Frequency Provider Last Rate Last Dose    0.9%  NaCl infusion   Intravenous Continuous Rosanna Khan MD 0 mL/hr at 20 1611      electrolyte-S (ISOLYTE)   Intravenous  Continuous Philippe Greco MD 10 mL/hr at 03/06/20 0910      lactated ringers infusion   Intravenous Once PRN Rosanna Khan MD        lactated ringers infusion   Intravenous Once PRN Rosanna Khan MD        lactated ringers infusion   Intravenous Continuous Philippe Greco MD        lidocaine (PF) 10 mg/ml (1%) injection 10 mg  1 mL Intradermal Once Philippe Greco MD         Current Outpatient Medications on File Prior to Encounter   Medication Sig Dispense Refill    ARIPiprazole (ABILIFY) 2 MG Tab Take 1 tablet (2 mg total) by mouth every evening. 90 tablet 1    benztropine (COGENTIN) 1 MG tablet Take 1 tablet (1 mg total) by mouth 2 (two) times daily. 60 tablet 2    busPIRone (BUSPAR) 10 MG tablet Take 1 tablet (10 mg total) by mouth 3 (three) times daily. 270 tablet 1    calcitRIOL (ROCALTROL) 0.25 MCG Cap 0.25 mcg once daily.   3    DAILY-YANDY tablet Take 1 tablet by mouth once daily.   11    famotidine (PEPCID) 20 MG tablet Take 20 mg by mouth every evening.   1    FLUoxetine 20 MG capsule Take 1 capsule (20 mg total) by mouth once daily. 90 capsule 1    fluticasone-umeclidin-vilanter (TRELEGY ELLIPTA) 100-62.5-25 mcg DsDv Inhale 1 puff into the lungs once daily. 1 each 11    levothyroxine (SYNTHROID) 100 MCG tablet Take 100 mcg by mouth before breakfast.       midodrine (PROAMATINE) 10 MG tablet Take 10 mg by mouth every 7 days. Prior to dialysis treatment      montelukast (SINGULAIR) 10 mg tablet Take 1 tablet (10 mg total) by mouth every evening. 90 tablet 1    ondansetron (ZOFRAN) 8 MG tablet Take 1 tablet (8 mg total) by mouth every 8 (eight) hours as needed for Nausea. (Patient taking differently: Take 8 mg by mouth every 12 (twelve) hours as needed for Nausea. ) 30 tablet 2    polyethylene glycol (GLYCOLAX) 17 gram PwPk Take 17 g by mouth daily as needed.  0    rosuvastatin (CRESTOR) 20 MG tablet Take 1 tablet (20 mg total) by mouth once daily. 90 tablet 1    traZODone (DESYREL)  100 MG tablet Take 100 mg by mouth nightly as needed.   2    albuterol-ipratropium  mcg (COMBIVENT)  mcg/actuation inhaler Inhale 2 puffs into the lungs every 6 (six) hours as needed for Wheezing.      bisacodyl (DULCOLAX) 5 mg EC tablet Take 1 tablet (5 mg total) by mouth daily as needed for Constipation.  0    dicyclomine (BENTYL) 10 MG capsule dicyclomine 10 mg capsule      epoetin jacob-epbx (RETACRIT) 10,000 unit/mL imjection Inject 0.34 mLs (3,400 Units total) into the skin every Tues, Thurs, Sat. With hemodialysis      midodrine (PROAMATINE) 5 MG Tab Take 1 tablet (5 mg total) by mouth 2 (two) times daily with meals. 60 tablet 11    polyvinyl alcohol, artificial tears, (LIQUIFILM TEARS) 1.4 % ophthalmic solution Place 1 drop into the left eye as needed. 15 mL 0    PROLIA 60 mg/mL Syrg Inject 1 mL (60 mg total) into the skin every 6 (six) months. 1 Syringe 1       Allergies:  Metoprolol and Codeine    Past Family History:  Reviewed; refer to Hospitalist Admission Note    Review of Systems:  Review of Systems - All 14 systems reviewed and negative, except as noted in HPI    Physical Exam:  General Appearance:    NAD, AAO x 3, cooperative, appears stated age   Head:    Normocephalic, atraumatic   Eyes:    PER, EOMI, and conjunctiva/sclera clear bilaterally       Mouth:   Moist mucus membranes, no thrush or oral lesions,       normal dentition   Back:     No CVA tenderness   Lungs:    exp wheezes   Chest wall:    No tenderness or deformity   Heart:    Regular rate and rhythm, S1 and S2 normal, no murmur, rub   or gallop   Abdomen:     Soft, non-tender, non-distended, bowel sounds active all four   quadrants, no RT or guarding, no masses, no organomegaly   Extremities:   Warm and well perfused, distal pulses are intact, no             cyanosis or peripheral edema   MSK:   No joint or muscle swelling, tenderness or deformity   Skin:   Skin color, texture, turgor normal, no rashes or lesions    Neurologic/Psychiatric:   CNII-XII intact, normal strength and sensation       throughout, no asterixis; normal affect, memory, judgement     and insight      Results:  Lab Results   Component Value Date     06/14/2020    K 3.3 (L) 06/14/2020     06/14/2020    CO2 25 06/14/2020    BUN 10 06/14/2020    CREATININE 3.1 (H) 06/14/2020    CALCIUM 7.9 (L) 06/14/2020    ANIONGAP 13 06/14/2020    ESTGFRAFRICA 15.9 (A) 06/14/2020    EGFRNONAA 13.8 (A) 06/14/2020       Lab Results   Component Value Date    CALCIUM 7.9 (L) 06/14/2020    PHOS 2.5 (L) 06/13/2020       No results for input(s): WBC, RBC, HGB, HCT, PLT, MCV, MCH, MCHC in the last 24 hours.    I have personally reviewed pertinent radiological imaging and reports.    Eran Solis MD  [facility] [place]  [address] [place] [id number]  [phone] (p)  [phone] (f)

## 2020-06-16 NOTE — PROGRESS NOTES
Patient tolerated dialysis. Consent and Hepatitis status confirmed prior to treatment. Patient stable, lines flushes, and needles removed. Pressure and bandage applied. 2 Liters were removed.  VS noted in flowsheet.  Report call to Delaney.

## 2020-06-17 VITALS
BODY MASS INDEX: 21.48 KG/M2 | HEIGHT: 67 IN | RESPIRATION RATE: 16 BRPM | OXYGEN SATURATION: 98 % | HEART RATE: 84 BPM | TEMPERATURE: 99 F | WEIGHT: 136.88 LBS | DIASTOLIC BLOOD PRESSURE: 70 MMHG | SYSTOLIC BLOOD PRESSURE: 122 MMHG

## 2020-06-17 LAB
ALBUMIN SERPL BCP-MCNC: 3.7 G/DL (ref 3.5–5.2)
ANION GAP SERPL CALC-SCNC: 13 MMOL/L (ref 8–16)
BASOPHILS # BLD AUTO: 0.04 K/UL (ref 0–0.2)
BASOPHILS NFR BLD: 0.6 % (ref 0–1.9)
BUN SERPL-MCNC: 22 MG/DL (ref 8–23)
CALCIUM SERPL-MCNC: 8.2 MG/DL (ref 8.7–10.5)
CHLORIDE SERPL-SCNC: 101 MMOL/L (ref 95–110)
CO2 SERPL-SCNC: 24 MMOL/L (ref 23–29)
CREAT SERPL-MCNC: 3.9 MG/DL (ref 0.5–1.4)
DIFFERENTIAL METHOD: ABNORMAL
EOSINOPHIL # BLD AUTO: 0.1 K/UL (ref 0–0.5)
EOSINOPHIL NFR BLD: 0.8 % (ref 0–8)
ERYTHROCYTE [DISTWIDTH] IN BLOOD BY AUTOMATED COUNT: 14.3 % (ref 11.5–14.5)
EST. GFR  (AFRICAN AMERICAN): 12 ML/MIN/1.73 M^2
EST. GFR  (NON AFRICAN AMERICAN): 10.4 ML/MIN/1.73 M^2
GLUCOSE SERPL-MCNC: 111 MG/DL (ref 70–110)
HCT VFR BLD AUTO: 33.1 % (ref 37–48.5)
HGB BLD-MCNC: 10 G/DL (ref 12–16)
IMM GRANULOCYTES # BLD AUTO: 0.04 K/UL (ref 0–0.04)
IMM GRANULOCYTES NFR BLD AUTO: 0.6 % (ref 0–0.5)
LYMPHOCYTES # BLD AUTO: 1.1 K/UL (ref 1–4.8)
LYMPHOCYTES NFR BLD: 17 % (ref 18–48)
MAGNESIUM SERPL-MCNC: 1.9 MG/DL (ref 1.6–2.6)
MCH RBC QN AUTO: 32.5 PG (ref 27–31)
MCHC RBC AUTO-ENTMCNC: 30.2 G/DL (ref 32–36)
MCV RBC AUTO: 108 FL (ref 82–98)
MONOCYTES # BLD AUTO: 0.5 K/UL (ref 0.3–1)
MONOCYTES NFR BLD: 7.4 % (ref 4–15)
NEUTROPHILS # BLD AUTO: 4.6 K/UL (ref 1.8–7.7)
NEUTROPHILS NFR BLD: 73.6 % (ref 38–73)
NRBC BLD-RTO: 0 /100 WBC
PHOSPHATE SERPL-MCNC: 4.4 MG/DL (ref 2.7–4.5)
PLATELET # BLD AUTO: 327 K/UL (ref 150–350)
PMV BLD AUTO: 10.3 FL (ref 9.2–12.9)
POTASSIUM SERPL-SCNC: 4 MMOL/L (ref 3.5–5.1)
RBC # BLD AUTO: 3.08 M/UL (ref 4–5.4)
SODIUM SERPL-SCNC: 138 MMOL/L (ref 136–145)
WBC # BLD AUTO: 6.22 K/UL (ref 3.9–12.7)

## 2020-06-17 PROCEDURE — 99900035 HC TECH TIME PER 15 MIN (STAT)

## 2020-06-17 PROCEDURE — 97530 THERAPEUTIC ACTIVITIES: CPT

## 2020-06-17 PROCEDURE — 36415 COLL VENOUS BLD VENIPUNCTURE: CPT

## 2020-06-17 PROCEDURE — 25000242 PHARM REV CODE 250 ALT 637 W/ HCPCS: Performed by: INTERNAL MEDICINE

## 2020-06-17 PROCEDURE — 94640 AIRWAY INHALATION TREATMENT: CPT

## 2020-06-17 PROCEDURE — 63600175 PHARM REV CODE 636 W HCPCS: Performed by: FAMILY MEDICINE

## 2020-06-17 PROCEDURE — 83735 ASSAY OF MAGNESIUM: CPT

## 2020-06-17 PROCEDURE — 94761 N-INVAS EAR/PLS OXIMETRY MLT: CPT

## 2020-06-17 PROCEDURE — 25000003 PHARM REV CODE 250: Performed by: NURSE PRACTITIONER

## 2020-06-17 PROCEDURE — 80069 RENAL FUNCTION PANEL: CPT

## 2020-06-17 PROCEDURE — 85025 COMPLETE CBC W/AUTO DIFF WBC: CPT

## 2020-06-17 RX ADMIN — MIDODRINE HYDROCHLORIDE 5 MG: 2.5 TABLET ORAL at 09:06

## 2020-06-17 RX ADMIN — FLUOXETINE 20 MG: 20 CAPSULE ORAL at 09:06

## 2020-06-17 RX ADMIN — BUSPIRONE HYDROCHLORIDE 10 MG: 5 TABLET ORAL at 09:06

## 2020-06-17 RX ADMIN — TIOTROPIUM BROMIDE 18 MCG: 18 CAPSULE ORAL; RESPIRATORY (INHALATION) at 09:06

## 2020-06-17 RX ADMIN — ONDANSETRON 8 MG: 2 INJECTION INTRAMUSCULAR; INTRAVENOUS at 03:06

## 2020-06-17 RX ADMIN — CALCITRIOL CAPSULES 0.25 MCG 0.25 MCG: 0.25 CAPSULE ORAL at 09:06

## 2020-06-17 RX ADMIN — ACETAMINOPHEN 650 MG: 325 TABLET ORAL at 05:06

## 2020-06-17 RX ADMIN — LEVOTHYROXINE SODIUM 100 MCG: 100 TABLET ORAL at 05:06

## 2020-06-17 RX ADMIN — FLUTICASONE FUROATE AND VILANTEROL TRIFENATATE 1 PUFF: 100; 25 POWDER RESPIRATORY (INHALATION) at 09:06

## 2020-06-17 RX ADMIN — ONDANSETRON 8 MG: 2 INJECTION INTRAMUSCULAR; INTRAVENOUS at 11:06

## 2020-06-17 RX ADMIN — BUSPIRONE HYDROCHLORIDE 10 MG: 5 TABLET ORAL at 03:06

## 2020-06-17 RX ADMIN — BENZTROPINE MESYLATE 1 MG: 1 TABLET ORAL at 09:06

## 2020-06-17 NOTE — NURSING
Pt discharged per stretcher with [facility] to [facility].  Pt alert, no c/o pain or nausea, no distress, in good spirits.

## 2020-06-17 NOTE — PT/OT/SLP PROGRESS
Physical Therapy      Patient Name:  Althea Gaona   MRN:  [id number]    Patient not seen today secondary to Other (Comment)Patient discharged prior to PT treatment this afternoon    .Rajwinder Vaca, PT

## 2020-06-17 NOTE — DISCHARGE SUMMARY
"[facility]  Discharge Summary    DOS: 06/17/2020      Patient Name: Althea Gaona  MRN: [id number]  Admission Date: 6/6/2020  Hospital Length of Stay: 9 days  Discharge Date and Time:  06/17/2020 4:02 PM  Attending Physician: No att. providers found   Discharging Provider: Seema Soto MD  Primary Care Provider: Zachary Ramsey MD      HPI:   Ms. Gaona presents today with complaints of diarrhea. It is moderate. It is associated with weakness, chills, bilat lower quadrant pain, and nausea. She denies measured fever, cough, SOB, chest pain, or LOC. She has a history of HTN, HLD, ESRD on HD T/Th/Sat, COPD, and CVA. She's had diarrhea, stating "it just pours out of me" 2-4 times a day since Wed. She missed her last 2 HD appts on Thursday and today d/t the abd pain and diarrhea. In the ED, a CT abd/pelvis was done showing acute proctocolitis without perforation or abscess. Dr. Rodrigues was consulted and will manage her HD. Discussed code status and patient states her living will is "lost", but she wishes to be a full code.    * No surgery found *      Hospital Course:   During her prolonged hospital stay of 11 days I took care of her for last couple days.  In summary this lady with past medical history of ESRD on hemodialysis, COPD, CVA came with diarrhea, abdominal pain and was found to have proctocolitis without perforation or abscess.  She received antibiotics while inpatient.  She received dialysis as per schedule. Lately she was waiting for placement.  Yesterday she was complaining of lower abdominal fullness and we bladder scanned her which showed more than 900 cc.  After placing Medina's catheter 18 under CC urine came out. Today I spoke with Dr. Huggins(urology) who recommended to send her to skilled nursing facility with indwelling Medina's catheter and he can see her in the clinic within week to 10 days give her trial of void.  She was advised to follow-up with her PCP, " dialysis unit as well as Urology.    Review of systems:  Mild intermittent confusion otherwise comprehensive review of system negative    Physical Examination:  General: AAOx3. NAD.  HEENT: NCAT. PERRLA. EOMI.     CV: RRR.No M/R/G.   Chest: NL effort. CTAB. No R/R/W.   Abd: +BS x 4. Soft. ND/NT. No rebound or guarding.  Lower abdominal fullness noted  Ext: moving well. +distal pulses  Skin: Intact. No rash. No lesions.   Neuro: CN III-XII intact. No focal deficit. Pleasantly confused, at baseline per family   Psych:  No A/V hallucinations. NL affect. No abnormal behaviors noted     Vital signs reviewed.  Nursing notes reviewed.      Consults:   Consults (From admission, onward)        Status Ordering Provider     Inpatient consult to Nephrology  Once     Provider:  Ruby Rodrigues MD    Completed CARON CLINE     Inpatient consult to Social Work/Case Management  Once     Provider:  (Not yet assigned)    CHON Marks     Inpatient consult to Urology  Once     Provider:  Dario Huggins MD    Acknowledged GUS DORSEY          No new Assessment & Plan notes have been filed under this hospital service since the last note was generated.  Service: Hospital Medicine    Final Active Diagnoses:    Diagnosis Date Noted POA    PRINCIPAL PROBLEM:  Proctocolitis [K52.9] 06/06/2020 Yes    Metabolic acidosis [E87.2] 06/06/2020 Yes    Borderline hypotension [I95.9] 08/30/2019 Yes    ESRD (end stage renal disease) [N18.6] 08/29/2019 Yes     Chronic      Problems Resolved During this Admission:    Diagnosis Date Noted Date Resolved POA    Dehydration [E86.0] 12/28/2015 06/17/2020 Yes       Discharged Condition: good    Disposition: Skilled Nursing Facility    Follow Up:   Contact information for follow-up providers     Zachary Ramsey MD In 1 week.    Specialty: Family Medicine  Contact information:  [address]  [address]  [facility] [place] [place] [address] [phone]              dialysis unit.           Draio Huggins MD In 1 week.    Specialty: Urology  Why: for urinary retention  Contact information:  [address]  [address]  [facility] [address]  [phone]                   Contact information for after-discharge care     Destination     [facility].    Service: Skilled Nursing  Contact information:  [address]  [facility] [address]  [phone]                           Patient Instructions:      Diet renal     Notify your health care provider if you experience any of the following:  temperature >100.4     Notify your health care provider if you experience any of the following:  persistent nausea and vomiting or diarrhea     Activity as tolerated       Significant Diagnostic Studies: Labs:   BMP:   Recent Labs   Lab 06/17/20  0450   *      K 4.0      CO2 24   BUN 22   CREATININE 3.9*   CALCIUM 8.2*   MG 1.9    and CBC   Recent Labs   Lab 06/17/20  0450   WBC 6.22   HGB 10.0*   HCT 33.1*          Pending Diagnostic Studies:     None         Medications:  Reconciled Home Medications:      Medication List      CHANGE how you take these medications    ondansetron 8 MG tablet  Commonly known as: ZOFRAN  Take 1 tablet (8 mg total) by mouth every 8 (eight) hours as needed for Nausea.  What changed: when to take this        CONTINUE taking these medications    ARIPiprazole 2 MG Tab  Commonly known as: ABILIFY  Take 1 tablet (2 mg total) by mouth every evening.     benztropine 1 MG tablet  Commonly known as: COGENTIN  Take 1 tablet (1 mg total) by mouth 2 (two) times daily.     bisacodyL 5 mg EC tablet  Commonly known as: DULCOLAX  Take 1 tablet (5 mg total) by mouth daily as needed for Constipation.     busPIRone 10 MG tablet  Commonly known as: BUSPAR  Take 1 tablet (10 mg total) by mouth 3 (three) times daily.     calcitRIOL 0.25 MCG Cap  Commonly known as: ROCALTROL  0.25 mcg once daily.     COMBIVENT  mcg/actuation  inhaler  Generic drug: albuterol-ipratropium  mcg  Inhale 2 puffs into the lungs every 6 (six) hours as needed for Wheezing.     DAILY-YANDY per tablet  Generic drug: multivitamin  Take 1 tablet by mouth once daily.     dicyclomine 10 MG capsule  Commonly known as: BENTYL  dicyclomine 10 mg capsule     epoetin jacob-epbx 10,000 unit/mL imjection  Commonly known as: RETACRIT  Inject 0.34 mLs (3,400 Units total) into the skin every Tues, Thurs, Sat. With hemodialysis     famotidine 20 MG tablet  Commonly known as: PEPCID  Take 20 mg by mouth every evening.     FLUoxetine 20 MG capsule  Take 1 capsule (20 mg total) by mouth once daily.     fluticasone-umeclidin-vilanter 100-62.5-25 mcg Dsdv  Commonly known as: TRELEGY ELLIPTA  Inhale 1 puff into the lungs once daily.     levothyroxine 100 MCG tablet  Commonly known as: SYNTHROID  Take 100 mcg by mouth before breakfast.     * midodrine 10 MG tablet  Commonly known as: PROAMATINE  Take 10 mg by mouth every 7 days. Prior to dialysis treatment     * midodrine 5 MG Tab  Commonly known as: PROAMATINE  Take 1 tablet (5 mg total) by mouth 2 (two) times daily with meals.     montelukast 10 mg tablet  Commonly known as: SINGULAIR  Take 1 tablet (10 mg total) by mouth every evening.     polyethylene glycol 17 gram Pwpk  Commonly known as: GLYCOLAX  Take 17 g by mouth daily as needed.     polyvinyl alcohol (artificial tears) 1.4 % ophthalmic solution  Commonly known as: LIQUIFILM TEARS  Place 1 drop into the left eye as needed.     PROLIA 60 mg/mL Syrg  Generic drug: denosumab  Inject 1 mL (60 mg total) into the skin every 6 (six) months.     rosuvastatin 20 MG tablet  Commonly known as: CRESTOR  Take 1 tablet (20 mg total) by mouth once daily.     traZODone 100 MG tablet  Commonly known as: DESYREL  Take 100 mg by mouth nightly as needed.         * This list has 2 medication(s) that are the same as other medications prescribed for you. Read the directions carefully, and ask  your doctor or other care provider to review them with you.                Indwelling Lines/Drains at time of discharge:   Lines/Drains/Airways     Drain                 Hemodialysis AV Fistula Left upper arm -- days         Urethral Catheter 06/16/20 1630 16 Fr. less than 1 day                Time spent on the discharge of patient: 34 minutes  Patient was seen and examined on the date of discharge and determined to be suitable for discharge.         Seema Soto MD  Department of Hospital Medicine  Count includes the [facility]

## 2020-06-17 NOTE — PLAN OF CARE
Problem: Adult Inpatient Plan of Care  Goal: Plan of Care Review  Outcome: Met  Goal: Patient-Specific Goal (Individualization)  Outcome: Met  Goal: Absence of Hospital-Acquired Illness or Injury  Outcome: Met  Goal: Optimal Comfort and Wellbeing  Outcome: Met  Goal: Readiness for Transition of Care  Outcome: Met  Goal: Rounds/Family Conference  Outcome: Met     Problem: Infection  Goal: Infection Symptom Resolution  Outcome: Met     Problem: Skin Injury Risk Increased  Goal: Skin Health and Integrity  Outcome: Met     Problem: Fall Injury Risk  Goal: Absence of Fall and Fall-Related Injury  Outcome: Met     Problem: Device-Related Complication Risk (Hemodialysis)  Goal: Safe, Effective Therapy Delivery  Outcome: Met     Problem: Hemodynamic Instability (Hemodialysis)  Goal: Vital Signs Remain in Desired Range  Outcome: Met     Problem: Infection (Hemodialysis)  Goal: Absence of Infection Signs/Symptoms  Outcome: Met     Problem: Oral Intake Inadequate  Goal: Improved Oral Intake  Outcome: Met         Pt discharged to [facility] care.

## 2020-06-17 NOTE — PLAN OF CARE
06/17/20 0932   Patient Assessment/Suction   Level of Consciousness (AVPU) alert   All Lung Fields Breath Sounds diminished   PRE-TX-O2   O2 Device (Oxygen Therapy) room air   SpO2 (!) 91 %   Pulse Oximetry Type Intermittent   $ Pulse Oximetry - Multiple Charge Pulse Oximetry - Multiple   Pulse 81   Resp 18   Inhaler   $ Inhaler Charges MDI (Metered Dose Inahler) Treatment   Daily Review of Necessity (Inhaler) completed   Respiratory Treatment Status (Inhaler) independent   Treatment Route (Inhaler) mouthpiece   Patient Position (Inhaler) HOB elevated   Post Treatment Assessment (Inhaler) breath sounds unchanged   Breath Sounds Post-Respiratory Treatment   Throughout All Fields Post-Treatment All Fields   Throughout All Fields Post-Treatment aeration increased   Post-treatment Heart Rate (beats/min) 72   Post-treatment Resp Rate (breaths/min) 18   Respiratory Evaluation   $ Care Plan Tech Time 15 min

## 2020-06-17 NOTE — PLAN OF CARE
06/17/20 1417   Final Note   Assessment Type Final Discharge Note   Anticipated Discharge Disposition SNF   Patient is discharging to [facility]. Let Delaney know to call report to Ayse at  [phone]. Lashawn is setting up [facility] to come  patient.    Tried calling the  multiple times to let him know she is going to [facility] but haven't been able to reach  Him.    Talked to  and he knows she is going to [facility] today as soon as [facility] can come get her.

## 2020-06-17 NOTE — PROGRESS NOTES
"Critical access hospital  Adult Nutrition   Progress Note (Follow-Up)    SUMMARY     Recommendations  Recommendation/Intervention: 1. Continue diet as tolerated by patient 2. Will discontinue supplement--not consuming  Goals: 1. Patient to meet at least 75% estimated needs via PO diet  Nutrition Goal Status: progressing towards goal    Dietitian Rounds Brief    Pt seen for f/u. Intake good; consuming at least 75% of meals per patient and I&Os. Not consuming Nepro--dislikes supplement. RD to discontinue. LBM 6/14 per chart. Continues to await SNF placement.    Reason for Assessment  Reason For Assessment: RD follow-up  Diagnosis: other (see comments)(proctocolitis)  Relevant Medical History: COPD, bipolar disorder, dialysis, ESRD, HTN, Stroke, Thyroid disease, pancreatitis, anxiety, GERD, full dentures  Interdisciplinary Rounds: did not attend    Nutrition Risk Screen  Nutrition Risk Screen: no indicators present    Nutrition/Diet History  Spiritual, Cultural Beliefs, Gnosticist Practices, Values that Affect Care: no  Food Allergies: NKFA  Factors Affecting Nutritional Intake: None identified at this time    Anthropometrics  Temp: 98.8 °F (37.1 °C)  Height Method: Stated  Height: 5' 7" (170.2 cm)  Height (inches): 67 in  Weight Method: Bed Scale  Weight: 62.1 kg (136 lb 14.5 oz)  Weight (lb): 136.91 lb  Ideal Body Weight (IBW), Female: 135 lb  % Ideal Body Weight, Female (lb): 106.47 %  BMI (Calculated): 21.4  BMI Grade: 18.5-24.9 - normal       Weight History:  Wt Readings from Last 10 Encounters:   06/10/20 62.1 kg (136 lb 14.5 oz)   04/23/20 63.5 kg (140 lb)   04/09/20 63.5 kg (140 lb)   03/06/20 63.5 kg (140 lb)   02/27/20 63.5 kg (140 lb)   02/23/20 63.5 kg (140 lb)   02/17/20 63.5 kg (140 lb)   01/22/20 64 kg (141 lb)   01/10/20 64 kg (141 lb 1.5 oz)   11/25/19 64 kg (141 lb)       Lab/Procedures/Meds: Pertinent Labs Reviewed  Clinical Chemistry:  Recent Labs   Lab 06/11/20  0443  06/13/20  0959  06/17/20  0450 "     --  138   < > 138   K 4.0  --  2.9*   < > 4.0     --  100   < > 101   CO2 24  --  24   < > 24   GLU 78  --  117*   < > 111*   BUN 20  --  11   < > 22   CREATININE 4.6*  --  2.7*   < > 3.9*   CALCIUM 8.2*  --  8.1*   < > 8.2*   ALBUMIN  --    < > 3.1*  --  3.7   ANIONGAP 12  --  14   < > 13   ESTGFRAFRICA 9.9*  --  18.8*   < > 12.0*   EGFRNONAA 8.5*  --  16.3*   < > 10.4*   MG 1.9  --   --    < > 1.9   PHOS 4.4  --  2.5*  --  4.4    < > = values in this interval not displayed.     CBC:   Recent Labs   Lab 06/17/20  0450   WBC 6.22   RBC 3.08*   HGB 10.0*   HCT 33.1*      *   MCH 32.5*   MCHC 30.2*       Medications: Pertinent Medications reviewed  Scheduled Meds:   ARIPiprazole  2 mg Oral QHS    benztropine  1 mg Oral BID    busPIRone  10 mg Oral TID    calcitRIOL  0.25 mcg Oral Daily    epoetin jacob-ebpx (RETACRIT) injection  50 Units/kg Intravenous Once    epoetin jacob-ebpx (RETACRIT) injection  50 Units/kg Intravenous Every Tues, Thurs, Sat    famotidine  20 mg Oral QHS    FLUoxetine  20 mg Oral Daily    fluticasone furoate-vilanteroL  1 puff Inhalation Daily    levothyroxine  100 mcg Oral Before breakfast    midodrine  10 mg Oral Every other day    midodrine  5 mg Oral BID WM    montelukast  10 mg Oral QHS    rosuvastatin  20 mg Oral Daily    tiotropium  1 capsule Inhalation Daily     Continuous Infusions:  PRN Meds:.acetaminophen, albuterol sulfate, ondansetron, sodium chloride 0.9%, traZODone    Estimated/Assessed Needs  Weight Used For Calorie Calculations: 62.1 kg (136 lb 14.5 oz)  Energy Calorie Requirements (kcal): 1028-7053 kcal (25-30 kcal/kg)  Energy Need Method: Kcal/kg  Protein Requirements: 75-93 g (1.2-1.5g/kg)  Weight Used For Protein Calculations: 62.1 kg (136 lb 14.5 oz)  Fluid Requirements (mL): (1000ml +UOP)  Estimated Fluid Requirement Method: other (see comments)  RDA Method (mL): 1552       Nutrition Prescription Ordered  Current Diet Order:  renal  Oral Nutrition Supplement: Nepro TID    Evaluation of Received Nutrient/Fluid Intake  Energy Calories Required: meeting needs  Protein Required: meeting needs  Fluid Required: meeting needs  Tolerance: tolerating     Intake/Output Summary (Last 24 hours) at 6/17/2020 1318  Last data filed at 6/17/2020 0400  Gross per 24 hour   Intake 500 ml   Output 4550 ml   Net -4050 ml        % Meal Intake: 75 - 100 %    Nutrition Risk  Level of Risk/Frequency of Follow-up: moderate     Monitor and Evaluation  Food and Nutrient Intake: energy intake, food and beverage intake  Food and Nutrient Adminstration: diet order  Knowledge/Beliefs/Attitudes: beliefs and attitudes, food and nutrition knowledge/skill  Physical Activity and Function: nutrition-related ADLs and IADLs  Anthropometric Measurements: weight change  Biochemical Data, Medical Tests and Procedures: gastrointestinal profile, electrolyte and renal panel, glucose/endocrine profile  Nutrition-Focused Physical Findings: overall appearance     Nutrition Follow-Up  RD Follow-up?: Yes    Kalpana Bee RD 06/17/2020 1:19 PM

## 2020-06-17 NOTE — PLAN OF CARE
06/17/20 1432   Medicare Message   Important Message from Medicare regarding Discharge Appeal Rights Given to patient/caregiver;Explained to patient/caregiver;Signed/date by patient/caregiver   Date IMM was signed 06/17/20   Time IMM was signed [id number]

## 2020-06-17 NOTE — HOSPITAL COURSE
During her prolonged hospital stay of 11 days I took care of her for last couple days.  In summary this lady with past medical history of ESRD on hemodialysis, COPD, CVA came with diarrhea, abdominal pain and was found to have proctocolitis without perforation or abscess.  She received antibiotics while inpatient.  She received dialysis as per schedule. Lately she was waiting for placement.  Yesterday she was complaining of lower abdominal fullness and we bladder scanned her which showed more than 900 cc.  After placing Medina's catheter 18 under CC urine came out. Today I spoke with Dr. Huggins(urology) who recommended to send her to skilled nursing facility with indwelling Medina's catheter and he can see her in the clinic within week to 10 days give her trial of void.  She was advised to follow-up with her PCP, dialysis unit as well as Urology.    Review of systems:  Mild intermittent confusion otherwise comprehensive review of system negative    Physical Examination:  General: AAOx3. NAD.  HEENT: NCAT. PERRLA. EOMI.     CV: RRR.No M/R/G.   Chest: NL effort. CTAB. No R/R/W.   Abd: +BS x 4. Soft. ND/NT. No rebound or guarding.  Lower abdominal fullness noted  Ext: moving well. +distal pulses  Skin: Intact. No rash. No lesions.   Neuro: CN III-XII intact. No focal deficit. Pleasantly confused, at baseline per family   Psych:  No A/V hallucinations. NL affect. No abnormal behaviors noted     Vital signs reviewed.  Nursing notes reviewed.

## 2020-06-17 NOTE — PT/OT/SLP PROGRESS
Occupational Therapy   Treatment    Name: Althea Gaona  MRN: [id number]  Admitting Diagnosis:  Proctocolitis       Recommendations:     Discharge Recommendations: nursing facility, skilled  Discharge Equipment Recommendations:  bath bench, bedside commode  Barriers to discharge:       Assessment:     Althea Gaona is a 80 y.o. female with a medical diagnosis of Proctocolitis.  She presents with general weakness. Patient reported increased dizziness when sitting upright during session. Performance deficits affecting function are impaired endurance, impaired sensation, impaired self care skills, impaired functional mobilty, impaired balance, impaired cognition, decreased safety awareness.     Rehab Prognosis:  Fair; patient would benefit from acute skilled OT services to address these deficits and reach maximum level of function.       Plan:     Patient to be seen 5 x/week to address the above listed problems via self-care/home management, therapeutic activities, therapeutic exercises  · Plan of Care Expires: 06/09/20  · Plan of Care Reviewed with: patient    Subjective     Pain/Comfort:  · Pain Rating 1: 0/10  · Pain Rating Post-Intervention 1: 0/10    Objective:     Communicated with: nurse prior to session.  Patient found HOB elevated with peripheral IV, telemetry upon OT entry to room.    General Precautions: Standard, fall   Orthopedic Precautions:N/A   Braces: N/A     Occupational Performance:     Bed Mobility:    · Patient completed Scooting/Bridging with minimum assistance  · Patient completed Supine to Sit with minimum assistance  · Patient completed Sit to Supine with minimum assistance   · Performed unsupported sitting EOB with contact guard assistance    Activities of Daily Living:  · Declined ADL activity, but asked for help identifying a contact phone number of one of her family members on her flip phone.     [facility] 6 Click ADL:      Treatment & Education:  Required encouragement to work with  therapy.     Patient left HOB elevated with all lines intact, call button in reach and bed alarm onEducation:      GOALS:   Multidisciplinary Problems     Occupational Therapy Goals        Problem: Occupational Therapy Goal    Goal Priority Disciplines Outcome Interventions   Occupational Therapy Goal     OT, PT/OT Ongoing, Progressing    Description: Goals to be met by: discharge    Patient will increase functional independence with ADLs by performing:  Grooming at the sink with Min A seated.   Toileting from toilet with Moderate Assistance for hygiene and clothing management.   Toilet transfer to toilet with Moderate Assistance.                      Time Tracking:     OT Date of Treatment: 06/17/20  OT Start Time: 1115  OT Stop Time: 1138  OT Total Time (min): 23 min    Billable Minutes:Therapeutic Activity 23    Samy Allred OT  6/17/2020

## 2020-06-18 NOTE — PT/OT/SLP DISCHARGE
Occupational Therapy Discharge Summary    Althea Gaona  MRN: [id number]   Principal Problem: Proctocolitis      Patient Discharged from acute Occupational Therapy on 6/17/2020.  Please refer to prior OT note dated 6/17/2020 for functional status.    Assessment:      Patient appropriate for care in another setting.    Objective:     GOALS:   Multidisciplinary Problems     Occupational Therapy Goals     Not on file          Multidisciplinary Problems (Resolved)        Problem: Occupational Therapy Goal    Goal Priority Disciplines Outcome Interventions   Occupational Therapy Goal   (Resolved)     OT, PT/OT Met    Description: Goals to be met by: discharge    Patient will increase functional independence with ADLs by performing:  Grooming at the sink with Min A seated.   Toileting from toilet with Moderate Assistance for hygiene and clothing management.   Toilet transfer to toilet with Moderate Assistance.                      Reasons for Discontinuation of Therapy Services  Transfer to alternate level of care.      Plan:     Patient Discharged to: Skilled Nursing Facility    Samy Allred OT  6/18/2020

## 2020-07-20 ENCOUNTER — TELEPHONE (OUTPATIENT)
Dept: FAMILY MEDICINE | Facility: CLINIC | Age: 80
End: 2020-07-20

## 2020-07-20 NOTE — TELEPHONE ENCOUNTER
----- Message from Tania Doherty sent at 7/20/2020 12:03 PM CDT -----  Ayse from [facility]. When did  she have her last Prolia injection?  Ayse # [phone]

## 2020-07-21 NOTE — TELEPHONE ENCOUNTER
Spoke to Kari from [facility] regarding the message below. Kari stated she was unsure that she would give us a call back to let us know

## 2020-09-03 ENCOUNTER — TELEPHONE (OUTPATIENT)
Dept: NEUROLOGY | Facility: CLINIC | Age: 80
End: 2020-09-03

## 2020-09-03 NOTE — TELEPHONE ENCOUNTER
----- Message from Dara Tyson sent at 9/3/2020  3:19 PM CDT -----  Regarding: schedule  Contact: [phone]  PEACE Martínez is calling to schedule a pt that was referred by his office. Please contact Dr Wall office

## 2020-11-30 ENCOUNTER — OFFICE VISIT (OUTPATIENT)
Dept: PAIN MEDICINE | Facility: CLINIC | Age: 80
End: 2020-11-30
Payer: MEDICARE

## 2020-11-30 VITALS
BODY MASS INDEX: 21.48 KG/M2 | HEIGHT: 67 IN | HEART RATE: 68 BPM | DIASTOLIC BLOOD PRESSURE: 68 MMHG | SYSTOLIC BLOOD PRESSURE: 118 MMHG | WEIGHT: 136.88 LBS

## 2020-11-30 DIAGNOSIS — M54.16 LUMBAR RADICULOPATHY: ICD-10-CM

## 2020-11-30 DIAGNOSIS — M51.36 DEGENERATIVE DISC DISEASE, LUMBAR: Primary | ICD-10-CM

## 2020-11-30 DIAGNOSIS — Z87.81 HISTORY OF VERTEBRAL COMPRESSION FRACTURE: ICD-10-CM

## 2020-11-30 PROCEDURE — 3288F PR FALLS RISK ASSESSMENT DOCUMENTED: ICD-10-PCS | Mod: CPTII,S$GLB,, | Performed by: ANESTHESIOLOGY

## 2020-11-30 PROCEDURE — 99214 PR OFFICE/OUTPT VISIT, EST, LEVL IV, 30-39 MIN: ICD-10-PCS | Mod: S$GLB,,, | Performed by: ANESTHESIOLOGY

## 2020-11-30 PROCEDURE — 99214 OFFICE O/P EST MOD 30 MIN: CPT | Mod: S$GLB,,, | Performed by: ANESTHESIOLOGY

## 2020-11-30 PROCEDURE — 1101F PT FALLS ASSESS-DOCD LE1/YR: CPT | Mod: CPTII,S$GLB,, | Performed by: ANESTHESIOLOGY

## 2020-11-30 PROCEDURE — 1159F PR MEDICATION LIST DOCUMENTED IN MEDICAL RECORD: ICD-10-PCS | Mod: S$GLB,,, | Performed by: ANESTHESIOLOGY

## 2020-11-30 PROCEDURE — 99999 PR PBB SHADOW E&M-EST. PATIENT-LVL IV: CPT | Mod: PBBFAC,,, | Performed by: ANESTHESIOLOGY

## 2020-11-30 PROCEDURE — 1157F ADVNC CARE PLAN IN RCRD: CPT | Mod: S$GLB,,, | Performed by: ANESTHESIOLOGY

## 2020-11-30 PROCEDURE — 99999 PR PBB SHADOW E&M-EST. PATIENT-LVL IV: ICD-10-PCS | Mod: PBBFAC,,, | Performed by: ANESTHESIOLOGY

## 2020-11-30 PROCEDURE — 3288F FALL RISK ASSESSMENT DOCD: CPT | Mod: CPTII,S$GLB,, | Performed by: ANESTHESIOLOGY

## 2020-11-30 PROCEDURE — 1159F MED LIST DOCD IN RCRD: CPT | Mod: S$GLB,,, | Performed by: ANESTHESIOLOGY

## 2020-11-30 PROCEDURE — 1101F PR PT FALLS ASSESS DOC 0-1 FALLS W/OUT INJ PAST YR: ICD-10-PCS | Mod: CPTII,S$GLB,, | Performed by: ANESTHESIOLOGY

## 2020-11-30 PROCEDURE — 1125F PR PAIN SEVERITY QUANTIFIED, PAIN PRESENT: ICD-10-PCS | Mod: S$GLB,,, | Performed by: ANESTHESIOLOGY

## 2020-11-30 PROCEDURE — 1125F AMNT PAIN NOTED PAIN PRSNT: CPT | Mod: S$GLB,,, | Performed by: ANESTHESIOLOGY

## 2020-11-30 PROCEDURE — 1157F PR ADVANCE CARE PLAN OR EQUIV PRESENT IN MEDICAL RECORD: ICD-10-PCS | Mod: S$GLB,,, | Performed by: ANESTHESIOLOGY

## 2020-11-30 RX ORDER — TRAMADOL HYDROCHLORIDE 50 MG/1
50 TABLET ORAL EVERY 12 HOURS PRN
Qty: 60 TABLET | Refills: 0 | Status: SHIPPED | OUTPATIENT
Start: 2020-11-30 | End: 2020-11-30

## 2020-11-30 RX ORDER — TRAMADOL HYDROCHLORIDE 50 MG/1
50 TABLET ORAL EVERY 12 HOURS PRN
Qty: 60 TABLET | Refills: 0 | Status: SHIPPED | OUTPATIENT
Start: 2020-11-30

## 2020-11-30 NOTE — PROGRESS NOTES
"FOLLOW UP NOTE:     CHIEF COMPLAINT: back and leg pain    INITIAL HISTORY OF PRESENT ILLNESS: Althea Gaona is a 79 y.o. female with PMH significant for COPD, bipolar disorder, anxiety, CKD, GERD, hx of CVA, and HTN presents for the evaluation of back pain. Patient reports that she fell at [facility] around August 2019. Patient reports that she tripped on her cane and fell onto her buttocks. Prior to her fall, patient endorsed of "minor" aches and pain in her low back prior to her fall. Patient rates her pain as a 5/10 prior to her fall. Patient rates her pain as a constant 10/10 since her fall. Patient describes her pain as an aching pain. Patient reports of intermittent radiation down her RLE( not present prior to her fall). Patient reports that her pain has made it difficult to sleep at night.  Patient has been wearing a back brace with no benefit. Patient reports of taking Norco with no relief of her pain. Patient denies of any urinary/fecal incontinence, saddle anesthesia, or new falls.      Aggravating factors: bending, twisting, lifting        INTERVAL HISTORY OF PRESENT ILLNESS: Althea Gaona is a 80 y.o. female with PMH significant for COPD, bipolar disorder, anxiety, CKD, GERD, hx of CVA, and HTN presents as an established patient for the continued management of back pain. In in the interim, the patient has had a > 10 day hospital admission for colitis. She currently resides in a SNF. She complains of low back pain with radiation down her RLE today. Her aid who is present with the patient reports that she is not allowed to ambulate without assistance. The patient is presenting to discuss her current pain regimen. She is not frankly confused at her clinic visit with me today, but she does exhibit signs of cognitive decline since my last evaluation of her in 2/2020. She is able to answer with short responses but she is unable to give any meaningful details in regards to her " "medications. I reviewed her latest MAR from the aid who accompanied her today which was significant for Acetaminophen 500 mg for pain.      INTERVENTIONAL PAIN HISTORY:  2/3/2020: Lumbar interlaminar epidural steroid injection at L5-S1  11/22/2019: Lumbar Interlaminar epidural steroid injection at L5-S1 - 85% relief   10/18/2019: L2 kyphoplasty     CURRENT PAIN MEDICATIONS:   Tylenol 500 mg     ROS:  Review of Systems   Constitutional: Negative for chills and fever.   HENT: Negative for sore throat.    Eyes: Negative for visual disturbance.   Respiratory: Negative for shortness of breath.    Cardiovascular: Negative for chest pain.   Gastrointestinal: Negative for nausea and vomiting.   Genitourinary: Negative for difficulty urinating.   Musculoskeletal: Positive for back pain.   Skin: Negative for rash.   Allergic/Immunologic: Negative for immunocompromised state.   Neurological: Negative for syncope.   Hematological: Does not bruise/bleed easily.   Psychiatric/Behavioral: Negative for suicidal ideas.        MEDICAL, SURGICAL, FAMILY, SOCIAL HX: reviewed    MEDICATIONS/ALLERGIES: reviewed    PHYSICAL EXAM:    VITALS: Vitals reviewed.   Vitals:    11/30/20 0944   BP: 118/68   Pulse: 68   Weight: 62.1 kg (136 lb 14.5 oz)   Height: 5' 7" (1.702 m)   PainSc:   9   PainLoc: Generalized       Physical Exam   Constitutional: She is oriented to person, place, and time and well-developed, well-nourished, and in no distress.   HENT:   Head: Normocephalic and atraumatic.   Eyes: Conjunctivae and EOM are normal. Right eye exhibits no discharge. Left eye exhibits no discharge.   Cardiovascular: Normal rate.   Pulmonary/Chest: Effort normal and breath sounds normal. No respiratory distress.   Abdominal: Soft.   Neurological: She is alert and oriented to person, place, and time.   Skin: Skin is warm and dry. No rash noted. She is not diaphoretic.   Psychiatric: Mood, memory, affect and judgment normal.   Nursing note and vitals " reviewed.       EXTREMITIES:    Gen: No cyanosis, edema, varicosities, or tenderness to palpation BLE   Skin: Warm, pink, dry, no rashes, no lesions BLE   Strength: 5/5 motor strength BLE   ROM: hips, knees and ankles without pain.    SPINE: unable to examine as patient is a significant fall risk    NEUROLOGICAL:    Gen: No clonus or spasticity.   Gait: Normal without antalgic lean   DTR's: 2+ in bilateral patellar, and ankle   BABINSKI: Absent bilaterally  Sensory: Intact to light touch and proprioception BLE    IMAGING: no new imaging of the spine to review    ASSESSMENT:  Althea Gaona is a 80 y.o. female with PMH significant for COPD, bipolar disorder, anxiety, CKD, GERD, hx of CVA, and HTN presents as an established patient for the continued management of back pain. The patient presents to discuss her current medication regimen. Treatment plan outlined below.     PLAN:  1. Prescribed Tramadol 50 mg PO BID for breakthrough pain; #60 tablets; 0 refills.   2. Avoid polypharmacy given patient's co-morbidities  3. I have stressed the importance of physical activity and a home exercise plan to help with chronic pain and improve health.  4. Would consider repeat epidural steroid injection if the patient's pain persists  5. RTC in 4 weeks for follow-up       Rosanna Khan MD  Pain Management

## 2021-01-11 ENCOUNTER — OFFICE VISIT (OUTPATIENT)
Dept: PAIN MEDICINE | Facility: CLINIC | Age: 81
End: 2021-01-11
Payer: MEDICARE

## 2021-01-11 VITALS
DIASTOLIC BLOOD PRESSURE: 58 MMHG | HEIGHT: 67 IN | HEART RATE: 69 BPM | BODY MASS INDEX: 21.48 KG/M2 | SYSTOLIC BLOOD PRESSURE: 88 MMHG | WEIGHT: 136.88 LBS

## 2021-01-11 DIAGNOSIS — Z87.81 HISTORY OF VERTEBRAL COMPRESSION FRACTURE: ICD-10-CM

## 2021-01-11 DIAGNOSIS — M54.16 LUMBAR RADICULOPATHY: ICD-10-CM

## 2021-01-11 DIAGNOSIS — M51.36 DEGENERATIVE DISC DISEASE, LUMBAR: Primary | ICD-10-CM

## 2021-01-11 PROCEDURE — 1125F PR PAIN SEVERITY QUANTIFIED, PAIN PRESENT: ICD-10-PCS | Mod: S$GLB,,, | Performed by: ANESTHESIOLOGY

## 2021-01-11 PROCEDURE — 1157F ADVNC CARE PLAN IN RCRD: CPT | Mod: S$GLB,,, | Performed by: ANESTHESIOLOGY

## 2021-01-11 PROCEDURE — 1101F PR PT FALLS ASSESS DOC 0-1 FALLS W/OUT INJ PAST YR: ICD-10-PCS | Mod: CPTII,S$GLB,, | Performed by: ANESTHESIOLOGY

## 2021-01-11 PROCEDURE — 99213 OFFICE O/P EST LOW 20 MIN: CPT | Mod: S$GLB,,, | Performed by: ANESTHESIOLOGY

## 2021-01-11 PROCEDURE — 3288F FALL RISK ASSESSMENT DOCD: CPT | Mod: CPTII,S$GLB,, | Performed by: ANESTHESIOLOGY

## 2021-01-11 PROCEDURE — 1157F PR ADVANCE CARE PLAN OR EQUIV PRESENT IN MEDICAL RECORD: ICD-10-PCS | Mod: S$GLB,,, | Performed by: ANESTHESIOLOGY

## 2021-01-11 PROCEDURE — 1159F PR MEDICATION LIST DOCUMENTED IN MEDICAL RECORD: ICD-10-PCS | Mod: S$GLB,,, | Performed by: ANESTHESIOLOGY

## 2021-01-11 PROCEDURE — 1125F AMNT PAIN NOTED PAIN PRSNT: CPT | Mod: S$GLB,,, | Performed by: ANESTHESIOLOGY

## 2021-01-11 PROCEDURE — 3288F PR FALLS RISK ASSESSMENT DOCUMENTED: ICD-10-PCS | Mod: CPTII,S$GLB,, | Performed by: ANESTHESIOLOGY

## 2021-01-11 PROCEDURE — 99999 PR PBB SHADOW E&M-EST. PATIENT-LVL III: ICD-10-PCS | Mod: PBBFAC,,, | Performed by: ANESTHESIOLOGY

## 2021-01-11 PROCEDURE — 99999 PR PBB SHADOW E&M-EST. PATIENT-LVL III: CPT | Mod: PBBFAC,,, | Performed by: ANESTHESIOLOGY

## 2021-01-11 PROCEDURE — 99213 PR OFFICE/OUTPT VISIT, EST, LEVL III, 20-29 MIN: ICD-10-PCS | Mod: S$GLB,,, | Performed by: ANESTHESIOLOGY

## 2021-01-11 PROCEDURE — 1159F MED LIST DOCD IN RCRD: CPT | Mod: S$GLB,,, | Performed by: ANESTHESIOLOGY

## 2021-01-11 PROCEDURE — 1101F PT FALLS ASSESS-DOCD LE1/YR: CPT | Mod: CPTII,S$GLB,, | Performed by: ANESTHESIOLOGY

## 2021-01-11 RX ORDER — TRAMADOL HYDROCHLORIDE 50 MG/1
50 TABLET ORAL EVERY 6 HOURS PRN
Qty: 120 TABLET | Refills: 0 | Status: SHIPPED | OUTPATIENT
Start: 2021-01-11 | End: 2024-03-09 | Stop reason: SDUPTHER

## 2021-02-08 ENCOUNTER — OFFICE VISIT (OUTPATIENT)
Dept: PAIN MEDICINE | Facility: CLINIC | Age: 81
End: 2021-02-08
Payer: MEDICARE

## 2021-02-08 VITALS
HEIGHT: 67 IN | BODY MASS INDEX: 21.48 KG/M2 | HEART RATE: 66 BPM | WEIGHT: 136.88 LBS | DIASTOLIC BLOOD PRESSURE: 50 MMHG | SYSTOLIC BLOOD PRESSURE: 99 MMHG

## 2021-02-08 DIAGNOSIS — M51.36 DEGENERATIVE DISC DISEASE, LUMBAR: Primary | ICD-10-CM

## 2021-02-08 DIAGNOSIS — Z87.81 HISTORY OF VERTEBRAL COMPRESSION FRACTURE: ICD-10-CM

## 2021-02-08 DIAGNOSIS — M54.16 LUMBAR RADICULOPATHY: ICD-10-CM

## 2021-02-08 PROCEDURE — 1159F PR MEDICATION LIST DOCUMENTED IN MEDICAL RECORD: ICD-10-PCS | Mod: S$GLB,,, | Performed by: ANESTHESIOLOGY

## 2021-02-08 PROCEDURE — 1101F PR PT FALLS ASSESS DOC 0-1 FALLS W/OUT INJ PAST YR: ICD-10-PCS | Mod: CPTII,S$GLB,, | Performed by: ANESTHESIOLOGY

## 2021-02-08 PROCEDURE — 99999 PR PBB SHADOW E&M-EST. PATIENT-LVL III: ICD-10-PCS | Mod: PBBFAC,,, | Performed by: ANESTHESIOLOGY

## 2021-02-08 PROCEDURE — 1101F PT FALLS ASSESS-DOCD LE1/YR: CPT | Mod: CPTII,S$GLB,, | Performed by: ANESTHESIOLOGY

## 2021-02-08 PROCEDURE — 99999 PR PBB SHADOW E&M-EST. PATIENT-LVL III: CPT | Mod: PBBFAC,,, | Performed by: ANESTHESIOLOGY

## 2021-02-08 PROCEDURE — 3288F FALL RISK ASSESSMENT DOCD: CPT | Mod: CPTII,S$GLB,, | Performed by: ANESTHESIOLOGY

## 2021-02-08 PROCEDURE — 99214 OFFICE O/P EST MOD 30 MIN: CPT | Mod: S$GLB,,, | Performed by: ANESTHESIOLOGY

## 2021-02-08 PROCEDURE — 1157F PR ADVANCE CARE PLAN OR EQUIV PRESENT IN MEDICAL RECORD: ICD-10-PCS | Mod: S$GLB,,, | Performed by: ANESTHESIOLOGY

## 2021-02-08 PROCEDURE — 1125F AMNT PAIN NOTED PAIN PRSNT: CPT | Mod: S$GLB,,, | Performed by: ANESTHESIOLOGY

## 2021-02-08 PROCEDURE — 1157F ADVNC CARE PLAN IN RCRD: CPT | Mod: S$GLB,,, | Performed by: ANESTHESIOLOGY

## 2021-02-08 PROCEDURE — 1125F PR PAIN SEVERITY QUANTIFIED, PAIN PRESENT: ICD-10-PCS | Mod: S$GLB,,, | Performed by: ANESTHESIOLOGY

## 2021-02-08 PROCEDURE — 99214 PR OFFICE/OUTPT VISIT, EST, LEVL IV, 30-39 MIN: ICD-10-PCS | Mod: S$GLB,,, | Performed by: ANESTHESIOLOGY

## 2021-02-08 PROCEDURE — 1159F MED LIST DOCD IN RCRD: CPT | Mod: S$GLB,,, | Performed by: ANESTHESIOLOGY

## 2021-02-08 PROCEDURE — 3288F PR FALLS RISK ASSESSMENT DOCUMENTED: ICD-10-PCS | Mod: CPTII,S$GLB,, | Performed by: ANESTHESIOLOGY

## 2021-02-23 ENCOUNTER — LAB VISIT (OUTPATIENT)
Dept: PRIMARY CARE CLINIC | Facility: CLINIC | Age: 81
End: 2021-02-23
Payer: MEDICARE

## 2021-02-23 DIAGNOSIS — Z01.818 PRE-OP TESTING: ICD-10-CM

## 2021-02-23 PROCEDURE — U0005 INFEC AGEN DETEC AMPLI PROBE: HCPCS

## 2021-02-23 PROCEDURE — U0003 INFECTIOUS AGENT DETECTION BY NUCLEIC ACID (DNA OR RNA); SEVERE ACUTE RESPIRATORY SYNDROME CORONAVIRUS 2 (SARS-COV-2) (CORONAVIRUS DISEASE [COVID-19]), AMPLIFIED PROBE TECHNIQUE, MAKING USE OF HIGH THROUGHPUT TECHNOLOGIES AS DESCRIBED BY CMS-2020-01-R: HCPCS

## 2021-02-24 LAB — SARS-COV-2 RNA RESP QL NAA+PROBE: NOT DETECTED

## 2021-02-25 RX ORDER — ALENDRONATE SODIUM 70 MG/1
70 TABLET ORAL
COMMUNITY
End: 2024-03-15 | Stop reason: CLARIF

## 2021-02-25 RX ORDER — DIAZEPAM 5 MG/1
5 TABLET ORAL
COMMUNITY
End: 2024-03-15 | Stop reason: CLARIF

## 2021-02-25 RX ORDER — MENTHOL 40 MG/ML
GEL TOPICAL
COMMUNITY
End: 2024-03-15 | Stop reason: CLARIF

## 2021-02-25 RX ORDER — ACETAMINOPHEN 500 MG
500 TABLET ORAL EVERY 6 HOURS PRN
COMMUNITY
End: 2024-03-15 | Stop reason: CLARIF

## 2021-02-25 RX ORDER — CYPROHEPTADINE HYDROCHLORIDE 4 MG/1
4 TABLET ORAL 4 TIMES DAILY
COMMUNITY
End: 2024-03-15 | Stop reason: CLARIF

## 2021-02-26 ENCOUNTER — HOSPITAL ENCOUNTER (OUTPATIENT)
Facility: AMBULARY SURGERY CENTER | Age: 81
Discharge: HOME | End: 2021-02-26
Attending: ANESTHESIOLOGY | Admitting: ANESTHESIOLOGY
Payer: MEDICARE

## 2021-02-26 DIAGNOSIS — M51.36 DEGENERATIVE DISC DISEASE, LUMBAR: Primary | ICD-10-CM

## 2021-02-26 PROCEDURE — 62323 NJX INTERLAMINAR LMBR/SAC: CPT | Mod: ,,, | Performed by: ANESTHESIOLOGY

## 2021-02-26 PROCEDURE — 62323 NJX INTERLAMINAR LMBR/SAC: CPT | Performed by: ANESTHESIOLOGY

## 2021-02-26 PROCEDURE — 62323 PR INJ LUMBAR/SACRAL, W/IMAGING GUIDANCE: ICD-10-PCS | Mod: ,,, | Performed by: ANESTHESIOLOGY

## 2021-02-26 RX ORDER — FENTANYL CITRATE 50 UG/ML
INJECTION, SOLUTION INTRAMUSCULAR; INTRAVENOUS
Status: DISCONTINUED | OUTPATIENT
Start: 2021-02-26 | End: 2021-02-26 | Stop reason: HOSPADM

## 2021-02-26 RX ORDER — MIDAZOLAM HYDROCHLORIDE 2 MG/2ML
INJECTION, SOLUTION INTRAMUSCULAR; INTRAVENOUS
Status: DISCONTINUED | OUTPATIENT
Start: 2021-02-26 | End: 2021-02-26 | Stop reason: HOSPADM

## 2021-02-26 RX ORDER — LIDOCAINE HYDROCHLORIDE 10 MG/ML
INJECTION, SOLUTION EPIDURAL; INFILTRATION; INTRACAUDAL; PERINEURAL
Status: DISCONTINUED | OUTPATIENT
Start: 2021-02-26 | End: 2021-02-26 | Stop reason: HOSPADM

## 2021-02-26 RX ORDER — SODIUM CHLORIDE, SODIUM LACTATE, POTASSIUM CHLORIDE, CALCIUM CHLORIDE 600; 310; 30; 20 MG/100ML; MG/100ML; MG/100ML; MG/100ML
INJECTION, SOLUTION INTRAVENOUS ONCE AS NEEDED
Status: ACTIVE | OUTPATIENT
Start: 2021-02-26 | End: 2032-07-25

## 2021-02-26 RX ORDER — DEXAMETHASONE SODIUM PHOSPHATE 10 MG/ML
INJECTION INTRAMUSCULAR; INTRAVENOUS
Status: DISCONTINUED | OUTPATIENT
Start: 2021-02-26 | End: 2021-02-26 | Stop reason: HOSPADM

## 2021-02-26 RX ORDER — SODIUM CHLORIDE 9 MG/ML
INJECTION, SOLUTION INTRAMUSCULAR; INTRAVENOUS; SUBCUTANEOUS
Status: DISCONTINUED | OUTPATIENT
Start: 2021-02-26 | End: 2021-02-26 | Stop reason: HOSPADM

## 2021-03-01 VITALS
DIASTOLIC BLOOD PRESSURE: 59 MMHG | HEIGHT: 67 IN | RESPIRATION RATE: 16 BRPM | WEIGHT: 136 LBS | TEMPERATURE: 98 F | BODY MASS INDEX: 21.35 KG/M2 | HEART RATE: 79 BPM | OXYGEN SATURATION: 87 % | SYSTOLIC BLOOD PRESSURE: 107 MMHG

## 2021-04-19 ENCOUNTER — OFFICE VISIT (OUTPATIENT)
Dept: PAIN MEDICINE | Facility: CLINIC | Age: 81
End: 2021-04-19
Payer: MEDICARE

## 2021-04-19 VITALS
HEIGHT: 67 IN | BODY MASS INDEX: 21.35 KG/M2 | WEIGHT: 136 LBS | HEART RATE: 63 BPM | DIASTOLIC BLOOD PRESSURE: 62 MMHG | SYSTOLIC BLOOD PRESSURE: 100 MMHG

## 2021-04-19 DIAGNOSIS — M51.36 DEGENERATIVE DISC DISEASE, LUMBAR: ICD-10-CM

## 2021-04-19 DIAGNOSIS — M54.16 LUMBAR RADICULOPATHY: Primary | ICD-10-CM

## 2021-04-19 PROCEDURE — 1101F PT FALLS ASSESS-DOCD LE1/YR: CPT | Mod: CPTII,S$GLB,, | Performed by: ANESTHESIOLOGY

## 2021-04-19 PROCEDURE — 3288F PR FALLS RISK ASSESSMENT DOCUMENTED: ICD-10-PCS | Mod: CPTII,S$GLB,, | Performed by: ANESTHESIOLOGY

## 2021-04-19 PROCEDURE — 1159F MED LIST DOCD IN RCRD: CPT | Mod: S$GLB,,, | Performed by: ANESTHESIOLOGY

## 2021-04-19 PROCEDURE — 1157F PR ADVANCE CARE PLAN OR EQUIV PRESENT IN MEDICAL RECORD: ICD-10-PCS | Mod: S$GLB,,, | Performed by: ANESTHESIOLOGY

## 2021-04-19 PROCEDURE — 1159F PR MEDICATION LIST DOCUMENTED IN MEDICAL RECORD: ICD-10-PCS | Mod: S$GLB,,, | Performed by: ANESTHESIOLOGY

## 2021-04-19 PROCEDURE — 1125F PR PAIN SEVERITY QUANTIFIED, PAIN PRESENT: ICD-10-PCS | Mod: S$GLB,,, | Performed by: ANESTHESIOLOGY

## 2021-04-19 PROCEDURE — 3288F FALL RISK ASSESSMENT DOCD: CPT | Mod: CPTII,S$GLB,, | Performed by: ANESTHESIOLOGY

## 2021-04-19 PROCEDURE — 1125F AMNT PAIN NOTED PAIN PRSNT: CPT | Mod: S$GLB,,, | Performed by: ANESTHESIOLOGY

## 2021-04-19 PROCEDURE — 1101F PR PT FALLS ASSESS DOC 0-1 FALLS W/OUT INJ PAST YR: ICD-10-PCS | Mod: CPTII,S$GLB,, | Performed by: ANESTHESIOLOGY

## 2021-04-19 PROCEDURE — 99214 OFFICE O/P EST MOD 30 MIN: CPT | Mod: S$GLB,,, | Performed by: ANESTHESIOLOGY

## 2021-04-19 PROCEDURE — 99999 PR PBB SHADOW E&M-EST. PATIENT-LVL IV: ICD-10-PCS | Mod: PBBFAC,,, | Performed by: ANESTHESIOLOGY

## 2021-04-19 PROCEDURE — 99214 PR OFFICE/OUTPT VISIT, EST, LEVL IV, 30-39 MIN: ICD-10-PCS | Mod: S$GLB,,, | Performed by: ANESTHESIOLOGY

## 2021-04-19 PROCEDURE — 1157F ADVNC CARE PLAN IN RCRD: CPT | Mod: S$GLB,,, | Performed by: ANESTHESIOLOGY

## 2021-04-19 PROCEDURE — 99999 PR PBB SHADOW E&M-EST. PATIENT-LVL IV: CPT | Mod: PBBFAC,,, | Performed by: ANESTHESIOLOGY

## 2021-07-12 ENCOUNTER — TELEPHONE (OUTPATIENT)
Dept: PAIN MEDICINE | Facility: CLINIC | Age: 81
End: 2021-07-12

## 2021-07-12 ENCOUNTER — OFFICE VISIT (OUTPATIENT)
Dept: PAIN MEDICINE | Facility: CLINIC | Age: 81
End: 2021-07-12
Payer: MEDICARE

## 2021-07-12 VITALS
HEIGHT: 67 IN | BODY MASS INDEX: 21.35 KG/M2 | DIASTOLIC BLOOD PRESSURE: 56 MMHG | SYSTOLIC BLOOD PRESSURE: 99 MMHG | HEART RATE: 68 BPM | WEIGHT: 136 LBS

## 2021-07-12 DIAGNOSIS — M51.36 DEGENERATION OF LUMBAR INTERVERTEBRAL DISC: ICD-10-CM

## 2021-07-12 DIAGNOSIS — M54.16 LUMBAR RADICULOPATHY: Primary | ICD-10-CM

## 2021-07-12 DIAGNOSIS — M51.36 DEGENERATIVE DISC DISEASE, LUMBAR: ICD-10-CM

## 2021-07-12 PROCEDURE — 99214 OFFICE O/P EST MOD 30 MIN: CPT | Mod: S$GLB,,, | Performed by: PHYSICIAN ASSISTANT

## 2021-07-12 PROCEDURE — 1159F PR MEDICATION LIST DOCUMENTED IN MEDICAL RECORD: ICD-10-PCS | Mod: S$GLB,,, | Performed by: PHYSICIAN ASSISTANT

## 2021-07-12 PROCEDURE — 1157F ADVNC CARE PLAN IN RCRD: CPT | Mod: S$GLB,,, | Performed by: PHYSICIAN ASSISTANT

## 2021-07-12 PROCEDURE — 99214 PR OFFICE/OUTPT VISIT, EST, LEVL IV, 30-39 MIN: ICD-10-PCS | Mod: S$GLB,,, | Performed by: PHYSICIAN ASSISTANT

## 2021-07-12 PROCEDURE — 1101F PR PT FALLS ASSESS DOC 0-1 FALLS W/OUT INJ PAST YR: ICD-10-PCS | Mod: CPTII,S$GLB,, | Performed by: PHYSICIAN ASSISTANT

## 2021-07-12 PROCEDURE — 1157F PR ADVANCE CARE PLAN OR EQUIV PRESENT IN MEDICAL RECORD: ICD-10-PCS | Mod: S$GLB,,, | Performed by: PHYSICIAN ASSISTANT

## 2021-07-12 PROCEDURE — 3288F PR FALLS RISK ASSESSMENT DOCUMENTED: ICD-10-PCS | Mod: CPTII,S$GLB,, | Performed by: PHYSICIAN ASSISTANT

## 2021-07-12 PROCEDURE — 99999 PR PBB SHADOW E&M-EST. PATIENT-LVL IV: ICD-10-PCS | Mod: PBBFAC,,, | Performed by: PHYSICIAN ASSISTANT

## 2021-07-12 PROCEDURE — 3288F FALL RISK ASSESSMENT DOCD: CPT | Mod: CPTII,S$GLB,, | Performed by: PHYSICIAN ASSISTANT

## 2021-07-12 PROCEDURE — 99999 PR PBB SHADOW E&M-EST. PATIENT-LVL IV: CPT | Mod: PBBFAC,,, | Performed by: PHYSICIAN ASSISTANT

## 2021-07-12 PROCEDURE — 1125F PR PAIN SEVERITY QUANTIFIED, PAIN PRESENT: ICD-10-PCS | Mod: S$GLB,,, | Performed by: PHYSICIAN ASSISTANT

## 2021-07-12 PROCEDURE — 1125F AMNT PAIN NOTED PAIN PRSNT: CPT | Mod: S$GLB,,, | Performed by: PHYSICIAN ASSISTANT

## 2021-07-12 PROCEDURE — 1159F MED LIST DOCD IN RCRD: CPT | Mod: S$GLB,,, | Performed by: PHYSICIAN ASSISTANT

## 2021-07-12 PROCEDURE — 1101F PT FALLS ASSESS-DOCD LE1/YR: CPT | Mod: CPTII,S$GLB,, | Performed by: PHYSICIAN ASSISTANT

## 2021-07-23 NOTE — ED NOTES
"    7/23/2021         RE: Christophe Del Rosario  [address] [address]        Dear Colleague,    Thank you for referring your patient, Christophe Del Rosario, to the [facility] [place]. Please see a copy of my visit note below.    It was a pleasure to see Christohpe Del Rosario today in Neurosurgery Clinic. 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 He is here for routine follow up. No new issues over last year.    Vitals:    07/23/21 1348   BP: (!) 149/91   Pulse: 69   Temp: 98.1  F (36.7  C)   TempSrc: Oral   SpO2: 98%   Weight: 86.6 kg (191 lb)   Height: 1.803 m (5' 11\")     Body mass index is 26.64 kg/m .  No Pain (0)    Awake, alert.  Neurologically intact.    Imaging: MRI of the brain from July 1, 2021 shows no change in size of the meningioma when compared with the year prior.  The imaging was reviewed with patient shown to the patient clinic today.    Assessment: Olfactory groove meningioma, stable.    Plan: We discussed treatment options and the patient will return in 1 year with a repeat MRI of the brain.         Again, thank you for allowing me to participate in the care of your patient.        Sincerely,        Adam Baker MD    " Bed: 03  Expected date:   Expected time:   Means of arrival:   Comments:  EMS

## 2021-07-26 ENCOUNTER — TELEPHONE (OUTPATIENT)
Dept: PAIN MEDICINE | Facility: CLINIC | Age: 81
End: 2021-07-26

## 2021-08-04 DIAGNOSIS — Z01.818 PRE-OP TESTING: ICD-10-CM

## 2021-08-06 ENCOUNTER — LAB VISIT (OUTPATIENT)
Dept: PRIMARY CARE CLINIC | Facility: CLINIC | Age: 81
End: 2021-08-06
Payer: MEDICARE

## 2021-08-06 DIAGNOSIS — Z01.818 PRE-OP TESTING: ICD-10-CM

## 2021-08-06 PROCEDURE — U0003 INFECTIOUS AGENT DETECTION BY NUCLEIC ACID (DNA OR RNA); SEVERE ACUTE RESPIRATORY SYNDROME CORONAVIRUS 2 (SARS-COV-2) (CORONAVIRUS DISEASE [COVID-19]), AMPLIFIED PROBE TECHNIQUE, MAKING USE OF HIGH THROUGHPUT TECHNOLOGIES AS DESCRIBED BY CMS-2020-01-R: HCPCS | Performed by: ANESTHESIOLOGY

## 2021-08-06 PROCEDURE — U0005 INFEC AGEN DETEC AMPLI PROBE: HCPCS | Performed by: ANESTHESIOLOGY

## 2021-08-07 LAB
SARS-COV-2 RNA RESP QL NAA+PROBE: NOT DETECTED
SARS-COV-2- CYCLE NUMBER: -1

## 2021-08-09 ENCOUNTER — HOSPITAL ENCOUNTER (OUTPATIENT)
Facility: AMBULARY SURGERY CENTER | Age: 81
Discharge: HOME | End: 2021-08-09
Attending: ANESTHESIOLOGY | Admitting: ANESTHESIOLOGY
Payer: MEDICARE

## 2021-08-09 DIAGNOSIS — M51.36 DEGENERATIVE DISC DISEASE, LUMBAR: Primary | ICD-10-CM

## 2021-08-09 DIAGNOSIS — M54.16 LUMBAR RADICULOPATHY: ICD-10-CM

## 2021-08-09 PROCEDURE — 64484 NJX AA&/STRD TFRM EPI L/S EA: CPT | Performed by: ANESTHESIOLOGY

## 2021-08-09 PROCEDURE — 64484 NJX AA&/STRD TFRM EPI L/S EA: CPT | Mod: RT,,, | Performed by: ANESTHESIOLOGY

## 2021-08-09 PROCEDURE — 64483 NJX AA&/STRD TFRM EPI L/S 1: CPT | Performed by: ANESTHESIOLOGY

## 2021-08-09 PROCEDURE — 64484 PRA INJECT ANES/STEROID FORAMEN LUMBAR/SACRAL W IMG GUIDE ,EA ADD LEVEL: ICD-10-PCS | Mod: RT,,, | Performed by: ANESTHESIOLOGY

## 2021-08-09 PROCEDURE — 64483 NJX AA&/STRD TFRM EPI L/S 1: CPT | Mod: RT,,, | Performed by: ANESTHESIOLOGY

## 2021-08-09 PROCEDURE — 64483 PR EPIDURAL INJ, ANES/STEROID, TRANSFORAMINAL, LUMB/SACR, SNGL LEVL: ICD-10-PCS | Mod: RT,,, | Performed by: ANESTHESIOLOGY

## 2021-08-09 RX ORDER — SODIUM CHLORIDE 9 MG/ML
INJECTION, SOLUTION INTRAVENOUS CONTINUOUS
Status: DISCONTINUED | OUTPATIENT
Start: 2021-08-09 | End: 2021-08-09 | Stop reason: HOSPADM

## 2021-08-09 RX ORDER — BUPIVACAINE HYDROCHLORIDE 2.5 MG/ML
INJECTION, SOLUTION EPIDURAL; INFILTRATION; INTRACAUDAL
Status: DISCONTINUED | OUTPATIENT
Start: 2021-08-09 | End: 2021-08-09 | Stop reason: HOSPADM

## 2021-08-09 RX ORDER — FENTANYL CITRATE 50 UG/ML
INJECTION, SOLUTION INTRAMUSCULAR; INTRAVENOUS
Status: DISCONTINUED | OUTPATIENT
Start: 2021-08-09 | End: 2021-08-09 | Stop reason: HOSPADM

## 2021-08-09 RX ORDER — SODIUM CHLORIDE, SODIUM LACTATE, POTASSIUM CHLORIDE, CALCIUM CHLORIDE 600; 310; 30; 20 MG/100ML; MG/100ML; MG/100ML; MG/100ML
INJECTION, SOLUTION INTRAVENOUS ONCE AS NEEDED
Status: DISCONTINUED | OUTPATIENT
Start: 2021-08-09 | End: 2021-08-09 | Stop reason: HOSPADM

## 2021-08-09 RX ORDER — LIDOCAINE HYDROCHLORIDE 10 MG/ML
INJECTION, SOLUTION EPIDURAL; INFILTRATION; INTRACAUDAL; PERINEURAL
Status: DISCONTINUED | OUTPATIENT
Start: 2021-08-09 | End: 2021-08-09 | Stop reason: HOSPADM

## 2021-08-09 RX ORDER — DEXAMETHASONE SODIUM PHOSPHATE 10 MG/ML
INJECTION INTRAMUSCULAR; INTRAVENOUS
Status: DISCONTINUED | OUTPATIENT
Start: 2021-08-09 | End: 2021-08-09 | Stop reason: HOSPADM

## 2021-08-09 RX ORDER — MIDAZOLAM HYDROCHLORIDE 1 MG/ML
INJECTION INTRAMUSCULAR; INTRAVENOUS
Status: DISCONTINUED | OUTPATIENT
Start: 2021-08-09 | End: 2021-08-09 | Stop reason: HOSPADM

## 2021-08-09 RX ADMIN — SODIUM CHLORIDE: 9 INJECTION, SOLUTION INTRAVENOUS at 12:08

## 2021-08-11 VITALS
HEART RATE: 76 BPM | OXYGEN SATURATION: 97 % | HEIGHT: 67 IN | RESPIRATION RATE: 20 BRPM | BODY MASS INDEX: 21.35 KG/M2 | SYSTOLIC BLOOD PRESSURE: 100 MMHG | WEIGHT: 136 LBS | TEMPERATURE: 98 F | DIASTOLIC BLOOD PRESSURE: 63 MMHG

## 2021-10-04 ENCOUNTER — OFFICE VISIT (OUTPATIENT)
Dept: PAIN MEDICINE | Facility: CLINIC | Age: 81
End: 2021-10-04
Payer: MEDICARE

## 2021-10-04 VITALS
WEIGHT: 136 LBS | DIASTOLIC BLOOD PRESSURE: 46 MMHG | HEART RATE: 60 BPM | BODY MASS INDEX: 21.35 KG/M2 | HEIGHT: 67 IN | SYSTOLIC BLOOD PRESSURE: 83 MMHG

## 2021-10-04 DIAGNOSIS — M54.16 LUMBAR RADICULOPATHY: ICD-10-CM

## 2021-10-04 DIAGNOSIS — M51.36 DEGENERATIVE DISC DISEASE, LUMBAR: Primary | ICD-10-CM

## 2021-10-04 DIAGNOSIS — M51.36 DDD (DEGENERATIVE DISC DISEASE), LUMBAR: ICD-10-CM

## 2021-10-04 DIAGNOSIS — M54.16 LUMBAR RADICULOPATHY: Primary | ICD-10-CM

## 2021-10-04 DIAGNOSIS — M51.36 DEGENERATION OF LUMBAR INTERVERTEBRAL DISC: ICD-10-CM

## 2021-10-04 PROCEDURE — 99999 PR PBB SHADOW E&M-EST. PATIENT-LVL IV: ICD-10-PCS | Mod: PBBFAC,,, | Performed by: PHYSICIAN ASSISTANT

## 2021-10-04 PROCEDURE — 1157F ADVNC CARE PLAN IN RCRD: CPT | Mod: CPTII,S$GLB,, | Performed by: PHYSICIAN ASSISTANT

## 2021-10-04 PROCEDURE — 3078F PR MOST RECENT DIASTOLIC BLOOD PRESSURE < 80 MM HG: ICD-10-PCS | Mod: CPTII,S$GLB,, | Performed by: PHYSICIAN ASSISTANT

## 2021-10-04 PROCEDURE — 1125F AMNT PAIN NOTED PAIN PRSNT: CPT | Mod: CPTII,S$GLB,, | Performed by: PHYSICIAN ASSISTANT

## 2021-10-04 PROCEDURE — 3078F DIAST BP <80 MM HG: CPT | Mod: CPTII,S$GLB,, | Performed by: PHYSICIAN ASSISTANT

## 2021-10-04 PROCEDURE — 1101F PT FALLS ASSESS-DOCD LE1/YR: CPT | Mod: CPTII,S$GLB,, | Performed by: PHYSICIAN ASSISTANT

## 2021-10-04 PROCEDURE — 3074F PR MOST RECENT SYSTOLIC BLOOD PRESSURE < 130 MM HG: ICD-10-PCS | Mod: CPTII,S$GLB,, | Performed by: PHYSICIAN ASSISTANT

## 2021-10-04 PROCEDURE — 1159F PR MEDICATION LIST DOCUMENTED IN MEDICAL RECORD: ICD-10-PCS | Mod: CPTII,S$GLB,, | Performed by: PHYSICIAN ASSISTANT

## 2021-10-04 PROCEDURE — 1157F PR ADVANCE CARE PLAN OR EQUIV PRESENT IN MEDICAL RECORD: ICD-10-PCS | Mod: CPTII,S$GLB,, | Performed by: PHYSICIAN ASSISTANT

## 2021-10-04 PROCEDURE — 1159F MED LIST DOCD IN RCRD: CPT | Mod: CPTII,S$GLB,, | Performed by: PHYSICIAN ASSISTANT

## 2021-10-04 PROCEDURE — 99214 PR OFFICE/OUTPT VISIT, EST, LEVL IV, 30-39 MIN: ICD-10-PCS | Mod: S$GLB,,, | Performed by: PHYSICIAN ASSISTANT

## 2021-10-04 PROCEDURE — 99214 OFFICE O/P EST MOD 30 MIN: CPT | Mod: S$GLB,,, | Performed by: PHYSICIAN ASSISTANT

## 2021-10-04 PROCEDURE — 1101F PR PT FALLS ASSESS DOC 0-1 FALLS W/OUT INJ PAST YR: ICD-10-PCS | Mod: CPTII,S$GLB,, | Performed by: PHYSICIAN ASSISTANT

## 2021-10-04 PROCEDURE — 3074F SYST BP LT 130 MM HG: CPT | Mod: CPTII,S$GLB,, | Performed by: PHYSICIAN ASSISTANT

## 2021-10-04 PROCEDURE — 99999 PR PBB SHADOW E&M-EST. PATIENT-LVL IV: CPT | Mod: PBBFAC,,, | Performed by: PHYSICIAN ASSISTANT

## 2021-10-04 PROCEDURE — 1125F PR PAIN SEVERITY QUANTIFIED, PAIN PRESENT: ICD-10-PCS | Mod: CPTII,S$GLB,, | Performed by: PHYSICIAN ASSISTANT

## 2021-10-04 PROCEDURE — 3288F FALL RISK ASSESSMENT DOCD: CPT | Mod: CPTII,S$GLB,, | Performed by: PHYSICIAN ASSISTANT

## 2021-10-04 PROCEDURE — 3288F PR FALLS RISK ASSESSMENT DOCUMENTED: ICD-10-PCS | Mod: CPTII,S$GLB,, | Performed by: PHYSICIAN ASSISTANT

## 2021-10-04 RX ORDER — HYDROCODONE BITARTRATE AND ACETAMINOPHEN 5; 325 MG/1; MG/1
1 TABLET ORAL EVERY 6 HOURS PRN
Status: ON HOLD | COMMUNITY
Start: 2021-09-24 | End: 2024-03-19

## 2021-10-22 ENCOUNTER — HOSPITAL ENCOUNTER (OUTPATIENT)
Facility: AMBULARY SURGERY CENTER | Age: 81
Discharge: HOME | End: 2021-10-22
Attending: ANESTHESIOLOGY | Admitting: ANESTHESIOLOGY
Payer: MEDICARE

## 2021-10-22 DIAGNOSIS — M54.16 LUMBAR RADICULOPATHY: Primary | ICD-10-CM

## 2021-10-22 PROCEDURE — 64483 NJX AA&/STRD TFRM EPI L/S 1: CPT | Mod: RT,,, | Performed by: ANESTHESIOLOGY

## 2021-10-22 PROCEDURE — 64483 PR EPIDURAL INJ, ANES/STEROID, TRANSFORAMINAL, LUMB/SACR, SNGL LEVL: ICD-10-PCS | Mod: RT,,, | Performed by: ANESTHESIOLOGY

## 2021-10-22 PROCEDURE — 64484 PRA INJECT ANES/STEROID FORAMEN LUMBAR/SACRAL W IMG GUIDE ,EA ADD LEVEL: ICD-10-PCS | Mod: RT,,, | Performed by: ANESTHESIOLOGY

## 2021-10-22 PROCEDURE — 64483 NJX AA&/STRD TFRM EPI L/S 1: CPT | Performed by: ANESTHESIOLOGY

## 2021-10-22 PROCEDURE — 64484 NJX AA&/STRD TFRM EPI L/S EA: CPT | Mod: RT,,, | Performed by: ANESTHESIOLOGY

## 2021-10-22 PROCEDURE — 64484 NJX AA&/STRD TFRM EPI L/S EA: CPT | Performed by: ANESTHESIOLOGY

## 2021-10-22 RX ORDER — SODIUM CHLORIDE, SODIUM LACTATE, POTASSIUM CHLORIDE, CALCIUM CHLORIDE 600; 310; 30; 20 MG/100ML; MG/100ML; MG/100ML; MG/100ML
INJECTION, SOLUTION INTRAVENOUS ONCE AS NEEDED
Status: DISCONTINUED | OUTPATIENT
Start: 2021-10-22 | End: 2021-10-22 | Stop reason: HOSPADM

## 2021-10-22 RX ORDER — BUPIVACAINE HYDROCHLORIDE 2.5 MG/ML
INJECTION, SOLUTION EPIDURAL; INFILTRATION; INTRACAUDAL
Status: DISCONTINUED | OUTPATIENT
Start: 2021-10-22 | End: 2021-10-22 | Stop reason: HOSPADM

## 2021-10-22 RX ORDER — MIDAZOLAM HYDROCHLORIDE 1 MG/ML
INJECTION INTRAMUSCULAR; INTRAVENOUS
Status: DISCONTINUED | OUTPATIENT
Start: 2021-10-22 | End: 2021-10-22 | Stop reason: HOSPADM

## 2021-10-22 RX ORDER — SODIUM CHLORIDE 9 MG/ML
INJECTION, SOLUTION INTRAVENOUS ONCE
Status: COMPLETED | OUTPATIENT
Start: 2021-10-22 | End: 2021-10-22

## 2021-10-22 RX ORDER — DEXAMETHASONE SODIUM PHOSPHATE 10 MG/ML
INJECTION INTRAMUSCULAR; INTRAVENOUS
Status: DISCONTINUED | OUTPATIENT
Start: 2021-10-22 | End: 2021-10-22 | Stop reason: HOSPADM

## 2021-10-22 RX ORDER — FENTANYL CITRATE 50 UG/ML
INJECTION, SOLUTION INTRAMUSCULAR; INTRAVENOUS
Status: DISCONTINUED | OUTPATIENT
Start: 2021-10-22 | End: 2021-10-22 | Stop reason: HOSPADM

## 2021-10-22 RX ORDER — LIDOCAINE HYDROCHLORIDE 10 MG/ML
INJECTION, SOLUTION EPIDURAL; INFILTRATION; INTRACAUDAL; PERINEURAL
Status: DISCONTINUED | OUTPATIENT
Start: 2021-10-22 | End: 2021-10-22 | Stop reason: HOSPADM

## 2021-10-22 RX ADMIN — SODIUM CHLORIDE: 9 INJECTION, SOLUTION INTRAVENOUS at 09:10

## 2021-10-25 VITALS
OXYGEN SATURATION: 97 % | TEMPERATURE: 98 F | RESPIRATION RATE: 18 BRPM | BODY MASS INDEX: 21.35 KG/M2 | WEIGHT: 136 LBS | HEIGHT: 67 IN | SYSTOLIC BLOOD PRESSURE: 111 MMHG | HEART RATE: 69 BPM | DIASTOLIC BLOOD PRESSURE: 56 MMHG

## 2021-12-10 ENCOUNTER — OFFICE VISIT (OUTPATIENT)
Dept: PAIN MEDICINE | Facility: CLINIC | Age: 81
End: 2021-12-10
Payer: MEDICARE

## 2021-12-10 VITALS
DIASTOLIC BLOOD PRESSURE: 54 MMHG | SYSTOLIC BLOOD PRESSURE: 74 MMHG | WEIGHT: 136 LBS | HEART RATE: 81 BPM | BODY MASS INDEX: 21.35 KG/M2 | HEIGHT: 67 IN

## 2021-12-10 DIAGNOSIS — M54.16 LUMBAR RADICULOPATHY: ICD-10-CM

## 2021-12-10 DIAGNOSIS — M47.896 OTHER SPONDYLOSIS, LUMBAR REGION: ICD-10-CM

## 2021-12-10 DIAGNOSIS — Z87.81 HISTORY OF COMPRESSION FRACTURE OF SPINE: Primary | ICD-10-CM

## 2021-12-10 DIAGNOSIS — M51.36 DEGENERATIVE DISC DISEASE, LUMBAR: ICD-10-CM

## 2021-12-10 PROCEDURE — 1157F PR ADVANCE CARE PLAN OR EQUIV PRESENT IN MEDICAL RECORD: ICD-10-PCS | Mod: CPTII,S$GLB,, | Performed by: ANESTHESIOLOGY

## 2021-12-10 PROCEDURE — 99999 PR PBB SHADOW E&M-EST. PATIENT-LVL IV: ICD-10-PCS | Mod: PBBFAC,,, | Performed by: ANESTHESIOLOGY

## 2021-12-10 PROCEDURE — 99214 PR OFFICE/OUTPT VISIT, EST, LEVL IV, 30-39 MIN: ICD-10-PCS | Mod: S$GLB,,, | Performed by: ANESTHESIOLOGY

## 2021-12-10 PROCEDURE — 99214 OFFICE O/P EST MOD 30 MIN: CPT | Mod: S$GLB,,, | Performed by: ANESTHESIOLOGY

## 2021-12-10 PROCEDURE — 99999 PR PBB SHADOW E&M-EST. PATIENT-LVL IV: CPT | Mod: PBBFAC,,, | Performed by: ANESTHESIOLOGY

## 2021-12-10 PROCEDURE — 1157F ADVNC CARE PLAN IN RCRD: CPT | Mod: CPTII,S$GLB,, | Performed by: ANESTHESIOLOGY

## 2021-12-10 RX ORDER — MIRTAZAPINE 15 MG/1
15 TABLET, FILM COATED ORAL NIGHTLY
COMMUNITY
Start: 2021-12-02 | End: 2024-03-15 | Stop reason: CLARIF

## 2021-12-10 RX ORDER — NITROFURANTOIN 25; 75 MG/1; MG/1
100 CAPSULE ORAL DAILY
Status: ON HOLD | COMMUNITY
Start: 2021-11-03 | End: 2024-03-10 | Stop reason: HOSPADM

## 2021-12-10 RX ORDER — SEVELAMER CARBONATE FOR ORAL SUSPENSION 800 MG/1
POWDER, FOR SUSPENSION ORAL
COMMUNITY
Start: 2021-11-10 | End: 2024-03-15 | Stop reason: CLARIF

## 2021-12-10 RX ORDER — CEFUROXIME AXETIL 500 MG/1
500 TABLET ORAL NIGHTLY
COMMUNITY
Start: 2021-10-11 | End: 2021-12-10

## 2021-12-10 RX ORDER — ALPRAZOLAM 0.5 MG/1
0.5 TABLET ORAL 2 TIMES DAILY PRN
COMMUNITY
Start: 2021-12-08 | End: 2024-03-15 | Stop reason: CLARIF

## 2021-12-29 ENCOUNTER — HOSPITAL ENCOUNTER (OUTPATIENT)
Dept: RADIOLOGY | Facility: HOSPITAL | Age: 81
Discharge: HOME | End: 2021-12-29
Attending: ANESTHESIOLOGY
Payer: MEDICARE

## 2021-12-29 DIAGNOSIS — M51.36 DEGENERATIVE DISC DISEASE, LUMBAR: ICD-10-CM

## 2021-12-29 DIAGNOSIS — Z87.81 HISTORY OF COMPRESSION FRACTURE OF SPINE: ICD-10-CM

## 2021-12-29 DIAGNOSIS — M54.16 LUMBAR RADICULOPATHY: ICD-10-CM

## 2021-12-29 DIAGNOSIS — M47.896 OTHER SPONDYLOSIS, LUMBAR REGION: ICD-10-CM

## 2021-12-29 PROCEDURE — 72148 MRI LUMBAR SPINE WITHOUT CONTRAST: ICD-10-PCS | Mod: 26,,, | Performed by: RADIOLOGY

## 2021-12-29 PROCEDURE — 72148 MRI LUMBAR SPINE W/O DYE: CPT | Mod: 26,,, | Performed by: RADIOLOGY

## 2021-12-29 PROCEDURE — 72148 MRI LUMBAR SPINE W/O DYE: CPT | Mod: TC

## 2022-03-15 NOTE — TELEPHONE ENCOUNTER
Please advise. Spoke to , would like for us to send a referral to [facility] for a psych nurse to visit as well as physical therapy. Please advise. Thanks   Detail Level: Generalized General Sunscreen Counseling: I recommended a broad spectrum sunscreen with a SPF of 30 or higher.  I explained that SPF 30 sunscreens block approximately 97 percent of the sun's harmful rays.  Sunscreens should be applied at least 15 minutes prior to expected sun exposure and then every 2 hours after that as long as sun exposure continues. If swimming or exercising sunscreen should be reapplied every 45 minutes to an hour after getting wet or sweating.  One ounce, or the equivalent of a shot glass full of sunscreen, is adequate to protect the skin not covered by a bathing suit. I also recommended a lip balm with a sunscreen as well. Sun protective clothing can be used in lieu of sunscreen but must be worn the entire time you are exposed to the sun's rays.

## 2022-07-19 ENCOUNTER — HOSPITAL ENCOUNTER (EMERGENCY)
Facility: HOSPITAL | Age: 82
Discharge: HOME | End: 2022-07-19
Attending: EMERGENCY MEDICINE
Payer: MEDICARE

## 2022-07-19 VITALS
TEMPERATURE: 99 F | BODY MASS INDEX: 18.83 KG/M2 | SYSTOLIC BLOOD PRESSURE: 119 MMHG | DIASTOLIC BLOOD PRESSURE: 58 MMHG | RESPIRATION RATE: 20 BRPM | OXYGEN SATURATION: 94 % | HEART RATE: 65 BPM | HEIGHT: 67 IN | WEIGHT: 120 LBS

## 2022-07-19 DIAGNOSIS — R42 LIGHTHEADEDNESS: ICD-10-CM

## 2022-07-19 DIAGNOSIS — T17.308A CHOKING: Primary | ICD-10-CM

## 2022-07-19 LAB
ALBUMIN SERPL BCP-MCNC: 3.7 G/DL (ref 3.5–5.2)
ALP SERPL-CCNC: 99 U/L (ref 55–135)
ALT SERPL W/O P-5'-P-CCNC: 16 U/L (ref 10–44)
ANION GAP SERPL CALC-SCNC: 11 MMOL/L (ref 8–16)
AST SERPL-CCNC: 19 U/L (ref 10–40)
BASOPHILS # BLD AUTO: 0.02 K/UL (ref 0–0.2)
BASOPHILS NFR BLD: 0.4 % (ref 0–1.9)
BILIRUB SERPL-MCNC: 0.7 MG/DL (ref 0.1–1)
BUN SERPL-MCNC: 21 MG/DL (ref 8–23)
CALCIUM SERPL-MCNC: 7.6 MG/DL (ref 8.7–10.5)
CHLORIDE SERPL-SCNC: 91 MMOL/L (ref 95–110)
CO2 SERPL-SCNC: 34 MMOL/L (ref 23–29)
CREAT SERPL-MCNC: 4 MG/DL (ref 0.5–1.4)
DIFFERENTIAL METHOD: ABNORMAL
EOSINOPHIL # BLD AUTO: 0.1 K/UL (ref 0–0.5)
EOSINOPHIL NFR BLD: 1.2 % (ref 0–8)
ERYTHROCYTE [DISTWIDTH] IN BLOOD BY AUTOMATED COUNT: 15.7 % (ref 11.5–14.5)
EST. GFR  (AFRICAN AMERICAN): 11.5 ML/MIN/1.73 M^2
EST. GFR  (NON AFRICAN AMERICAN): 10 ML/MIN/1.73 M^2
GLUCOSE SERPL-MCNC: 84 MG/DL (ref 70–110)
GLUCOSE SERPL-MCNC: 98 MG/DL (ref 70–110)
HCT VFR BLD AUTO: 33.2 % (ref 37–48.5)
HGB BLD-MCNC: 9.9 G/DL (ref 12–16)
IMM GRANULOCYTES # BLD AUTO: 0.01 K/UL (ref 0–0.04)
IMM GRANULOCYTES NFR BLD AUTO: 0.2 % (ref 0–0.5)
LYMPHOCYTES # BLD AUTO: 0.9 K/UL (ref 1–4.8)
LYMPHOCYTES NFR BLD: 15.5 % (ref 18–48)
MCH RBC QN AUTO: 32.8 PG (ref 27–31)
MCHC RBC AUTO-ENTMCNC: 29.8 G/DL (ref 32–36)
MCV RBC AUTO: 110 FL (ref 82–98)
MONOCYTES # BLD AUTO: 0.5 K/UL (ref 0.3–1)
MONOCYTES NFR BLD: 8.2 % (ref 4–15)
NEUTROPHILS # BLD AUTO: 4.2 K/UL (ref 1.8–7.7)
NEUTROPHILS NFR BLD: 74.5 % (ref 38–73)
NRBC BLD-RTO: 0 /100 WBC
PLATELET # BLD AUTO: 284 K/UL (ref 150–450)
PMV BLD AUTO: 9.4 FL (ref 9.2–12.9)
POTASSIUM SERPL-SCNC: 3.2 MMOL/L (ref 3.5–5.1)
PROT SERPL-MCNC: 7.3 G/DL (ref 6–8.4)
RBC # BLD AUTO: 3.02 M/UL (ref 4–5.4)
SODIUM SERPL-SCNC: 136 MMOL/L (ref 136–145)
TROPONIN I SERPL DL<=0.01 NG/ML-MCNC: <0.03 NG/ML
WBC # BLD AUTO: 5.63 K/UL (ref 3.9–12.7)

## 2022-07-19 PROCEDURE — C9113 INJ PANTOPRAZOLE SODIUM, VIA: HCPCS | Performed by: STUDENT IN AN ORGANIZED HEALTH CARE EDUCATION/TRAINING PROGRAM

## 2022-07-19 PROCEDURE — 93005 ELECTROCARDIOGRAM TRACING: CPT | Performed by: SPECIALIST

## 2022-07-19 PROCEDURE — 99284 EMERGENCY DEPT VISIT MOD MDM: CPT | Mod: 25

## 2022-07-19 PROCEDURE — 84484 ASSAY OF TROPONIN QUANT: CPT | Performed by: EMERGENCY MEDICINE

## 2022-07-19 PROCEDURE — 63600175 PHARM REV CODE 636 W HCPCS: Performed by: STUDENT IN AN ORGANIZED HEALTH CARE EDUCATION/TRAINING PROGRAM

## 2022-07-19 PROCEDURE — 96374 THER/PROPH/DIAG INJ IV PUSH: CPT

## 2022-07-19 PROCEDURE — 82962 GLUCOSE BLOOD TEST: CPT

## 2022-07-19 PROCEDURE — 93010 EKG 12-LEAD: ICD-10-PCS | Mod: ,,, | Performed by: SPECIALIST

## 2022-07-19 PROCEDURE — 80053 COMPREHEN METABOLIC PANEL: CPT | Performed by: STUDENT IN AN ORGANIZED HEALTH CARE EDUCATION/TRAINING PROGRAM

## 2022-07-19 PROCEDURE — 93010 ELECTROCARDIOGRAM REPORT: CPT | Mod: ,,, | Performed by: SPECIALIST

## 2022-07-19 PROCEDURE — 96375 TX/PRO/DX INJ NEW DRUG ADDON: CPT

## 2022-07-19 PROCEDURE — 36415 COLL VENOUS BLD VENIPUNCTURE: CPT | Performed by: STUDENT IN AN ORGANIZED HEALTH CARE EDUCATION/TRAINING PROGRAM

## 2022-07-19 PROCEDURE — 25000003 PHARM REV CODE 250: Performed by: STUDENT IN AN ORGANIZED HEALTH CARE EDUCATION/TRAINING PROGRAM

## 2022-07-19 PROCEDURE — 85025 COMPLETE CBC W/AUTO DIFF WBC: CPT | Performed by: STUDENT IN AN ORGANIZED HEALTH CARE EDUCATION/TRAINING PROGRAM

## 2022-07-19 RX ORDER — LIDOCAINE HYDROCHLORIDE 20 MG/ML
10 SOLUTION OROPHARYNGEAL ONCE
Status: COMPLETED | OUTPATIENT
Start: 2022-07-19 | End: 2022-07-19

## 2022-07-19 RX ORDER — FAMOTIDINE 10 MG/ML
20 INJECTION INTRAVENOUS
Status: COMPLETED | OUTPATIENT
Start: 2022-07-19 | End: 2022-07-19

## 2022-07-19 RX ORDER — ACETAMINOPHEN 500 MG
1000 TABLET ORAL
Status: COMPLETED | OUTPATIENT
Start: 2022-07-19 | End: 2022-07-19

## 2022-07-19 RX ORDER — MAG HYDROX/ALUMINUM HYD/SIMETH 200-200-20
30 SUSPENSION, ORAL (FINAL DOSE FORM) ORAL ONCE
Status: COMPLETED | OUTPATIENT
Start: 2022-07-19 | End: 2022-07-19

## 2022-07-19 RX ORDER — PANTOPRAZOLE SODIUM 40 MG/10ML
40 INJECTION, POWDER, LYOPHILIZED, FOR SOLUTION INTRAVENOUS DAILY
Status: DISCONTINUED | OUTPATIENT
Start: 2022-07-19 | End: 2022-07-19 | Stop reason: HOSPADM

## 2022-07-19 RX ADMIN — LIDOCAINE HYDROCHLORIDE 10 ML: 20 SOLUTION ORAL; TOPICAL at 02:07

## 2022-07-19 RX ADMIN — PANTOPRAZOLE SODIUM 40 MG: 40 INJECTION, POWDER, FOR SOLUTION INTRAVENOUS at 02:07

## 2022-07-19 RX ADMIN — FAMOTIDINE 20 MG: 10 INJECTION, SOLUTION INTRAVENOUS at 02:07

## 2022-07-19 RX ADMIN — ALUMINA, MAGNESIA, AND SIMETHICONE ORAL SUSPENSION REGULAR STRENGTH 30 ML: 1200; 1200; 120 SUSPENSION ORAL at 02:07

## 2022-07-19 RX ADMIN — ACETAMINOPHEN 1000 MG: 500 TABLET ORAL at 02:07

## 2022-07-19 NOTE — ED NOTES
This RN spoke with GRUPO Baltazar at [facility]. Report given and Delaney is setting up [facility] transport for the pt a ride back to the nursing [place].

## 2022-07-19 NOTE — DISCHARGE INSTRUCTIONS
Chew food completely prior to swallowing. Please return to this facility or another ED as needed for any new or worsening symptoms including chest pain, shortness of breath, abdominal pain, nausea or vomiting, fever or chills. Please follow up with your primary care doctor in 3 days. Please take all medicines as prescribed.

## 2022-07-19 NOTE — ED PROVIDER NOTES
"Encounter Date: 7/19/2022       History     Chief Complaint   Patient presents with    Foreign Body In Throat     EMS states pt called after choking on a pretzel     HPI   Year old with history of ESRD on Tuesday Thursday Saturday dialysis, COPD, anxiety, bipolar presents for evaluation via EMS after she choked on a pretzel at home.  Reports choking on small sq shaped pretzel.  Felt it stick and then past she has residual sharp, throat pain is nonradiating, is getting a little better, is constant.  She also reports lightheadedness that is her baseline.  She denies chest pain, shortness of breath, fever, chills, difficulty breathing, difficulty swallowing, abdominal pain, nausea vomiting.   Review of patient's allergies indicates:   Allergen Reactions    Metoprolol Other (See Comments)     Grand-daughter/care giver reported Pt has over reaction to this drug, Bp bottoms out along with heart rate    Abilify [aripiprazole]      dizziness    Codeine Other (See Comments)     Other reaction(s): Unknown "shaky" "crazy" "dizzy" stated per patient       Past Medical History:   Diagnosis Date    Alcoholism     no drink since 1984    Anxiety     Asthma     Bipolar affect, depressed     Bipolar disorder     COPD (chronic obstructive pulmonary disease)     Degenerative disc disease, lumbar 11/22/2019    Dialysis patient     Tue-Thur-Sat    ESRD (end stage renal disease)     Full dentures     GERD (gastroesophageal reflux disease)     Hypertension     Pt states low b/p    Limb alert care status     NO BP/IV LEFT ARM    Pancreatitis     Stroke     Thyroid disease      Past Surgical History:   Procedure Laterality Date    APPENDECTOMY      CATARACT EXTRACTION Right     DIALYSIS FISTULA CREATION      left upper arm    EPIDURAL STEROID INJECTION INTO LUMBAR SPINE N/A 11/22/2019    Procedure: Injection-steroid-epidural-lumbar;  Surgeon: Rosanna Khan MD;  Location: [facility];  Service: Pain Management;  " Laterality: N/A;  L5-S1      EPIDURAL STEROID INJECTION INTO LUMBAR SPINE N/A 2/3/2020    Procedure: Injection-steroid-epidural-lumbar;  Surgeon: Rosanna Khan MD;  Location: [facility] OR;  Service: Pain Management;  Laterality: N/A;  L5-S1    EPIDURAL STEROID INJECTION INTO LUMBAR SPINE N/A 2021    Procedure: Injection-steroid-epidural-lumbar;  Surgeon: Rosanna Khan MD;  Location: [facility] OR;  Service: Pain Management;  Laterality: N/A;  thickened fluids only per nursing facility [facility].    EYE SURGERY      FIXATION KYPHOPLASTY N/A 10/18/2019    Procedure: Kyphoplasty;  Surgeon: Rosanna Khan MD;  Location: [facility] OR;  Service: Pain Management;  Laterality: N/A;  L2    HYSTERECTOMY      OPEN REDUCTION AND INTERNAL FIXATION (ORIF) OF INJURY OF FOREARM Left 3/6/2020    Procedure: ORIF, FOREARM;  Surgeon: Tab Mendez MD;  Location: [facility] OR;  Service: Orthopedics;  Laterality: Left;    THYROIDECTOMY      THYROIDECTOMY      TRANSFORAMINAL EPIDURAL INJECTION OF STEROID Right 2021    Procedure: Injection,steroid,epidural,transforaminal approach;  Surgeon: Rosanna Khan MD;  Location: [facility] OR;  Service: Pain Management;  Laterality: Right;  L4-5, L5-S1    TRANSFORAMINAL EPIDURAL INJECTION OF STEROID Right 10/22/2021    Procedure: Injection,steroid,epidural,transforaminal approach Right L4-5, L5-S1;  Surgeon: Rosanna Khan MD;  Location: [facility] OR;  Service: Pain Management;  Laterality: Right;     Family History   Problem Relation Age of Onset    No Known Problems Mother     Diabetes Father     Heart disease Father         blood clot in lung went to heart    Cancer Sister         breast    Stroke Paternal Aunt     Cancer Sister         breast    Cancer Cousin         colon    Diabetes Cousin     Cancer Cousin         colon    COPD Sister          of COPD     Social History     Tobacco Use    Smoking status: Former Smoker     Packs/day: 1.00     Types: Cigarettes     Quit date:  10/1/2018     Years since quitting: 3.8    Smokeless tobacco: Never Used   Substance Use Topics    Alcohol use: No    Drug use: Never     Review of Systems   Constitutional: Negative for chills and fever.   HENT: Positive for sore throat. Negative for trouble swallowing.    Respiratory: Negative for shortness of breath and wheezing.    Cardiovascular: Negative for chest pain and palpitations.   Gastrointestinal: Negative for nausea and vomiting.   Genitourinary: Negative for difficulty urinating and dysuria.   Skin: Negative for rash and wound.   Neurological: Negative for seizures and headaches.       Physical Exam     Initial Vitals [07/19/22 1321]   BP Pulse Resp Temp SpO2   127/60 64 20 98.9 °F (37.2 °C) 100 %      MAP       --         Physical Exam    Nursing note and vitals reviewed.  Constitutional: She appears well-developed. She is not diaphoretic.   Nontoxic appearing older woman wearing large sunglasses   HENT:   Head: Normocephalic and atraumatic.   Moist mucous membranes, no pooling secretions, no obvious intraoral trauma, uvula midline   Eyes: Right eye exhibits no discharge. Left eye exhibits no discharge.   Neck: Neck supple. No tracheal deviation present.   Normal range of motion.  Cardiovascular: Normal rate and regular rhythm.   Pulmonary/Chest: Breath sounds normal. No stridor. No respiratory distress.   Abdominal: Abdomen is soft. There is no abdominal tenderness. There is no rebound and no guarding.   Musculoskeletal:         General: No edema.      Cervical back: Normal range of motion and neck supple.      Comments: Left arm avf with palp thrill     Neurological: She is alert.   Oriented to self and location, knows the year not the month    Cranial nerves intact, good strength in all 4 extremities, sensation intact to light touch   Skin: Skin is warm and dry.         ED Course   Procedures  Labs Reviewed   COMPREHENSIVE METABOLIC PANEL - Abnormal; Notable for the following components:        Result Value    Potassium 3.2 (*)     Chloride 91 (*)     CO2 34 (*)     Creatinine 4.0 (*)     Calcium 7.6 (*)     eGFR if  11.5 (*)     eGFR if non  10.0 (*)     All other components within normal limits   CBC W/ AUTO DIFFERENTIAL - Abnormal; Notable for the following components:    RBC 3.02 (*)     Hemoglobin 9.9 (*)     Hematocrit 33.2 (*)      (*)     MCH 32.8 (*)     MCHC 29.8 (*)     RDW 15.7 (*)     Lymph # 0.9 (*)     Gran % 74.5 (*)     Lymph % 15.5 (*)     All other components within normal limits   TROPONIN I   POCT GLUCOSE        ECG Results          EKG 12-lead (In process)  Result time 07/19/22 15:41:16    In process by Interface, Lab In [facility] (07/19/22 15:41:16)                 Narrative:    Test Reason : R42,    Vent. Rate : 060 BPM     Atrial Rate : 060 BPM     P-R Int : 182 ms          QRS Dur : 072 ms      QT Int : 480 ms       P-R-T Axes : 073 -45 080 degrees     QTc Int : 480 ms    Normal sinus rhythm  Possible Left atrial enlargement  Left axis deviation  Septal infarct (cited on or before 18-NOV-2016)  Abnormal ECG  When compared with ECG of 29-AUG-2019 14:21,  Questionable change in initial forces of Septal leads  QT has lengthened    Referred By: [email or web address]   SELF           Confirmed By:                             Imaging Results          X-Ray Chest 1 View (Final result)  Result time 07/19/22 14:12:46    Final result by Dolly Acosta MD (07/19/22 14:12:46)                 Narrative:    XR CHEST 1 VIEW    CLINICAL HISTORY:  82 years Female horn and body in throat    COMPARISON: 4/9/2020    FINDINGS: Cardiomediastinal silhouette is within normal limits. Lungs are normally expanded with no airspace consolidation. No pleural effusion or pneumothorax. There are old right rib fractures.  There are vascular stents in the left axilla      IMPRESSION: No acute pulmonary process.    Electronically signed by:  Dolly Acosta MD  7/19/2022 2:12 PM CDT  Workstation: [id number]                               Medications   pantoprazole injection 40 mg (40 mg Intravenous Given 7/19/22 1440)   acetaminophen tablet 1,000 mg (1,000 mg Oral Given 7/19/22 1441)   famotidine (PF) injection 20 mg (20 mg Intravenous Given 7/19/22 1441)   aluminum-magnesium hydroxide-simethicone 200-200-20 mg/5 mL suspension 30 mL (30 mLs Oral Given 7/19/22 1441)     And   LIDOcaine HCl 2% oral solution 10 mL (10 mLs Oral Given 7/19/22 1441)     Medical Decision Making:   History:   Old Medical Records: I decided to obtain old medical records.  Old Records Summarized: records from clinic visits and records from previous admission(s).       <> Summary of Records: Multiple pain injections, ESRD  Initial Assessment:   Elderly woman who swallowed a pretzel.  With normal vital signs, well appearing on physical exam.  Differential Diagnosis:   Mild traumatic injury from choking on food, metabolic derangement, ACS, pneumothorax  Independently Interpreted Test(s):   I have ordered and independently interpreted X-rays - see summary below.       <> Summary of X-Ray Reading(s): Chest x-ray without focal consolidation  I have ordered and independently interpreted EKG Reading(s) - see summary below       <> Summary of EKG Reading(s): On my interpretation, nsr, 60 bpm, left axis, qtc less than 500, otherwise normal intervals, no stemi, appears similar to prior 08/29/2019     Clinical Tests:   Lab Tests: Ordered and Reviewed       <> Summary of Lab: Hemoglobin is stable, alkalotic believe this is likely a chronic issue and is not contributing to her choking issue, not hyperkalemic, trop negative, no indication for emergent dialysis at this time  Radiological Study: Ordered and Reviewed  Medical Tests: Ordered  ED Management:  Overall benign presentation of an elderly woman who choked on a piece of bread so but was able to pass it on her own had some residual mild throat pain but no signs or symptoms of  airway or esophageal compromise.  Lightheadedness appears to be unchanged from her normal therefore will not pursue additional workup past the above. Do not feel requires admit for same at this point. She felt better with treatment and is ready to leave. overall lab workup does not suggest any emergent etiologies requiring further evaluation at this point.  She feels better and would like to go at this point. Return precautions/follow up instructions/treatment plan given, expressed agreement and understanding.     Reji Wilkins, HO3  [id number]                      Clinical Impression:   Final diagnoses:  [R42] Lightheadedness  [T17.308A] Choking (Primary)          ED Disposition Condition    Discharge Stable        ED Prescriptions     None        Follow-up Information     Follow up With Specialties Details Why Contact Info Additional Information    Zachary Ramsey MD Family Medicine, Home Health Services, Hospice Services In 2 days For follow-up [address]  [address]  [place] [address]  [phone]       [facility] - Emergency Dept Emergency Medicine Go to  As needed, If symptoms worsen, Any new or worsening symptoms [address] [address]  [phone] 1st floor           Reji Wilkins MD  Resident  07/19/22 0082

## 2024-02-20 ENCOUNTER — HOSPITAL ENCOUNTER (EMERGENCY)
Facility: HOSPITAL | Age: 84
Discharge: HOME | End: 2024-02-20
Attending: STUDENT IN AN ORGANIZED HEALTH CARE EDUCATION/TRAINING PROGRAM
Payer: MEDICARE

## 2024-02-20 VITALS
BODY MASS INDEX: 18.52 KG/M2 | OXYGEN SATURATION: 96 % | SYSTOLIC BLOOD PRESSURE: 125 MMHG | HEIGHT: 67 IN | RESPIRATION RATE: 17 BRPM | HEART RATE: 71 BPM | DIASTOLIC BLOOD PRESSURE: 66 MMHG | WEIGHT: 118 LBS

## 2024-02-20 DIAGNOSIS — R58 BLEEDING: ICD-10-CM

## 2024-02-20 DIAGNOSIS — T82.9XXA COMPLICATION OF ARTERIOVENOUS DIALYSIS FISTULA, INITIAL ENCOUNTER: Primary | ICD-10-CM

## 2024-02-20 PROCEDURE — 12001 RPR S/N/AX/GEN/TRNK 2.5CM/<: CPT

## 2024-02-20 PROCEDURE — 99283 EMERGENCY DEPT VISIT LOW MDM: CPT | Mod: 25

## 2024-02-20 NOTE — ED PROVIDER NOTES
"Encounter Date: 2/20/2024       History     Chief Complaint   Patient presents with    fistula problem     Fistula bleeding since dialysis this morning     HPI    84-year-old female with history of COPD on home O2 and ESRD on HD presenting from dialysis clinic with bleeding fistula.  Reportedly patient completed normal dialysis session but when the needle was removed she had oozing at the site.  Bleeding was not controlled with pressure at the center and she was sent here for further evaluation and management.  Vital signs here are stable.  Patient appears confused.  She thinks she is 42 years old, is currently , and her parents are alive and living independently.    Review of patient's allergies indicates:   Allergen Reactions    Metoprolol Other (See Comments)     Grand-daughter/care giver reported Pt has over reaction to this drug, Bp bottoms out along with heart rate    Abilify [aripiprazole]      dizziness    Codeine Other (See Comments)     Other reaction(s): Unknown "shaky" "crazy" "dizzy" stated per patient       Past Medical History:   Diagnosis Date    Alcoholism     no drink since 1984    Anxiety     Asthma     Bipolar affect, depressed     Bipolar disorder     COPD (chronic obstructive pulmonary disease)     Degenerative disc disease, lumbar 11/22/2019    Dialysis patient     Tue-Thur-Sat    ESRD (end stage renal disease)     Full dentures     GERD (gastroesophageal reflux disease)     Hypertension     Pt states low b/p    Limb alert care status     NO BP/IV LEFT ARM    Pancreatitis     Stroke     Thyroid disease      Past Surgical History:   Procedure Laterality Date    APPENDECTOMY      CATARACT EXTRACTION Right     DIALYSIS FISTULA CREATION      left upper arm    EPIDURAL STEROID INJECTION INTO LUMBAR SPINE N/A 11/22/2019    Procedure: Injection-steroid-epidural-lumbar;  Surgeon: Rosanna Khan MD;  Location: [facility];  Service: Pain Management;  Laterality: N/A;  L5-S1      " EPIDURAL STEROID INJECTION INTO LUMBAR SPINE N/A 2/3/2020    Procedure: Injection-steroid-epidural-lumbar;  Surgeon: Rosanna Khan MD;  Location: [facility] OR;  Service: Pain Management;  Laterality: N/A;  L5-S1    EPIDURAL STEROID INJECTION INTO LUMBAR SPINE N/A 2021    Procedure: Injection-steroid-epidural-lumbar;  Surgeon: Rosanna Khan MD;  Location: [facility] OR;  Service: Pain Management;  Laterality: N/A;  thickened fluids only per nursing facility [facility].    EYE SURGERY      FIXATION KYPHOPLASTY N/A 10/18/2019    Procedure: Kyphoplasty;  Surgeon: Rosanna Khan MD;  Location: [facility] OR;  Service: Pain Management;  Laterality: N/A;  L2    HYSTERECTOMY      OPEN REDUCTION AND INTERNAL FIXATION (ORIF) OF INJURY OF FOREARM Left 3/6/2020    Procedure: ORIF, FOREARM;  Surgeon: Tab Mendez MD;  Location: [facility] OR;  Service: Orthopedics;  Laterality: Left;    THYROIDECTOMY      THYROIDECTOMY      TRANSFORAMINAL EPIDURAL INJECTION OF STEROID Right 2021    Procedure: Injection,steroid,epidural,transforaminal approach;  Surgeon: Rosanna Khan MD;  Location: [facility] OR;  Service: Pain Management;  Laterality: Right;  L4-5, L5-S1    TRANSFORAMINAL EPIDURAL INJECTION OF STEROID Right 10/22/2021    Procedure: Injection,steroid,epidural,transforaminal approach Right L4-5, L5-S1;  Surgeon: Rosanna Khan MD;  Location: [facility] OR;  Service: Pain Management;  Laterality: Right;     Family History   Problem Relation Age of Onset    No Known Problems Mother     Diabetes Father     Heart disease Father         blood clot in lung went to heart    Cancer Sister         breast    Stroke Paternal Aunt     Cancer Sister         breast    Cancer Cousin         colon    Diabetes Cousin     Cancer Cousin         colon    COPD Sister          of COPD     Social History     Tobacco Use    Smoking status: Former     Current packs/day: 0.00     Types: Cigarettes     Quit date: 10/1/2018     Years since quittin.3     Smokeless tobacco: Never   Substance Use Topics    Alcohol use: No    Drug use: Never     Review of Systems   Unable to perform ROS: Mental status change       Physical Exam     Initial Vitals   BP Pulse Resp Temp SpO2   02/20/24 1618 02/20/24 1618 02/20/24 1618 02/20/24 1601 02/20/24 1618   (!) 150/81 72 17 (P) 98.4 °F (36.9 °C) 100 %      MAP       --                Physical Exam    HENT:   Head: Normocephalic and atraumatic.   Eyes: Conjunctivae are normal. Pupils are equal, round, and reactive to light.   Neck:   Normal range of motion.  Cardiovascular:  Normal rate.           Loud holosystolic murmur likely related to AV fistula   Pulmonary/Chest: Breath sounds normal. No respiratory distress.   Abdominal: Abdomen is soft. She exhibits no distension.   Musculoskeletal:      Cervical back: Normal range of motion.     Neurological: She is alert.   Patient is not oriented to location, situation, or time.  Moving all 4 extremities.     Skin: Skin is warm and dry.   Small pinpoint site with oozing.  No significant blood loss.  Hemostatic with pressure.  Palpable thrill to AV fistula.  Distal extremities are warm with good pulses.         ED Course   Lac Repair    Date/Time: 2/20/2024 6:48 PM    Performed by: Johnson Rothman MD  Authorized by: Johnson Rothman MD    Universal protocol:     Patient identity confirmed:  Verbally with patient  Anesthesia:     Anesthesia method:  None  Laceration details:     Location:  Shoulder/arm    Shoulder/arm location:  L upper arm    Length (cm):  0.3  Skin repair:     Repair method:  Sutures    Suture size:  4-0    Suture material:  Nylon    Number of sutures:  1  Approximation:     Approximation:  Close  Repair type:     Repair type:  Simple  Comments:      Applied Surgicel and area was gently wrapped.  Hemostasis was achieved after 15 minutes of observation.    Labs Reviewed - No data to display       Imaging Results    None          Medications - No data to display  Medical  Decision Making  84-year-old female with history of COPD on home O2, ESRD on HD, dementia presenting from dialysis with bleeding fistula.  Minimal ooze noted from site of dialysis.  Vital signs are stable.  Low suspicion for any acute blood loss anemia.  Applied 1 suture in Surgicel with resolution of bleeding.  Patient completed her full dialysis session today.  She appears to be at baseline.  Will discharge back to nursing home.  Sutures may be removed at HD facility.    Johnson Rothman MD  Emergency Medicine                                        Clinical Impression:  Final diagnoses:  [T82.9XXA] Complication of arteriovenous dialysis fistula, initial encounter (Primary)  [R58] Bleeding          ED Disposition Condition    Discharge Stable          ED Prescriptions    None       Follow-up Information       Follow up With Specialties Details Why Contact Info Additional Information    [facility] - Emergency Dept Emergency Medicine  As needed, If symptoms worsen sitting persistent bleeding or any other concerns [address] [facility] [id number] [phone] 1st floor    Zachary Ramsey MD Family Medicine, Home Health Services, Hospice Services Schedule an appointment as soon as possible for a visit  As needed [address]  [address]  [place] [address]  [phone]                Johnson Rothman MD  02/20/24 2031

## 2024-02-21 NOTE — DISCHARGE INSTRUCTIONS
Follow-up for routine dialysis on Thursday of this week.  Return with any complications or concerns

## 2024-03-09 ENCOUNTER — HOSPITAL ENCOUNTER (OUTPATIENT)
Facility: HOSPITAL | Age: 84
Discharge: NURSING, REHAB OR LONG-TERM CARE | End: 2024-03-10
Attending: EMERGENCY MEDICINE | Admitting: INTERNAL MEDICINE
Payer: MEDICARE

## 2024-03-09 DIAGNOSIS — T82.590A DIALYSIS AV FISTULA MALFUNCTION: ICD-10-CM

## 2024-03-09 DIAGNOSIS — N18.6 ESRD (END STAGE RENAL DISEASE): Primary | Chronic | ICD-10-CM

## 2024-03-09 DIAGNOSIS — T82.9XXA COMPLICATION OF AV DIALYSIS FISTULA, INITIAL ENCOUNTER: ICD-10-CM

## 2024-03-09 DIAGNOSIS — T82.9XXA COMPLICATION OF ARTERIOVENOUS DIALYSIS FISTULA, INITIAL ENCOUNTER: ICD-10-CM

## 2024-03-09 LAB
ABO + RH BLD: NORMAL
ALBUMIN SERPL BCP-MCNC: 3.8 G/DL (ref 3.5–5.2)
ALBUMIN SERPL BCP-MCNC: 3.8 G/DL (ref 3.5–5.2)
ALP SERPL-CCNC: 77 U/L (ref 55–135)
ALP SERPL-CCNC: 77 U/L (ref 55–135)
ALT SERPL W/O P-5'-P-CCNC: <3 U/L (ref 10–44)
ALT SERPL W/O P-5'-P-CCNC: <3 U/L (ref 10–44)
ANION GAP SERPL CALC-SCNC: 12 MMOL/L (ref 8–16)
ANION GAP SERPL CALC-SCNC: 12 MMOL/L (ref 8–16)
APTT PPP: 21.9 SEC (ref 21–32)
AST SERPL-CCNC: 21 U/L (ref 10–40)
AST SERPL-CCNC: 21 U/L (ref 10–40)
BASOPHILS NFR BLD: 0 % (ref 0–1.9)
BILIRUB SERPL-MCNC: 0.4 MG/DL (ref 0.1–1)
BILIRUB SERPL-MCNC: 0.4 MG/DL (ref 0.1–1)
BLD GP AB SCN CELLS X3 SERPL QL: NORMAL
BNP SERPL-MCNC: 282 PG/ML (ref 0–99)
BUN SERPL-MCNC: 33 MG/DL (ref 8–23)
BUN SERPL-MCNC: 33 MG/DL (ref 8–23)
CALCIUM SERPL-MCNC: 9.4 MG/DL (ref 8.7–10.5)
CALCIUM SERPL-MCNC: 9.4 MG/DL (ref 8.7–10.5)
CHLORIDE SERPL-SCNC: 96 MMOL/L (ref 95–110)
CHLORIDE SERPL-SCNC: 96 MMOL/L (ref 95–110)
CO2 SERPL-SCNC: 31 MMOL/L (ref 23–29)
CO2 SERPL-SCNC: 31 MMOL/L (ref 23–29)
CREAT SERPL-MCNC: 5.5 MG/DL (ref 0.5–1.4)
CREAT SERPL-MCNC: 5.5 MG/DL (ref 0.5–1.4)
DIFFERENTIAL METHOD BLD: ABNORMAL
EOSINOPHIL NFR BLD: 2 % (ref 0–8)
ERYTHROCYTE [DISTWIDTH] IN BLOOD BY AUTOMATED COUNT: 16.7 % (ref 11.5–14.5)
EST. GFR  (NO RACE VARIABLE): 7.3 ML/MIN/1.73 M^2
EST. GFR  (NO RACE VARIABLE): 7.3 ML/MIN/1.73 M^2
GLUCOSE SERPL-MCNC: 116 MG/DL (ref 70–110)
GLUCOSE SERPL-MCNC: 116 MG/DL (ref 70–110)
HCT VFR BLD AUTO: 31.7 % (ref 37–48.5)
HGB BLD-MCNC: 9.3 G/DL (ref 12–16)
IMM GRANULOCYTES # BLD AUTO: ABNORMAL K/UL (ref 0–0.04)
IMM GRANULOCYTES NFR BLD AUTO: ABNORMAL % (ref 0–0.5)
LYMPHOCYTES NFR BLD: 26 % (ref 18–48)
MAGNESIUM SERPL-MCNC: 2 MG/DL (ref 1.6–2.6)
MCH RBC QN AUTO: 29.2 PG (ref 27–31)
MCHC RBC AUTO-ENTMCNC: 29.3 G/DL (ref 32–36)
MCV RBC AUTO: 100 FL (ref 82–98)
MONOCYTES NFR BLD: 2 % (ref 4–15)
NEUTROPHILS NFR BLD: 69 % (ref 38–73)
NEUTS BAND NFR BLD MANUAL: 1 %
NRBC BLD-RTO: 0 /100 WBC
PLATELET # BLD AUTO: 156 K/UL (ref 150–450)
PLATELET BLD QL SMEAR: ABNORMAL
PMV BLD AUTO: 10.8 FL (ref 9.2–12.9)
POTASSIUM SERPL-SCNC: 3.9 MMOL/L (ref 3.5–5.1)
POTASSIUM SERPL-SCNC: 3.9 MMOL/L (ref 3.5–5.1)
PROT SERPL-MCNC: 6.3 G/DL (ref 6–8.4)
PROT SERPL-MCNC: 6.3 G/DL (ref 6–8.4)
RBC # BLD AUTO: 3.18 M/UL (ref 4–5.4)
SODIUM SERPL-SCNC: 139 MMOL/L (ref 136–145)
SODIUM SERPL-SCNC: 139 MMOL/L (ref 136–145)
SPECIMEN OUTDATE: NORMAL
TROPONIN I SERPL HS-MCNC: 20.3 PG/ML (ref 0–14.9)
TROPONIN I SERPL HS-MCNC: 20.7 PG/ML (ref 0–14.9)
WBC # BLD AUTO: 4.11 K/UL (ref 3.9–12.7)

## 2024-03-09 PROCEDURE — 84484 ASSAY OF TROPONIN QUANT: CPT | Performed by: EMERGENCY MEDICINE

## 2024-03-09 PROCEDURE — 90935 HEMODIALYSIS ONE EVALUATION: CPT

## 2024-03-09 PROCEDURE — G0378 HOSPITAL OBSERVATION PER HR: HCPCS

## 2024-03-09 PROCEDURE — 93005 ELECTROCARDIOGRAM TRACING: CPT | Performed by: GENERAL PRACTICE

## 2024-03-09 PROCEDURE — 93010 ELECTROCARDIOGRAM REPORT: CPT | Mod: ,,, | Performed by: GENERAL PRACTICE

## 2024-03-09 PROCEDURE — 85730 THROMBOPLASTIN TIME PARTIAL: CPT | Performed by: EMERGENCY MEDICINE

## 2024-03-09 PROCEDURE — 99285 EMERGENCY DEPT VISIT HI MDM: CPT | Mod: 25

## 2024-03-09 PROCEDURE — 85027 COMPLETE CBC AUTOMATED: CPT | Performed by: EMERGENCY MEDICINE

## 2024-03-09 PROCEDURE — 83735 ASSAY OF MAGNESIUM: CPT | Performed by: EMERGENCY MEDICINE

## 2024-03-09 PROCEDURE — 25000003 PHARM REV CODE 250: Performed by: EMERGENCY MEDICINE

## 2024-03-09 PROCEDURE — 86901 BLOOD TYPING SEROLOGIC RH(D): CPT | Performed by: EMERGENCY MEDICINE

## 2024-03-09 PROCEDURE — G0257 UNSCHED DIALYSIS ESRD PT HOS: HCPCS

## 2024-03-09 PROCEDURE — 85007 BL SMEAR W/DIFF WBC COUNT: CPT | Performed by: EMERGENCY MEDICINE

## 2024-03-09 PROCEDURE — 12001 RPR S/N/AX/GEN/TRNK 2.5CM/<: CPT

## 2024-03-09 PROCEDURE — 80053 COMPREHEN METABOLIC PANEL: CPT | Performed by: EMERGENCY MEDICINE

## 2024-03-09 PROCEDURE — 25000003 PHARM REV CODE 250: Performed by: NURSE PRACTITIONER

## 2024-03-09 PROCEDURE — 25000003 PHARM REV CODE 250: Performed by: INTERNAL MEDICINE

## 2024-03-09 PROCEDURE — 83880 ASSAY OF NATRIURETIC PEPTIDE: CPT | Performed by: EMERGENCY MEDICINE

## 2024-03-09 RX ORDER — SODIUM CHLORIDE 0.9 % (FLUSH) 0.9 %
3 SYRINGE (ML) INJECTION EVERY 12 HOURS PRN
Status: DISCONTINUED | OUTPATIENT
Start: 2024-03-09 | End: 2024-03-10 | Stop reason: HOSPADM

## 2024-03-09 RX ORDER — LEVOTHYROXINE SODIUM 137 UG/1
137 TABLET ORAL
COMMUNITY

## 2024-03-09 RX ORDER — ALUMINUM HYDROXIDE, MAGNESIUM HYDROXIDE, AND SIMETHICONE 1200; 120; 1200 MG/30ML; MG/30ML; MG/30ML
30 SUSPENSION ORAL 4 TIMES DAILY PRN
Status: DISCONTINUED | OUTPATIENT
Start: 2024-03-09 | End: 2024-03-10 | Stop reason: HOSPADM

## 2024-03-09 RX ORDER — BENZTROPINE MESYLATE 1 MG/1
1 TABLET ORAL 2 TIMES DAILY
Status: DISCONTINUED | OUTPATIENT
Start: 2024-03-09 | End: 2024-03-10 | Stop reason: HOSPADM

## 2024-03-09 RX ORDER — FAMOTIDINE 20 MG/1
20 TABLET, FILM COATED ORAL NIGHTLY
Status: DISCONTINUED | OUTPATIENT
Start: 2024-03-09 | End: 2024-03-10 | Stop reason: HOSPADM

## 2024-03-09 RX ORDER — SEVELAMER CARBONATE 800 MG/1
800 TABLET, FILM COATED ORAL
Status: DISCONTINUED | OUTPATIENT
Start: 2024-03-10 | End: 2024-03-10 | Stop reason: HOSPADM

## 2024-03-09 RX ORDER — HYDROCODONE BITARTRATE AND ACETAMINOPHEN 5; 325 MG/1; MG/1
1 TABLET ORAL EVERY 6 HOURS PRN
Status: DISCONTINUED | OUTPATIENT
Start: 2024-03-09 | End: 2024-03-09

## 2024-03-09 RX ORDER — NALOXONE HCL 0.4 MG/ML
0.02 VIAL (ML) INJECTION
Status: DISCONTINUED | OUTPATIENT
Start: 2024-03-09 | End: 2024-03-10 | Stop reason: HOSPADM

## 2024-03-09 RX ORDER — HYDROCODONE BITARTRATE AND ACETAMINOPHEN 5; 325 MG/1; MG/1
1 TABLET ORAL EVERY 6 HOURS PRN
Status: DISCONTINUED | OUTPATIENT
Start: 2024-03-09 | End: 2024-03-10 | Stop reason: HOSPADM

## 2024-03-09 RX ORDER — LIDOCAINE HYDROCHLORIDE AND EPINEPHRINE 10; 10 MG/ML; UG/ML
10 INJECTION, SOLUTION INFILTRATION; PERINEURAL ONCE
Status: COMPLETED | OUTPATIENT
Start: 2024-03-09 | End: 2024-03-09

## 2024-03-09 RX ORDER — ALPRAZOLAM 0.5 MG/1
0.5 TABLET ORAL 2 TIMES DAILY PRN
Status: DISCONTINUED | OUTPATIENT
Start: 2024-03-09 | End: 2024-03-10 | Stop reason: HOSPADM

## 2024-03-09 RX ORDER — ONDANSETRON HYDROCHLORIDE 2 MG/ML
4 INJECTION, SOLUTION INTRAVENOUS EVERY 6 HOURS PRN
Status: DISCONTINUED | OUTPATIENT
Start: 2024-03-09 | End: 2024-03-10 | Stop reason: HOSPADM

## 2024-03-09 RX ORDER — TALC
6 POWDER (GRAM) TOPICAL NIGHTLY PRN
Status: DISCONTINUED | OUTPATIENT
Start: 2024-03-09 | End: 2024-03-10 | Stop reason: HOSPADM

## 2024-03-09 RX ORDER — AMOXICILLIN 250 MG
1 CAPSULE ORAL DAILY
Status: DISCONTINUED | OUTPATIENT
Start: 2024-03-10 | End: 2024-03-10 | Stop reason: HOSPADM

## 2024-03-09 RX ORDER — HYDROCODONE BITARTRATE AND ACETAMINOPHEN 5; 325 MG/1; MG/1
1 TABLET ORAL
Status: COMPLETED | OUTPATIENT
Start: 2024-03-09 | End: 2024-03-09

## 2024-03-09 RX ADMIN — BENZTROPINE MESYLATE 1 MG: 1 TABLET ORAL at 10:03

## 2024-03-09 RX ADMIN — Medication 6 MG: at 10:03

## 2024-03-09 RX ADMIN — FAMOTIDINE 20 MG: 20 TABLET ORAL at 10:03

## 2024-03-09 RX ADMIN — HYDROCODONE BITARTRATE AND ACETAMINOPHEN 1 TABLET: 5; 325 TABLET ORAL at 12:03

## 2024-03-09 RX ADMIN — HYDROCODONE BITARTRATE AND ACETAMINOPHEN 1 TABLET: 5; 325 TABLET ORAL at 10:03

## 2024-03-09 RX ADMIN — LIDOCAINE HYDROCHLORIDE,EPINEPHRINE BITARTRATE 10 ML: 10; .01 INJECTION, SOLUTION INFILTRATION; PERINEURAL at 12:03

## 2024-03-09 NOTE — ED TRIAGE NOTES
"Present via EMS, EMS reports pt was in dialysis when her fistula started bleeding uncontrollable upon access by staff, report received pt only received minutes of dialysis,     EMS reports pt has dementia and is at normal baseline per nursing staff    Currently alert to person, reports " [age] something" when asked the year, alert to situation, pt states " they couldn't get the bleeding to stop" , alert to location, pt reports she is at " [facility]" made aware she is at [facility], in no acute distress,    Arrived with tourniquet to R upper arm, tight dressing to L upper arm  "

## 2024-03-09 NOTE — H&P
Formerly [facility] - Emergency Dept  Hospital Medicine  History & Physical    Patient Name: Althea Gaona  MRN: [id number]  Patient Class: OP- Observation  Admission Date: 3/9/2024  Attending Physician: Renato Medina MD   Primary Care Provider: Meenakshi Primary Doctor         Patient information was obtained from patient and ER records.     Subjective:     Principal Problem:Dialysis AV fistula malfunction    Chief Complaint:   Chief Complaint   Patient presents with    bleeding fistula     Pt was at dialysis for 5 min and fistula started bleeding and could not be stopped so she was sent to the ED for repair        HPI: 84 year old with a past medical history of Hypothyroidism, HLD, GERD, CVA, CAD on plavix, Anemia in CKD, ESRD on HD, COPD on home oxygen, questionable dementia whom presented to the emergency room from [facility] for bleeding dialysis fistula. Patient received 5 minutes of HD today before her fistula began bleeding. EMS was called, a tourniquet was applied to the left arm. Bleeding currently controlled. Tourniquet on since 1135. Patient complains of pain to her left arm and some numbness and tingling in her fingertips.In ER, sutures to LUE AV fistula placed. BP remained stable at 135/74> H/H 9.3/31.7, Plt 156, K 3.9, Cr 5.5, , Troponin I high sensitivity 20.3 with repeat Troponin I 20.7. Chest xray with no acute cardiopulmonary process. US of AV fistula pending. Nephrology consulted in ER. Will admit to hospital medicine for further medical management, plan for HD today, if AV fistula is actively bleeding patient may need temporary line to run HD today. Monitor for overt bleeding, trend h/h. Awaiting US results.    Past Medical History:   Diagnosis Date    Alcoholism     no drink since 1984    Anxiety     Asthma     Bipolar affect, depressed     Bipolar disorder     COPD (chronic obstructive pulmonary disease)     Degenerative disc disease, lumbar 11/22/2019    [facility]    ESRD (end stage renal disease)     Full dentures     GERD (gastroesophageal reflux disease)     Hypertension     Pt states low b/p    Limb alert care status     NO BP/IV LEFT ARM    Pancreatitis     Stroke     Thyroid disease        Past Surgical History:   Procedure Laterality Date    APPENDECTOMY      CATARACT EXTRACTION Right     DIALYSIS FISTULA CREATION      left upper arm    EPIDURAL STEROID INJECTION INTO LUMBAR SPINE N/A 11/22/2019    Procedure: Injection-steroid-epidural-lumbar;  Surgeon: Rosanna Khan MD;  Location: [facility] OR;  Service: Pain Management;  Laterality: N/A;  L5-S1      EPIDURAL STEROID INJECTION INTO LUMBAR SPINE N/A 2/3/2020    Procedure: Injection-steroid-epidural-lumbar;  Surgeon: Rosanna Khan MD;  Location: [facility] OR;  Service: Pain Management;  Laterality: N/A;  L5-S1    EPIDURAL STEROID INJECTION INTO LUMBAR SPINE N/A 2/26/2021    Procedure: Injection-steroid-epidural-lumbar;  Surgeon: Rosanna Khan MD;  Location: [facility] OR;  Service: Pain Management;  Laterality: N/A;  thickened fluids only per nursing facility [facility].    EYE SURGERY      FIXATION KYPHOPLASTY N/A 10/18/2019    Procedure: Kyphoplasty;  Surgeon: Rosanna Khan MD;  Location: [facility] OR;  Service: Pain Management;  Laterality: N/A;  L2    HYSTERECTOMY      OPEN REDUCTION AND INTERNAL FIXATION (ORIF) OF INJURY OF FOREARM Left 3/6/2020    Procedure: ORIF, FOREARM;  Surgeon: Tab Mendez MD;  Location: [facility] OR;  Service: Orthopedics;  Laterality: Left;    THYROIDECTOMY      THYROIDECTOMY      TRANSFORAMINAL EPIDURAL INJECTION OF STEROID Right 8/9/2021    Procedure: Injection,steroid,epidural,transforaminal approach;  Surgeon: Rosanna Khan MD;  Location: [facility] OR;  Service: Pain Management;  Laterality: Right;  L4-5, L5-S1    TRANSFORAMINAL EPIDURAL INJECTION OF STEROID Right 10/22/2021    Procedure: Injection,steroid,epidural,transforaminal approach Right L4-5, L5-S1;  Surgeon: Rosanna Khan  "MD;  Location: [facility] OR;  Service: Pain Management;  Laterality: Right;       Review of patient's allergies indicates:   Allergen Reactions    Metoprolol Other (See Comments)     Grand-daughter/care giver reported Pt has over reaction to this drug, Bp bottoms out along with heart rate    Abilify [aripiprazole]      dizziness    Codeine Other (See Comments)     Other reaction(s): Unknown "shaky" "crazy" "dizzy" stated per patient         Current Facility-Administered Medications on File Prior to Encounter   Medication    0.9%  NaCl infusion    electrolyte-S (ISOLYTE)    lactated ringers infusion    lactated ringers infusion    lactated ringers infusion    lactated ringers infusion    lidocaine (PF) 10 mg/ml (1%) injection 10 mg     Current Outpatient Medications on File Prior to Encounter   Medication Sig    acetaminophen (TYLENOL) 500 MG tablet Take 500 mg by mouth every 6 (six) hours as needed for Pain.    alendronate (FOSAMAX) 70 MG tablet Take 70 mg by mouth every 7 days.    ALPRAZolam (XANAX) 0.5 MG tablet Take 0.5 mg by mouth 2 (two) times daily as needed.    ARIPiprazole (ABILIFY) 2 MG Tab Take 1 tablet (2 mg total) by mouth every evening.    benztropine (COGENTIN) 1 MG tablet Take 1 tablet (1 mg total) by mouth 2 (two) times daily.    bisacodyl (DULCOLAX) 5 mg EC tablet Take 1 tablet (5 mg total) by mouth daily as needed for Constipation.    busPIRone (BUSPAR) 10 MG tablet Take 1 tablet (10 mg total) by mouth 3 (three) times daily. (Patient taking differently: Take 15 mg by mouth 3 (three) times daily. )    cyproheptadine (PERIACTIN) 4 mg tablet Take 4 mg by mouth 4 (four) times daily.    DAILY-YANDY tablet Take 1 tablet by mouth once daily.     diazePAM (VALIUM) 5 MG tablet Take 5 mg by mouth as needed for Anxiety. Prior to dental procedure    dicyclomine (BENTYL) 10 MG capsule dicyclomine 10 mg capsule    famotidine (PEPCID) 20 MG tablet Take 20 mg by mouth every evening.     FLUoxetine 20 MG capsule Take 1 " capsule (20 mg total) by mouth once daily.    HYDROcodone-acetaminophen (NORCO) 5-325 mg per tablet Take 1 tablet by mouth every 6 (six) hours as needed.    levothyroxine (SYNTHROID) 100 MCG tablet Take 100 mcg by mouth before breakfast.     menthol (BIOFREEZE, MENTHOL,) 4 % Gel Apply topically. Left neck prn    midodrine (PROAMATINE) 10 MG tablet Take 10 mg by mouth every 7 days. Prior to dialysis treatment    mirtazapine (REMERON) 15 MG tablet Take 15 mg by mouth nightly.    montelukast (SINGULAIR) 10 mg tablet Take 1 tablet (10 mg total) by mouth every evening.    nitrofurantoin, macrocrystal-monohydrate, (MACROBID) 100 MG capsule Take 100 mg by mouth once daily.    ondansetron (ZOFRAN) 8 MG tablet Take 1 tablet (8 mg total) by mouth every 8 (eight) hours as needed for Nausea. (Patient taking differently: Take 8 mg by mouth every 6 (six) hours as needed for Nausea.)    polyethylene glycol (GLYCOLAX) 17 gram PwPk Take 17 g by mouth daily as needed.    rosuvastatin (CRESTOR) 20 MG tablet Take 1 tablet (20 mg total) by mouth once daily.    sevelamer carbonate (RENVELA) 0.8 gram PwPk Take by mouth.    traMADoL (ULTRAM) 50 mg tablet Take 1 tablet (50 mg total) by mouth every 12 (twelve) hours as needed.    traMADoL (ULTRAM) 50 mg tablet Take 1 tablet (50 mg total) by mouth every 6 (six) hours as needed.    traZODone (DESYREL) 100 MG tablet Take 100 mg by mouth nightly as needed.      Family History       Problem Relation (Age of Onset)    COPD Sister    Cancer Sister, Sister, Cousin, Cousin    Diabetes Father, Cousin    Heart disease Father    No Known Problems Mother    Stroke Paternal Aunt          Tobacco Use    Smoking status: Former     Current packs/day: 0.00     Types: Cigarettes     Quit date: 10/1/2018     Years since quittin.4    Smokeless tobacco: Never   Substance and Sexual Activity    Alcohol use: No    Drug use: Never    Sexual activity: Not Currently     Review of Systems   Constitutional:   Negative for activity change, chills and fever.   HENT:  Negative for congestion, rhinorrhea and trouble swallowing.    Respiratory:  Positive for shortness of breath. Negative for cough and chest tightness.         Chronic sob due to COPD   Cardiovascular:  Negative for chest pain, palpitations and leg swelling.   Gastrointestinal:  Negative for abdominal pain, nausea and vomiting.   Genitourinary:  Negative for difficulty urinating, dysuria and hematuria.   Musculoskeletal:  Positive for myalgias.   Skin:  Positive for wound. Negative for color change and pallor.   Psychiatric/Behavioral:  Positive for suicidal ideas. The patient is nervous/anxious.      Objective:     Vital Signs (Most Recent):  Temp: 98.2 °F (36.8 °C) (03/09/24 1226)  Pulse: 82 (03/09/24 1456)  Resp: 20 (03/09/24 1456)  BP: 135/74 (03/09/24 1456)  SpO2: 100 % (03/09/24 1456) Vital Signs (24h Range):  Temp:  [98.2 °F (36.8 °C)] 98.2 °F (36.8 °C)  Pulse:  [73-82] 82  Resp:  [18-28] 20  SpO2:  [96 %-100 %] 100 %  BP: (124-171)/(62-89) 135/74     Weight: 54.4 kg (120 lb)  Body mass index is 18.79 kg/m².     Physical Exam  Vitals reviewed.   Constitutional:       Appearance: Normal appearance. She is well-developed.      Comments: Elderly, frail, confused    HENT:      Head: Normocephalic and atraumatic.      Mouth/Throat:      Mouth: Mucous membranes are dry.   Eyes:      Extraocular Movements: Extraocular movements intact.      Pupils: Pupils are equal, round, and reactive to light.   Cardiovascular:      Rate and Rhythm: Normal rate and regular rhythm.      Pulses: Normal pulses.      Heart sounds: Normal heart sounds.   Pulmonary:      Effort: Pulmonary effort is normal.      Breath sounds: Normal breath sounds.      Comments: On supplemental oxygen.  Abdominal:      General: Bowel sounds are normal.      Palpations: Abdomen is soft.      Tenderness: There is no abdominal tenderness. There is no guarding.   Musculoskeletal:         General: No  tenderness. Normal range of motion.      Cervical back: Normal range of motion and neck supple.      Comments: Left upper extremity with AV fistula. Suture in place, wrapped with ace wrap, no active bleeding.    Skin:     General: Skin is warm and dry.   Neurological:      General: No focal deficit present.      Mental Status: She is alert and oriented to person, place, and time. Mental status is at baseline.   Psychiatric:         Mood and Affect: Mood normal.         Behavior: Behavior is cooperative.              CRANIAL NERVES     CN III, IV, VI   Pupils are equal, round, and reactive to light.       Significant Labs: All pertinent labs within the past 24 hours have been reviewed.  Recent Lab Results         03/09/24  1309        Albumin 3.8        3.8       ALP 77        77       ALT <3        <3       Anion Gap 12        12       PTT 21.9  Comment: Refer to local heparin nomogram for intensity/dose specific   therapeutic   range.         AST 21        21       Bands 1.0       Basophil % 0.0       BILIRUBIN TOTAL 0.4  Comment: For infants and newborns, interpretation of results should be based  on gestational age, weight and in agreement with clinical  observations.    Premature Infant recommended reference ranges:  Up to 24 hours.............<8.0 mg/dL  Up to 48 hours............<12.0 mg/dL  3-5 days..................<15.0 mg/dL  6-29 days.................<15.0 mg/dL          0.4  Comment: For infants and newborns, interpretation of results should be based  on gestational age, weight and in agreement with clinical  observations.    Premature Infant recommended reference ranges:  Up to 24 hours.............<8.0 mg/dL  Up to 48 hours............<12.0 mg/dL  3-5 days..................<15.0 mg/dL  6-29 days.................<15.0 mg/dL           Comment: Values of less than 100 pg/ml are consistent with non-CHF populations.       BUN 33        33       Calcium 9.4        9.4       Chloride 96        96        CO2 31        31       Creatinine 5.5        5.5       Differential Method Manual       eGFR 7.3        7.3       Eos % 2.0       Glucose 116        116       Gran % 69.0       Group & Rh O POS       Hematocrit 31.7       Hemoglobin 9.3       Immature Grans (Abs) CANCELED  Comment: Mild elevation in immature granulocytes is non specific and   can be seen in a variety of conditions including stress response,   acute inflammation, trauma and pregnancy. Correlation with other   laboratory and clinical findings is essential.    Result canceled by the ancillary.         Immature Granulocytes CANCELED  Comment: Result canceled by the ancillary.       INDIRECT ALFIE NEG       Lymph % 26.0       Magnesium  2.0       MCH 29.2       MCHC 29.3              Mono % 2.0       MPV 10.8       nRBC 0       Platelet Estimate Appears normal       Platelet Count 156       Potassium 3.9        3.9       PROTEIN TOTAL 6.3        6.3       RBC 3.18       RDW 16.7       Sodium 139        139       Specimen Outdate 03/12/2024 23:59       Troponin I High Sensitivity 20.7  Comment: Troponin results differ between methods. Do not use   results between Troponin methods interchangeably.    Alkaline Phospatase levels above 400 U/L may   cause false positive results.    Access hsTnI should not be used for patients taking   Asfotase jacob (Strensiq).          20.3  Comment: Troponin results differ between methods. Do not use   results between Troponin methods interchangeably.    Alkaline Phospatase levels above 400 U/L may   cause false positive results.    Access hsTnI should not be used for patients taking   Asfotase jacob (Strensiq).         WBC 4.11               Significant Imaging: I have reviewed all pertinent imaging results/findings within the past 24 hours.    X-Ray Chest AP Portable  Order: [id number]  Status: Final result       Visible to patient: No (inaccessible in Patient Portal)       Next appt: None    0 Result  Notes  Details    Reading Physician Reading Date Result Priority   Derek Olivier MD  [phone] 3/9/2024 STAT     Narrative & Impression  EXAMINATION:  XR CHEST AP PORTABLE     CLINICAL HISTORY:  Chest Pain;     FINDINGS:  Portable chest at 1247 compared with 07/19/2022 shows unchanged cardiomediastinal silhouette. Vascular stents centered in left axillary region unchanged.     Slight prominence of interstitial markings in lower lung zones remain unchanged.  No new confluent alveolar consolidation, pleural effusion, or pneumothorax.  Pulmonary vasculature is normal. Old healed right rib fractures evident.     Impression:     No acute cardiopulmonary abnormality.        Electronically signed by: Derek Olivier MD  Date:                                            03/09/2024  Time:                                           14:57     Assessment/Plan:     * Dialysis AV fistula malfunction  Bleeding to AV fistula. Sutures applied to AV fistula in ER. H/H stable.   Nephrology consulted. HD today  Pending US AV fistula, if bleeding patient will need temp line placed by Vascular        ESRD (end stage renal disease)  Creatine stable for now. BMP reviewed- noted Estimated Creatinine Clearance: 6.8 mL/min (A) (based on SCr of 5.5 mg/dL (H)). according to latest data. Based on current GFR, CKD stage is end stage.  Monitor UOP and serial BMP and adjust therapy as needed. Renally dose meds. Avoid nephrotoxic medications and procedures.    US AV fistula pending, Nephrology consulted, plan for HD today. K 3.9 today    Chronic obstructive pulmonary disease  Patient's COPD is controlled currently.  Patient is currently off COPD Pathway. Continue scheduled inhalers Supplemental oxygen and monitor respiratory status closely.     GERD (gastroesophageal reflux disease)  Continue home meds on reconciled      Hyperlipidemia   Patient is chronically on statin.will continue for now. Monitor clinically. Last LDL was   Lab Results   Component  Value Date    LDLCALC 71 02/17/2020             VTE Risk Mitigation (From admission, onward)           Ordered     Reason for No Pharmacological VTE Prophylaxis  Once        Question:  Reasons:  Answer:  Active Bleeding    03/09/24 1550     IP VTE HIGH RISK PATIENT  Once         03/09/24 1550     Place sequential compression device  Until discontinued         03/09/24 1550                         On 03/09/2024, patient should be placed in hospital observation services under my care in collaboration with Dr. Medina.    Restart home meds once reconciled.   Pending Usof AV fistula  Plan for HD today, if bleeding continues patient will need Vascular surgery to place temp line.   Trend for overt bleeding.          Karena Lam, NP  Department of Hospital Medicine  Formerly [facility] - Emergency Dept

## 2024-03-09 NOTE — ASSESSMENT & PLAN NOTE
Patient is chronically on statin.will continue for now. Monitor clinically. Last LDL was   Lab Results   Component Value Date    LDLCALC 71 02/17/2020

## 2024-03-09 NOTE — ED PROVIDER NOTES
"Encounter Date: 3/9/2024       History     Chief Complaint   Patient presents with    bleeding fistula     Pt was at dialysis for 5 min and fistula started bleeding and could not be stopped so she was sent to the ED for repair     This is a 84-year-old female past medical history of bipolar, COPD, end-stage renal disease on dialysis, anxiety, hypertension, questionable history of dementia presents emergency department for bleeding dialysis fistula.  It is reported that the patient was at dialysis and she had her session for about 5 minutes and then she started bleeding from the fistula.  They are unable to control it.  EMS was called and a tourniquet was applied to the left arm.  Bleeding currently controlled.  Tourniquet on since 1135.  Patient complains of pain to her left arm and some numbness and tingling in her fingertips.  No current bleeding.      Review of patient's allergies indicates:   Allergen Reactions    Metoprolol Other (See Comments)     Grand-daughter/care giver reported Pt has over reaction to this drug, Bp bottoms out along with heart rate    Abilify [aripiprazole]      dizziness    Codeine Other (See Comments)     Other reaction(s): Unknown "shaky" "crazy" "dizzy" stated per patient       Past Medical History:   Diagnosis Date    Alcoholism     no drink since 1984    Anxiety     Asthma     Bipolar affect, depressed     Bipolar disorder     COPD (chronic obstructive pulmonary disease)     Degenerative disc disease, lumbar 11/22/2019    Dialysis patient     [facility]    ESRD (end stage renal disease)     Full dentures     GERD (gastroesophageal reflux disease)     Hypertension     Pt states low b/p    Limb alert care status     NO BP/IV LEFT ARM    Pancreatitis     Stroke     Thyroid disease      Past Surgical History:   Procedure Laterality Date    APPENDECTOMY      CATARACT EXTRACTION Right     DIALYSIS FISTULA CREATION      left upper arm    EPIDURAL STEROID INJECTION INTO LUMBAR SPINE N/A " 2019    Procedure: Injection-steroid-epidural-lumbar;  Surgeon: Rosanna Khan MD;  Location: [facility] OR;  Service: Pain Management;  Laterality: N/A;  L5-S1      EPIDURAL STEROID INJECTION INTO LUMBAR SPINE N/A 2/3/2020    Procedure: Injection-steroid-epidural-lumbar;  Surgeon: Rosanna Khan MD;  Location: [facility] OR;  Service: Pain Management;  Laterality: N/A;  L5-S1    EPIDURAL STEROID INJECTION INTO LUMBAR SPINE N/A 2021    Procedure: Injection-steroid-epidural-lumbar;  Surgeon: Rosanna Khan MD;  Location: [facility] OR;  Service: Pain Management;  Laterality: N/A;  thickened fluids only per nursing facility [facility].    EYE SURGERY      FIXATION KYPHOPLASTY N/A 10/18/2019    Procedure: Kyphoplasty;  Surgeon: Rosanna Khan MD;  Location: [facility] OR;  Service: Pain Management;  Laterality: N/A;  L2    HYSTERECTOMY      OPEN REDUCTION AND INTERNAL FIXATION (ORIF) OF INJURY OF FOREARM Left 3/6/2020    Procedure: ORIF, FOREARM;  Surgeon: Tab Mendez MD;  Location: [facility] OR;  Service: Orthopedics;  Laterality: Left;    THYROIDECTOMY      THYROIDECTOMY      TRANSFORAMINAL EPIDURAL INJECTION OF STEROID Right 2021    Procedure: Injection,steroid,epidural,transforaminal approach;  Surgeon: Rosanna Khan MD;  Location: [facility] OR;  Service: Pain Management;  Laterality: Right;  L4-5, L5-S1    TRANSFORAMINAL EPIDURAL INJECTION OF STEROID Right 10/22/2021    Procedure: Injection,steroid,epidural,transforaminal approach Right L4-5, L5-S1;  Surgeon: Rosanna Khan MD;  Location: [facility] OR;  Service: Pain Management;  Laterality: Right;     Family History   Problem Relation Age of Onset    No Known Problems Mother     Diabetes Father     Heart disease Father         blood clot in lung went to heart    Cancer Sister         breast    Stroke Paternal Aunt     Cancer Sister         breast    Cancer Cousin         colon    Diabetes Cousin     Cancer Cousin         colon    COPD Sister          of COPD      Social History     Tobacco Use    Smoking status: Former     Current packs/day: 0.00     Types: Cigarettes     Quit date: 10/1/2018     Years since quittin.4    Smokeless tobacco: Never   Substance Use Topics    Alcohol use: No    Drug use: Never     Review of Systems   Constitutional:  Negative for chills and fever.   HENT:  Negative for sore throat.    Respiratory:  Negative for shortness of breath.    Cardiovascular:  Negative for chest pain and palpitations.   Gastrointestinal:  Negative for abdominal pain, nausea and vomiting.   Genitourinary:  Negative for dysuria.   Musculoskeletal:  Positive for myalgias. Negative for back pain.   Skin:  Negative for rash.   Neurological:  Negative for weakness and headaches.   Hematological:  Does not bruise/bleed easily.   All other systems reviewed and are negative.      Physical Exam     Initial Vitals [24 1226]   BP Pulse Resp Temp SpO2   (!) 171/86 77 18 98.2 °F (36.8 °C) 100 %      MAP       --         Physical Exam    Nursing note and vitals reviewed.  Constitutional: She appears well-developed and well-nourished. No distress.   HENT:   Head: Normocephalic and atraumatic.   Mouth/Throat: No oropharyngeal exudate.   Eyes: Conjunctivae and EOM are normal. Pupils are equal, round, and reactive to light.   Neck: Neck supple. No tracheal deviation present.   Normal range of motion.  Cardiovascular:  Normal rate, regular rhythm, normal heart sounds and intact distal pulses.           No murmur heard.  Pulmonary/Chest: Breath sounds normal. No stridor. No respiratory distress. She has no wheezes. She has no rhonchi. She has no rales.   Abdominal: Abdomen is soft. She exhibits no distension. There is no abdominal tenderness. There is no rebound.   Musculoskeletal:         General: No tenderness or edema. Normal range of motion.      Cervical back: Normal range of motion and neck supple.      Comments: Left upper arm has a tourniquet in place.  There is a  dialysis fistula present.  There is a small area where it appears that the bleeding was.  It is in the midportion of the dialysis fistula.     Neurological: She is alert and oriented to person, place, and time. She has normal strength. No cranial nerve deficit or sensory deficit.   Skin: Skin is warm and dry. Capillary refill takes less than 2 seconds. No rash noted. No erythema. No pallor.   Psychiatric: She has a normal mood and affect. Thought content normal.         ED Course   Lac Repair    Date/Time: 3/9/2024 12:52 PM    Performed by: Molina Hansen DO  Authorized by: Molina Hansen DO    Consent:     Consent obtained:  Emergent situation    Risks discussed:  Need for additional repair, pain, nerve damage, vascular damage and poor wound healing    Alternatives discussed:  No treatment  Universal protocol:     Patient identity confirmed:  Verbally with patient  Anesthesia:     Anesthesia method:  None  Laceration details:     Location:  Shoulder/arm    Shoulder/arm location:  L upper arm    Length (cm):  0.5  Pre-procedure details:     Preparation:  Patient was prepped and draped in usual sterile fashion  Exploration:     Limited defect created (wound extended): no      Hemostasis achieved with:  Tourniquet    Imaging outcome: foreign body not noted      Wound exploration: wound explored through full range of motion      Wound extent: no foreign bodies/material noted and no underlying fracture noted      Contaminated: no    Treatment:     Area cleansed with:  Povidone-iodine    Amount of cleaning:  Standard    Debridement:  None    Undermining:  None  Skin repair:     Repair method:  Sutures    Suture size:  5-0    Suture material:  Prolene    Suture technique:  Figure eight and simple interrupted    Number of sutures:  2  Approximation:     Approximation:  Close  Repair type:     Repair type:  Simple  Post-procedure details:     Dressing:  Bulky dressing    Procedure completion:  Tolerated well, no  immediate complications    Labs Reviewed   COMPREHENSIVE METABOLIC PANEL - Abnormal; Notable for the following components:       Result Value    CO2 31 (*)     Glucose 116 (*)     BUN 33 (*)     Creatinine 5.5 (*)     ALT <3 (*)     eGFR 7.3 (*)     All other components within normal limits   CBC W/ AUTO DIFFERENTIAL - Abnormal; Notable for the following components:    RBC 3.18 (*)     Hemoglobin 9.3 (*)     Hematocrit 31.7 (*)      (*)     MCHC 29.3 (*)     RDW 16.7 (*)     Mono % 2.0 (*)     All other components within normal limits   COMPREHENSIVE METABOLIC PANEL - Abnormal; Notable for the following components:    CO2 31 (*)     Glucose 116 (*)     BUN 33 (*)     Creatinine 5.5 (*)     ALT <3 (*)     eGFR 7.3 (*)     All other components within normal limits   TROPONIN I HIGH SENSITIVITY - Abnormal; Notable for the following components:    Troponin I High Sensitivity 20.3 (*)     All other components within normal limits   TROPONIN I HIGH SENSITIVITY - Abnormal; Notable for the following components:    Troponin I High Sensitivity 20.7 (*)     All other components within normal limits   B-TYPE NATRIURETIC PEPTIDE - Abnormal; Notable for the following components:     (*)     All other components within normal limits   APTT   MAGNESIUM   TYPE & SCREEN        ECG Results              EKG 12-lead (In process)        Collection Time Result Time QRS Duration OHS QTC Calculation    03/09/24 13:29:46 03/09/24 13:45:32 80 448                     In process by Interface, Lab In [facility] (03/09/24 13:45:34)                   Narrative:    Test Reason : R07.9,    Vent. Rate : 075 BPM     Atrial Rate : 075 BPM     P-R Int : 204 ms          QRS Dur : 080 ms      QT Int : 402 ms       P-R-T Axes : 072 -47 063 degrees     QTc Int : 448 ms    Normal sinus rhythm  Possible Left atrial enlargement  Left axis deviation  Septal infarct ,age undetermined  Abnormal ECG  When compared with ECG of 10-MAR-2023  19:33,  Premature atrial complexes are no longer Present  Septal infarct is now Present    Referred By: [email or web address]   SELF           Confirmed By:                                   Imaging Results              US Hemodialysis Access (Final result)  Result time 03/09/24 16:16:39      Final result by Jacob Lee Jr., MD (03/09/24 16:16:39)                   Narrative:    Examination: Left brachiocephalic Doppler ultrasound.    CLINICAL HISTORY: Bleeding from the patient's known dialysis access site with evidence of inability to perform dialysis.? Thrombosis.    TECHNIQUE: Real-time sonographic patient's left brachiocephalic region was completed utilizing both grayscale and color flow imaging.    FINDINGS: Dilated brachiocephalic fistula with evidence of significant admixing and tortuosity of the Doppler signal in the distal segment. There is no evidence of clot formation established about Doppler inspection as all the vessels appear open. Flow velocity within the brachiocephalic fistula are within the range of normal for with measurable velocities 161.0 cm/s.    IMPRESSION: Patent brachiocephalic fistula.    Electronically signed by:  Jacob Lee MD  03/09/2024 04:16 PM [facility] Workstation: [id number]                                     X-Ray Chest AP Portable (Final result)  Result time 03/09/24 14:57:18      Final result by Derek Olivier MD (03/09/24 14:57:18)                   Impression:      No acute cardiopulmonary abnormality.      Electronically signed by: Derek Olivier MD  Date:    03/09/2024  Time:    14:57               Narrative:    EXAMINATION:  XR CHEST AP PORTABLE    CLINICAL HISTORY:  Chest Pain;    FINDINGS:  Portable chest at 1247 compared with 07/19/2022 shows unchanged cardiomediastinal silhouette. Vascular stents centered in left axillary region unchanged.    Slight prominence of interstitial markings in lower lung zones remain unchanged.  No new confluent alveolar consolidation, pleural  effusion, or pneumothorax.  Pulmonary vasculature is normal. Old healed right rib fractures evident.                                       Medications   sodium chloride 0.9% flush 3 mL (has no administration in time range)   melatonin tablet 6 mg (has no administration in time range)   ondansetron injection 4 mg (has no administration in time range)   senna-docusate 8.6-50 mg per tablet 1 tablet (has no administration in time range)   aluminum-magnesium hydroxide-simethicone 200-200-20 mg/5 mL suspension 30 mL (has no administration in time range)   naloxone 0.4 mg/mL injection 0.02 mg (has no administration in time range)   ALPRAZolam tablet 0.5 mg (has no administration in time range)   benztropine tablet 1 mg (has no administration in time range)   famotidine tablet 20 mg (has no administration in time range)   HYDROcodone-acetaminophen 5-325 mg per tablet 1 tablet (has no administration in time range)   sevelamer carbonate tablet 800 mg (has no administration in time range)   LIDOcaine-EPINEPHrine 1%-1:100,000 injection 10 mL (10 mLs Intradermal Given by Provider 3/9/24 1226)   HYDROcodone-acetaminophen 5-325 mg per tablet 1 tablet (1 tablet Oral Given 3/9/24 1246)     Medical Decision Making  Differential includes but not limited to coagulopathy, bleeding dialysis fistula, clotted dialysis fistula, anemia,     Emergent evaluation of an 84 year female presents emergency department with bleeding dialysis fistula.  Patient has a tourniquet in place upon arrival.  Patient had 1 simple interrupted suture placed followed by a figure-of-eight suture placed.  Bleeding was controlled.  Tourniquet was taken down.  Patient had no further bleeding.  Patient admitted for observation admitted to obtain dialysis per nephrology request.  Patient hemodynamically stable.  Does not need a current blood transfusion.  Is not actively bleeding/hemorrhaging.  Patient will be admitted to telemetry.    A dictation software program was  used for this note.  Please expect some simple typographical  errors in this note.     Amount and/or Complexity of Data Reviewed  External Data Reviewed: labs and notes.  Labs: ordered. Decision-making details documented in ED Course.  Radiology: ordered and independent interpretation performed. Decision-making details documented in ED Course.    Risk  Prescription drug management.              Attending Attestation:             Attending ED Notes:   Attending Critical Care:   Critical Care Times:   Direct Patient Care (initial evaluation, reassessments, and time considering the case)................................................................10 minutes.   Additional History from reviewing old medical records or taking additional history from the family, EMS, PCP, etc.......................10 minutes.   Ordering, Reviewing, and Interpreting Diagnostic Studies...............................................................................................................10 minutes.   Documentation..................................................................................................................................................................................5 minutes.   ==============================================================  · Total Critical Care Time - exclusive of procedural time: 35 minutes.  ==============================================================  Critical care was necessary to treat or prevent imminent or life-threatening deterioration of the following conditions:  Acute bleeding fistula.   Critical care was time spent personally by me on the following activities: obtaining history from patient or relative, examination of patient, review of x-rays / CT sent with the patient, ordering lab, x-rays, and/or EKG, development of treatment plan with patient or relative, ordering and performing treatments and interventions, interpretation of cardiac measurements and re-evaluation of  patient's conition.   Critical Care Condition: potentially life-threatening         ED Course as of 03/09/24 2140   Sat Mar 09, 2024   1315 No further bleeding noted, palpable thrill and bruit in the fistula. [JR]      ED Course User Index  [JR] Molina Hansen DO                           Clinical Impression:  Final diagnoses:  [T82.9XXA] Complication of AV dialysis fistula, initial encounter          ED Disposition Condition    Observation Stable                Molina Hansen DO  03/09/24 2141     97.3

## 2024-03-09 NOTE — HPI
84 year old with a past medical history of Hypothyroidism, HLD, GERD, CVA, CAD on plavix, Anemia in CKD, ESRD on HD, COPD on home oxygen, questionable dementia whom presented to the emergency room from [facility] for bleeding dialysis fistula. Patient received 5 minutes of HD today before her fistula began bleeding. EMS was called, a tourniquet was applied to the left arm. Bleeding currently controlled. Tourniquet on since 1135. Patient complains of pain to her left arm and some numbness and tingling in her fingertips.In ER, sutures to LUE AV fistula placed. BP remained stable at 135/74> H/H 9.3/31.7, Plt 156, K 3.9, Cr 5.5, , Troponin I high sensitivity 20.3 with repeat Troponin I 20.7. Chest xray with no acute cardiopulmonary process. US of AV fistula pending. Nephrology consulted in ER. Will admit to hospital medicine for further medical management, plan for HD today, if AV fistula is actively bleeding patient may need temporary line to run HD today. Monitor for overt bleeding, trend h/h. Awaiting US results.

## 2024-03-09 NOTE — SUBJECTIVE & OBJECTIVE
Past Medical History:   Diagnosis Date    Alcoholism     no drink since 1984    Anxiety     Asthma     Bipolar affect, depressed     Bipolar disorder     COPD (chronic obstructive pulmonary disease)     Degenerative disc disease, lumbar 11/22/2019    Dialysis patient     Tue-Thur-Sat    ESRD (end stage renal disease)     Full dentures     GERD (gastroesophageal reflux disease)     Hypertension     Pt states low b/p    Limb alert care status     NO BP/IV LEFT ARM    Pancreatitis     Stroke     Thyroid disease        Past Surgical History:   Procedure Laterality Date    APPENDECTOMY      CATARACT EXTRACTION Right     DIALYSIS FISTULA CREATION      left upper arm    EPIDURAL STEROID INJECTION INTO LUMBAR SPINE N/A 11/22/2019    Procedure: Injection-steroid-epidural-lumbar;  Surgeon: Rosanna Khan MD;  Location: [facility] OR;  Service: Pain Management;  Laterality: N/A;  L5-S1      EPIDURAL STEROID INJECTION INTO LUMBAR SPINE N/A 2/3/2020    Procedure: Injection-steroid-epidural-lumbar;  Surgeon: Rosanna Khan MD;  Location: [facility] OR;  Service: Pain Management;  Laterality: N/A;  L5-S1    EPIDURAL STEROID INJECTION INTO LUMBAR SPINE N/A 2/26/2021    Procedure: Injection-steroid-epidural-lumbar;  Surgeon: Rosanna Khan MD;  Location: [facility] OR;  Service: Pain Management;  Laterality: N/A;  thickened fluids only per nursing facility [facility].    EYE SURGERY      FIXATION KYPHOPLASTY N/A 10/18/2019    Procedure: Kyphoplasty;  Surgeon: Rosanna Khan MD;  Location: [facility] OR;  Service: Pain Management;  Laterality: N/A;  L2    HYSTERECTOMY      OPEN REDUCTION AND INTERNAL FIXATION (ORIF) OF INJURY OF FOREARM Left 3/6/2020    Procedure: ORIF, FOREARM;  Surgeon: Tab Mendez MD;  Location: [facility] OR;  Service: Orthopedics;  Laterality: Left;    THYROIDECTOMY      THYROIDECTOMY      TRANSFORAMINAL EPIDURAL INJECTION OF STEROID Right 8/9/2021    Procedure: Injection,steroid,epidural,transforaminal approach;  Surgeon:  "Rosanna Khan MD;  [facility];  Service: Pain Management;  Laterality: Right;  L4-5, L5-S1    TRANSFORAMINAL EPIDURAL INJECTION OF STEROID Right 10/22/2021    Procedure: Injection,steroid,epidural,transforaminal approach Right L4-5, L5-S1;  Surgeon: Rosanna Khan MD;  [facility];  Service: Pain Management;  Laterality: Right;       Review of patient's allergies indicates:   Allergen Reactions    Metoprolol Other (See Comments)     Grand-daughter/care giver reported Pt has over reaction to this drug, Bp bottoms out along with heart rate    Abilify [aripiprazole]      dizziness    Codeine Other (See Comments)     Other reaction(s): Unknown "shaky" "crazy" "dizzy" stated per patient         Current Facility-Administered Medications on File Prior to Encounter   Medication    0.9%  NaCl infusion    electrolyte-S (ISOLYTE)    lactated ringers infusion    lactated ringers infusion    lactated ringers infusion    lactated ringers infusion    lidocaine (PF) 10 mg/ml (1%) injection 10 mg     Current Outpatient Medications on File Prior to Encounter   Medication Sig    acetaminophen (TYLENOL) 500 MG tablet Take 500 mg by mouth every 6 (six) hours as needed for Pain.    alendronate (FOSAMAX) 70 MG tablet Take 70 mg by mouth every 7 days.    ALPRAZolam (XANAX) 0.5 MG tablet Take 0.5 mg by mouth 2 (two) times daily as needed.    ARIPiprazole (ABILIFY) 2 MG Tab Take 1 tablet (2 mg total) by mouth every evening.    benztropine (COGENTIN) 1 MG tablet Take 1 tablet (1 mg total) by mouth 2 (two) times daily.    bisacodyl (DULCOLAX) 5 mg EC tablet Take 1 tablet (5 mg total) by mouth daily as needed for Constipation.    busPIRone (BUSPAR) 10 MG tablet Take 1 tablet (10 mg total) by mouth 3 (three) times daily. (Patient taking differently: Take 15 mg by mouth 3 (three) times daily. )    cyproheptadine (PERIACTIN) 4 mg tablet Take 4 mg by mouth 4 (four) times daily.    DAILY-YANDY tablet Take 1 tablet by mouth once " daily.     diazePAM (VALIUM) 5 MG tablet Take 5 mg by mouth as needed for Anxiety. Prior to dental procedure    dicyclomine (BENTYL) 10 MG capsule dicyclomine 10 mg capsule    famotidine (PEPCID) 20 MG tablet Take 20 mg by mouth every evening.     FLUoxetine 20 MG capsule Take 1 capsule (20 mg total) by mouth once daily.    HYDROcodone-acetaminophen (NORCO) 5-325 mg per tablet Take 1 tablet by mouth every 6 (six) hours as needed.    levothyroxine (SYNTHROID) 100 MCG tablet Take 100 mcg by mouth before breakfast.     menthol (BIOFREEZE, MENTHOL,) 4 % Gel Apply topically. Left neck prn    midodrine (PROAMATINE) 10 MG tablet Take 10 mg by mouth every 7 days. Prior to dialysis treatment    mirtazapine (REMERON) 15 MG tablet Take 15 mg by mouth nightly.    montelukast (SINGULAIR) 10 mg tablet Take 1 tablet (10 mg total) by mouth every evening.    nitrofurantoin, macrocrystal-monohydrate, (MACROBID) 100 MG capsule Take 100 mg by mouth once daily.    ondansetron (ZOFRAN) 8 MG tablet Take 1 tablet (8 mg total) by mouth every 8 (eight) hours as needed for Nausea. (Patient taking differently: Take 8 mg by mouth every 6 (six) hours as needed for Nausea.)    polyethylene glycol (GLYCOLAX) 17 gram PwPk Take 17 g by mouth daily as needed.    rosuvastatin (CRESTOR) 20 MG tablet Take 1 tablet (20 mg total) by mouth once daily.    sevelamer carbonate (RENVELA) 0.8 gram PwPk Take by mouth.    traMADoL (ULTRAM) 50 mg tablet Take 1 tablet (50 mg total) by mouth every 12 (twelve) hours as needed.    traMADoL (ULTRAM) 50 mg tablet Take 1 tablet (50 mg total) by mouth every 6 (six) hours as needed.    traZODone (DESYREL) 100 MG tablet Take 100 mg by mouth nightly as needed.      Family History       Problem Relation (Age of Onset)    COPD Sister    Cancer Sister, Sister, Cousin, Cousin    Diabetes Father, Cousin    Heart disease Father    No Known Problems Mother    Stroke Paternal Aunt          Tobacco Use    Smoking status: Former      Current packs/day: 0.00     Types: Cigarettes     Quit date: 10/1/2018     Years since quittin.4    Smokeless tobacco: Never   Substance and Sexual Activity    Alcohol use: No    Drug use: Never    Sexual activity: Not Currently     Review of Systems   Constitutional:  Negative for activity change, chills and fever.   HENT:  Negative for congestion, rhinorrhea and trouble swallowing.    Respiratory:  Positive for shortness of breath. Negative for cough and chest tightness.         Chronic sob due to COPD   Cardiovascular:  Negative for chest pain, palpitations and leg swelling.   Gastrointestinal:  Negative for abdominal pain, nausea and vomiting.   Genitourinary:  Negative for difficulty urinating, dysuria and hematuria.   Musculoskeletal:  Positive for myalgias.   Skin:  Positive for wound. Negative for color change and pallor.   Psychiatric/Behavioral:  Positive for suicidal ideas. The patient is nervous/anxious.      Objective:     Vital Signs (Most Recent):  Temp: 98.2 °F (36.8 °C) (24 1226)  Pulse: 82 (24 1456)  Resp: 20 (24 1456)  BP: 135/74 (24 1456)  SpO2: 100 % (24 1456) Vital Signs (24h Range):  Temp:  [98.2 °F (36.8 °C)] 98.2 °F (36.8 °C)  Pulse:  [73-82] 82  Resp:  [18-28] 20  SpO2:  [96 %-100 %] 100 %  BP: (124-171)/(62-89) 135/74     Weight: 54.4 kg (120 lb)  Body mass index is 18.79 kg/m².     Physical Exam  Vitals reviewed.   Constitutional:       Appearance: Normal appearance. She is well-developed.      Comments: Elderly, frail, confused    HENT:      Head: Normocephalic and atraumatic.      Mouth/Throat:      Mouth: Mucous membranes are dry.   Eyes:      Extraocular Movements: Extraocular movements intact.      Pupils: Pupils are equal, round, and reactive to light.   Cardiovascular:      Rate and Rhythm: Normal rate and regular rhythm.      Pulses: Normal pulses.      Heart sounds: Normal heart sounds.   Pulmonary:      Effort: Pulmonary effort is normal.       Breath sounds: Normal breath sounds.      Comments: On supplemental oxygen.  Abdominal:      General: Bowel sounds are normal.      Palpations: Abdomen is soft.      Tenderness: There is no abdominal tenderness. There is no guarding.   Musculoskeletal:         General: No tenderness. Normal range of motion.      Cervical back: Normal range of motion and neck supple.      Comments: Left upper extremity with AV fistula. Suture in place, wrapped with ace wrap, no active bleeding.    Skin:     General: Skin is warm and dry.   Neurological:      General: No focal deficit present.      Mental Status: She is alert and oriented to person, place, and time. Mental status is at baseline.   Psychiatric:         Mood and Affect: Mood normal.         Behavior: Behavior is cooperative.              CRANIAL NERVES     CN III, IV, VI   Pupils are equal, round, and reactive to light.       Significant Labs: All pertinent labs within the past 24 hours have been reviewed.  Recent Lab Results         03/09/24  1309        Albumin 3.8        3.8       ALP 77        77       ALT <3        <3       Anion Gap 12        12       PTT 21.9  Comment: Refer to local heparin nomogram for intensity/dose specific   therapeutic   range.         AST 21        21       Bands 1.0       Basophil % 0.0       BILIRUBIN TOTAL 0.4  Comment: For infants and newborns, interpretation of results should be based  on gestational age, weight and in agreement with clinical  observations.    Premature Infant recommended reference ranges:  Up to 24 hours.............<8.0 mg/dL  Up to 48 hours............<12.0 mg/dL  3-5 days..................<15.0 mg/dL  6-29 days.................<15.0 mg/dL          0.4  Comment: For infants and newborns, interpretation of results should be based  on gestational age, weight and in agreement with clinical  observations.    Premature Infant recommended reference ranges:  Up to 24 hours.............<8.0 mg/dL  Up to 48  hours............<12.0 mg/dL  3-5 days..................<15.0 mg/dL  6-29 days.................<15.0 mg/dL           Comment: Values of less than 100 pg/ml are consistent with non-CHF populations.       BUN 33        33       Calcium 9.4        9.4       Chloride 96        96       CO2 31        31       Creatinine 5.5        5.5       Differential Method Manual       eGFR 7.3        7.3       Eos % 2.0       Glucose 116        116       Gran % 69.0       Group & Rh O POS       Hematocrit 31.7       Hemoglobin 9.3       Immature Grans (Abs) CANCELED  Comment: Mild elevation in immature granulocytes is non specific and   can be seen in a variety of conditions including stress response,   acute inflammation, trauma and pregnancy. Correlation with other   laboratory and clinical findings is essential.    Result canceled by the ancillary.         Immature Granulocytes CANCELED  Comment: Result canceled by the ancillary.       INDIRECT ALIFE NEG       Lymph % 26.0       Magnesium  2.0       MCH 29.2       MCHC 29.3              Mono % 2.0       MPV 10.8       nRBC 0       Platelet Estimate Appears normal       Platelet Count 156       Potassium 3.9        3.9       PROTEIN TOTAL 6.3        6.3       RBC 3.18       RDW 16.7       Sodium 139        139       Specimen Outdate 03/12/2024 23:59       Troponin I High Sensitivity 20.7  Comment: Troponin results differ between methods. Do not use   results between Troponin methods interchangeably.    Alkaline Phospatase levels above 400 U/L may   cause false positive results.    Access hsTnI should not be used for patients taking   Asfotase jacob (Strensiq).          20.3  Comment: Troponin results differ between methods. Do not use   results between Troponin methods interchangeably.    Alkaline Phospatase levels above 400 U/L may   cause false positive results.    Access hsTnI should not be used for patients taking   Asfotase jacob (Strensiq).         WBC 4.11                Significant Imaging: I have reviewed all pertinent imaging results/findings within the past 24 hours.    X-Ray Chest AP Portable  Order: [id number]  Status: Final result       Visible to patient: No (inaccessible in Patient Portal)       Next appt: None    0 Result Notes  Details    Reading Physician Reading Date Result Priority   Derek Olivier MD  [phone] 3/9/2024 STAT     Narrative & Impression  EXAMINATION:  XR CHEST AP PORTABLE     CLINICAL HISTORY:  Chest Pain;     FINDINGS:  Portable chest at 1247 compared with 07/19/2022 shows unchanged cardiomediastinal silhouette. Vascular stents centered in left axillary region unchanged.     Slight prominence of interstitial markings in lower lung zones remain unchanged.  No new confluent alveolar consolidation, pleural effusion, or pneumothorax.  Pulmonary vasculature is normal. Old healed right rib fractures evident.     Impression:     No acute cardiopulmonary abnormality.        Electronically signed by: Derek Olivier MD  Date:                                            03/09/2024  Time:                                           14:57

## 2024-03-09 NOTE — ASSESSMENT & PLAN NOTE
Creatine stable for now. BMP reviewed- noted Estimated Creatinine Clearance: 6.8 mL/min (A) (based on SCr of 5.5 mg/dL (H)). according to latest data. Based on current GFR, CKD stage is end stage.  Monitor UOP and serial BMP and adjust therapy as needed. Renally dose meds. Avoid nephrotoxic medications and procedures.    US AV fistula pending, Nephrology consulted, plan for HD today. K 3.9 today

## 2024-03-09 NOTE — CONSULTS
Consult Note  Nephrology    Consult Requested By: Molina Hansen DO    Reason for Consult: ESRD, dependent on dialysis    SUBJECTIVE:     History of Present Illness:   83 y/o female patient known to our practice, has out patient dialysis 3x wk on TTS schedule.  She presented to the out patient unit today, got about 5 min of HD, started bleeding uncontollably.  Staff notified EMS and pt was transported to ER, suture placed by ER physicians.    3/9  called Aretha at [facility], states pt had fistulagram on Wednesday w/Dr. Harris.  Awaiting lab results, also ordered US of fistula and awaiting results.  Pt seen and examined in ER.    Assessment/plan:    ESRD, dependent on dialysis  AVF bleeding  SHPT  Anemia of chronic dz  HTN    --will order HD on pt's schedule, TTS.  If labs are critical, may need temporary line to run today.  --got sutured in ER and US ordered  --cont sevelemer w/meals  --epo w/HD as indicated  --VSS.  Cont home meds    Past Medical History:   Diagnosis Date    Alcoholism     no drink since 1984    Anxiety     Asthma     Bipolar affect, depressed     Bipolar disorder     COPD (chronic obstructive pulmonary disease)     Degenerative disc disease, lumbar 11/22/2019    Dialysis patient     Julianna-Sat    ESRD (end stage renal disease)     Full dentures     GERD (gastroesophageal reflux disease)     Hypertension     Pt states low b/p    Limb alert care status     NO BP/IV LEFT ARM    Pancreatitis     Stroke     Thyroid disease      Past Surgical History:   Procedure Laterality Date    APPENDECTOMY      CATARACT EXTRACTION Right     DIALYSIS FISTULA CREATION      left upper arm    EPIDURAL STEROID INJECTION INTO LUMBAR SPINE N/A 11/22/2019    Procedure: Injection-steroid-epidural-lumbar;  Surgeon: Rosanna Khan MD;  Location: [facility];  Service: Pain Management;  Laterality: N/A;  L5-S1      EPIDURAL STEROID INJECTION INTO LUMBAR SPINE N/A 2/3/2020    Procedure: Injection-steroid-epidural-lumbar;   Surgeon: Rosanna Khan MD;  Location: [facility] OR;  Service: Pain Management;  Laterality: N/A;  L5-S1    EPIDURAL STEROID INJECTION INTO LUMBAR SPINE N/A 2021    Procedure: Injection-steroid-epidural-lumbar;  Surgeon: Rosanna Khan MD;  Location: [facility] OR;  Service: Pain Management;  Laterality: N/A;  thickened fluids only per nursing facility [facility].    EYE SURGERY      FIXATION KYPHOPLASTY N/A 10/18/2019    Procedure: Kyphoplasty;  Surgeon: Rosanna Khan MD;  Location: [facility] OR;  Service: Pain Management;  Laterality: N/A;  L2    HYSTERECTOMY      OPEN REDUCTION AND INTERNAL FIXATION (ORIF) OF INJURY OF FOREARM Left 3/6/2020    Procedure: ORIF, FOREARM;  Surgeon: Tab Mendez MD;  Location: [facility] OR;  Service: Orthopedics;  Laterality: Left;    THYROIDECTOMY      THYROIDECTOMY      TRANSFORAMINAL EPIDURAL INJECTION OF STEROID Right 2021    Procedure: Injection,steroid,epidural,transforaminal approach;  Surgeon: Rosanna Khan MD;  Location: [facility] OR;  Service: Pain Management;  Laterality: Right;  L4-5, L5-S1    TRANSFORAMINAL EPIDURAL INJECTION OF STEROID Right 10/22/2021    Procedure: Injection,steroid,epidural,transforaminal approach Right L4-5, L5-S1;  Surgeon: Rosanna Khan MD;  Location: [facility] OR;  Service: Pain Management;  Laterality: Right;     Family History   Problem Relation Age of Onset    No Known Problems Mother     Diabetes Father     Heart disease Father         blood clot in lung went to heart    Cancer Sister         breast    Stroke Paternal Aunt     Cancer Sister         breast    Cancer Cousin         colon    Diabetes Cousin     Cancer Cousin         colon    COPD Sister          of COPD     Social History     Tobacco Use    Smoking status: Former     Current packs/day: 0.00     Types: Cigarettes     Quit date: 10/1/2018     Years since quittin.4    Smokeless tobacco: Never   Substance Use Topics    Alcohol use: No    Drug use: Never       Review of patient's  "allergies indicates:   Allergen Reactions    Metoprolol Other (See Comments)     Grand-daughter/care giver reported Pt has over reaction to this drug, Bp bottoms out along with heart rate    Abilify [aripiprazole]      dizziness    Codeine Other (See Comments)     Other reaction(s): Unknown "shaky" "crazy" "dizzy" stated per patient          Review of Systems:  Negative unless noted above    OBJECTIVE:     Vital Signs Range (Last 24H):  Temp:  [98.2 °F (36.8 °C)]   Pulse:  [75-82]   Resp:  [18-19]   BP: (138-171)/(70-89)   SpO2:  [99 %-100 %]     Physical Exam:  General- NAD noted  HEENT- WNL  Neck- supple  CV- Regular rate and rhythm  Resp- Lungs CTA Bilaterally, No increased WOB  GI- Non tender/non-distended, BS normoactive x4 quads  Extrem- No cyanosis, clubbing, edema.  Derm- skin w/d  Neuro-  No flap.     Body mass index is 18.79 kg/m².    Laboratory:  CBC:   Recent Labs   Lab 03/09/24  1309   WBC 4.11   RBC 3.18*   HGB 9.3*   HCT 31.7*      *   MCH 29.2   MCHC 29.3*     CMP: No results for input(s): "GLU", "CALCIUM", "ALBUMIN", "PROT", "NA", "K", "CO2", "CL", "BUN", "CREATININE", "ALKPHOS", "ALT", "AST", "BILITOT" in the last 168 hours.    Diagnostic Results:  Labs: Reviewed        ASSESSMENT/PLAN:     There are no hospital problems to display for this patient.        Thank you for allowing us to participate in the care of your patient. We will follow the patient and provide recommendations as needed.      Time spent seeing patient( greater than 1/2 spent in direct contact) :    Maria E Piña NP    "

## 2024-03-09 NOTE — ASSESSMENT & PLAN NOTE
Bleeding to AV fistula. Sutures applied to AV fistula in ER. H/H stable.   Nephrology consulted. HD today  Pending US AV fistula, if bleeding patient will need temp line placed by Vascular

## 2024-03-10 VITALS
RESPIRATION RATE: 20 BRPM | OXYGEN SATURATION: 97 % | WEIGHT: 100.75 LBS | BODY MASS INDEX: 15.27 KG/M2 | DIASTOLIC BLOOD PRESSURE: 59 MMHG | TEMPERATURE: 99 F | HEART RATE: 70 BPM | HEIGHT: 68 IN | SYSTOLIC BLOOD PRESSURE: 110 MMHG

## 2024-03-10 PROBLEM — D63.1 ANEMIA IN ESRD (END-STAGE RENAL DISEASE): Status: ACTIVE | Noted: 2019-09-02

## 2024-03-10 PROBLEM — N18.6 ANEMIA IN ESRD (END-STAGE RENAL DISEASE): Status: ACTIVE | Noted: 2019-09-02

## 2024-03-10 PROBLEM — T82.9XXA COMPLICATION OF ARTERIOVENOUS DIALYSIS FISTULA: Status: ACTIVE | Noted: 2024-03-09

## 2024-03-10 LAB
ALBUMIN SERPL BCP-MCNC: 3.4 G/DL (ref 3.5–5.2)
ALP SERPL-CCNC: 70 U/L (ref 55–135)
ALT SERPL W/O P-5'-P-CCNC: <3 U/L (ref 10–44)
ANION GAP SERPL CALC-SCNC: 5 MMOL/L (ref 8–16)
APTT PPP: 25.9 SEC (ref 21–32)
AST SERPL-CCNC: 19 U/L (ref 10–40)
BASOPHILS # BLD AUTO: 0.01 K/UL (ref 0–0.2)
BASOPHILS NFR BLD: 0.2 % (ref 0–1.9)
BILIRUB SERPL-MCNC: 0.3 MG/DL (ref 0.1–1)
BUN SERPL-MCNC: 20 MG/DL (ref 8–23)
CALCIUM SERPL-MCNC: 9 MG/DL (ref 8.7–10.5)
CHLORIDE SERPL-SCNC: 105 MMOL/L (ref 95–110)
CO2 SERPL-SCNC: 29 MMOL/L (ref 23–29)
CREAT SERPL-MCNC: 4.4 MG/DL (ref 0.5–1.4)
DIFFERENTIAL METHOD BLD: ABNORMAL
EOSINOPHIL # BLD AUTO: 0.1 K/UL (ref 0–0.5)
EOSINOPHIL NFR BLD: 1.5 % (ref 0–8)
ERYTHROCYTE [DISTWIDTH] IN BLOOD BY AUTOMATED COUNT: 16.8 % (ref 11.5–14.5)
EST. GFR  (NO RACE VARIABLE): 9.5 ML/MIN/1.73 M^2
GLUCOSE SERPL-MCNC: 88 MG/DL (ref 70–110)
HCT VFR BLD AUTO: 27.7 % (ref 37–48.5)
HGB BLD-MCNC: 7.9 G/DL (ref 12–16)
IMM GRANULOCYTES # BLD AUTO: 0.08 K/UL (ref 0–0.04)
IMM GRANULOCYTES NFR BLD AUTO: 1.7 % (ref 0–0.5)
LYMPHOCYTES # BLD AUTO: 1 K/UL (ref 1–4.8)
LYMPHOCYTES NFR BLD: 21.6 % (ref 18–48)
MAGNESIUM SERPL-MCNC: 1.9 MG/DL (ref 1.6–2.6)
MCH RBC QN AUTO: 28.9 PG (ref 27–31)
MCHC RBC AUTO-ENTMCNC: 28.5 G/DL (ref 32–36)
MCV RBC AUTO: 102 FL (ref 82–98)
MONOCYTES # BLD AUTO: 0.3 K/UL (ref 0.3–1)
MONOCYTES NFR BLD: 6.8 % (ref 4–15)
NEUTROPHILS # BLD AUTO: 3.2 K/UL (ref 1.8–7.7)
NEUTROPHILS NFR BLD: 68.2 % (ref 38–73)
NRBC BLD-RTO: 0 /100 WBC
PLATELET # BLD AUTO: 150 K/UL (ref 150–450)
PMV BLD AUTO: 10.7 FL (ref 9.2–12.9)
POTASSIUM SERPL-SCNC: 4.2 MMOL/L (ref 3.5–5.1)
PROT SERPL-MCNC: 5.6 G/DL (ref 6–8.4)
RBC # BLD AUTO: 2.73 M/UL (ref 4–5.4)
SODIUM SERPL-SCNC: 139 MMOL/L (ref 136–145)
WBC # BLD AUTO: 4.68 K/UL (ref 3.9–12.7)

## 2024-03-10 PROCEDURE — 96372 THER/PROPH/DIAG INJ SC/IM: CPT | Performed by: NURSE PRACTITIONER

## 2024-03-10 PROCEDURE — 80053 COMPREHEN METABOLIC PANEL: CPT | Performed by: NURSE PRACTITIONER

## 2024-03-10 PROCEDURE — 94761 N-INVAS EAR/PLS OXIMETRY MLT: CPT

## 2024-03-10 PROCEDURE — 27000221 HC OXYGEN, UP TO 24 HOURS

## 2024-03-10 PROCEDURE — G0378 HOSPITAL OBSERVATION PER HR: HCPCS

## 2024-03-10 PROCEDURE — 25000003 PHARM REV CODE 250: Performed by: INTERNAL MEDICINE

## 2024-03-10 PROCEDURE — 83735 ASSAY OF MAGNESIUM: CPT | Performed by: NURSE PRACTITIONER

## 2024-03-10 PROCEDURE — 25000003 PHARM REV CODE 250: Performed by: NURSE PRACTITIONER

## 2024-03-10 PROCEDURE — 99900031 HC PATIENT EDUCATION (STAT)

## 2024-03-10 PROCEDURE — 86706 HEP B SURFACE ANTIBODY: CPT | Performed by: INTERNAL MEDICINE

## 2024-03-10 PROCEDURE — 99900035 HC TECH TIME PER 15 MIN (STAT)

## 2024-03-10 PROCEDURE — 85730 THROMBOPLASTIN TIME PARTIAL: CPT | Performed by: EMERGENCY MEDICINE

## 2024-03-10 PROCEDURE — 36415 COLL VENOUS BLD VENIPUNCTURE: CPT | Performed by: EMERGENCY MEDICINE

## 2024-03-10 PROCEDURE — 94760 N-INVAS EAR/PLS OXIMETRY 1: CPT

## 2024-03-10 PROCEDURE — 85025 COMPLETE CBC W/AUTO DIFF WBC: CPT | Performed by: NURSE PRACTITIONER

## 2024-03-10 PROCEDURE — 63600175 PHARM REV CODE 636 W HCPCS: Mod: JZ,JG | Performed by: NURSE PRACTITIONER

## 2024-03-10 PROCEDURE — 87340 HEPATITIS B SURFACE AG IA: CPT | Performed by: INTERNAL MEDICINE

## 2024-03-10 RX ADMIN — SEVELAMER CARBONATE 800 MG: 800 TABLET, FILM COATED ORAL at 11:03

## 2024-03-10 RX ADMIN — HYDROCODONE BITARTRATE AND ACETAMINOPHEN 1 TABLET: 5; 325 TABLET ORAL at 11:03

## 2024-03-10 RX ADMIN — ALPRAZOLAM 0.5 MG: 0.5 TABLET ORAL at 04:03

## 2024-03-10 RX ADMIN — EPOETIN ALFA-EPBX 10000 UNITS: 10000 INJECTION, SOLUTION INTRAVENOUS; SUBCUTANEOUS at 02:03

## 2024-03-10 RX ADMIN — HYDROCODONE BITARTRATE AND ACETAMINOPHEN 1 TABLET: 5; 325 TABLET ORAL at 04:03

## 2024-03-10 RX ADMIN — BENZTROPINE MESYLATE 1 MG: 1 TABLET ORAL at 11:03

## 2024-03-10 RX ADMIN — HYDROCODONE BITARTRATE AND ACETAMINOPHEN 1 TABLET: 5; 325 TABLET ORAL at 05:03

## 2024-03-10 RX ADMIN — SEVELAMER CARBONATE 800 MG: 800 TABLET, FILM COATED ORAL at 09:03

## 2024-03-10 NOTE — PROGRESS NOTES
03/09/24 1955   Required for all Hemodialysis Patients   Hepatitis Status   (unknown)   Handoff Report   Received From Johnie LOMBARDO   Given To Naveed RN   Treatment Type   Treatment Type Maintenance   Vital Signs   Temp 97.8 °F (36.6 °C)   Temp Source Oral   Pulse 76   Heart Rate Source Monitor   Resp 20   SpO2 100 %   Flow (L/min) 3   Device (Oxygen Therapy) nasal cannula   BP (!) 106/53   BP Method Automatic   Patient Position Lying   Assessments (Pre/Post)   Blood Liters Processed (BLP) 41.6   Transport Modality bed   Level of Consciousness (AVPU) alert   Dialyzer Clearance moderately streaked   Pain   Pain Rating (0-10): Rest 0   Post-Hemodialysis Assessment   Rinseback Volume (mL) 250 mL   Blood Volume Processed (Liters) 41.6 L   Dialyzer Clearance Moderately streaked   Duration of Treatment 120 minutes   Additional Fluid Intake (mL) 500 mL   Total UF (mL) 1000 mL   Net Fluid Removal 500   Patient Response to Treatment pt agustin tx   Post-Treatment Weight 54.2 kg (119 lb 7.8 oz)   Treatment Weight Change -0.5   Arterial bleeding stop time (min) 6 min   Venous bleeding stop time (min) 6 min   Post-Hemodialysis Comments no complications with the pt's fistula     Pt agustin tx, 500 ml net uf removed, pt's b/p too low to removed more. No complications with pt's AVF. Pt cannulated x 2 with 15 ga needles easily. When tx complete, needles removed and hemostasis achieved in 8 mins, +B&T

## 2024-03-10 NOTE — NURSING
Report called to Delaney at [facility].  Son, Zachary, has also been updated on plan to discharge.  Awaiting transport at this time

## 2024-03-10 NOTE — PLAN OF CARE
[facility]  Initial Discharge Assessment       Primary Care Provider: Meenakshi, Primary Doctor    Admission Diagnosis: Chest pain [R07.9]    Admission Date: 3/9/2024  Expected Discharge Date: 3/10/2024    Pt is AAOx1. Pt is a resident at [facility]. Pt has dialysis with [facility].     Transition of Care Barriers: Transportation    Payor: MEDICARE / Plan: MEDICARE PART A & B / Product Type: Government /     Extended Emergency Contact Information  Primary Emergency Contact: Derek Gaona  Address: [address] [place] [address] [place] [address] [facility]  Home Phone: [phone]  Mobile Phone: [phone]  Relation: Spouse  Secondary Emergency Contact: Zachary Ortega  Mobile Phone: [phone]  Relation: Son   needed? No    Discharge Plan A: Return to nursing home  Discharge Plan B: Return to Nursing Home      [id number] [place] [facility] [address] AT [facility] [address]  [address]  [facility] [address]  Phone: [phone] Fax: [phone]    [facility] #[id number] - [place] - [address] AT [facility] [address] [facility]  [address] [facility] [place] [address]  Phone: [phone] Fax: [phone]      Initial Assessment (most recent)       Adult Discharge Assessment - 03/10/24 1052          Discharge Assessment    Assessment Type Discharge Planning Assessment     Confirmed/corrected address, phone number and insurance --   Pt is a resident at [address] [address]    Source of Information facility verbal report;health record     Reason For Admission Complication of arteriovenous dialysis fistula     People in Home facility resident     Facility Arrived From: dialysis center     Do you expect to return to your current living situation? Yes     Do you have help at home or someone to help you manage your care at home? Yes     Who are your caregiver(s) and their  phone number(s)? Facility Staff     Prior to hospitilization cognitive status: Unable to Assess     Current cognitive status: Unable to Assess     Walking or Climbing Stairs Difficulty yes     Walking or Climbing Stairs ambulation difficulty, requires equipment     Mobility Management Wheelchair     Dressing/Bathing Difficulty yes     Dressing/Bathing dressing difficulty, assistance 1 person;bathing difficulty, assistance 1 person     Equipment Currently Used at Home wheelchair;hospital bed     Readmission within 30 days? No     Patient currently being followed by outpatient case management? No     Do you currently have service(s) that help you manage your care at home? No     Do you take prescription medications? Yes     Do you have prescription coverage? Yes     Coverage Medicare     Do you have any problems affording any of your prescribed medications? No     Is the patient taking medications as prescribed? yes     Who is going to help you get home at discharge? Facility Staff     How do you get to doctors appointments? agency     Are you on dialysis? Yes     Dialysis Name and Scheduled days [facility]     Do you take coumadin? No     Discharge Plan A Return to nursing home     Discharge Plan B Return to Nursing Home     Discharge Plan discussed with: Caregiver     Name(s) and Number(s) Nurse Baltazar at [facility]     Transition of Care Barriers Transportation

## 2024-03-10 NOTE — CONSULTS
Consult Note  Nephrology    Consult Requested By: Lion Snider MD    Reason for Consult: ESRD, dependent on dialysis    SUBJECTIVE:     History of Present Illness:   85 y/o female patient known to our practice, has out patient dialysis 3x wk on TTS schedule.  She presented to the out patient unit today, got about 5 min of HD, started bleeding uncontollably.  Staff notified EMS and pt was transported to ER, suture placed by ER physicians.    3/9  called Aretha at [facility], states pt had fistulagram on Wednesday w/Dr. Harris.  Awaiting lab results, also ordered US of fistula and awaiting results.  Pt seen and examined in ER.  3/10  HD done yesterday, 2hr tx, 1L UF, no problems w/AVF.  VSS. 2pt drop in Hgb this am.  Denies SOB, chest pain, feels a little weak--but states she always is.  D/w Dr. LIANG, ordered one time dose epo 10K SQ for today, then pt can be discharged back to NH from renal standpoint.    Assessment/plan:    ESRD, dependent on dialysis  AVF bleeding  SHPT  Anemia of chronic dz  HTN    --will order HD on pt's schedule, TTS.  If labs are critical, may need temporary line to run today.  --got sutured in ER and US ordered-done  --cont sevelemer w/meals  --epo w/HD as indicated  --VSS.  Cont home meds    Past Medical History:   Diagnosis Date    Alcoholism     no drink since 1984    Anxiety     Asthma     Bipolar affect, depressed     Bipolar disorder     COPD (chronic obstructive pulmonary disease)     Degenerative disc disease, lumbar 11/22/2019    Dialysis patient     Tue-Thur-Sat    ESRD (end stage renal disease)     Full dentures     GERD (gastroesophageal reflux disease)     Hypertension     Pt states low b/p    Limb alert care status     NO BP/IV LEFT ARM    Pancreatitis     Stroke     Thyroid disease      Past Surgical History:   Procedure Laterality Date    APPENDECTOMY      CATARACT EXTRACTION Right     DIALYSIS FISTULA CREATION      left upper arm    EPIDURAL STEROID INJECTION INTO LUMBAR SPINE  N/A 2019    Procedure: Injection-steroid-epidural-lumbar;  Surgeon: Rosanna Khan MD;  Location: [facility] OR;  Service: Pain Management;  Laterality: N/A;  L5-S1      EPIDURAL STEROID INJECTION INTO LUMBAR SPINE N/A 2/3/2020    Procedure: Injection-steroid-epidural-lumbar;  Surgeon: Rosanna Khan MD;  Location: [facility] OR;  Service: Pain Management;  Laterality: N/A;  L5-S1    EPIDURAL STEROID INJECTION INTO LUMBAR SPINE N/A 2021    Procedure: Injection-steroid-epidural-lumbar;  Surgeon: Rosanna Khan MD;  Location: [facility] OR;  Service: Pain Management;  Laterality: N/A;  thickened fluids only per nursing facility [facility].    EYE SURGERY      FIXATION KYPHOPLASTY N/A 10/18/2019    Procedure: Kyphoplasty;  Surgeon: Rosanna Khan MD;  Location: [facility] OR;  Service: Pain Management;  Laterality: N/A;  L2    HYSTERECTOMY      OPEN REDUCTION AND INTERNAL FIXATION (ORIF) OF INJURY OF FOREARM Left 3/6/2020    Procedure: ORIF, FOREARM;  Surgeon: Tab Mendez MD;  Location: [facility] OR;  Service: Orthopedics;  Laterality: Left;    THYROIDECTOMY      THYROIDECTOMY      TRANSFORAMINAL EPIDURAL INJECTION OF STEROID Right 2021    Procedure: Injection,steroid,epidural,transforaminal approach;  Surgeon: Rosanna Khan MD;  Location: [facility] OR;  Service: Pain Management;  Laterality: Right;  L4-5, L5-S1    TRANSFORAMINAL EPIDURAL INJECTION OF STEROID Right 10/22/2021    Procedure: Injection,steroid,epidural,transforaminal approach Right L4-5, L5-S1;  Surgeon: Rosanna Khan MD;  Location: [facility] OR;  Service: Pain Management;  Laterality: Right;     Family History   Problem Relation Age of Onset    No Known Problems Mother     Diabetes Father     Heart disease Father         blood clot in lung went to heart    Cancer Sister         breast    Stroke Paternal Aunt     Cancer Sister         breast    Cancer Cousin         colon    Diabetes Cousin     Cancer Cousin         colon    COPD Sister          of COPD  "    Social History     Tobacco Use    Smoking status: Former     Current packs/day: 0.00     Types: Cigarettes     Quit date: 10/1/2018     Years since quittin.4    Smokeless tobacco: Never   Substance Use Topics    Alcohol use: No    Drug use: Never       Review of patient's allergies indicates:   Allergen Reactions    Metoprolol Other (See Comments)     Grand-daughter/care giver reported Pt has over reaction to this drug, Bp bottoms out along with heart rate    Abilify [aripiprazole]      dizziness    Codeine Other (See Comments)     Other reaction(s): Unknown "shaky" "crazy" "dizzy" stated per patient          Review of Systems:  Negative unless noted above    OBJECTIVE:     Vital Signs Range (Last 24H):  Temp:  [97.8 °F (36.6 °C)-99 °F (37.2 °C)]   Pulse:  [70-86]   Resp:  [15-28]   BP: ()/(43-89)   SpO2:  [95 %-100 %]     Physical Exam:  General- NAD noted  HEENT- WNL  Neck- supple  CV- Regular rate and rhythm  Resp- Lungs CTA Bilaterally, No increased WOB  GI- Non tender/non-distended, BS normoactive x4 quads  Extrem- No cyanosis, clubbing, edema.  Derm- skin w/d  Neuro-  No flap.     Body mass index is 15.32 kg/m².    Laboratory:  CBC:   Recent Labs   Lab 03/10/24  0408   WBC 4.68   RBC 2.73*   HGB 7.9*   HCT 27.7*      *   MCH 28.9   MCHC 28.5*       CMP:   Recent Labs   Lab 03/10/24  0408   GLU 88   CALCIUM 9.0   ALBUMIN 3.4*   PROT 5.6*      K 4.2   CO2 29      BUN 20   CREATININE 4.4*   ALKPHOS 70   ALT <3*   AST 19   BILITOT 0.3       Diagnostic Results:  Labs: Reviewed        ASSESSMENT/PLAN:     Active Hospital Problems    Diagnosis  POA    *Dialysis AV fistula malfunction [T82.590A]  Yes    ESRD (end stage renal disease) [N18.6]  Yes     Chronic    Chronic obstructive pulmonary disease [J44.9]  Yes     Chronic    GERD (gastroesophageal reflux disease) [K21.9]  Yes     Chronic    Hyperlipidemia [E78.5]  Yes     Chronic      Resolved Hospital Problems   No resolved " problems to display.           Thank you for allowing us to participate in the care of your patient. We will follow the patient and provide recommendations as needed.      Time spent seeing patient( greater than 1/2 spent in direct contact) :    Maria E Piña NP

## 2024-03-10 NOTE — CARE UPDATE
03/10/24 0911   Patient Assessment/Suction   Level of Consciousness (AVPU) alert   Respiratory Effort Normal;Unlabored   Rhythm/Pattern, Respiratory unlabored   PRE-TX-O2   Device (Oxygen Therapy) nasal cannula   $ Is the patient on Low Flow Oxygen? Yes   Flow (L/min) 2   SpO2 96 %   Pulse Oximetry Type Continuous   $ Pulse Oximetry - Multiple Charge Pulse Oximetry - Multiple   Pulse 86   Resp 16   Respiratory Evaluation   $ Care Plan Tech Time 15 min   Home Oxygen   Has Home Oxygen? Yes   Liter Flow 2

## 2024-03-10 NOTE — PLAN OF CARE
03/10/24 1058   STONER Message   Medicare Outpatient and Observation Notification regarding financial responsibility Given to patient/caregiver;Explained to patient/caregiver;Signed/date by patient/caregiver   Date STONER was signed 03/10/24   Time STONER was signed 1052

## 2024-03-10 NOTE — PLAN OF CARE
Patient transfer via bed from HD room to room 3017. Patient AAOx1. Full assessment completed and documented. Tele and bed alarm in place. Condition stable at this time.

## 2024-03-10 NOTE — PLAN OF CARE
03/10/24 1131   Final Note   Assessment Type Final Discharge Note   Anticipated Discharge Disposition [facility]   What phone number can be called within the next 1-3 days to see how you are doing after discharge? [phone]   Post-Acute Status   Discharge Delays None known at this time     Spoke with Delaney at [facility] who advise pt can return. Discharge orders fax to [phone]. Number for report is [phone]. Provide information to nurse.    Patient cleared for discharge from case management standpoint.     Patient discharged home with no further case management needs.

## 2024-03-11 NOTE — NURSING
Patient discharge to [facility] with [facility] transportation. Patient on 2L N/C. Condition stable at time of discharge.

## 2024-03-11 NOTE — HOSPITAL COURSE
Sutures were placed at the location of the bleeding at the AVF site.  Ultrasound showed no abnormality of the fistula.  No aneurysm.  Patient had a two-hour run of HD, and the fistula functioned well.  She stayed overnight to ensure no recurrence of bleeding.  She did well.  HGB dropped a bit, likely from the blood loss from the day before.  Nephrologist ordered a dose of erythropoietin.    Physical Exam  Vitals reviewed.   Constitutional:       Appearance: Normal appearance. She is well-developed.   Eyes:      Extraocular Movements: Extraocular movements intact.      Pupils: Pupils are equal, round, and reactive to light.   Cardiovascular:      Rate and Rhythm: Normal rate and regular rhythm.      Pulses: Normal pulses.      Heart sounds: Normal heart sounds.   Pulmonary:      Effort: Pulmonary effort is normal.      Breath sounds: Normal breath sounds.

## 2024-03-11 NOTE — DISCHARGE SUMMARY
[facility] Medicine  Discharge Summary      Patient Name: Althea Gaona  MRN: [id number]  HO: [id number]  Patient Class: OP- Observation  Admission Date: 3/9/2024  Hospital Length of Stay: 0 days  Discharge Date and Time:  03/10/2024 8:34 PM  Attending Physician: Lion Snider MD   Discharging Provider: Lion Snider MD  Primary Care Provider: Meenakshi, Primary Doctor    Primary Care Team: Networked reference to record PCT     HPI:   84 year old with a past medical history of Hypothyroidism, HLD, GERD, CVA, CAD on plavix, Anemia in CKD, ESRD on HD, COPD on home oxygen, questionable dementia whom presented to the emergency room from [facility] for bleeding dialysis fistula. Patient received 5 minutes of HD today before her fistula began bleeding. EMS was called, a tourniquet was applied to the left arm. Bleeding currently controlled. Tourniquet on since 1135. Patient complains of pain to her left arm and some numbness and tingling in her fingertips.In ER, sutures to LUE AV fistula placed. BP remained stable at 135/74> H/H 9.3/31.7, Plt 156, K 3.9, Cr 5.5, , Troponin I high sensitivity 20.3 with repeat Troponin I 20.7. Chest xray with no acute cardiopulmonary process. US of AV fistula pending. Nephrology consulted in ER. Will admit to hospital medicine for further medical management, plan for HD today, if AV fistula is actively bleeding patient may need temporary line to run HD today. Monitor for overt bleeding, trend h/h. Awaiting US results.    * No surgery found *      Hospital Course:   Sutures were placed at the location of the bleeding at the AVF site.  Ultrasound showed no abnormality of the fistula.  No aneurysm.  Patient had a two-hour run of HD, and the fistula functioned well.  She stayed overnight to ensure no recurrence of bleeding.  She did well.  HGB dropped a bit, likely from the blood loss from the day before.  Nephrologist ordered a dose of  erythropoietin.    Physical Exam  Vitals reviewed.   Constitutional:       Appearance: Normal appearance. She is well-developed.   Eyes:      Extraocular Movements: Extraocular movements intact.      Pupils: Pupils are equal, round, and reactive to light.   Cardiovascular:      Rate and Rhythm: Normal rate and regular rhythm.      Pulses: Normal pulses.      Heart sounds: Normal heart sounds.   Pulmonary:      Effort: Pulmonary effort is normal.      Breath sounds: Normal breath sounds.     Goals of Care Treatment Preferences:  Code Status: Full Code      Consults:     No new Assessment & Plan notes have been filed under this hospital service since the last note was generated.  Service: Hospital Medicine    Final Active Diagnoses:    Diagnosis Date Noted POA    PRINCIPAL PROBLEM:  Complication of arteriovenous dialysis fistula [T82.9XXA] 03/09/2024 Yes    Anemia in ESRD (end-stage renal disease) [N18.6, D63.1] 09/02/2019 Yes    ESRD (end stage renal disease) [N18.6] 08/29/2019 Yes     Chronic    Chronic obstructive pulmonary disease [J44.9] 11/19/2018 Yes     Chronic    GERD (gastroesophageal reflux disease) [K21.9] 08/03/2015 Yes     Chronic    Hyperlipidemia [E78.5] 08/03/2015 Yes     Chronic      Problems Resolved During this Admission:       Discharged Condition: good    Disposition: Home or Self Care    Follow Up:    Patient Instructions:      Diet renal     Activity as tolerated       Significant Diagnostic Studies:     Hemoglobin   Date Value Ref Range Status   03/10/2024 7.9 (L) 12.0 - 16.0 g/dL Final   03/09/2024 9.3 (L) 12.0 - 16.0 g/dL Final   03/10/2023 10.4 (L) 12.0 - 16.0 g/dL Final         Pending Diagnostic Studies:       Procedure Component Value Units Date/Time    Hepatitis B surface antibody [[id number]] Collected: 03/10/24 0408    Order Status: Sent Lab Status: In process Updated: 03/10/24 0501    Specimen: Blood     Hepatitis B surface antigen [[id number]] Collected: 03/10/24 0408    Order  Status: Sent Lab Status: In process Updated: 03/10/24 0509    Specimen: Blood            Medications:  Reconciled Home Medications:      Medication List        CHANGE how you take these medications      busPIRone 10 MG tablet  Commonly known as: BUSPAR  Take 1 tablet (10 mg total) by mouth 3 (three) times daily.  What changed: how much to take            CONTINUE taking these medications      acetaminophen 500 MG tablet  Commonly known as: TYLENOL  Take 500 mg by mouth every 6 (six) hours as needed for Pain.     alendronate 70 MG tablet  Commonly known as: FOSAMAX  Take 70 mg by mouth every 7 days.     ALPRAZolam 0.5 MG tablet  Commonly known as: XANAX  Take 0.5 mg by mouth 2 (two) times daily as needed for Anxiety.     ARIPiprazole 2 MG Tab  Commonly known as: ABILIFY  Take 1 tablet (2 mg total) by mouth every evening.     benztropine 1 MG tablet  Commonly known as: COGENTIN  Take 1 tablet (1 mg total) by mouth 2 (two) times daily.     BIOFREEZE (MENTHOL) 4 % Gel  Generic drug: menthol  Apply topically. Left neck prn     bisacodyL 5 mg EC tablet  Commonly known as: DULCOLAX  Take 1 tablet (5 mg total) by mouth daily as needed for Constipation.     cyproheptadine 4 mg tablet  Commonly known as: PERIACTIN  Take 4 mg by mouth 4 (four) times daily.     DAILY-YANDY per tablet  Generic drug: multivitamin  Take 1 tablet by mouth once daily.     diazePAM 5 MG tablet  Commonly known as: VALIUM  Take 5 mg by mouth as needed for Anxiety. Prior to dental procedure     dicyclomine 10 MG capsule  Commonly known as: BENTYL  dicyclomine 10 mg capsule     famotidine 20 MG tablet  Commonly known as: PEPCID  Take 20 mg by mouth every evening.     FLUoxetine 20 MG capsule  Take 1 capsule (20 mg total) by mouth once daily.     HYDROcodone-acetaminophen 5-325 mg per tablet  Commonly known as: NORCO  Take 1 tablet by mouth every 6 (six) hours as needed for Pain.     levothyroxine 137 MCG Tab tablet  Commonly known as: SYNTHROID  Take 137  mcg by mouth before breakfast.     midodrine 10 MG tablet  Commonly known as: PROAMATINE  Take 10 mg by mouth every 7 days. Prior to dialysis treatment     mirtazapine 15 MG tablet  Commonly known as: REMERON  Take 15 mg by mouth nightly.     montelukast 10 mg tablet  Commonly known as: SINGULAIR  Take 1 tablet (10 mg total) by mouth every evening.     ondansetron 8 MG tablet  Commonly known as: ZOFRAN  Take 1 tablet (8 mg total) by mouth every 8 (eight) hours as needed for Nausea.     polyethylene glycol 17 gram Pwpk  Commonly known as: GLYCOLAX  Take 17 g by mouth daily as needed.     rosuvastatin 20 MG tablet  Commonly known as: CRESTOR  Take 1 tablet (20 mg total) by mouth once daily.     sevelamer carbonate 0.8 gram Pwpk  Commonly known as: RENVELA  Take by mouth.     traMADoL 50 mg tablet  Commonly known as: ULTRAM  Take 1 tablet (50 mg total) by mouth every 12 (twelve) hours as needed.     traZODone 100 MG tablet  Commonly known as: DESYREL  Take 100 mg by mouth nightly as needed for Insomnia.            STOP taking these medications      nitrofurantoin (macrocrystal-monohydrate) 100 MG capsule  Commonly known as: MACROBID              Indwelling Lines/Drains at time of discharge:   Lines/Drains/Airways       Drain  Duration                  Hemodialysis AV Fistula Left upper arm -- days                    Time spent on the discharge of patient: 24 minutes         Lion Snider MD  Department of Hospital Medicine  [facility]

## 2024-03-11 NOTE — CARE UPDATE
03/10/24 2000   Patient Assessment/Suction   Level of Consciousness (AVPU) alert   Respiratory Effort Normal;Unlabored   PRE-TX-O2   Device (Oxygen Therapy) nasal cannula   $ Is the patient on Low Flow Oxygen? Yes   Flow (L/min) 2   SpO2 97 %   Pulse Oximetry Type Intermittent   $ Pulse Oximetry - Single Charge Pulse Oximetry - Single   $ Pulse Oximetry - Multiple Charge Pulse Oximetry - Multiple   Pulse 70   Resp 20   Education   $ Education DME Oxygen;15 min

## 2024-03-12 LAB
HBV SURFACE AB SER QL: NON REACTIVE
HBV SURFACE AG SERPL QL IA: NEGATIVE

## 2024-03-15 PROBLEM — N39.0 UTI (URINARY TRACT INFECTION): Status: ACTIVE | Noted: 2024-03-15

## 2024-03-15 PROBLEM — R79.89 TROPONIN I ABOVE REFERENCE RANGE: Status: ACTIVE | Noted: 2024-03-15

## 2024-03-15 PROBLEM — K56.41 FECAL IMPACTION: Status: ACTIVE | Noted: 2024-03-15

## 2024-03-15 PROBLEM — K62.3 RECTAL PROLAPSE: Status: ACTIVE | Noted: 2024-03-15

## 2024-03-15 PROBLEM — G93.41 ACUTE METABOLIC ENCEPHALOPATHY: Status: ACTIVE | Noted: 2024-03-15

## 2024-03-15 PROBLEM — Z71.89 ACP (ADVANCE CARE PLANNING): Status: ACTIVE | Noted: 2024-03-15

## 2024-03-17 PROBLEM — D64.9 SYMPTOMATIC ANEMIA: Status: ACTIVE | Noted: 2019-09-02

## 2024-03-17 PROBLEM — Z75.8 DISCHARGE PLANNING ISSUES: Status: ACTIVE | Noted: 2024-03-17

## 2024-03-18 PROBLEM — E43 SEVERE MALNUTRITION: Status: ACTIVE | Noted: 2024-03-18

## 2024-03-25 LAB
OHS QRS DURATION: 80 MS
OHS QTC CALCULATION: 448 MS

## 2024-04-01 ENCOUNTER — HOSPITAL ENCOUNTER (OUTPATIENT)
Dept: PREADMISSION TESTING | Facility: HOSPITAL | Age: 84
Discharge: HOME | End: 2024-04-01
Attending: SPECIALIST
Payer: MEDICARE

## 2024-04-01 ENCOUNTER — HOSPITAL ENCOUNTER (OUTPATIENT)
Dept: RADIOLOGY | Facility: HOSPITAL | Age: 84
Discharge: HOME | End: 2024-04-01
Attending: SPECIALIST
Payer: MEDICARE

## 2024-04-01 VITALS
SYSTOLIC BLOOD PRESSURE: 132 MMHG | RESPIRATION RATE: 18 BRPM | WEIGHT: 106 LBS | BODY MASS INDEX: 16.64 KG/M2 | DIASTOLIC BLOOD PRESSURE: 79 MMHG | HEART RATE: 70 BPM | OXYGEN SATURATION: 98 % | HEIGHT: 67 IN

## 2024-04-01 DIAGNOSIS — Z01.818 PREOP TESTING: ICD-10-CM

## 2024-04-01 DIAGNOSIS — Z01.818 PRE-OP TESTING: Primary | ICD-10-CM

## 2024-04-01 DIAGNOSIS — Z51.81 ADMISSION FOR LONG-TERM (CURRENT) USE OF ANTICOAGULANTS: ICD-10-CM

## 2024-04-01 DIAGNOSIS — Z79.01 CHRONIC ANTICOAGULATION: ICD-10-CM

## 2024-04-01 DIAGNOSIS — Z79.01 ADMISSION FOR LONG-TERM (CURRENT) USE OF ANTICOAGULANTS: ICD-10-CM

## 2024-04-01 DIAGNOSIS — Z01.818 PREOP TESTING: Primary | ICD-10-CM

## 2024-04-01 DIAGNOSIS — Z79.01 ANTICOAGULATION MONITORING, INR RANGE 2.5-3.5: ICD-10-CM

## 2024-04-01 RX ORDER — CEFAZOLIN SODIUM 2 G/50ML
2 SOLUTION INTRAVENOUS ONCE
Status: CANCELLED | OUTPATIENT
Start: 2024-04-04

## 2024-04-01 NOTE — PROGRESS NOTES
Pt here for pre op appt-   sent with transportation  from [facility].   Pt son Zachary on the phone for pre op assessment.    assessment completed.   Pt son states that he is trying to have procedure rescheduled due to dialysis on thursdays.  He left message with office and left message States.  I attempted to call office message left for tiffany.    Pre op assessment   instructions reviewed with pt and copy given to pt transport person to give to nursing [place].  Will call [facility] day before with arrival time

## 2024-04-01 NOTE — DISCHARGE INSTRUCTIONS
INSTRUCTIONS  To confirm your doctor has scheduled your surgery for:  4/4/24    maybe rescheduled           Morning of surgery please check in with registration near Parking Garage Entrance then proceed to Outpatient Surgery Department.    Preop nurses will call the afternoon prior to surgery between 4:00 and 6:00 PM with your final arrival time.  PLEASE NOTE:  The surgery schedule has many variables which may affect the time of your surgery case. Family members should be available if your surgery time changes. Plan to be here the day of your procedure between 4-6 hours.    TAKE ONLY THESE MEDICATIONS WITH A SMALL SIP OF WATER THE MORNING OF SURGERY: see list    DO NOT TAKE THESE MEDICATIONS 5-7 DAYS PRIOR to your procedure per your surgeon's request: ASPIRIN, ALEVE, BC powder, CHILANGO SELTZER, IBUPROFEN, FISH OIL, VITAMIN E, OR HERBALS   (May take Tylenol)    If you are prescribed any types of blood thinners (Aspirin, Coumadin, Plavix, Pradaxa, Xarelto, Aggrenox, Effient, Eliquis, Savasya, Brilinta or any other), please ask your surgeon how many days before scheduled procedure should you stop taking them. You may also need to verify with prescribing physician if it is OK to stop your blood thinners.      INSTRUCTIONS IMPORTANT!!  Do not eat or drink anything after midnight.  ONLY if you are diabetic, check your sugar in the morning before your procedure.  Do not smoke, vape or drink alcoholic beverages 24 hours prior to your procedure.  Shower the night before AND the morning of your procedure with a Chlorhexidine wash such as hibiclens or Dial antibacterial soap from neck down. You may use your own shampoo and face wash. This helps your skin to be as bacteria free as possible.  If you wear contact lenses, dentures, hearing aids or glasses, bring a container to put them in and give to a family member.    Please leave all jewelry, piercings and valuables at home.  DO NOT remove hair from the surgery site.   If your  condition changes such as fever, cough, etc, please notify your surgeon.   ONLY if you have been diagnosed with sleep apnea please bring your C-PAP machine.  ONLY if you wear home oxygen please bring your portable oxygen tank the day of your procedure.   ONLY for patients requiring bowel prep, written instructions will be given by your doctor's office.  ONLY if you have a neuro stimulator, please bring the controller with you the morning of surgery  Make arrangements in advance for transportation home by a responsible adult.  You must make arrangements for transportation, TAXI'S, UBER'S OR LYFTS ARE NOT ALLOWED.        If you have any questions about these instructions, call Pre-Op Admit  Nursing at [phone] or the Pre-Op Day Surgery Unit at [phone].

## 2024-06-17 PROBLEM — N39.0 UTI (URINARY TRACT INFECTION): Status: RESOLVED | Noted: 2024-03-15 | Resolved: 2024-06-17

## 2024-10-26 ENCOUNTER — HOSPITAL ENCOUNTER (EMERGENCY)
Facility: HOSPITAL | Age: 84
Discharge: NURSING, REHAB OR LONG-TERM CARE | End: 2024-10-26
Attending: EMERGENCY MEDICINE
Payer: MEDICARE

## 2024-10-26 VITALS
DIASTOLIC BLOOD PRESSURE: 69 MMHG | HEART RATE: 59 BPM | HEIGHT: 67 IN | RESPIRATION RATE: 16 BRPM | OXYGEN SATURATION: 95 % | WEIGHT: 116 LBS | SYSTOLIC BLOOD PRESSURE: 150 MMHG | BODY MASS INDEX: 18.21 KG/M2 | TEMPERATURE: 99 F

## 2024-10-26 DIAGNOSIS — T82.590A DIALYSIS AV FISTULA MALFUNCTION, INITIAL ENCOUNTER: Primary | ICD-10-CM

## 2024-10-26 PROCEDURE — 99283 EMERGENCY DEPT VISIT LOW MDM: CPT

## 2024-10-26 PROCEDURE — 25000003 PHARM REV CODE 250: Performed by: EMERGENCY MEDICINE

## 2024-10-26 RX ORDER — OXYCODONE AND ACETAMINOPHEN 5; 325 MG/1; MG/1
2 TABLET ORAL
Status: COMPLETED | OUTPATIENT
Start: 2024-10-26 | End: 2024-10-26

## 2024-10-26 RX ADMIN — OXYCODONE HYDROCHLORIDE AND ACETAMINOPHEN 2 TABLET: 5; 325 TABLET ORAL at 04:10

## 2024-10-30 NOTE — ASSESSMENT & PLAN NOTE
Fell in the room 9/4/19 at 12:20 am, pt reported she needed to go to the rest room and got out without assistance and tripped over her cane at the bedside  Xray lumbar shows mild compression fracture L2  Norco and IV morphone 1 mg on board  Consulted Ortho, been evaluated and recommended LTSO Brace. recommended also pt f/u with pain mx as out pt for kyphoplasty if indicated at that point  PT/OT to cont therapy while awaiting placement  Pt is high risk for falls, hx of 2 falls at home before admit as well  Confusion is a contributing factor as pt mentioned she was confused the night she fell   Reason For Visit:   Chief Complaint   Patient presents with    Consult     Left forearm injury        Primary MD: Sherin Ref-Primary, Physician  Ref. MD: Sarah     Age: 22 year old    ?  No      LMP 06/04/2024       Pain Assessment  Patient Currently in Pain: Yes  Patient's Stated Pain Goal: 6  0-10 Pain Scale: 6  Primary Pain Location:  (Forearm)    Hand Dominance Evaluation  Hand Dominance: Right          QuickDASH Assessment      10/30/2024    10:33 AM   QuickDASH Main   1. Open a tight or new jar Mild difficulty   2. Do heavy household chores (e.g., wash walls, floors) No difficulty   3. Carry a shopping bag or briefcase Moderate difficulty   4. Wash your back No difficulty   5. Use a knife to cut food No difficulty   6. Recreational activities in which you take some force or impact through your arm, shoulder or hand (e.g., golf, hammering, tennis, etc.) Mild difficulty   7. During the past week, to what extent has your arm, shoulder or hand problem interfered with your normal social activities with family, friends, neighbours or groups Slightly   8. During the past week, were you limited in your work or other regular daily activities as a result of your arm, shoulder or hand problem Slightly limited   9. Arm, shoulder or hand pain Moderate   10.Tingling (pins and needles) in your arm,shoulder or hand Mild   11. During the past week, how much difficulty have you had sleeping because of the pain in your arm, shoulder or hand No difficulty   Quickdash Ability Score 20.45          Current Outpatient Medications   Medication Sig Dispense Refill    albuterol (PROAIR HFA/PROVENTIL HFA/VENTOLIN HFA) 108 (90 Base) MCG/ACT inhaler Inhale 2 puffs into the lungs every 6 hours as needed for shortness of breath / dyspnea or wheezing (Patient not taking: Reported on 10/30/2024) 18 g 0    diclofenac (VOLTAREN) 75 MG EC tablet Take 1 tablet (75 mg) by mouth 2 times daily 42 tablet 1    diclofenac (VOLTAREN) 75 MG EC tablet  Take 1 tablet (75 mg) by mouth 2 times daily as needed for moderate pain 40 tablet 1       No Known Allergies    Edy Lal, CMA

## 2024-12-05 NOTE — TELEPHONE ENCOUNTER
----- Message from Tonja Barrios sent at 12/16/2019 11:50 AM CST -----  anxiety medication   [facility]   Pt [phone]    Patient/Caregiver provided printed discharge information.

## 2025-05-11 NOTE — ED PROVIDER NOTES
EMERGENCY DEPARTMENT ENCOUNTER    Room Number:  17/17  PCP: Provider, No Known  Historian: Patient      HPI:  Chief Complaint: Shortness of breath  A complete HPI/ROS/PMH/PSH/SH/FH are unobtainable due to: None  Context: Domingo Sears is a 40 y.o. male who presents to the ED c/o sudden onset of shortness of breath that began yesterday and has been significantly worsening throughout the day today.  He reports a history of asthma and states that this feels similar to his typical asthma events.  He had run out of his rescue inhaler so has not had any albuterol to treat this thus far.  He complains of a associated nonproductive cough but denies fever/chills, back pain, nausea/vomiting, or known sick contacts.  He denies overt chest pain but does state that he has chest tightness along with the symptoms.  Symptoms currently are at least moderate if not severe in intensity.            PAST MEDICAL HISTORY  Active Ambulatory Problems     Diagnosis Date Noted    Asthma exacerbation 11/14/2024    Coronavirus infection 11/14/2024    Viral pneumonia 11/14/2024     Resolved Ambulatory Problems     Diagnosis Date Noted    Acute respiratory failure with hypoxia 11/14/2024     Past Medical History:   Diagnosis Date    Asthma          PAST SURGICAL HISTORY  History reviewed. No pertinent surgical history.      FAMILY HISTORY  History reviewed. No pertinent family history.      SOCIAL HISTORY  Social History     Socioeconomic History    Marital status: Single   Tobacco Use    Smoking status: Never   Vaping Use    Vaping status: Never Used   Substance and Sexual Activity    Drug use: Never         ALLERGIES  Patient has no known allergies.        REVIEW OF SYSTEMS  Review of Systems   Constitutional:  Negative for activity change, appetite change and fever.   HENT:  Negative for congestion and sore throat.    Eyes: Negative.    Respiratory:  Positive for cough and shortness of breath.    Cardiovascular:  Positive for chest pain.  Encounter Date: 2/23/2020       History     Chief Complaint   Patient presents with    Fall     LEFT ARM DEFORMITY     HPI   80-year-old female with past medical history as listed below presents to the ER with left arm deformity status post fall from bed.  Patient denies loss of consciousness but states she landed on her left arm and her chronic.  Patient complains of aching sharp pain in her left distal arm made worse with palpation or attempted movement.  Patient denies numbness, tingling or weakness distal to injury. When EMS arrived they noted an obvious deformity and placed patient's hand in a splint.  Patient's  states patient has been falling more frequently.  Review of patient's allergies indicates:   Allergen Reactions    Metoprolol Other (See Comments)     Grand-daughter/care giver reported Pt has over reaction to this drug, Bp bottoms out along with heart rate    Codeine      Other reaction(s): Unknown     Past Medical History:   Diagnosis Date    Alcoholism     no drink since 1984    Anxiety     Asthma     Bipolar affect, depressed     Bipolar disorder     COPD (chronic obstructive pulmonary disease)     Degenerative disc disease, lumbar 11/22/2019    Dialysis patient     ESRD (end stage renal disease)     GERD (gastroesophageal reflux disease)     Hypertension     Limb alert care status     NO BP/IV LEFT ARM    Pancreatitis     Stroke     Thyroid disease      Past Surgical History:   Procedure Laterality Date    APPENDECTOMY      CATARACT EXTRACTION Right     DIALYSIS FISTULA CREATION      left upper arm    EPIDURAL STEROID INJECTION INTO LUMBAR SPINE N/A 11/22/2019    Procedure: Injection-steroid-epidural-lumbar;  Surgeon: Rosanna Khan MD;  Location: [facility];  Service: Pain Management;  Laterality: N/A;  L5-S1      EPIDURAL STEROID INJECTION INTO LUMBAR SPINE N/A 2/3/2020    Procedure: Injection-steroid-epidural-lumbar;  Surgeon: Rosanna Khan MD;  Location: [facility]  Negative for leg swelling.   Gastrointestinal:  Negative for abdominal pain, diarrhea and vomiting.   Endocrine: Negative.    Genitourinary:  Negative for decreased urine volume and dysuria.   Musculoskeletal:  Negative for neck pain.   Skin:  Negative for rash and wound.   Allergic/Immunologic: Negative.    Neurological:  Negative for weakness, numbness and headaches.   Hematological: Negative.    Psychiatric/Behavioral: Negative.     All other systems reviewed and are negative.           PHYSICAL EXAM  ED Triage Vitals   Temp Heart Rate Resp BP SpO2   05/11/25 0019 05/11/25 0019 05/11/25 0019 05/11/25 0025 05/11/25 0019   99.3 °F (37.4 °C) (!) 129 24 117/77 92 %      Temp src Heart Rate Source Patient Position BP Location FiO2 (%)   -- -- -- 05/11/25 0025 --      Left arm        Physical Exam  Constitutional:       General: He is in acute distress.      Appearance: Normal appearance. He is not ill-appearing or toxic-appearing.   HENT:      Head: Normocephalic and atraumatic.   Eyes:      Extraocular Movements: Extraocular movements intact.      Pupils: Pupils are equal, round, and reactive to light.   Cardiovascular:      Rate and Rhythm: Regular rhythm. Tachycardia present.      Heart sounds: No murmur heard.     No friction rub. No gallop.   Pulmonary:      Effort: Tachypnea and respiratory distress present.      Breath sounds: Wheezing present.      Comments: Diffuse expiratory wheezes  Abdominal:      General: Abdomen is flat. There is no distension.      Palpations: Abdomen is soft.      Tenderness: There is no abdominal tenderness.   Musculoskeletal:         General: No swelling or tenderness. Normal range of motion.      Cervical back: Normal range of motion and neck supple.   Skin:     General: Skin is warm and dry.   Neurological:      General: No focal deficit present.      Mental Status: He is alert and oriented to person, place, and time.      Sensory: No sensory deficit.      Motor: No weakness.  OR;  Service: Pain Management;  Laterality: N/A;  L5-S1    EYE SURGERY      FIXATION KYPHOPLASTY N/A 10/18/2019    Procedure: Kyphoplasty;  Surgeon: Rosanna Khan MD;  Location: [facility];  Service: Pain Management;  Laterality: N/A;  L2    HYSTERECTOMY      THYROIDECTOMY      THYROIDECTOMY       Family History   Problem Relation Age of Onset    No Known Problems Mother     Diabetes Father     Heart disease Father         blood clot in lung went to heart    Cancer Sister         breast    Stroke Paternal Aunt     Cancer Sister         breast    Cancer Cousin         colon    Diabetes Cousin     Cancer Cousin         colon    COPD Sister          of COPD     Social History     Tobacco Use    Smoking status: Former Smoker     Packs/day: 1.00     Types: Cigarettes     Last attempt to quit: 10/1/2018     Years since quittin.4    Smokeless tobacco: Never Used   Substance Use Topics    Alcohol use: No    Drug use: Never     Review of Systems   Constitutional: Negative for appetite change, chills, diaphoresis and fever.   HENT: Negative for ear pain, rhinorrhea, sore throat, trouble swallowing and voice change.    Eyes: Negative for pain, discharge, redness and visual disturbance.   Respiratory: Negative for cough, chest tightness and shortness of breath.    Cardiovascular: Negative for chest pain and leg swelling.   Gastrointestinal: Negative for abdominal pain, constipation, diarrhea, nausea and vomiting.   Genitourinary: Negative for difficulty urinating, dysuria, flank pain, frequency and urgency.   Musculoskeletal: Negative for arthralgias, back pain and myalgias.   Skin: Negative for rash.   Neurological: Negative for dizziness, syncope, speech difficulty, weakness, light-headedness, numbness and headaches.       Physical Exam     Initial Vitals [20 0417]   BP Pulse Resp Temp SpO2   132/80 75 18 98.1 °F (36.7 °C) 98 %      MAP       --         Physical Exam    Nursing note and vitals    Psychiatric:         Mood and Affect: Mood normal.         Behavior: Behavior normal.           Vital signs and nursing notes reviewed.          LAB RESULTS  Recent Results (from the past 24 hours)   ECG 12 Lead ED Triage Standing Order; SOA    Collection Time: 05/11/25 12:23 AM   Result Value Ref Range    QT Interval 321 ms    QTC Interval 454 ms   Comprehensive Metabolic Panel    Collection Time: 05/11/25 12:34 AM    Specimen: Blood   Result Value Ref Range    Glucose 116 (H) 65 - 99 mg/dL    BUN 15 6 - 20 mg/dL    Creatinine 1.15 0.76 - 1.27 mg/dL    Sodium 138 136 - 145 mmol/L    Potassium 3.5 3.5 - 5.2 mmol/L    Chloride 102 98 - 107 mmol/L    CO2 27.1 22.0 - 29.0 mmol/L    Calcium 8.6 8.6 - 10.5 mg/dL    Total Protein 6.7 6.0 - 8.5 g/dL    Albumin 4.0 3.5 - 5.2 g/dL    ALT (SGPT) 14 1 - 41 U/L    AST (SGOT) 18 1 - 40 U/L    Alkaline Phosphatase 101 39 - 117 U/L    Total Bilirubin 0.5 0.0 - 1.2 mg/dL    Globulin 2.7 gm/dL    A/G Ratio 1.5 g/dL    BUN/Creatinine Ratio 13.0 7.0 - 25.0    Anion Gap 8.9 5.0 - 15.0 mmol/L    eGFR 83.5 >60.0 mL/min/1.73   Green Top (Gel)    Collection Time: 05/11/25 12:34 AM   Result Value Ref Range    Extra Tube Hold for add-ons.    Lavender Top    Collection Time: 05/11/25 12:34 AM   Result Value Ref Range    Extra Tube hold for add-on    Gold Top - SST    Collection Time: 05/11/25 12:34 AM   Result Value Ref Range    Extra Tube Hold for add-ons.    Light Blue Top    Collection Time: 05/11/25 12:34 AM   Result Value Ref Range    Extra Tube Hold for add-ons.    CBC Auto Differential    Collection Time: 05/11/25 12:34 AM    Specimen: Blood   Result Value Ref Range    WBC 10.53 3.40 - 10.80 10*3/mm3    RBC 5.49 4.14 - 5.80 10*6/mm3    Hemoglobin 15.4 13.0 - 17.7 g/dL    Hematocrit 45.4 37.5 - 51.0 %    MCV 82.7 79.0 - 97.0 fL    MCH 28.1 26.6 - 33.0 pg    MCHC 33.9 31.5 - 35.7 g/dL    RDW 14.7 12.3 - 15.4 %    RDW-SD 44.5 37.0 - 54.0 fl    MPV 10.8 6.0 - 12.0 fL    Platelets 206 140  reviewed.  Constitutional: She appears well-developed and well-nourished. She is not diaphoretic. She is cooperative.  Non-toxic appearance. She does not have a sickly appearance. She does not appear ill. No distress.   HENT:   Head: Normocephalic and atraumatic. Head is without abrasion, without right periorbital erythema and without left periorbital erythema.   Right Ear: Hearing and external ear normal. No drainage or tenderness.   Left Ear: Hearing and external ear normal. No drainage or tenderness.   Nose: No rhinorrhea, sinus tenderness or nasal septal hematoma. No epistaxis.  No foreign bodies. Right sinus exhibits no frontal sinus tenderness. Left sinus exhibits no frontal sinus tenderness.   Mouth/Throat: Uvula is midline and mucous membranes are normal. No oral lesions. No trismus in the jaw. No dental abscesses or uvula swelling. No oropharyngeal exudate, posterior oropharyngeal edema, posterior oropharyngeal erythema or tonsillar abscesses.   Eyes: Lids are normal. Pupils are equal, round, and reactive to light. Right eye exhibits no chemosis, no discharge and no exudate. Left eye exhibits no chemosis, no discharge and no exudate. Right conjunctiva is not injected. Right conjunctiva has no hemorrhage. Left conjunctiva is not injected. Left conjunctiva has no hemorrhage. No scleral icterus. Right eye exhibits normal extraocular motion. Left eye exhibits normal extraocular motion.   Neck: Trachea normal and normal range of motion. Neck supple. No stridor present. No spinous process tenderness and no muscular tenderness present. No tracheal deviation and no edema present. No neck rigidity. No JVD present.   Cardiovascular: Regular rhythm, normal heart sounds and normal pulses.   Pulmonary/Chest: Breath sounds normal.   Abdominal: Soft. Bowel sounds are normal. She exhibits no distension, no ascites and no mass. There is no hepatosplenomegaly. There is no tenderness. There is no rigidity, no rebound, no  - 450 10*3/mm3    Neutrophil % 63.2 42.7 - 76.0 %    Lymphocyte % 23.6 19.6 - 45.3 %    Monocyte % 6.3 5.0 - 12.0 %    Eosinophil % 6.3 (H) 0.3 - 6.2 %    Basophil % 0.3 0.0 - 1.5 %    Immature Grans % 0.3 0.0 - 0.5 %    Neutrophils, Absolute 6.67 1.70 - 7.00 10*3/mm3    Lymphocytes, Absolute 2.48 0.70 - 3.10 10*3/mm3    Monocytes, Absolute 0.66 0.10 - 0.90 10*3/mm3    Eosinophils, Absolute 0.66 (H) 0.00 - 0.40 10*3/mm3    Basophils, Absolute 0.03 0.00 - 0.20 10*3/mm3    Immature Grans, Absolute 0.03 0.00 - 0.05 10*3/mm3    nRBC 0.0 0.0 - 0.2 /100 WBC       Ordered the above labs and reviewed the results.        RADIOLOGY  XR Chest 1 View  Result Date: 5/11/2025  SINGLE VIEW OF THE CHEST  HISTORY: Shortness of air  COMPARISON: November 14, 2024  FINDINGS: Heart size is within normal limits. No pneumothorax, pleural effusion, or acute infiltrate is seen.      No acute findings.  This report was finalized on 5/11/2025 1:09 AM by Dr. Daniela Cevallos M.D on Workstation: [id number]        Ordered the above noted radiological studies. Reviewed by me in PACS.            PROCEDURES  Critical Care    Performed by: Bao Covington MD  Authorized by: Bao Covington MD    Critical care provider statement:     Critical care time (minutes):  31    Critical care time was exclusive of:  Separately billable procedures and treating other patients    Critical care was necessary to treat or prevent imminent or life-threatening deterioration of the following conditions:  Respiratory failure    Critical care was time spent personally by me on the following activities:  Blood draw for specimens, development of treatment plan with patient or surrogate, discussions with consultants, evaluation of patient's response to treatment, examination of patient, obtaining history from patient or surrogate, ordering and performing treatments and interventions, ordering and review of laboratory studies, ordering and review of  guarding and no CVA tenderness. Hernia confirmed negative in the ventral area.   Musculoskeletal: Normal range of motion.        Left forearm: She exhibits tenderness, bony tenderness and deformity.   Lymphadenopathy:     She has no cervical adenopathy.   Neurological: She is alert. She has normal strength. No cranial nerve deficit or sensory deficit.   Skin: Skin is warm. Capillary refill takes less than 2 seconds. No ecchymosis, no lesion and no rash noted. No erythema.        Psychiatric: She has a normal mood and affect. Her speech is normal and behavior is normal. Judgment and thought content normal.         ED Course   Orthopedic Injury  Date/Time: 2/23/2020 6:17 AM  Performed by: Zachary Macias MD  Authorized by: Zachary Macias MD     Location procedure was performed:  [facility] EMERGENCY DEPARTMENT  Assisting Provider:  Zachary Macias MD  Pre-operative diagnosis:  Distal radial fracture  Post-operative diagnosis:  Partially reduced distal radial fracture  Consent Done?:  Yes  Universal Protocol:     Verbal consent obtained?: Yes      Consent given by:  Patient    Patient states understanding of procedure being performed: Yes      Relevant documents present and verified: Yes      Test results available and properly labeled: Yes      Imaging studies available: Yes      Patient identity confirmed:  Verbally with patient  Injury:     Injury location:  Wrist    Location details:  Left wrist    Injury type:  Fracture-dislocation      Pre-procedure assessment:     Neurovascular status: Neurovascularly intact      Distal perfusion: normal      Neurological function: normal      Range of motion: reduced      Local anesthesia used?: Yes      Anesthesia:  Local infiltration    Local anesthetic:  Lidocaine 1% without epinephrine    Anesthetic total (ml):  8    Patient sedated?: No        Procedure details:     Description of findings:  Na  Selections made in this section will also lock the Injury type section above.:      Immobilization:  Splint    Technical Procedures Used:  Traction    Significant surgical tasks conducted by the assistant(s):  Na    Complications: No      Estimated blood loss (mL):  0    Specimens: No      Implants: No    Post-procedure assessment:     Neurovascular status: Neurovascularly intact      Distal perfusion: normal      Neurological function: normal      Range of motion: normal      Patient tolerance:  Patient tolerated the procedure well with no immediate complications     Hematoma block performed on fracture.  Patient's left fingers replaced in finger traps attempted reduction performed in splint placed.  Minor reduction of fracture fragments noted on repeat x-ray.      Labs Reviewed - No data to display       Imaging Results          X-Ray Wrist 2 View Left (Final result)  Result time 02/23/20 06:03:36   Procedure changed from X-Ray Wrist Complete Left     Final result by Tab Manuel MD (02/23/20 06:02:02)                 Impression:      Transverse fracture with lateral displacement of the distal radius.  Fracture line is at the level of the proximal portion of the radial fixation plate.      Electronically signed by: Tab Manuel MD  Date:    02/23/2020  Time:    06:02             Narrative:    EXAMINATION:  XR WRIST 2 VIEW LEFT    CLINICAL HISTORY:  post reduction; Personal history of (healed) traumatic fracture    FINDINGS:  AP and lateral radiographs of the left wrist demonstrate a transverse fracture at the distal diaphysis of the radius, with 6 mm of lateral displacement.  Plate screw fixation of the distal radius is demonstrated.  This fracture is at the level of the proximal fixation plate.  Mild radiocarpal degenerative changes noted.  Regional soft tissue swelling noted.  Overlying cast material demonstrated.                               X-Ray Forearm Left (Final result)  Result time 02/23/20 06:11:01    Final result by Tab Manuel MD (02/23/20 06:11:01)                  radiographic studies, pulse oximetry, re-evaluation of patient's condition and review of old charts    Care discussed with: admitting provider        EKG independently interpreted by myself as follows:    EKG          EKG time: 1223  Rhythm/Rate: sinus tachycardia, 120  P waves and MO: nml  QRS, axis: widened, RBBB, LAFB, LAD   ST and T waves: nml     Interpreted Contemporaneously by me, independently viewed  unchanged compared to prior 11/14/24            MEDICATIONS GIVEN IN ER  Medications   sodium chloride 0.9 % flush 10 mL (has no administration in time range)   ipratropium-albuterol (DUO-NEB) nebulizer solution 3 mL (has no administration in time range)   ipratropium-albuterol (DUO-NEB) nebulizer solution 3 mL (3 mL Nebulization Given 5/11/25 0042)   methylPREDNISolone sodium succinate (SOLU-Medrol) injection 125 mg (125 mg Intravenous Given 5/11/25 0038)                   MEDICAL DECISION MAKING, PROGRESS, and CONSULTS    All labs have been independently reviewed by me.  All radiology studies have been reviewed by me and I have also reviewed the radiology report.   EKGs independently viewed and interpreted by me.  Discussion below represents my analysis of pertinent findings related to patient's condition, differential diagnosis, treatment plan and final disposition.      Additional sources:  - Discussed/ obtained information from independent historians: History obtained from patient himself at bedside.    - External (non-ED) record review: Upon medical records review, the patient was admitted to the hospital from 11/14/2024 through 11/15/2024 for a viral pneumonia with associated asthma exacerbation.    - Chronic or social conditions impacting care: Chronic asthma    - Shared decision making: Admission decision based on shared conversations abdomen myself, the patient and family at bedside, as well as MARGIE Zavala for [facility].      Orders placed during this visit:  Orders Placed This Encounter    Procedures    Critical Care    XR Chest 1 View    [place] Draw    Comprehensive Metabolic Panel    CBC Auto Differential    NPO Diet NPO Type: Strict NPO    Undress & Gown    Continuous Pulse Oximetry    Vital Signs    [facility] (on-call MD unless specified) Details    Oxygen Therapy- Nasal Cannula; Titrate 1-6 LPM Per SpO2; 90 - 95%    ECG 12 Lead ED Triage Standing Order; SOA    Telemetry Scan    Insert Peripheral IV    CBC & Differential    Green Top (Gel)    Lavender Top    Gold Top - SST    Light Blue Top           Differential diagnosis includes but is not limited to:    Asthma with acute exacerbation, COPD with acute exacerbation, pneumonia, pulmonary embolism, pneumothorax, or pulmonary edema      Independent interpretation of labs, radiology studies, and discussions with consultants:    Chest x-ray independently interpreted by myself with my interpretation showing no cardiomegaly nor edema, infiltrate, or pneumothorax.      ED Course as of 05/11/25 0200   Sun May 11, 2025   0123 The patient is in mild to moderate respiratory distress and on room air at one point here his oxygen saturations were 88%.  We will place him on oxygen and reassess post nebulizer treatment. [BM]   0133 On reevaluation, the patient continues to be in mild to moderate respiratory distress with oxygen saturations of 93% on 3 to 4 L by nasal cannula.  Upon auscultation, he continues to have coarse expiratory wheezes throughout all lung fields.  I informed him of the workup thus far is fairly unremarkable however he is going to be admitted to the hospital today due to the increased oxygen requirement as well as for further treatment of his acute asthma exacerbation.  He and his family agree with that plan and all questions answered. [BM]   0159 The patient's presentation, workup, as well as diagnosis and treatment plan was discussed at length with MARGIE Zavala for [facility].  She agrees to admit the patient to the hospital today for   Impression:      Transverse fracture through the distal radius as described.      Electronically signed by: Tab Manuel MD  Date:    02/23/2020  Time:    06:11             Narrative:    EXAMINATION:  XR FOREARM LEFT    FINDINGS:  AP and lateral radiographs of the left forearm demonstrate a transverse fracture through the distal radius at the level of the distal radial orthopedic hardware are proximal plate.  There is 6 mm of lateral displacement and apex palmar angulation.  Regional soft tissue swelling noted.  Mild radiocarpal degeneration.                               X-Ray Wrist 2 View Left (Final result)  Result time 02/23/20 06:03:44   Procedure changed from X-Ray Wrist Complete Left     Final result by Tab Manuel MD (02/23/20 06:03:15)                 Impression:      As above.      Electronically signed by: Tab Manuel MD  Date:    02/23/2020  Time:    06:03             Narrative:    EXAMINATION:  XR WRIST 2 VIEW LEFT    CLINICAL HISTORY:  pain after fall; Pain, unspecified    FINDINGS:  AP and lateral radiographs of the left wrist demonstrate a transverse fracture through the distal radius with 6 mm of lateral displacement and apex palmar angulation.  The fracture is at the level of the proximal radial fixation plate.  Radial plate and screw fixation is intact.  Regional soft tissue swelling noted.                                 Medical Decision Making:   Initial Assessment:   Review of patient's chart shows the patient has ambulatory dysfunction and has been seen and admitted for deconditioning multiple times.  Will speak with social work to see if we can arrange for home health and PT OT at home.   states patient has a walker at home but does not use it in fact tripped over it.  Patient also has a wheelchair at home.  Will have patient use wheelchair.                    ED Course as of Feb 24 0353   Sun Feb 23, 2020   0532 No significant reduction of fracture.  Patient splint and will  Toña. [BM]      ED Course User Index  [BM] Bao Covington MD           DIAGNOSIS  Final diagnoses:   Acute respiratory failure with hypoxia   Severe asthma with exacerbation, unspecified whether persistent   Sinus tachycardia         DISPOSITION  ADMISSION    Discussed treatment plan and reason for admission with pt/family and admitting physician.  Pt/family voiced understanding of the plan for admission for further testing/treatment as needed.               Latest Documented Vital Signs:  As of 02:00 EDT  BP- 117/77 HR- (!) 129 Temp- 99.3 °F (37.4 °C) O2 sat- 92%              --    Please note that portions of this were completed with a voice recognition program.       Note Disclaimer: At [facility], we believe that sharing information builds trust and better relationships. You are receiving this note because you are receiving care at [facility] or recently visited. It is possible you will see health information before a provider has talked with you about it. This kind of information can be easy to misunderstand. To help you fully understand what it means for your health, we urge you to discuss this note with your provider.             Bao Covington MD  05/11/25 6888     have patient follow up with Orthopedic surgery.    [MP]   0605 Patient neurovascular intact. Spoke with  who raises concerns the patient is intermittently noncompliant and often times needs frequent redirection.  Will speak with social work to see if we can have additional resources for patient and .   states patient has oxycodone at home which he heavily monitors make sure she is not abusing.    [MP]   0607 Called and left a message for Jamey Royal  to help assist set up PT OT services at home if possible.    [MP]      ED Course User Index  [MP] Zachary Macias MD                Clinical Impression:       ICD-10-CM ICD-9-CM   1. Fall, initial encounter W19.XXXA E888.9   2. Pain R52 780.96   3. H/O reduction of closed fracture Z87.81 V15.51                             Zachary Macias MD  02/24/20 0353

## (undated) DEVICE — SEE MEDLINE ITEM 152530

## (undated) DEVICE — TUBING MINIBORE EXTENSION

## (undated) DEVICE — SYR 10CC LUER LOCK

## (undated) DEVICE — GLOVE SURG ULTRA TOUCH 7.5

## (undated) DEVICE — SKINMARKER W/RULER DEVON

## (undated) DEVICE — SEE MEDLINE ITEM 157171

## (undated) DEVICE — DRAPE C ARM 42 X 120 10/BX

## (undated) DEVICE — SPONGE GAUZE 16PLY 4X4

## (undated) DEVICE — SEE MEDLINE ITEM 157117

## (undated) DEVICE — SLING ORTHOPEDIC MEDIUM

## (undated) DEVICE — NDL HYPODERMIC BLUNT 18G 1.5IN

## (undated) DEVICE — NDL SAFETY 25G X 1.5 ECLIPSE

## (undated) DEVICE — SEE MEDLINE ITEM 146292

## (undated) DEVICE — DRESSING DERMACEA SPNG 10S

## (undated) DEVICE — SEE MEDLINE ITEM 152487

## (undated) DEVICE — SYR GLASS 5CC LUER LOK

## (undated) DEVICE — SUT CTD VICRYL 2.0

## (undated) DEVICE — SCREW BNE LOK HEXLB 3.5X10
Type: IMPLANTABLE DEVICE | Site: ULNA | Status: NON-FUNCTIONAL
Removed: 2020-03-06

## (undated) DEVICE — NDL SPINAL SPINOCAN 22GX3.5

## (undated) DEVICE — SEE MEDLINE ITEM 157131

## (undated) DEVICE — DRESSING N ADH OIL EMUL 3X8 3S

## (undated) DEVICE — CHLORAPREP 10.5 ML APPLICATOR

## (undated) DEVICE — BLADE SURG CARBON STEEL SZ11

## (undated) DEVICE — SYR DISP LL 5CC

## (undated) DEVICE — BIT DRILL QUICK RELEASE 2.8MM

## (undated) DEVICE — SEE MEDLINE ITEM 152622

## (undated) DEVICE — GLOVE SURG ULTRA TOUCH 6

## (undated) DEVICE — CONTAINER SPECIMEN STRL 4OZ

## (undated) DEVICE — APPLICATOR CHLORAPREP ORN 26ML

## (undated) DEVICE — ADHESIVE DERMABOND ADVANCED

## (undated) DEVICE — STRAP OR TABLE 5IN X 72IN

## (undated) DEVICE — SCREW BNE N LOK HEXLB 3.5X14
Type: IMPLANTABLE DEVICE | Site: ULNA | Status: NON-FUNCTIONAL
Removed: 2020-03-06

## (undated) DEVICE — COVER SURG LIGHT HANDLE

## (undated) DEVICE — DRAPE MINOR PROCEDURE

## (undated) DEVICE — GOWN B1 X-LG X-LONG

## (undated) DEVICE — SEE MEDLINE ITEM 153151

## (undated) DEVICE — UNDERGLOVES BIOGEL PI SIZE 7.5

## (undated) DEVICE — DRAPE INCISE IOBAN 2 23X17IN

## (undated) DEVICE — SYS LABEL CORRECT MED

## (undated) DEVICE — SPONGE LAP 18X18 PREWASHED

## (undated) DEVICE — DRILL QUICK RELEASE 2.8MM 5IN

## (undated) DEVICE — GLOVE SURG ULTRA TOUCH 8

## (undated) DEVICE — LINER SUCTION 3000CC

## (undated) DEVICE — GLOVE SURGEONS ULTRA TOUCH 6.5

## (undated) DEVICE — NDL TUOHY EPIDURAL 20G X 3.5

## (undated) DEVICE — SUT MONOCRYL 4-0 PS-2

## (undated) DEVICE — PACK CUSTOM UNIV BASIN SLI

## (undated) DEVICE — SPONGE DERMACEA GAUZE 4X4

## (undated) DEVICE — SUT ETHILON 3/0 18IN PS-1

## (undated) DEVICE — NDL BOX COUNTER

## (undated) DEVICE — SOL 9P NACL IRR PIC IL

## (undated) DEVICE — DRAPE PLASTIC U 60X72

## (undated) DEVICE — KIT FRACTR IVAS ELITE 11G 15MM

## (undated) DEVICE — PAD CAST SPECIALIST STRL 4

## (undated) DEVICE — SPONGE SUPER KERLIX 6X6.75IN

## (undated) DEVICE — ELECTRODE REM PLYHSV RETURN 9

## (undated) DEVICE — SCREW BNE LOK HEXLB 3.5X16
Type: IMPLANTABLE DEVICE | Site: ULNA | Status: NON-FUNCTIONAL
Removed: 2020-03-06

## (undated) DEVICE — PACK BASIC

## (undated) DEVICE — SEE MEDLINE ITEM 152515

## (undated) DEVICE — SEE MEDLINE ITEM 157216

## (undated) DEVICE — TOURNIQUET SB QC DP 18X4IN

## (undated) DEVICE — CORD BIPOLAR 12 FOOT

## (undated) DEVICE — DRAPE STERI-DRAPE 1000 17X11IN

## (undated) DEVICE — BANDAGE ESMARK 6X12

## (undated) DEVICE — PAD CAST SPECIALIST STRL 6

## (undated) DEVICE — BIT DRILL CALIB QUIK REL 2.3

## (undated) DEVICE — SLEEVE SCD EXPRESS CALF MEDIUM